# Patient Record
Sex: FEMALE | Race: WHITE | NOT HISPANIC OR LATINO | Employment: FULL TIME | URBAN - METROPOLITAN AREA
[De-identification: names, ages, dates, MRNs, and addresses within clinical notes are randomized per-mention and may not be internally consistent; named-entity substitution may affect disease eponyms.]

---

## 2017-05-04 ENCOUNTER — HOSPITAL ENCOUNTER (OUTPATIENT)
Dept: RADIOLOGY | Facility: MEDICAL CENTER | Age: 37
DRG: 621 | End: 2017-05-04
Attending: COLON & RECTAL SURGERY
Payer: COMMERCIAL

## 2017-05-04 DIAGNOSIS — Z01.810 PRE-OPERATIVE CARDIOVASCULAR EXAMINATION: ICD-10-CM

## 2017-05-04 DIAGNOSIS — Z01.811 PRE-OPERATIVE RESPIRATORY EXAMINATION: ICD-10-CM

## 2017-05-04 DIAGNOSIS — Z01.812 PRE-OPERATIVE LABORATORY EXAMINATION: ICD-10-CM

## 2017-05-04 LAB
25(OH)D3 SERPL-MCNC: 15 NG/ML (ref 30–100)
ALBUMIN SERPL BCP-MCNC: 4.5 G/DL (ref 3.2–4.9)
ALBUMIN/GLOB SERPL: 1.3 G/DL
ALP SERPL-CCNC: 83 U/L (ref 30–99)
ALT SERPL-CCNC: 22 U/L (ref 2–50)
ANION GAP SERPL CALC-SCNC: 11 MMOL/L (ref 0–11.9)
APTT PPP: 27 SEC (ref 24.7–36)
AST SERPL-CCNC: 22 U/L (ref 12–45)
BILIRUB SERPL-MCNC: 1.7 MG/DL (ref 0.1–1.5)
BUN SERPL-MCNC: 14 MG/DL (ref 8–22)
CALCIUM SERPL-MCNC: 10.2 MG/DL (ref 8.5–10.5)
CHLORIDE SERPL-SCNC: 102 MMOL/L (ref 96–112)
CHOLEST SERPL-MCNC: 203 MG/DL (ref 100–199)
CO2 SERPL-SCNC: 23 MMOL/L (ref 20–33)
CREAT SERPL-MCNC: 0.69 MG/DL (ref 0.5–1.4)
EKG IMPRESSION: NORMAL
ERYTHROCYTE [DISTWIDTH] IN BLOOD BY AUTOMATED COUNT: 42.2 FL (ref 35.9–50)
FERRITIN SERPL-MCNC: 49.6 NG/ML (ref 10–291)
FOLATE SERPL-MCNC: >23.3 NG/ML
GFR SERPL CREATININE-BSD FRML MDRD: >60 ML/MIN/1.73 M 2
GLOBULIN SER CALC-MCNC: 3.6 G/DL (ref 1.9–3.5)
GLUCOSE SERPL-MCNC: 69 MG/DL (ref 65–99)
HCT VFR BLD AUTO: 42.1 % (ref 37–47)
HDLC SERPL-MCNC: 51 MG/DL
HGB BLD-MCNC: 13.9 G/DL (ref 12–16)
INR PPP: 0.93 (ref 0.87–1.13)
IRON SATN MFR SERPL: 16 % (ref 15–55)
IRON SERPL-MCNC: 76 UG/DL (ref 40–170)
LDLC SERPL CALC-MCNC: 130 MG/DL
MCH RBC QN AUTO: 28.2 PG (ref 27–33)
MCHC RBC AUTO-ENTMCNC: 33 G/DL (ref 33.6–35)
MCV RBC AUTO: 85.4 FL (ref 81.4–97.8)
PLATELET # BLD AUTO: 408 K/UL (ref 164–446)
PMV BLD AUTO: 10.8 FL (ref 9–12.9)
POTASSIUM SERPL-SCNC: 3.9 MMOL/L (ref 3.6–5.5)
PREALB SERPL-MCNC: 15 MG/DL (ref 18–38)
PROT SERPL-MCNC: 8.1 G/DL (ref 6–8.2)
PROTHROMBIN TIME: 12.8 SEC (ref 12–14.6)
RBC # BLD AUTO: 4.93 M/UL (ref 4.2–5.4)
SODIUM SERPL-SCNC: 136 MMOL/L (ref 135–145)
TIBC SERPL-MCNC: 476 UG/DL (ref 250–450)
TRANSFERRIN SERPL-MCNC: 336 MG/DL (ref 200–370)
TRIGL SERPL-MCNC: 108 MG/DL (ref 0–149)
VIT B12 SERPL-MCNC: 452 PG/ML (ref 211–911)
WBC # BLD AUTO: 7.4 K/UL (ref 4.8–10.8)

## 2017-05-04 PROCEDURE — 83540 ASSAY OF IRON: CPT

## 2017-05-04 PROCEDURE — 84134 ASSAY OF PREALBUMIN: CPT

## 2017-05-04 PROCEDURE — 36415 COLL VENOUS BLD VENIPUNCTURE: CPT

## 2017-05-04 PROCEDURE — 71010 DX-CHEST-LIMITED (1 VIEW): CPT

## 2017-05-04 PROCEDURE — 82607 VITAMIN B-12: CPT

## 2017-05-04 PROCEDURE — 80053 COMPREHEN METABOLIC PANEL: CPT

## 2017-05-04 PROCEDURE — 82306 VITAMIN D 25 HYDROXY: CPT

## 2017-05-04 PROCEDURE — 82746 ASSAY OF FOLIC ACID SERUM: CPT

## 2017-05-04 PROCEDURE — 83550 IRON BINDING TEST: CPT

## 2017-05-04 PROCEDURE — 80061 LIPID PANEL: CPT

## 2017-05-04 PROCEDURE — 85730 THROMBOPLASTIN TIME PARTIAL: CPT

## 2017-05-04 PROCEDURE — 82728 ASSAY OF FERRITIN: CPT

## 2017-05-04 PROCEDURE — 84425 ASSAY OF VITAMIN B-1: CPT

## 2017-05-04 PROCEDURE — 93005 ELECTROCARDIOGRAM TRACING: CPT

## 2017-05-04 PROCEDURE — 93010 ELECTROCARDIOGRAM REPORT: CPT | Performed by: INTERNAL MEDICINE

## 2017-05-04 PROCEDURE — 85610 PROTHROMBIN TIME: CPT

## 2017-05-04 PROCEDURE — 84466 ASSAY OF TRANSFERRIN: CPT

## 2017-05-04 PROCEDURE — 85027 COMPLETE CBC AUTOMATED: CPT

## 2017-05-04 NOTE — OR NURSING
Pre admit apt: Pt. Instructed to continue regularly prescribed medications through day before surgery.  Instructed to take the following medications, the day of surgery, with a sip of water per anesthesia protocol:nexium

## 2017-05-04 NOTE — OR NURSING
Reviewed patient's medical history with Dr. Caal, based upon information available, Dr. Caal indicates that the patient is a candidate to have the procedure on 5/12/17

## 2017-05-06 LAB — VIT B1 BLD-MCNC: 96 NMOL/L (ref 70–180)

## 2017-05-12 ENCOUNTER — HOSPITAL ENCOUNTER (INPATIENT)
Facility: MEDICAL CENTER | Age: 37
LOS: 2 days | DRG: 621 | End: 2017-05-14
Attending: COLON & RECTAL SURGERY | Admitting: COLON & RECTAL SURGERY
Payer: COMMERCIAL

## 2017-05-12 PROBLEM — E66.01 MORBID OBESITY (HCC): Status: ACTIVE | Noted: 2017-05-12

## 2017-05-12 LAB
B-HCG FREE SERPL-ACNC: <5 G/DL
IHCGL IHCGL: NEGATIVE G/DL

## 2017-05-12 PROCEDURE — 160048 HCHG OR STATISTICAL LEVEL 1-5: Performed by: COLON & RECTAL SURGERY

## 2017-05-12 PROCEDURE — 160029 HCHG SURGERY MINUTES - 1ST 30 MINS LEVEL 4: Performed by: COLON & RECTAL SURGERY

## 2017-05-12 PROCEDURE — 700102 HCHG RX REV CODE 250 W/ 637 OVERRIDE(OP): Performed by: PHYSICIAN ASSISTANT

## 2017-05-12 PROCEDURE — 94760 N-INVAS EAR/PLS OXIMETRY 1: CPT

## 2017-05-12 PROCEDURE — 500800 HCHG LAPAROSCOPIC J/L HOOK: Performed by: COLON & RECTAL SURGERY

## 2017-05-12 PROCEDURE — 700101 HCHG RX REV CODE 250

## 2017-05-12 PROCEDURE — 770006 HCHG ROOM/CARE - MED/SURG/GYN SEMI*

## 2017-05-12 PROCEDURE — 501576 HCHG TROCAR, SMTH CAN&SEAL12: Performed by: COLON & RECTAL SURGERY

## 2017-05-12 PROCEDURE — A6402 STERILE GAUZE <= 16 SQ IN: HCPCS | Performed by: COLON & RECTAL SURGERY

## 2017-05-12 PROCEDURE — 500521 HCHG ENDOSTITCH LOAD UNIT: Performed by: COLON & RECTAL SURGERY

## 2017-05-12 PROCEDURE — 500522 HCHG ENDOSTITCH SUTURING DEVICE: Performed by: COLON & RECTAL SURGERY

## 2017-05-12 PROCEDURE — 84702 CHORIONIC GONADOTROPIN TEST: CPT

## 2017-05-12 PROCEDURE — 0BQS4ZZ REPAIR LEFT DIAPHRAGM, PERCUTANEOUS ENDOSCOPIC APPROACH: ICD-10-PCS | Performed by: COLON & RECTAL SURGERY

## 2017-05-12 PROCEDURE — 501583 HCHG TROCAR, THRD CAN&SEAL 5X100: Performed by: COLON & RECTAL SURGERY

## 2017-05-12 PROCEDURE — 0BQR4ZZ REPAIR RIGHT DIAPHRAGM, PERCUTANEOUS ENDOSCOPIC APPROACH: ICD-10-PCS | Performed by: COLON & RECTAL SURGERY

## 2017-05-12 PROCEDURE — 501570 HCHG TROCAR, SEPARATOR: Performed by: COLON & RECTAL SURGERY

## 2017-05-12 PROCEDURE — 700111 HCHG RX REV CODE 636 W/ 250 OVERRIDE (IP)

## 2017-05-12 PROCEDURE — 160035 HCHG PACU - 1ST 60 MINS PHASE I: Performed by: COLON & RECTAL SURGERY

## 2017-05-12 PROCEDURE — 160036 HCHG PACU - EA ADDL 30 MINS PHASE I: Performed by: COLON & RECTAL SURGERY

## 2017-05-12 PROCEDURE — 501571 HCHG TROCAR, SEPARATOR 12X100: Performed by: COLON & RECTAL SURGERY

## 2017-05-12 PROCEDURE — 502240 HCHG MISC OR SUPPLY RC 0272: Performed by: COLON & RECTAL SURGERY

## 2017-05-12 PROCEDURE — 502000 HCHG MISC OR IMPLANTS RC 0278: Performed by: COLON & RECTAL SURGERY

## 2017-05-12 PROCEDURE — 160002 HCHG RECOVERY MINUTES (STAT): Performed by: COLON & RECTAL SURGERY

## 2017-05-12 PROCEDURE — 160009 HCHG ANES TIME/MIN: Performed by: COLON & RECTAL SURGERY

## 2017-05-12 PROCEDURE — 700105 HCHG RX REV CODE 258: Performed by: PHYSICIAN ASSISTANT

## 2017-05-12 PROCEDURE — 502652 HCHG STAPLES, ETHICON ECR60: Performed by: COLON & RECTAL SURGERY

## 2017-05-12 PROCEDURE — 0D164ZA BYPASS STOMACH TO JEJUNUM, PERCUTANEOUS ENDOSCOPIC APPROACH: ICD-10-PCS | Performed by: COLON & RECTAL SURGERY

## 2017-05-12 PROCEDURE — 700111 HCHG RX REV CODE 636 W/ 250 OVERRIDE (IP): Performed by: PHYSICIAN ASSISTANT

## 2017-05-12 PROCEDURE — 501838 HCHG SUTURE GENERAL: Performed by: COLON & RECTAL SURGERY

## 2017-05-12 PROCEDURE — A9270 NON-COVERED ITEM OR SERVICE: HCPCS | Performed by: PHYSICIAN ASSISTANT

## 2017-05-12 PROCEDURE — 160041 HCHG SURGERY MINUTES - EA ADDL 1 MIN LEVEL 4: Performed by: COLON & RECTAL SURGERY

## 2017-05-12 PROCEDURE — 502571 HCHG PACK, LAP CHOLE: Performed by: COLON & RECTAL SURGERY

## 2017-05-12 DEVICE — TISSEEL 4ML ----MUST ORDER A MIN OF 6EA----: Type: IMPLANTABLE DEVICE | Status: FUNCTIONAL

## 2017-05-12 RX ORDER — HALOPERIDOL 5 MG/ML
1 INJECTION INTRAMUSCULAR EVERY 6 HOURS PRN
Status: DISCONTINUED | OUTPATIENT
Start: 2017-05-12 | End: 2017-05-14 | Stop reason: HOSPADM

## 2017-05-12 RX ORDER — SODIUM CHLORIDE, SODIUM LACTATE, POTASSIUM CHLORIDE, CALCIUM CHLORIDE 600; 310; 30; 20 MG/100ML; MG/100ML; MG/100ML; MG/100ML
1000 INJECTION, SOLUTION INTRAVENOUS
Status: DISCONTINUED | OUTPATIENT
Start: 2017-05-12 | End: 2017-05-12

## 2017-05-12 RX ORDER — ONDANSETRON 2 MG/ML
4 INJECTION INTRAMUSCULAR; INTRAVENOUS EVERY 4 HOURS PRN
Status: DISCONTINUED | OUTPATIENT
Start: 2017-05-12 | End: 2017-05-14 | Stop reason: HOSPADM

## 2017-05-12 RX ORDER — DIPHENHYDRAMINE HYDROCHLORIDE 50 MG/ML
25 INJECTION INTRAMUSCULAR; INTRAVENOUS EVERY 6 HOURS PRN
Status: DISCONTINUED | OUTPATIENT
Start: 2017-05-12 | End: 2017-05-12

## 2017-05-12 RX ORDER — SCOLOPAMINE TRANSDERMAL SYSTEM 1 MG/1
1 PATCH, EXTENDED RELEASE TRANSDERMAL
Status: DISCONTINUED | OUTPATIENT
Start: 2017-05-12 | End: 2017-05-14 | Stop reason: HOSPADM

## 2017-05-12 RX ORDER — DIPHENHYDRAMINE HYDROCHLORIDE 50 MG/ML
12.5 INJECTION INTRAMUSCULAR; INTRAVENOUS EVERY 6 HOURS PRN
Status: DISCONTINUED | OUTPATIENT
Start: 2017-05-12 | End: 2017-05-14 | Stop reason: HOSPADM

## 2017-05-12 RX ORDER — ACETAMINOPHEN 650 MG/1
650 SUPPOSITORY RECTAL EVERY 6 HOURS PRN
Status: DISCONTINUED | OUTPATIENT
Start: 2017-05-12 | End: 2017-05-14 | Stop reason: HOSPADM

## 2017-05-12 RX ORDER — HYDROXYZINE HYDROCHLORIDE 25 MG/1
25 TABLET, FILM COATED ORAL EVERY 6 HOURS PRN
Status: DISCONTINUED | OUTPATIENT
Start: 2017-05-12 | End: 2017-05-14 | Stop reason: HOSPADM

## 2017-05-12 RX ORDER — CETIRIZINE HYDROCHLORIDE 10 MG/1
10 TABLET ORAL DAILY
COMMUNITY
End: 2017-07-09

## 2017-05-12 RX ORDER — LORAZEPAM 1 MG/1
0.5 TABLET ORAL EVERY 4 HOURS PRN
COMMUNITY
End: 2017-07-09

## 2017-05-12 RX ORDER — HALOPERIDOL 5 MG/ML
INJECTION INTRAMUSCULAR
Status: COMPLETED
Start: 2017-05-12 | End: 2017-05-12

## 2017-05-12 RX ORDER — DIPHENHYDRAMINE HYDROCHLORIDE 50 MG/ML
25 INJECTION INTRAMUSCULAR; INTRAVENOUS EVERY 6 HOURS PRN
Status: DISCONTINUED | OUTPATIENT
Start: 2017-05-12 | End: 2017-05-14 | Stop reason: HOSPADM

## 2017-05-12 RX ORDER — DIPHENHYDRAMINE HCL 25 MG
25 TABLET ORAL EVERY 6 HOURS PRN
Status: DISCONTINUED | OUTPATIENT
Start: 2017-05-12 | End: 2017-05-12

## 2017-05-12 RX ORDER — CALCIUM CARBONATE 500 MG/1
500 TABLET, CHEWABLE ORAL
Status: DISCONTINUED | OUTPATIENT
Start: 2017-05-12 | End: 2017-05-14 | Stop reason: HOSPADM

## 2017-05-12 RX ORDER — ENALAPRILAT 1.25 MG/ML
2.5 INJECTION INTRAVENOUS EVERY 6 HOURS PRN
Status: DISCONTINUED | OUTPATIENT
Start: 2017-05-12 | End: 2017-05-14 | Stop reason: HOSPADM

## 2017-05-12 RX ORDER — MEPERIDINE HYDROCHLORIDE 25 MG/ML
INJECTION INTRAMUSCULAR; INTRAVENOUS; SUBCUTANEOUS
Status: COMPLETED
Start: 2017-05-12 | End: 2017-05-12

## 2017-05-12 RX ORDER — HYDROMORPHONE HYDROCHLORIDE 2 MG/1
2 TABLET ORAL
Status: DISCONTINUED | OUTPATIENT
Start: 2017-05-12 | End: 2017-05-12

## 2017-05-12 RX ORDER — DIPHENHYDRAMINE HCL 25 MG
25 TABLET ORAL EVERY 6 HOURS PRN
Status: DISCONTINUED | OUTPATIENT
Start: 2017-05-12 | End: 2017-05-14 | Stop reason: HOSPADM

## 2017-05-12 RX ORDER — ACETAMINOPHEN 160 MG/5ML
1000 SUSPENSION ORAL EVERY 6 HOURS
Status: DISCONTINUED | OUTPATIENT
Start: 2017-05-12 | End: 2017-05-12

## 2017-05-12 RX ORDER — LORAZEPAM 0.5 MG/1
0.5 TABLET ORAL EVERY 4 HOURS PRN
Status: DISCONTINUED | OUTPATIENT
Start: 2017-05-12 | End: 2017-05-14 | Stop reason: HOSPADM

## 2017-05-12 RX ADMIN — Medication: at 15:16

## 2017-05-12 RX ADMIN — ONDANSETRON 4 MG: 2 INJECTION INTRAMUSCULAR; INTRAVENOUS at 12:23

## 2017-05-12 RX ADMIN — MEPERIDINE HYDROCHLORIDE: 25 INJECTION INTRAMUSCULAR; INTRAVENOUS; SUBCUTANEOUS at 11:15

## 2017-05-12 RX ADMIN — ENOXAPARIN SODIUM 40 MG: 100 INJECTION SUBCUTANEOUS at 23:18

## 2017-05-12 RX ADMIN — POTASSIUM CHLORIDE: 149 INJECTION, SOLUTION, CONCENTRATE INTRAVENOUS at 12:28

## 2017-05-12 RX ADMIN — SCOPALAMINE 1 PATCH: 1 PATCH, EXTENDED RELEASE TRANSDERMAL at 12:53

## 2017-05-12 RX ADMIN — SODIUM CHLORIDE, SODIUM LACTATE, POTASSIUM CHLORIDE, CALCIUM CHLORIDE 1000 ML: 600; 310; 30; 20 INJECTION, SOLUTION INTRAVENOUS at 08:36

## 2017-05-12 RX ADMIN — LORAZEPAM 0.5 MG: 0.5 TABLET ORAL at 14:27

## 2017-05-12 RX ADMIN — HALOPERIDOL LACTATE 1 MG: 5 INJECTION, SOLUTION INTRAMUSCULAR at 18:48

## 2017-05-12 RX ADMIN — POTASSIUM CHLORIDE: 149 INJECTION, SOLUTION, CONCENTRATE INTRAVENOUS at 19:24

## 2017-05-12 RX ADMIN — HALOPERIDOL LACTATE 1 MG: 5 INJECTION, SOLUTION INTRAMUSCULAR at 10:53

## 2017-05-12 RX ADMIN — ONDANSETRON 4 MG: 2 INJECTION INTRAMUSCULAR; INTRAVENOUS at 16:42

## 2017-05-12 RX ADMIN — ONDANSETRON 4 MG: 2 INJECTION INTRAMUSCULAR; INTRAVENOUS at 21:22

## 2017-05-12 RX ADMIN — HALOPERIDOL LACTATE 1 MG: 5 INJECTION, SOLUTION INTRAMUSCULAR at 11:06

## 2017-05-12 ASSESSMENT — PAIN SCALES - GENERAL
PAINLEVEL_OUTOF10: 0
PAINLEVEL_OUTOF10: 0
PAINLEVEL_OUTOF10: 7
PAINLEVEL_OUTOF10: ASSUMED PAIN PRESENT
PAINLEVEL_OUTOF10: ASSUMED PAIN PRESENT

## 2017-05-12 ASSESSMENT — LIFESTYLE VARIABLES
ALCOHOL_USE: NO
EVER_SMOKED: NEVER

## 2017-05-12 NOTE — IP AVS SNAPSHOT
" <p align=\"LEFT\"><IMG SRC=\"//[email or web address]$/[email or web address]\" alt=\"Image\" WIDTH=\"50%\" HEIGHT=\"200\" BORDER=\"\"></p>                   Name:Farhana Freeman  Medical Record Number:[id number]  CSN: [id number]    YOB: 1980   Age: 37 y.o.  Sex: female  HT:1.575 m (5' 2\") WT: 103 kg (227 lb 1.2 oz)          Admit Date: 5/12/2017     Discharge Date:   Today's Date: 5/14/2017  Attending Doctor:  Shelton Jackson M.D.                  Allergies:  Oxycodone hcl; Morphine; and Sulfa drugs          Follow-up Information     1. Follow up with Shelton Jackson M.D..    Specialties:  Surgery, Radiology    Why:  as per his instructions    Contact information    [address]  [facility] [id number] [phone]           Medication List      Take these Medications        Instructions    albuterol 2.5mg/0.5ml Nebu   Commonly known as:  PROVENTIL    2.5 mg by Nebulization route every four hours as needed for Shortness of Breath.   Dose:  2.5 mg       cetirizine 10 MG Tabs   Commonly known as:  ZYRTEC    Take 10 mg by mouth every day.   Dose:  10 mg       CHILDRENS MULTIVITAMINS PO    Take  by mouth every day at 6 PM.       lorazepam 1 MG Tabs   Commonly known as:  ATIVAN    Take 0.5 mg by mouth every four hours as needed for Anxiety.   Dose:  0.5 mg       METHYL B-12 PO    Take  by mouth every day at 6 PM.       NEXIUM PO    Take 40 mg by mouth.   Dose:  40 mg       NON SPECIFIED    imetabolic multivitamin daily         "

## 2017-05-12 NOTE — OR NURSING
Name, birthday, allergies, NPO status, last void, medication rec, surgical site and procedure verified with patient.  Pt belongings collected and secured in locker.  Pt verbalizes understanding of pain scale, expected plan of care and course of stay.  IV established. Sequentials placed.  Family at bedside.

## 2017-05-12 NOTE — IP AVS SNAPSHOT
" Home Care Instructions                                                                                                                  Name:Farhana Freeman  Medical Record Number:[id number]  CSN: [id number]    YOB: 1980   Age: 37 y.o.  Sex: female  HT:1.575 m (5' 2\") WT: 103 kg (227 lb 1.2 oz)          Admit Date: 5/12/2017     Discharge Date:   Today's Date: 5/14/2017  Attending Doctor:  Shelton Jackson M.D.                  Allergies:  Oxycodone hcl; Morphine; and Sulfa drugs            Discharge Instructions       Discharge Instructions    Discharged to home by car with relative. Discharged via walking, hospital escort: Yes.  Special equipment needed: Not Applicable    Be sure to schedule a follow-up appointment with your primary care doctor or any specialists as instructed.     Discharge Plan: Home  Influenza Vaccine Indication: Patient Refuses    I understand that a diet low in cholesterol, fat, and sodium is recommended for good health. Unless I have been given specific instructions below for another diet, I accept this instruction as my diet prescription.   Other diet: as per Dr. Jackson's instructions    Special Instructions: None    · Is patient discharged on Warfarin / Coumadin?   No     · Is patient Post Blood Transfusion?  No    Depression / Suicide Risk    As you are discharged from this [facility] facility, it is important to learn how to keep safe from harming yourself.    Recognize the warning signs:  · Abrupt changes in personality, positive or negative- including increase in energy   · Giving away possessions  · Change in eating patterns- significant weight changes-  positive or negative  · Change in sleeping patterns- unable to sleep or sleeping all the time   · Unwillingness or inability to communicate  · Depression  · Unusual sadness, discouragement and loneliness  · Talk of wanting to die  · Neglect of personal appearance   · Rebelliousness- reckless behavior  · Withdrawal from " people/activities they love  · Confusion- inability to concentrate     If you or a loved one observes any of these behaviors or has concerns about self-harm, here's what you can do:  · Talk about it- your feelings and reasons for harming yourself  · Remove any means that you might use to hurt yourself (examples: pills, rope, extension cords, firearm)  · Get professional help from the community (Mental Health, Substance Abuse, psychological counseling)  · Do not be alone:Call your Safe Contact- someone whom you trust who will be there for you.  · Call your local CRISIS HOTLINE 532-9040 or 364-257-2393  · Call your local Children's Mobile Crisis Response Team [place] (929) 886-5521 or www.GoLocal24  · Call the toll free National Suicide Prevention Hotlines   · National Suicide Prevention Lifeline 500-421-KHKM (1305)  · COGE[facility] Line Network 800-SUICIDE (876-1678)        Follow-up Information     1. Follow up with Shelton Jackson M.D..    Specialties:  Surgery, Radiology    Why:  as per his instructions    Contact information    [address] [facility] [address]  [place] [address]  [phone]           Discharge Medication Instructions:    Below are the medications your physician expects you to take upon discharge:    Review all your home medications and newly ordered medications with your doctor and/or pharmacist. Follow medication instructions as directed by your doctor and/or pharmacist.    Please keep your medication list with you and share with your physician.               Medication List      CONTINUE taking these medications        Instructions    Morning Afternoon Evening Bedtime    albuterol 2.5mg/0.5ml Nebu   Commonly known as:  PROVENTIL        2.5 mg by Nebulization route every four hours as needed for Shortness of Breath.   Dose:  2.5 mg                        cetirizine 10 MG Tabs   Commonly known as:  ZYRTEC        Take 10 mg by mouth every day.   Dose:  10 mg                        CHILDRENS  MULTIVITAMINS PO        Take  by mouth every day at 6 PM.                        lorazepam 1 MG Tabs   Last time this was given:  0.5 mg on 5/13/2017  8:15 AM   Commonly known as:  ATIVAN        Take 0.5 mg by mouth every four hours as needed for Anxiety.   Dose:  0.5 mg                        METHYL B-12 PO        Take  by mouth every day at 6 PM.                        NEXIUM PO        Take 40 mg by mouth.   Dose:  40 mg                        NON SPECIFIED        imetabolic multivitamin daily                                Instructions           Diet / Nutrition:    Follow any diet instructions given to you by your doctor or the dietician, including how much salt (sodium) you are allowed each day.    If you are overweight, talk to your doctor about a weight reduction plan.    Activity:    Remain physically active following your doctor's instructions about exercise and activity.    Rest often.     Any time you become even a little tired or short of breath, SIT DOWN and rest.    Worsening Symptoms:    Report any of the following signs and symptoms to the doctor's office immediately:    *Pain of jaw, arm, or neck  *Chest pain not relieved by medication                               *Dizziness or loss of consciousness  *Difficulty breathing even when at rest   *More tired than usual                                       *Bleeding drainage or swelling of surgical site  *Swelling of feet, ankles, legs or stomach                 *Fever (>100ºF)  *Pink or blood tinged sputum  *Weight gain (3lbs/day or 5lbs /week)           *Shock from internal defibrillator (if applicable)  *Palpitations or irregular heartbeats                *Cool and/or numb extremities    Stroke Awareness    Common Risk Factors for Stroke include:    Age  Atrial Fibrillation  Carotid Artery Stenosis  Diabetes Mellitus  Excessive alcohol consumption  High blood pressure  Overweight   Physical inactivity  Smoking    Warning signs and symptoms of a  stroke include:    *Sudden numbness or weakness of the face, arm or leg (especially on one side of the body).  *Sudden confusion, trouble speaking or understanding.  *Sudden trouble seeing in one or both eyes.  *Sudden trouble walking, dizziness, loss of balance or coordination.Sudden severe headache with no known cause.    It is very important to get treatment quickly when a stroke occurs. If you experience any of the above warning signs, call 911 immediately.                   Disclaimer         Quit Smoking / Tobacco Use:    I understand the use of any tobacco products increases my chance of suffering from future heart disease or stroke and could cause other illnesses which may shorten my life. Quitting the use of tobacco products is the single most important thing I can do to improve my health. For further information on smoking / tobacco cessation call a Toll Free Quit Line at 1-677.833.5462 (*National Cancer [place]) or 1-328.785.6750 (American Lung Association) or you can access the web based program at www.lungLiquidPiston.org.    [place] Tobacco Users Help Line:  (584) 408-7941       Toll Free: 1-731.960.6412  Quit Tobacco Program [facility] (632)181-8145    Crisis Hotline:    [place] Crisis Hotline:  1-626-UTHRDLG or 1-951.305.2928    [place] Crisis Hotline:    1-411.716.5298 or 598-322-5517    Discharge Survey:   Thank you for choosing [facility]. We hope we did everything we could to make your hospital stay a pleasant one. You may be receiving a phone survey and we would appreciate your time and participation in answering the questions. Your input is very valuable to us in our efforts to improve our service to our patients and their families.        My signature on this form indicates that:    1. I have reviewed and understand the above information.  2. My questions regarding this information have been answered to my satisfaction.  3. I have formulated a plan with my discharge nurse to  obtain my prescribed medications for home.                  Disclaimer         __________________________________                     __________       ________                       Patient Signature                                                 Date                    Time

## 2017-05-12 NOTE — OP REPORT
NAME:  Farhana Freeman  MRN:  [id number]  :  1980      DATE OF OPERATION: 2017    PREOPERATIVE DIAGNOSIS: Morbid Obesity with medical sequelae    POSTOPERATIVE DIAGNOSIS: Morbid Obesity with medical sequelae; Hiatal hernia    OPERATION PERFORMED: 1.  Laparoscopic Danie-en-Y Gastric Bypass 2.   Methylene blue insufflation leak test  3. Hiatal hernia repair    SURGEON: Shelton Jackson MD    ASSISTANT:  Leydi Gonsales PA-C    ANESTHESIOLOGIST:  Tate Spencer MD. , MD    ANESTHESIA: General endotracheal anesthesia.     SPECIMEN: None    ESTIMATED BLOOD LOSS: <10cc.     INDICATIONS: The patient is a 37 y.o. female with a diagnosis of morbid obesity with medical sequelae. She is taken to the operating room today for laparoscopic Danie-en-Y Gastric Bypass.     PROCEDURE: Following informed consent, the patient was properly identified, taken to the operating room, and placed in the supine position where general endotracheal anesthesia was administered. Intravenous antibiotics were administered by the anesthesiologist in the correct time interval. Sequential compression devices were employed. The abdomen was prepped and draped into a sterile field.     A bladeless optical entry trocar was carefully inserted into the abdomen and a pneumoperitoneum was established in the usual fashion.  A bladeless 5 mm separator trocar was introduced. A 5 mm lens/camera was passed into the peritoneal cavity.  Three additional separator trocars were placed under direct vision.  A 5 mm Ran-type liver retractor was placed into position.  This was used to elevate the left sided segment of the liver.  It was secured to the patients right side with a robot arm.  Careful inspection revealed no untoward events with placement of the Veress needle via the first trocar. Adhesiolysis was easily performed to free a free adhesions related to the prior gastric procedure.    Careful examination of the abdomen and pelvis was performed.  An  antecolic antegastric Danie-en-Y gastric procedure was then performed.  The greater omentum was elevated.  The ligament of treitz was identified.  Approximately 30 cm downstream from the ligament of Treitz, the small bowel was divided with the endo mechanical stapler .  A second firing of the endomechanical stapler was used to divide a portion of the mesentery to give mobility on danie limb.  A 100 cm danie limb was carefully measured downstream.  At this position, the small bowel was approximated to the proximal stapled jejunal end with multiple interrupted endo stitches.  The enterotomes were made.  The jejunojejunal anastomosis was created with two firings of the endo mechanical stapler, one in each direction.  The anastomosis was inspected and found to be nicely patent and excellent blood supply and hemostasis.  The enterotomy defect was closed with the endomechanical stapler and reinforced with imbricating seromuscular endostitches with care taken to avoid liminal narrowing.  The mesentery defect was then closed with running and interrupted suture.    The lesser curvature of the stomach was then dissected just adjacent to the stomach wall with care taken to avoid the vagus nerve.  The first firing of the endo mechanical stapler on the stomach was then performed using a 3.5 mm staple depth.  A sight was chosen on the stomach so as to create a very small  Capacity gastric pouch measuring approximately 30 ml total volume.  A 34-Hebrew blunt-tipped bougie was passed down into the gastric pouch.  Three additional endomechanical stapler firings were used to complete the transection of the stomach.  Careful inspection of the staple line demonstrated excellent hemostasis.    The danie limb was brought up and comfortably approximated to the gastric pouch with multiple interrupted endostitches.  Enterotomies were made, the gastrojejunal anastomosis was then created with the endomechanical stapler using a 45 mm cartridge and  3.5 mm staple depth.  The bougie was passed down across the anastomosis and well down into the didier limb.  The gastrotomy/enterotomy defect was then closed with the endomechanical stapler and reinforced with imbricating seromuscular endostitches.  After completion of the healthy, intact, tension free and well-perfused anastomosis, attention was turned to the hiatus.    Careful inspection revealed a midline hiatal hernia. The midline diaphramatic Hiatal Hernia was then repaired with an O-Ethibond Endostitch using the anterior crural technique. The bougie was then removed.      A methylene blue insufflation leak test was performed.  An orogastric tube was inserted just beyond the anastomosis.  Cricoid pressure was held.  The didier limb was occluded.  The gastric pouch and proximal didier limb were then tautly distended with the blue dye.  A careful examination of the anastomosis and gastric pouch showed no extravasation.  After successful test, the dye was aspirated and the orogastric tube was removed.    The ports were removed under direct vision and the pneumoperitoneum was allowed to escape. The port sites were then irrigated well.  The port site skin incisions were closed with interrupted 4-0 Vicryl subcuticular sutures.  Steri-Strips and Benzoin were applied beneath sterile Band-Aids.     The patient tolerated the procedure well and there were no apparent complications. All sponge, needle, and instrument counts were correct on 2 separate occasions. She was awakened, extubated, and transferred to the recovery room in satisfactory condition.       ____________________________________   Shelton Jackson MD  DD: 5/12/2017  12:09 PM    CC:  [facility];

## 2017-05-12 NOTE — IP AVS SNAPSHOT
TestObject Access Code: [email or web address]  Expires: 6/3/2017 11:28 AM    TestObject  A secure, online tool to manage your health information     [facility] [employer]’s TestObject® is a secure, online tool that connects you to your personalized health information from the privacy of your home -- day or night - making it very easy for you to manage your healthcare. Once the activation process is completed, you can even access your medical information using the TestObject aditya, which is available for free in the Apple Aditya store or Google Play store.     TestObject provides the following levels of access (as shown below):   My Chart Features   [facility] [place] [facility] Primary Care Doctor [facility]  Specialists [facility]  Urgent  Care Non-[facility]  Primary Care  Doctor   Email your healthcare team securely and privately 24/7 X X X X   Manage appointments: schedule your next appointment; view details of past/upcoming appointments X      Request prescription refills. X      View recent personal medical records, including lab and immunizations X X X X   View health record, including health history, allergies, medications X X X X   Read reports about your outpatient visits, procedures, consult and ER notes X X X X   See your discharge summary, which is a recap of your hospital and/or ER visit that includes your diagnosis, lab results, and care plan. X X       How to register for TestObject:  1. Go to  [email or web address].  2. Click on the Sign Up Now box, which takes you to the New Member Sign Up page. You will need to provide the following information:  a. Enter your TestObject Access Code exactly as it appears at the top of this page. (You will not need to use this code after you’ve completed the sign-up process. If you do not sign up before the expiration date, you must request a new code.)   b. Enter your date of birth.   c. Enter your home email address.   d. Click Submit, and follow the next screen’s instructions.  3. Create a TestObject ID. This will be your TestObject  login ID and cannot be changed, so think of one that is secure and easy to remember.  4. Create a RedT password. You can change your password at any time.  5. Enter your Password Reset Question and Answer. This can be used at a later time if you forget your password.   6. Enter your e-mail address. This allows you to receive e-mail notifications when new information is available in RedT.  7. Click Sign Up. You can now view your health information.    For assistance activating your RedT account, call [phone]

## 2017-05-12 NOTE — OR NURSING
1048 Drowsy to PACU. LUngs clear with good spontaneous respiratory effort. Abdomen round and and soft. Small amount of oozing observed from incisional sites. Pt reoriented to surroundings, WEEKS's. 1052 C/o Nausea, RX given. 1105 Pt remains nauseated, cool cloth placed on forehead. 1115 Nausea persists, additional medication given. 1135 HR mid 40's at times, blood pressure 114/58 Skin, warm, pink and dry. Discussed with Dr Mcduffie with no intervention at this time. 1140 Pain tolerable and improved slightly. Pt meets criteria for discharge.  1152 Pt transported to floor with RN and CNA.

## 2017-05-12 NOTE — IP AVS SNAPSHOT
5/14/2017    Farhana Freeman  [address] [facility] [address]    Dear Farhana:    On license of [facility] wants to ensure your discharge home is safe and you or your loved ones have had all of your questions answered regarding your care after you leave the hospital.    Below is a list of resources and contact information should you have any questions regarding your hospital stay, follow-up instructions, or active medical symptoms.    Questions or Concerns Regarding… Contact   Medical Questions Related to Your Discharge  (7 days a week, 8am-5pm) Contact a Nurse Care Coordinator   [phone]   Medical Questions Not Related to Your Discharge  (24 hours a day / 7 days a week)  Contact the Nurse Health Line   [phone]    Medications or Discharge Instructions Refer to your discharge packet   or contact your [facility] Primary Care Provider   [phone]   Follow-up Appointment(s) Schedule your appointment via [employer]   or contact Scheduling [phone]   Billing Review your statement via [employer]  or contact Billing [phone]   Medical Records Review your records via [employer]   or contact Medical Records [phone]     You may receive a telephone call within two days of discharge. This call is to make certain you understand your discharge instructions and have the opportunity to have any questions answered. You can also easily access your medical information, test results and upcoming appointments via the Energy[employer]ck free online health management tool. You can learn more and sign up at [employer]/EnergyDeck. For assistance setting up your [employer] account, please call [phone].    Once again, we want to ensure your discharge home is safe and that you have a clear understanding of any next steps in your care. If you have any questions or concerns, please do not hesitate to contact us, we are here for you. Thank you for choosing [facility] for your healthcare needs.    Sincerely,    Your [facility] Healthcare Team

## 2017-05-13 LAB
ALBUMIN SERPL BCP-MCNC: 3.6 G/DL (ref 3.2–4.9)
ALBUMIN/GLOB SERPL: 1.4 G/DL
ALP SERPL-CCNC: 64 U/L (ref 30–99)
ALT SERPL-CCNC: 15 U/L (ref 2–50)
ANION GAP SERPL CALC-SCNC: 9 MMOL/L (ref 0–11.9)
AST SERPL-CCNC: 20 U/L (ref 12–45)
BILIRUB SERPL-MCNC: 1.1 MG/DL (ref 0.1–1.5)
BUN SERPL-MCNC: 6 MG/DL (ref 8–22)
CALCIUM SERPL-MCNC: 9 MG/DL (ref 8.4–10.2)
CHLORIDE SERPL-SCNC: 104 MMOL/L (ref 96–112)
CO2 SERPL-SCNC: 22 MMOL/L (ref 20–33)
CREAT SERPL-MCNC: 0.73 MG/DL (ref 0.5–1.4)
ERYTHROCYTE [DISTWIDTH] IN BLOOD BY AUTOMATED COUNT: 42.5 FL (ref 35.9–50)
GFR SERPL CREATININE-BSD FRML MDRD: >60 ML/MIN/1.73 M 2
GLOBULIN SER CALC-MCNC: 2.6 G/DL (ref 1.9–3.5)
GLUCOSE SERPL-MCNC: 107 MG/DL (ref 65–99)
HCT VFR BLD AUTO: 36.9 % (ref 37–47)
HGB BLD-MCNC: 12.4 G/DL (ref 12–16)
MCH RBC QN AUTO: 28.3 PG (ref 27–33)
MCHC RBC AUTO-ENTMCNC: 33.6 G/DL (ref 33.6–35)
MCV RBC AUTO: 84.2 FL (ref 81.4–97.8)
PLATELET # BLD AUTO: 389 K/UL (ref 164–446)
PMV BLD AUTO: 10.4 FL (ref 9–12.9)
POTASSIUM SERPL-SCNC: 4.3 MMOL/L (ref 3.6–5.5)
PROT SERPL-MCNC: 6.2 G/DL (ref 6–8.2)
RBC # BLD AUTO: 4.38 M/UL (ref 4.2–5.4)
SODIUM SERPL-SCNC: 135 MMOL/L (ref 135–145)
WBC # BLD AUTO: 15.5 K/UL (ref 4.8–10.8)

## 2017-05-13 PROCEDURE — 770006 HCHG ROOM/CARE - MED/SURG/GYN SEMI*

## 2017-05-13 PROCEDURE — 80053 COMPREHEN METABOLIC PANEL: CPT

## 2017-05-13 PROCEDURE — 36415 COLL VENOUS BLD VENIPUNCTURE: CPT

## 2017-05-13 PROCEDURE — 700105 HCHG RX REV CODE 258: Performed by: PHYSICIAN ASSISTANT

## 2017-05-13 PROCEDURE — A9270 NON-COVERED ITEM OR SERVICE: HCPCS | Performed by: COLON & RECTAL SURGERY

## 2017-05-13 PROCEDURE — 700102 HCHG RX REV CODE 250 W/ 637 OVERRIDE(OP): Performed by: PHYSICIAN ASSISTANT

## 2017-05-13 PROCEDURE — 700111 HCHG RX REV CODE 636 W/ 250 OVERRIDE (IP): Performed by: PHYSICIAN ASSISTANT

## 2017-05-13 PROCEDURE — 85027 COMPLETE CBC AUTOMATED: CPT

## 2017-05-13 PROCEDURE — 700102 HCHG RX REV CODE 250 W/ 637 OVERRIDE(OP): Performed by: COLON & RECTAL SURGERY

## 2017-05-13 PROCEDURE — A9270 NON-COVERED ITEM OR SERVICE: HCPCS | Performed by: PHYSICIAN ASSISTANT

## 2017-05-13 RX ADMIN — ENOXAPARIN SODIUM 40 MG: 100 INJECTION SUBCUTANEOUS at 08:15

## 2017-05-13 RX ADMIN — ONDANSETRON 4 MG: 2 INJECTION INTRAMUSCULAR; INTRAVENOUS at 01:36

## 2017-05-13 RX ADMIN — ENOXAPARIN SODIUM 40 MG: 100 INJECTION SUBCUTANEOUS at 20:32

## 2017-05-13 RX ADMIN — DIPHENHYDRAMINE HCL 25 MG: 25 TABLET ORAL at 09:49

## 2017-05-13 RX ADMIN — POTASSIUM CHLORIDE: 149 INJECTION, SOLUTION, CONCENTRATE INTRAVENOUS at 16:48

## 2017-05-13 RX ADMIN — POTASSIUM CHLORIDE: 149 INJECTION, SOLUTION, CONCENTRATE INTRAVENOUS at 01:39

## 2017-05-13 RX ADMIN — LORAZEPAM 0.5 MG: 0.5 TABLET ORAL at 08:15

## 2017-05-13 RX ADMIN — ONDANSETRON 4 MG: 2 INJECTION INTRAMUSCULAR; INTRAVENOUS at 10:54

## 2017-05-13 RX ADMIN — HALOPERIDOL LACTATE 1 MG: 5 INJECTION, SOLUTION INTRAMUSCULAR at 08:23

## 2017-05-13 RX ADMIN — ONDANSETRON 4 MG: 2 INJECTION INTRAMUSCULAR; INTRAVENOUS at 16:48

## 2017-05-13 RX ADMIN — ONDANSETRON 4 MG: 2 INJECTION INTRAMUSCULAR; INTRAVENOUS at 05:45

## 2017-05-13 ASSESSMENT — ENCOUNTER SYMPTOMS
ABDOMINAL PAIN: 1
VOMITING: 1
COUGH: 0
NAUSEA: 1
SHORTNESS OF BREATH: 0
CHILLS: 0
FEVER: 0
DIARRHEA: 0
HEARTBURN: 0

## 2017-05-13 ASSESSMENT — PAIN SCALES - GENERAL
PAINLEVEL_OUTOF10: 0
PAINLEVEL_OUTOF10: 0
PAINLEVEL_OUTOF10: 5

## 2017-05-13 NOTE — CARE PLAN
Problem: Pain Management  Goal: Pain level will decrease to patient’s comfort goal  Outcome: PROGRESSING AS EXPECTED  Intervention: Follow pain managment plan developed in collaboration with patient and Interdisciplinary Team  Pain assessment q4h or q2h after medication intervention.  Encourage patient to report pain, verbalize understanding. Medicate PRN      Problem: Venous Thromboembolism (VTW)/Deep Vein Thrombosis (DVT) Prevention:  Goal: Patient will participate in Venous Thrombosis (VTE)/Deep Vein Thrombosis (DVT)Prevention Measures  Outcome: PROGRESSING AS EXPECTED  Intervention: Encourage patient to perform ankle flex, foot rotation, and knee flex exercises in addition to other prophylatic measures every hour while awake  SCDs in place.  Encourage to perform flexion of the feet while in bed and awake, verbalize understanding.  Encourage ambulation TID.

## 2017-05-13 NOTE — PROGRESS NOTES
Requesting ativan for anxiety,took pill with sip of water,shoprtly after vomited up water.pill not vomited up.    1642 nauseated-medicated with zofran,pca dilaudid set-up earlier.has used a few times so far with good results.voided 250 cc

## 2017-05-13 NOTE — DIETARY
BMI - Pt with BMI >40 (41.52).   Pt s/p Gastric Bypass, anticipate follow-up with MD/RD post discharge.

## 2017-05-13 NOTE — PROGRESS NOTES
Received from pacu via bed.drowsy but easily awakens.  Iv infusing.pain 0/10   Lap stab sites x 5 with small amt bloody drainage.  Nauseated zofran given.  1253-pt nauseated vomited small amt bloody mucous-scopalamine patch applied.

## 2017-05-13 NOTE — DISCHARGE PLANNING
Medical Social Work    Referral: Pt discussed at IDT rounds this AM.    Intervention: Per lucas, pt lives with spouse.  No SS needs identified nor any requests for BRIAN Napier during rounds.      Plan: SW available for any assistance with d.c planning.    Care Transition Team Assessment    Information Source  Orientation : Oriented x 4  Information Given By: Patient    Readmission Evaluation  Is this a readmission?: No    Elopement Risk  Legal Hold: No  Elopement Risk: Not at Risk for Elopement    Interdisciplinary Discharge Planning  Does Admitting Nurse Feel This Could be a Complex Discharge?: No  Able to Return to Previous ADL's: Yes  Mobility Issues: No    Discharge Preparedness  What is your plan after discharge?: Home with help  What are your discharge supports?: Spouse         Finances  Prescription Coverage: Yes    Vision / Hearing Impairment  Right Eye Vision: Wears Glasses  Left Eye Vision: Wears Glasses    Values / Beliefs / Concerns  Spiritual Requests During Hospitalization: No    Advance Directive  Advance Directive?: None    Domestic Abuse  Have you ever been the victim of abuse or violence?: No  Physical Abuse or Sexual Abuse: No  Verbal Abuse or Emotional Abuse: No              Anticipated Discharge Information  Anticipated discharge disposition: Home

## 2017-05-13 NOTE — PROGRESS NOTES
Surgical Progress Note    Author: Leydi Gonsales Date & Time created: 2017   10:25 AM     Interval Events:  Pt doing okay POD #1 s/p gastric bypass. Admits to nausea and vomiting. Pt voiding, ambulating. Some abdominal pain. Pt is laying in bed. Alert and oriented. NAD. Breathing unlabored. Abdomen soft, somewhat tender, mildly distended. Incisions clean, dry, intact.   Review of Systems   Constitutional: Negative for fever and chills.   Respiratory: Negative for cough and shortness of breath.    Gastrointestinal: Positive for nausea, vomiting and abdominal pain. Negative for heartburn and diarrhea.   Skin: Negative for itching and rash.     Hemodynamics:  Temp (24hrs), Av.8 °C (98.2 °F), Min:36.5 °C (97.7 °F), Max:37 °C (98.6 °F)  Temperature: 36.6 °C (97.9 °F)  Pulse  Av.4  Min: 47  Max: 92Heart Rate (Monitored): (!) 48  Blood Pressure: 136/65 mmHg, NIBP: 114/58 mmHg     Respiratory:    Respiration: 20, Pulse Oximetry: 99 %, O2 Daily Delivery Respiratory : Room Air with O2 Available        RUL Breath Sounds: Clear, RML Breath Sounds: Clear, RLL Breath Sounds: Clear, RL Breath Sounds: Clear, LLL Breath Sounds: Clear  Neuro:  GCS       Fluids:    Intake/Output Summary (Last 24 hours) at 17 1025  Last data filed at 17 0700   Gross per 24 hour   Intake   1642 ml   Output   2100 ml   Net   -458 ml        Current Diet Order   Procedures   • Diet Order     Physical Exam   Constitutional: She is oriented to person, place, and time. She appears well-developed and well-nourished. No distress.   Cardiovascular: Normal rate.    Pulmonary/Chest: Effort normal. No respiratory distress.   Abdominal: Soft. She exhibits distension. She exhibits no mass. There is tenderness. There is no rebound and no guarding.   Neurological: She is alert and oriented to person, place, and time.   Skin: Skin is warm and dry. No rash noted. She is not diaphoretic. No erythema.   Psychiatric: She has a normal mood and affect.  Her behavior is normal.   Vitals reviewed.    Labs:  Recent Results (from the past 24 hour(s))   CBC without Differential (blood)    Collection Time: 05/13/17  5:13 AM   Result Value Ref Range    WBC 15.5 (H) 4.8 - 10.8 K/uL    RBC 4.38 4.20 - 5.40 M/uL    Hemoglobin 12.4 12.0 - 16.0 g/dL    Hematocrit 36.9 (L) 37.0 - 47.0 %    MCV 84.2 81.4 - 97.8 fL    MCH 28.3 27.0 - 33.0 pg    MCHC 33.6 33.6 - 35.0 g/dL    RDW 42.5 35.9 - 50.0 fL    Platelet Count 389 164 - 446 K/uL    MPV 10.4 9.0 - 12.9 fL   Comp Metabolic Panel (CMP)    Collection Time: 05/13/17  5:13 AM   Result Value Ref Range    Sodium 135 135 - 145 mmol/L    Potassium 4.3 3.6 - 5.5 mmol/L    Chloride 104 96 - 112 mmol/L    Co2 22 20 - 33 mmol/L    Anion Gap 9.0 0.0 - 11.9    Glucose 107 (H) 65 - 99 mg/dL    Bun 6 (L) 8 - 22 mg/dL    Creatinine 0.73 0.50 - 1.40 mg/dL    Calcium 9.0 8.4 - 10.2 mg/dL    AST(SGOT) 20 12 - 45 U/L    ALT(SGPT) 15 2 - 50 U/L    Alkaline Phosphatase 64 30 - 99 U/L    Total Bilirubin 1.1 0.1 - 1.5 mg/dL    Albumin 3.6 3.2 - 4.9 g/dL    Total Protein 6.2 6.0 - 8.2 g/dL    Globulin 2.6 1.9 - 3.5 g/dL    A-G Ratio 1.4 g/dL   ESTIMATED GFR    Collection Time: 05/13/17  5:13 AM   Result Value Ref Range    GFR If African American >60 >60 mL/min/1.73 m 2    GFR If Non African American >60 >60 mL/min/1.73 m 2     Medical Decision Making, by Problem:  Active Hospital Problems    Diagnosis   • Morbid obesity (CMS-HCC) [E66.01]     Plan:  Pt doing okay POD #1 s/p gastric bypass. Admits to nausea and vomiting. Pt voiding, ambulating. Some abdominal pain. Pt is laying in bed. Alert and oriented. NAD. Breathing unlabored. Abdomen soft, somewhat tender, mildly distended. Incisions clean, dry, intact. Labs reviewed. VS reviewed. Plan to stay one more night. Will continue to monitor.     Quality Measures:  Labs reviewed and Medications reviewed  Boston catheter: No Boston      DVT Prophylaxis: Enoxaparin (Lovenox)  DVT prophylaxis - mechanical:  SCDs  Ulcer prophylaxis: Yes          Discussed patient condition with RN, Patient and Dr. Jackson.

## 2017-05-13 NOTE — CARE PLAN
Problem: Safety  Goal: Will remain free from injury  Outcome: PROGRESSING AS EXPECTED  Pt will remain injury free  Call lght in reach  Hourly rounding    Problem: Fluid Volume:  Goal: Will maintain balanced intake and output  Outcome: PROGRESSING AS EXPECTED  Monitor i&o as ordered  Iv fluids as ordered

## 2017-05-13 NOTE — PROGRESS NOTES
Received report from night RN.  Assumed pt care.  Pt alert and oriented x 4.  Pt c/o nausea, will medicate per MAR.  C/O mild pain 3/10, tolerable by patient.  Will continue with care.

## 2017-05-14 VITALS
BODY MASS INDEX: 41.79 KG/M2 | HEIGHT: 62 IN | RESPIRATION RATE: 18 BRPM | SYSTOLIC BLOOD PRESSURE: 127 MMHG | WEIGHT: 227.07 LBS | DIASTOLIC BLOOD PRESSURE: 73 MMHG | TEMPERATURE: 97.3 F | HEART RATE: 67 BPM | OXYGEN SATURATION: 97 %

## 2017-05-14 LAB
ALBUMIN SERPL BCP-MCNC: 3.6 G/DL (ref 3.2–4.9)
ALBUMIN/GLOB SERPL: 1.3 G/DL
ALP SERPL-CCNC: 63 U/L (ref 30–99)
ALT SERPL-CCNC: 15 U/L (ref 2–50)
ANION GAP SERPL CALC-SCNC: 11 MMOL/L (ref 0–11.9)
AST SERPL-CCNC: 17 U/L (ref 12–45)
BILIRUB SERPL-MCNC: 1.4 MG/DL (ref 0.1–1.5)
BUN SERPL-MCNC: 6 MG/DL (ref 8–22)
CALCIUM SERPL-MCNC: 9.1 MG/DL (ref 8.4–10.2)
CHLORIDE SERPL-SCNC: 101 MMOL/L (ref 96–112)
CO2 SERPL-SCNC: 27 MMOL/L (ref 20–33)
CREAT SERPL-MCNC: 0.7 MG/DL (ref 0.5–1.4)
GFR SERPL CREATININE-BSD FRML MDRD: >60 ML/MIN/1.73 M 2
GLOBULIN SER CALC-MCNC: 2.7 G/DL (ref 1.9–3.5)
GLUCOSE SERPL-MCNC: 87 MG/DL (ref 65–99)
POTASSIUM SERPL-SCNC: 3.9 MMOL/L (ref 3.6–5.5)
PROT SERPL-MCNC: 6.3 G/DL (ref 6–8.2)
SODIUM SERPL-SCNC: 139 MMOL/L (ref 135–145)

## 2017-05-14 PROCEDURE — 36415 COLL VENOUS BLD VENIPUNCTURE: CPT

## 2017-05-14 PROCEDURE — 700111 HCHG RX REV CODE 636 W/ 250 OVERRIDE (IP): Performed by: PHYSICIAN ASSISTANT

## 2017-05-14 PROCEDURE — 700105 HCHG RX REV CODE 258: Performed by: PHYSICIAN ASSISTANT

## 2017-05-14 PROCEDURE — 80053 COMPREHEN METABOLIC PANEL: CPT

## 2017-05-14 PROCEDURE — A9270 NON-COVERED ITEM OR SERVICE: HCPCS | Performed by: PHYSICIAN ASSISTANT

## 2017-05-14 PROCEDURE — 700102 HCHG RX REV CODE 250 W/ 637 OVERRIDE(OP): Performed by: PHYSICIAN ASSISTANT

## 2017-05-14 RX ADMIN — POTASSIUM CHLORIDE: 149 INJECTION, SOLUTION, CONCENTRATE INTRAVENOUS at 01:28

## 2017-05-14 RX ADMIN — ENOXAPARIN SODIUM 40 MG: 100 INJECTION SUBCUTANEOUS at 08:29

## 2017-05-14 RX ADMIN — HYDROCODONE BITARTRATE AND ACETAMINOPHEN 15 ML: 7.5; 325 SOLUTION ORAL at 06:44

## 2017-05-14 ASSESSMENT — ENCOUNTER SYMPTOMS
SHORTNESS OF BREATH: 0
DIZZINESS: 0
COUGH: 0
FEVER: 0
VOMITING: 0
NECK PAIN: 0
CONSTITUTIONAL NEGATIVE: 1
PALPITATIONS: 0
BACK PAIN: 0
CHILLS: 0
NAUSEA: 0
ABDOMINAL PAIN: 0
HEADACHES: 0

## 2017-05-14 NOTE — CARE PLAN
Problem: Pain Management  Goal: Pain level will decrease to patient’s comfort goal  Outcome: PROGRESSING AS EXPECTED  Intervention: Follow pain managment plan developed in collaboration with patient and Interdisciplinary Team  Pain assessment q4h or q2h after medication intervention.  Encourage patient to report pain, verbalize understanding. Medicate PRN      Problem: Infection  Goal: Will remain free from infection  Outcome: PROGRESSING AS EXPECTED  Intervention: Implement standard precautions and perform hand washing before and after patient contact  Hand washing every encounter. IV ports scrubbed with alcohol when hanging medicine. Patient watch for s/s of infection. Patient taucht to report s/s of infection, verbalizes understanding.

## 2017-05-14 NOTE — DISCHARGE INSTRUCTIONS
Discharge Instructions    Discharged to home by car with relative. Discharged via walking, hospital escort: Yes.  Special equipment needed: Not Applicable    Be sure to schedule a follow-up appointment with your primary care doctor or any specialists as instructed.     Discharge Plan: Home  Influenza Vaccine Indication: Patient Refuses    I understand that a diet low in cholesterol, fat, and sodium is recommended for good health. Unless I have been given specific instructions below for another diet, I accept this instruction as my diet prescription.   Other diet: as per Dr. Jackson's instructions    Special Instructions: None    · Is patient discharged on Warfarin / Coumadin?   No     · Is patient Post Blood Transfusion?  No    Depression / Suicide Risk    As you are discharged from this [facility] facility, it is important to learn how to keep safe from harming yourself.    Recognize the warning signs:  · Abrupt changes in personality, positive or negative- including increase in energy   · Giving away possessions  · Change in eating patterns- significant weight changes-  positive or negative  · Change in sleeping patterns- unable to sleep or sleeping all the time   · Unwillingness or inability to communicate  · Depression  · Unusual sadness, discouragement and loneliness  · Talk of wanting to die  · Neglect of personal appearance   · Rebelliousness- reckless behavior  · Withdrawal from people/activities they love  · Confusion- inability to concentrate     If you or a loved one observes any of these behaviors or has concerns about self-harm, here's what you can do:  · Talk about it- your feelings and reasons for harming yourself  · Remove any means that you might use to hurt yourself (examples: pills, rope, extension cords, firearm)  · Get professional help from the community (Mental Health, Substance Abuse, psychological counseling)  · Do not be alone:Call your Safe Contact- someone whom you trust who will be  there for you.  · Call your local CRISIS HOTLINE 502-6012 or 543-789-9390  · Call your local Children's Mobile Crisis Response Team [place] (824) 027-3677 or www.[employer]  · Call the toll free National Suicide Prevention Hotlines   · National Suicide Prevention Lifeline 460-904-HHEO (1985)  · National [employer] Line Network 800-SUICIDE (229-0978)

## 2017-05-14 NOTE — PROGRESS NOTES
Surgical Progress Note    Author: Kade Jamil Date & Time created: 2017   6:12 AM     Interval Events:  Pt is POD 2 LRYGB.  She is OOB walking in room and halls.  She is in good spirits and excited about going home this morning.  She denies pain.  She denies N/V.  She voiding well.   Review of Systems   Constitutional: Negative.  Negative for fever and chills.   HENT: Negative.    Respiratory: Negative for cough and shortness of breath.    Cardiovascular: Negative for chest pain and palpitations.   Gastrointestinal: Negative for nausea, vomiting and abdominal pain.   Musculoskeletal: Negative for back pain and neck pain.   Skin: Negative for rash.   Neurological: Negative for dizziness and headaches.     Hemodynamics:  Temp (24hrs), Av.8 °C (98.3 °F), Min:36.5 °C (97.7 °F), Max:37.4 °C (99.4 °F)  Temperature: 36.7 °C (98.1 °F)  Pulse  Av.3  Min: 47  Max: 92  Blood Pressure: 116/65 mmHg     Respiratory:    Respiration: 18, Pulse Oximetry: 96 %        RUL Breath Sounds: Clear, RML Breath Sounds: Clear, RLL Breath Sounds: Clear, RL Breath Sounds: Clear, LLL Breath Sounds: Clear  Neuro:  GCS       Fluids:    Intake/Output Summary (Last 24 hours) at 17 0612  Last data filed at 17 2000   Gross per 24 hour   Intake   1625 ml   Output   1700 ml   Net    -75 ml        Current Diet Order   Procedures   • Diet Order     Physical Exam   Constitutional: She is oriented to person, place, and time. She appears well-developed and well-nourished. No distress.   HENT:   Head: Normocephalic and atraumatic.   Eyes: Conjunctivae and EOM are normal. Pupils are equal, round, and reactive to light.   Neck: Normal range of motion. Neck supple.   Pulmonary/Chest: No respiratory distress.   Abdominal: Soft. She exhibits no distension. There is no tenderness. There is no rebound and no guarding.   Wounds are Clean Dry and Intact   Musculoskeletal: Normal range of motion.   Neurological: She is alert and oriented  to person, place, and time.   Skin: Skin is warm and dry.   Psychiatric: She has a normal mood and affect.   Nursing note and vitals reviewed.    Labs:  Recent Results (from the past 24 hour(s))   COMP METABOLIC PANEL    Collection Time: 05/14/17  4:53 AM   Result Value Ref Range    Sodium 139 135 - 145 mmol/L    Potassium 3.9 3.6 - 5.5 mmol/L    Chloride 101 96 - 112 mmol/L    Co2 27 20 - 33 mmol/L    Anion Gap 11.0 0.0 - 11.9    Glucose 87 65 - 99 mg/dL    Bun 6 (L) 8 - 22 mg/dL    Creatinine 0.70 0.50 - 1.40 mg/dL    Calcium 9.1 8.4 - 10.2 mg/dL    AST(SGOT) 17 12 - 45 U/L    ALT(SGPT) 15 2 - 50 U/L    Alkaline Phosphatase 63 30 - 99 U/L    Total Bilirubin 1.4 0.1 - 1.5 mg/dL    Albumin 3.6 3.2 - 4.9 g/dL    Total Protein 6.3 6.0 - 8.2 g/dL    Globulin 2.7 1.9 - 3.5 g/dL    A-G Ratio 1.3 g/dL   ESTIMATED GFR    Collection Time: 05/14/17  4:53 AM   Result Value Ref Range    GFR If African American >60 >60 mL/min/1.73 m 2    GFR If Non African American >60 >60 mL/min/1.73 m 2     Medical Decision Making, by Problem:  Active Hospital Problems    Diagnosis   • Morbid obesity (CMS-HCC) [E66.01]     Plan:  Counseled patient on diet, actively level, and progress this far.  Will wean off IV medication and IV fluids and transition to PO.  OK for home later today if comfortable and tolerating PO well.      Quality Measures:  Labs reviewed and Medications reviewed  Boston catheter: No Boston      DVT Prophylaxis: Enoxaparin (Lovenox)  DVT prophylaxis - mechanical: SCDs  Ulcer prophylaxis: Yes          Discussed patient condition with Dr Jackson

## 2017-07-09 ENCOUNTER — HOSPITAL ENCOUNTER (EMERGENCY)
Facility: MEDICAL CENTER | Age: 37
End: 2017-07-09
Attending: EMERGENCY MEDICINE
Payer: COMMERCIAL

## 2017-07-09 ENCOUNTER — APPOINTMENT (OUTPATIENT)
Dept: RADIOLOGY | Facility: MEDICAL CENTER | Age: 37
End: 2017-07-09
Attending: EMERGENCY MEDICINE
Payer: COMMERCIAL

## 2017-07-09 VITALS
BODY MASS INDEX: 37.73 KG/M2 | OXYGEN SATURATION: 98 % | WEIGHT: 205.03 LBS | TEMPERATURE: 98 F | SYSTOLIC BLOOD PRESSURE: 112 MMHG | RESPIRATION RATE: 18 BRPM | DIASTOLIC BLOOD PRESSURE: 68 MMHG | HEIGHT: 62 IN | HEART RATE: 58 BPM

## 2017-07-09 DIAGNOSIS — R10.13 EPIGASTRIC PAIN: ICD-10-CM

## 2017-07-09 LAB
ALBUMIN SERPL BCP-MCNC: 4 G/DL (ref 3.2–4.9)
ALBUMIN/GLOB SERPL: 1.3 G/DL
ALP SERPL-CCNC: 96 U/L (ref 30–99)
ALT SERPL-CCNC: 21 U/L (ref 2–50)
ANION GAP SERPL CALC-SCNC: 8 MMOL/L (ref 0–11.9)
AST SERPL-CCNC: 18 U/L (ref 12–45)
BASOPHILS # BLD AUTO: 0.8 % (ref 0–1.8)
BASOPHILS # BLD: 0.06 K/UL (ref 0–0.12)
BILIRUB SERPL-MCNC: 1.4 MG/DL (ref 0.1–1.5)
BUN SERPL-MCNC: 6 MG/DL (ref 8–22)
CALCIUM SERPL-MCNC: 9.3 MG/DL (ref 8.4–10.2)
CHLORIDE SERPL-SCNC: 107 MMOL/L (ref 96–112)
CO2 SERPL-SCNC: 24 MMOL/L (ref 20–33)
CREAT SERPL-MCNC: 0.65 MG/DL (ref 0.5–1.4)
EOSINOPHIL # BLD AUTO: 0.16 K/UL (ref 0–0.51)
EOSINOPHIL NFR BLD: 2.1 % (ref 0–6.9)
ERYTHROCYTE [DISTWIDTH] IN BLOOD BY AUTOMATED COUNT: 45.1 FL (ref 35.9–50)
GFR SERPL CREATININE-BSD FRML MDRD: >60 ML/MIN/1.73 M 2
GLOBULIN SER CALC-MCNC: 3 G/DL (ref 1.9–3.5)
GLUCOSE SERPL-MCNC: 90 MG/DL (ref 65–99)
HCT VFR BLD AUTO: 39.9 % (ref 37–47)
HGB BLD-MCNC: 13.3 G/DL (ref 12–16)
IMM GRANULOCYTES # BLD AUTO: 0.02 K/UL (ref 0–0.11)
IMM GRANULOCYTES NFR BLD AUTO: 0.3 % (ref 0–0.9)
LIPASE SERPL-CCNC: 12 U/L (ref 7–58)
LYMPHOCYTES # BLD AUTO: 2.08 K/UL (ref 1–4.8)
LYMPHOCYTES NFR BLD: 26.9 % (ref 22–41)
MCH RBC QN AUTO: 28.5 PG (ref 27–33)
MCHC RBC AUTO-ENTMCNC: 33.3 G/DL (ref 33.6–35)
MCV RBC AUTO: 85.6 FL (ref 81.4–97.8)
MONOCYTES # BLD AUTO: 0.44 K/UL (ref 0–0.85)
MONOCYTES NFR BLD AUTO: 5.7 % (ref 0–13.4)
NEUTROPHILS # BLD AUTO: 4.97 K/UL (ref 2–7.15)
NEUTROPHILS NFR BLD: 64.2 % (ref 44–72)
NRBC # BLD AUTO: 0 K/UL
NRBC BLD AUTO-RTO: 0 /100 WBC
PLATELET # BLD AUTO: 368 K/UL (ref 164–446)
PMV BLD AUTO: 10.7 FL (ref 9–12.9)
POTASSIUM SERPL-SCNC: 3.6 MMOL/L (ref 3.6–5.5)
PROT SERPL-MCNC: 7 G/DL (ref 6–8.2)
RBC # BLD AUTO: 4.66 M/UL (ref 4.2–5.4)
SODIUM SERPL-SCNC: 139 MMOL/L (ref 135–145)
WBC # BLD AUTO: 7.7 K/UL (ref 4.8–10.8)

## 2017-07-09 PROCEDURE — 99284 EMERGENCY DEPT VISIT MOD MDM: CPT

## 2017-07-09 PROCEDURE — 36415 COLL VENOUS BLD VENIPUNCTURE: CPT

## 2017-07-09 PROCEDURE — 83690 ASSAY OF LIPASE: CPT

## 2017-07-09 PROCEDURE — 80053 COMPREHEN METABOLIC PANEL: CPT

## 2017-07-09 PROCEDURE — 74022 RADEX COMPL AQT ABD SERIES: CPT

## 2017-07-09 PROCEDURE — 85025 COMPLETE CBC W/AUTO DIFF WBC: CPT

## 2017-07-09 PROCEDURE — 700105 HCHG RX REV CODE 258: Performed by: EMERGENCY MEDICINE

## 2017-07-09 PROCEDURE — 700102 HCHG RX REV CODE 250 W/ 637 OVERRIDE(OP): Performed by: EMERGENCY MEDICINE

## 2017-07-09 PROCEDURE — A9270 NON-COVERED ITEM OR SERVICE: HCPCS | Performed by: EMERGENCY MEDICINE

## 2017-07-09 RX ORDER — OMEPRAZOLE 40 MG/1
40 CAPSULE, DELAYED RELEASE ORAL DAILY
Qty: 30 CAP | Refills: 0 | Status: SHIPPED | OUTPATIENT
Start: 2017-07-09 | End: 2017-08-28

## 2017-07-09 RX ORDER — POLYETHYLENE GLYCOL 3350 17 G/17G
17 POWDER, FOR SOLUTION ORAL DAILY
Status: SHIPPED | COMMUNITY
End: 2017-08-28

## 2017-07-09 RX ORDER — SODIUM CHLORIDE 9 MG/ML
1000 INJECTION, SOLUTION INTRAVENOUS ONCE
Status: COMPLETED | OUTPATIENT
Start: 2017-07-09 | End: 2017-07-09

## 2017-07-09 RX ORDER — PROMETHAZINE HYDROCHLORIDE 25 MG/1
25 TABLET ORAL ONCE
Status: COMPLETED | OUTPATIENT
Start: 2017-07-09 | End: 2017-07-09

## 2017-07-09 RX ORDER — DICYCLOMINE HCL 20 MG
20 TABLET ORAL EVERY 6 HOURS
Qty: 120 TAB | Refills: 0 | Status: SHIPPED | OUTPATIENT
Start: 2017-07-09 | End: 2017-08-06

## 2017-07-09 RX ORDER — M-VIT,TX,IRON,MINS/CALC/FOLIC 27MG-0.4MG
1 TABLET ORAL DAILY
Status: SHIPPED | COMMUNITY
End: 2020-03-27

## 2017-07-09 RX ADMIN — PROMETHAZINE HYDROCHLORIDE 25 MG: 25 TABLET ORAL at 12:41

## 2017-07-09 RX ADMIN — SODIUM CHLORIDE 1000 ML: 9 INJECTION, SOLUTION INTRAVENOUS at 12:40

## 2017-07-09 ASSESSMENT — PAIN SCALES - GENERAL
PAINLEVEL_OUTOF10: 5
PAINLEVEL_OUTOF10: 6

## 2017-07-09 NOTE — ED PROVIDER NOTES
"ED Provider Note    CHIEF COMPLAINT  Chief Complaint   Patient presents with   • Abdominal Pain   • Nausea       HPI  Farhana Freeman is a 37 y.o. female who presents to the emergency department with abdominal discomfort. The patient has been having the abdominal pain over the last several weeks. She had gastric bypass surgery on May 12. The patient does have associated nausea. She states the pain is in epigastric region and described as sharp. She states that she is out of her pain medication at this time. She has chronic loose stools but no change in bowel or bladder function. She has not had any associated fevers.    REVIEW OF SYSTEMS  See HPI for further details. All other systems are negative.     PAST MEDICAL HISTORY  Past Medical History   Diagnosis Date   • Migraine    • Heart burn    • Anesthesia      severe n/v with morphine   • ASTHMA 5/17     Inhalers as needed, last use 3/17       SOCIAL HISTORY  Social History     Social History   • Marital Status:      Spouse Name: N/A   • Number of Children: N/A   • Years of Education: N/A     Social History Main Topics   • Smoking status: Never Smoker    • Smokeless tobacco: Never Used   • Alcohol Use: No   • Drug Use: No   • Sexual Activity: Not Asked     Other Topics Concern   • None     Social History Narrative           PHYSICAL EXAM  VITAL SIGNS: /81 mmHg  Pulse 66  Temp(Src) 36.7 °C (98 °F)  Resp 20  Ht 1.575 m (5' 2\")  Wt 93 kg (205 lb 0.4 oz)  BMI 37.49 kg/m2  SpO2 99%  LMP 06/25/2017  Constitutional: Well developed, Well nourished, No acute distress, Non-toxic appearance.   HENT: Normocephalic, Atraumatic, tympanic membranes are intact and nonerythematous bilaterally, Oropharynx moist without exudates or erythema, Nose normal.   Eyes: PERRLA, EOMI, Conjunctiva normal.  Neck: Supple without meningismus  Lymphatic: No lymphadenopathy noted.   Cardiovascular: Normal heart rate, Normal rhythm, No murmurs, No rubs, No gallops.   Thorax " & Lungs: Normal breath sounds, No respiratory distress, No wheezing, No chest tenderness.   Abdomen: Epigastric discomfort to deep palpation, no rebound, no guarding, normal bowel sounds.   Skin: Warm, Dry, No erythema, No rash.   Back: No tenderness, No CVA tenderness.   Extremities: Atraumatic with symmetric distal pulses, No edema, No tenderness, No cyanosis, No clubbing.   Neurologic: Alert & oriented x 3, cranial nerves II through XII are intact, Normal motor function, Normal sensory function, No focal deficits noted.   Psychiatric: Affect normal, Judgment normal, Mood normal.     RADIOLOGY/PROCEDURES  DX-ABDOMEN COMPLETE WITH AP OR PA CXR   Final Result         1. No specific finding to suggest small bowel obstruction.            COURSE & MEDICAL DECISION MAKING  Pertinent Labs & Imaging studies reviewed. (See chart for details)  This a 37-year-old female who presents with abdominal discomfort. At this time I don't have a clear source. His abdomen is nonsurgical on the x-ray does not show any evidence of an obstructive process. The blood work does not support pancreatitis or cholecystitis. This could be from her narcotics causing some narcotic gut syndrome. Therefore we'll try the patient on Bentyl as well as Prilosec for possible gastritis. If this is not effective she'll follow-up with her surgeon or return to emergency department if she is acutely worse.    FINAL IMPRESSION  1. Abdominal pain  Disposition  The patient will be discharged in stable condition    Electronically signed by: Bob Banuelos, 7/9/2017 12:27 PM

## 2017-07-09 NOTE — ED AVS SNAPSHOT
H-art (WPP) Access Code: [email or web address]  Expires: 7/29/2017  4:10 AM    H-art (WPP)  A secure, online tool to manage your health information     [employer]’s H-art (WPP)® is a secure, online tool that connects you to your personalized health information from the privacy of your home -- day or night - making it very easy for you to manage your healthcare. Once the activation process is completed, you can even access your medical information using the H-art (WPP) aditya, which is available for free in the Apple Aditya store or Google Play store.     H-art (WPP) provides the following levels of access (as shown below):   My Chart Features   [facility] Primary Care Doctor [facility]  Specialists [facility]  Urgent  Care Non-[facility]  Primary Care  Doctor   Email your healthcare team securely and privately 24/7 X X X X   Manage appointments: schedule your next appointment; view details of past/upcoming appointments X      Request prescription refills. X      View recent personal medical records, including lab and immunizations X X X X   View health record, including health history, allergies, medications X X X X   Read reports about your outpatient visits, procedures, consult and ER notes X X X X   See your discharge summary, which is a recap of your hospital and/or ER visit that includes your diagnosis, lab results, and care plan. X X       How to register for H-art (WPP):  1. Go to  [email or web address].  2. Click on the Sign Up Now box, which takes you to the New Member Sign Up page. You will need to provide the following information:  a. Enter your H-art (WPP) Access Code exactly as it appears at the top of this page. (You will not need to use this code after you’ve completed the sign-up process. If you do not sign up before the expiration date, you must request a new code.)   b. Enter your date of birth.   c. Enter your home email address.   d. Click Submit, and follow the next screen’s instructions.  3. Create a H-art (WPP) ID. This will be your H-art (WPP)  login ID and cannot be changed, so think of one that is secure and easy to remember.  4. Create a [employer] password. You can change your password at any time.  5. Enter your Password Reset Question and Answer. This can be used at a later time if you forget your password.   6. Enter your e-mail address. This allows you to receive e-mail notifications when new information is available in [employer].  7. Click Sign Up. You can now view your health information.    For assistance activating your [employer] account, call [phone]

## 2017-07-09 NOTE — ED AVS SNAPSHOT
Home Care Instructions                                                                                                                Farhana Freeman   MRN: [id number]    Department:  [facility], Emergency Dept   Date of Visit:  7/9/2017            [facility], Emergency Dept    [address]    [place] [address]    Phone:  [phone]      You were seen by     Bob Banuelos M.D.      Your Diagnosis Was     Epigastric pain     R10.13       These are the medications you received during your hospitalization from 07/09/2017 1133 to 07/09/2017 1419     Date/Time Order Dose Route Action    07/09/2017 1240 NS infusion 1,000 mL 1,000 mL Intravenous New Bag    07/09/2017 1241 promethazine (PHENERGAN) tablet 25 mg 25 mg Oral Given      Follow-up Information     1. Follow up with [facility], Emergency Dept.    Specialty:  Emergency Medicine    Why:  If symptoms worsen    Contact information    [address] [id number] [phone]      Medication Information     Review all of your home medications and newly ordered medications with your primary doctor and/or pharmacist as soon as possible. Follow medication instructions as directed by your doctor and/or pharmacist.     Please keep your complete medication list with you and share with your physician. Update the information when medications are discontinued, doses are changed, or new medications (including over-the-counter products) are added; and carry medication information at all times in the event of emergency situations.               Medication List      START taking these medications        Instructions    Morning Afternoon Evening Bedtime    dicyclomine 20 MG Tabs   Commonly known as:  BENTYL        Take 1 Tab by mouth every 6 hours.   Dose:  20 mg                        omeprazole 40 MG delayed-release capsule   Commonly known as:  PRILOSEC        Take 1 Cap  by mouth every day.   Dose:  40 mg                          ASK your doctor about these medications        Instructions    Morning Afternoon Evening Bedtime    LORTAB 7.5-500 MG/15ML Soln   Generic drug:  hydrocodone-acetaminophen 2.5-167 mg/5 mL solution        Take 5 mL by mouth 4 times a day as needed for Moderate Pain.   Dose:  5 mL                        polyethylene glycol/lytes Pack   Commonly known as:  MIRALAX        Take 17 g by mouth every day.   Dose:  17 g                        therapeutic multivitamin-minerals Tabs        Take 1 Tab by mouth every day.   Dose:  1 Tab                             Where to Get Your Medications      These medications were sent to [id number] - [place], [place] - [address] [place] [address]     Phone:  [phone]    - omeprazole 40 MG delayed-release capsule      You can get these medications from any pharmacy     Bring a paper prescription for each of these medications    - dicyclomine 20 MG Tabs            Procedures and tests performed during your visit     CBC WITH DIFFERENTIAL    COMP METABOLIC PANEL    DX-ABDOMEN COMPLETE WITH AP OR PA CXR    ESTIMATED GFR    IV Saline Lock    LIPASE        Discharge Instructions       Abdominal Pain (Nonspecific)  Your exam might not show the exact reason you have abdominal pain. Since there are many different causes of abdominal pain, another checkup and more tests may be needed. It is very important to follow up for lasting (persistent) or worsening symptoms. A possible cause of abdominal pain in any person who still has his or her appendix is acute appendicitis. Appendicitis is often hard to diagnose. Normal blood tests, urine tests, ultrasound, and CT scans do not completely rule out early appendicitis or other causes of abdominal pain. Sometimes, only the changes that happen over time will allow appendicitis and other causes of abdominal pain to be determined. Other  potential problems that may require surgery may also take time to become more apparent. Because of this, it is important that you follow all of the instructions below.  HOME CARE INSTRUCTIONS   · Rest as much as possible.   · Do not eat solid food until your pain is gone.   · While adults or children have pain: A diet of water, weak decaffeinated tea, broth or bouillon, gelatin, oral rehydration solutions (ORS), frozen ice pops, or ice chips may be helpful.   · When pain is gone in adults or children: Start a light diet (dry toast, crackers, applesauce, or white rice). Increase the diet slowly as long as it does not bother you. Eat no dairy products (including cheese and eggs) and no spicy, fatty, fried, or high-fiber foods.   · Use no alcohol, caffeine, or cigarettes.   · Take your regular medicines unless your caregiver told you not to.   · Take any prescribed medicine as directed.   · Only take over-the-counter or prescription medicines for pain, discomfort, or fever as directed by your caregiver. Do not give aspirin to children.   If your caregiver has given you a follow-up appointment, it is very important to keep that appointment. Not keeping the appointment could result in a permanent injury and/or lasting (chronic) pain and/or disability. If there is any problem keeping the appointment, you must call to reschedule.   SEEK IMMEDIATE MEDICAL CARE IF:   · Your pain is not gone in 24 hours.   · Your pain becomes worse, changes location, or feels different.   · You or your child has an oral temperature above 102° F (38.9° C), not controlled by medicine.   · Your baby is older than 3 months with a rectal temperature of 102° F (38.9° C) or higher.   · Your baby is 3 months old or younger with a rectal temperature of 100.4° F (38° C) or higher.   · You have shaking chills.   · You keep throwing up (vomiting) or cannot drink liquids.   · There is blood in your vomit or you see blood in your bowel movements.   · Your  bowel movements become dark or black.   · You have frequent bowel movements.   · Your bowel movements stop (become blocked) or you cannot pass gas.   · You have bloody, frequent, or painful urination.   · You have yellow discoloration in the skin or whites of the eyes.   · Your stomach becomes bloated or bigger.   · You have dizziness or fainting.   · You have chest or back pain.   MAKE SURE YOU:   · Understand these instructions.   · Will watch your condition.   · Will get help right away if you are not doing well or get worse.   Document Released: 12/18/2006 Document Revised: 03/11/2013 Document Reviewed: 11/15/2010  ExitCare® Patient Information ©2013 [employer].          Patient Information     Patient Information    Following emergency treatment: all patient requiring follow-up care must return either to a private physician or a clinic if your condition worsens before you are able to obtain further medical attention, please return to the emergency room.     Billing Information    At [facility], we work to make the billing process streamlined for our patients.  Our Representatives are here to answer any questions you may have regarding your hospital bill.  If you have insurance coverage and have supplied your insurance information to us, we will submit a claim to your insurer on your behalf.  Should you have any questions regarding your bill, we can be reached online or by phone as follows:  Online: You are able pay your bills online or live chat with our representatives about any billing questions you may have. We are here to help Monday - Friday from 8:00am to 7:30pm and 9:00am - 12:00pm on Saturdays.  Please visit [email or web address]  for more information.   Phone:  [phone] or [phone]    Please note that your emergency physician, surgeon, pathologist, radiologist, anesthesiologist, and other specialists are not employed by [facility] and will therefore bill  separately for their services.  Please contact them directly for any questions concerning their bills at the numbers below:     Emergency Physician Services:  [phone]  [facility]:  [phone]  [facility]:  [phone]  [facility]:  [phone]    1. Your final bill may vary from the amount quoted upon discharge if all procedures are not complete at that time, or if your doctor has additional procedures of which we are not aware. You will receive an additional bill if you return to the Emergency Department at [facility] for suture removal regardless of the facility of which the sutures were placed.     2. Please arrange for settlement of this account at the emergency registration.    3. All self-pay accounts are due in full at the time of treatment.  If you are unable to meet this obligation then payment is expected within 4-5 days.     4. If you have had radiology studies (CT, X-ray, Ultrasound, MRI), you have received a preliminary result during your emergency department visit. Please contact the radiology department [phone] to receive a copy of your final result. Please discuss the Final result with your primary physician or with the follow up physician provided.     Crisis Hotline:  [place] Crisis Hotline:  4-210-NRSPHNP or 1-755.110.7935  [place] Crisis Hotline:    1-402.726.4432 or 938-394-0351         ED Discharge Follow Up Questions    1. In order to provide you with very good care, we would like to follow up with a phone call in the next few days.  May we have your permission to contact you?     YES /  NO    2. What is the best phone number to call you? (       )_____-__________    3. What is the best time to call you?      Morning  /  Afternoon  /  Evening                   Patient Signature:  ____________________________________________________________    Date:  ____________________________________________________________

## 2017-07-09 NOTE — ED AVS SNAPSHOT
7/9/2017    Farhana Freeman  [address] [facility] [address]    Dear Farhana:    [facility] wants to ensure your discharge home is safe and you or your loved ones have had all of your questions answered regarding your care after you leave the hospital.    Below is a list of resources and contact information should you have any questions regarding your hospital stay, follow-up instructions, or active medical symptoms.    Questions or Concerns Regarding… Contact   Medical Questions Related to Your Discharge  (7 days a week, 8am-5pm) Contact a Nurse Care Coordinator   [phone]   Medical Questions Not Related to Your Discharge  (24 hours a day / 7 days a week)  Contact the Nurse Health Line   [phone]    Medications or Discharge Instructions Refer to your discharge packet   or contact your [facility] Primary Care Provider   [phone]   Follow-up Appointment(s) Schedule your appointment via Ess[employer] Viewing   or contact Scheduling [phone]   Billing Review your statement via [employer]  or contact Billing [phone]   Medical Records Review your records via [employer]ing   or contact Medical Records [phone]     You may receive a telephone call within two days of discharge. This call is to make certain you understand your discharge instructions and have the opportunity to have any questions answered. You can also easily access your medical information, test results and upcoming appointments via the Essential Viewing free online health management tool. You can learn more and sign up at [employer]/Essential Viewing. For assistance setting up your Essential Viewing account, please call [phone].    Once again, we want to ensure your discharge home is safe and that you have a clear understanding of any next steps in your care. If you have any questions or concerns, please do not hesitate to contact us, we are here for you. Thank you for choosing [facility] for your healthcare needs.    Sincerely,    Your [facility] Healthcare Team

## 2017-07-09 NOTE — ED NOTES
Reports a history of gastric sleeve and bypass.  Pt C/O abdominal pain with nausea for the past 2 weeks.

## 2017-07-09 NOTE — DISCHARGE INSTRUCTIONS
Abdominal Pain (Nonspecific)  Your exam might not show the exact reason you have abdominal pain. Since there are many different causes of abdominal pain, another checkup and more tests may be needed. It is very important to follow up for lasting (persistent) or worsening symptoms. A possible cause of abdominal pain in any person who still has his or her appendix is acute appendicitis. Appendicitis is often hard to diagnose. Normal blood tests, urine tests, ultrasound, and CT scans do not completely rule out early appendicitis or other causes of abdominal pain. Sometimes, only the changes that happen over time will allow appendicitis and other causes of abdominal pain to be determined. Other potential problems that may require surgery may also take time to become more apparent. Because of this, it is important that you follow all of the instructions below.  HOME CARE INSTRUCTIONS   · Rest as much as possible.   · Do not eat solid food until your pain is gone.   · While adults or children have pain: A diet of water, weak decaffeinated tea, broth or bouillon, gelatin, oral rehydration solutions (ORS), frozen ice pops, or ice chips may be helpful.   · When pain is gone in adults or children: Start a light diet (dry toast, crackers, applesauce, or white rice). Increase the diet slowly as long as it does not bother you. Eat no dairy products (including cheese and eggs) and no spicy, fatty, fried, or high-fiber foods.   · Use no alcohol, caffeine, or cigarettes.   · Take your regular medicines unless your caregiver told you not to.   · Take any prescribed medicine as directed.   · Only take over-the-counter or prescription medicines for pain, discomfort, or fever as directed by your caregiver. Do not give aspirin to children.   If your caregiver has given you a follow-up appointment, it is very important to keep that appointment. Not keeping the appointment could result in a permanent injury and/or lasting (chronic) pain  and/or disability. If there is any problem keeping the appointment, you must call to reschedule.   SEEK IMMEDIATE MEDICAL CARE IF:   · Your pain is not gone in 24 hours.   · Your pain becomes worse, changes location, or feels different.   · You or your child has an oral temperature above 102° F (38.9° C), not controlled by medicine.   · Your baby is older than 3 months with a rectal temperature of 102° F (38.9° C) or higher.   · Your baby is 3 months old or younger with a rectal temperature of 100.4° F (38° C) or higher.   · You have shaking chills.   · You keep throwing up (vomiting) or cannot drink liquids.   · There is blood in your vomit or you see blood in your bowel movements.   · Your bowel movements become dark or black.   · You have frequent bowel movements.   · Your bowel movements stop (become blocked) or you cannot pass gas.   · You have bloody, frequent, or painful urination.   · You have yellow discoloration in the skin or whites of the eyes.   · Your stomach becomes bloated or bigger.   · You have dizziness or fainting.   · You have chest or back pain.   MAKE SURE YOU:   · Understand these instructions.   · Will watch your condition.   · Will get help right away if you are not doing well or get worse.   Document Released: 12/18/2006 Document Revised: 03/11/2013 Document Reviewed: 11/15/2010  ExitCare® Patient Information ©2013 [employer].

## 2017-07-09 NOTE — ED NOTES
Discharge instructions provided.  Pt verbalized the understanding of discharge instructions to follow up with PCP and to return to ER if condition worsens.  Pt ambulated out of ER without difficulty.   Educated on 2 new prescriptions.

## 2017-08-04 ENCOUNTER — HOSPITAL ENCOUNTER (OUTPATIENT)
Dept: LAB | Facility: MEDICAL CENTER | Age: 37
End: 2017-08-04
Attending: COLON & RECTAL SURGERY
Payer: COMMERCIAL

## 2017-08-04 LAB
25(OH)D3 SERPL-MCNC: 25 NG/ML (ref 30–100)
ALBUMIN SERPL BCP-MCNC: 3.9 G/DL (ref 3.2–4.9)
ALBUMIN/GLOB SERPL: 1.4 G/DL
ALP SERPL-CCNC: 99 U/L (ref 30–99)
ALT SERPL-CCNC: 15 U/L (ref 2–50)
ANION GAP SERPL CALC-SCNC: 7 MMOL/L (ref 0–11.9)
AST SERPL-CCNC: 17 U/L (ref 12–45)
BASOPHILS # BLD AUTO: 1.2 % (ref 0–1.8)
BASOPHILS # BLD: 0.08 K/UL (ref 0–0.12)
BILIRUB SERPL-MCNC: 0.8 MG/DL (ref 0.1–1.5)
BUN SERPL-MCNC: 9 MG/DL (ref 8–22)
CALCIUM SERPL-MCNC: 9.3 MG/DL (ref 8.5–10.5)
CHLORIDE SERPL-SCNC: 109 MMOL/L (ref 96–112)
CHOLEST SERPL-MCNC: 153 MG/DL (ref 100–199)
CO2 SERPL-SCNC: 24 MMOL/L (ref 20–33)
CREAT SERPL-MCNC: 0.63 MG/DL (ref 0.5–1.4)
EOSINOPHIL # BLD AUTO: 0.25 K/UL (ref 0–0.51)
EOSINOPHIL NFR BLD: 3.9 % (ref 0–6.9)
ERYTHROCYTE [DISTWIDTH] IN BLOOD BY AUTOMATED COUNT: 46 FL (ref 35.9–50)
FOLATE SERPL-MCNC: 8.8 NG/ML
GFR SERPL CREATININE-BSD FRML MDRD: >60 ML/MIN/1.73 M 2
GLOBULIN SER CALC-MCNC: 2.8 G/DL (ref 1.9–3.5)
GLUCOSE SERPL-MCNC: 81 MG/DL (ref 65–99)
HCT VFR BLD AUTO: 40.7 % (ref 37–47)
HDLC SERPL-MCNC: 43 MG/DL
HGB BLD-MCNC: 12.9 G/DL (ref 12–16)
IMM GRANULOCYTES # BLD AUTO: 0.02 K/UL (ref 0–0.11)
IMM GRANULOCYTES NFR BLD AUTO: 0.3 % (ref 0–0.9)
IRON SATN MFR SERPL: 16 % (ref 15–55)
IRON SERPL-MCNC: 62 UG/DL (ref 40–170)
LDLC SERPL CALC-MCNC: 96 MG/DL
LYMPHOCYTES # BLD AUTO: 2.13 K/UL (ref 1–4.8)
LYMPHOCYTES NFR BLD: 33 % (ref 22–41)
MCH RBC QN AUTO: 27.4 PG (ref 27–33)
MCHC RBC AUTO-ENTMCNC: 31.7 G/DL (ref 33.6–35)
MCV RBC AUTO: 86.6 FL (ref 81.4–97.8)
MONOCYTES # BLD AUTO: 0.47 K/UL (ref 0–0.85)
MONOCYTES NFR BLD AUTO: 7.3 % (ref 0–13.4)
NEUTROPHILS # BLD AUTO: 3.51 K/UL (ref 2–7.15)
NEUTROPHILS NFR BLD: 54.3 % (ref 44–72)
NRBC # BLD AUTO: 0 K/UL
NRBC BLD AUTO-RTO: 0 /100 WBC
PLATELET # BLD AUTO: 348 K/UL (ref 164–446)
PMV BLD AUTO: 11.4 FL (ref 9–12.9)
POTASSIUM SERPL-SCNC: 3.9 MMOL/L (ref 3.6–5.5)
PREALB SERPL-MCNC: 11 MG/DL (ref 18–38)
PROT SERPL-MCNC: 6.7 G/DL (ref 6–8.2)
PTH-INTACT SERPL-MCNC: 47.3 PG/ML (ref 14–72)
RBC # BLD AUTO: 4.7 M/UL (ref 4.2–5.4)
SODIUM SERPL-SCNC: 140 MMOL/L (ref 135–145)
TIBC SERPL-MCNC: 392 UG/DL (ref 250–450)
TRANSFERRIN SERPL-MCNC: 279 MG/DL (ref 200–370)
TRIGL SERPL-MCNC: 68 MG/DL (ref 0–149)
VIT B12 SERPL-MCNC: 306 PG/ML (ref 211–911)
WBC # BLD AUTO: 6.5 K/UL (ref 4.8–10.8)

## 2017-08-04 PROCEDURE — 80053 COMPREHEN METABOLIC PANEL: CPT

## 2017-08-04 PROCEDURE — 83970 ASSAY OF PARATHORMONE: CPT

## 2017-08-04 PROCEDURE — 83550 IRON BINDING TEST: CPT

## 2017-08-04 PROCEDURE — 85025 COMPLETE CBC W/AUTO DIFF WBC: CPT

## 2017-08-04 PROCEDURE — 83540 ASSAY OF IRON: CPT

## 2017-08-04 PROCEDURE — 36415 COLL VENOUS BLD VENIPUNCTURE: CPT

## 2017-08-04 PROCEDURE — 84134 ASSAY OF PREALBUMIN: CPT

## 2017-08-04 PROCEDURE — 84466 ASSAY OF TRANSFERRIN: CPT

## 2017-08-04 PROCEDURE — 82607 VITAMIN B-12: CPT

## 2017-08-04 PROCEDURE — 82306 VITAMIN D 25 HYDROXY: CPT

## 2017-08-04 PROCEDURE — 84425 ASSAY OF VITAMIN B-1: CPT

## 2017-08-04 PROCEDURE — 82746 ASSAY OF FOLIC ACID SERUM: CPT

## 2017-08-04 PROCEDURE — 80061 LIPID PANEL: CPT

## 2017-08-06 ENCOUNTER — APPOINTMENT (OUTPATIENT)
Dept: RADIOLOGY | Facility: MEDICAL CENTER | Age: 37
End: 2017-08-06
Attending: EMERGENCY MEDICINE
Payer: COMMERCIAL

## 2017-08-06 ENCOUNTER — HOSPITAL ENCOUNTER (EMERGENCY)
Facility: MEDICAL CENTER | Age: 37
End: 2017-08-06
Attending: EMERGENCY MEDICINE
Payer: COMMERCIAL

## 2017-08-06 VITALS
HEART RATE: 74 BPM | DIASTOLIC BLOOD PRESSURE: 66 MMHG | OXYGEN SATURATION: 99 % | RESPIRATION RATE: 18 BRPM | TEMPERATURE: 98 F | HEIGHT: 62 IN | WEIGHT: 198.41 LBS | BODY MASS INDEX: 36.51 KG/M2 | SYSTOLIC BLOOD PRESSURE: 105 MMHG

## 2017-08-06 DIAGNOSIS — K80.20 CALCULUS OF GALLBLADDER WITHOUT CHOLECYSTITIS WITHOUT OBSTRUCTION: ICD-10-CM

## 2017-08-06 DIAGNOSIS — R10.84 GENERALIZED ABDOMINAL PAIN: ICD-10-CM

## 2017-08-06 LAB
ALBUMIN SERPL BCP-MCNC: 4 G/DL (ref 3.2–4.9)
ALBUMIN/GLOB SERPL: 1.4 G/DL
ALP SERPL-CCNC: 94 U/L (ref 30–99)
ALT SERPL-CCNC: 13 U/L (ref 2–50)
ANION GAP SERPL CALC-SCNC: 7 MMOL/L (ref 0–11.9)
AST SERPL-CCNC: 14 U/L (ref 12–45)
BASOPHILS # BLD AUTO: 0.7 % (ref 0–1.8)
BASOPHILS # BLD: 0.05 K/UL (ref 0–0.12)
BILIRUB SERPL-MCNC: 0.9 MG/DL (ref 0.1–1.5)
BUN SERPL-MCNC: 9 MG/DL (ref 8–22)
CALCIUM SERPL-MCNC: 9.2 MG/DL (ref 8.4–10.2)
CHLORIDE SERPL-SCNC: 106 MMOL/L (ref 96–112)
CO2 SERPL-SCNC: 24 MMOL/L (ref 20–33)
CREAT SERPL-MCNC: 0.71 MG/DL (ref 0.5–1.4)
EOSINOPHIL # BLD AUTO: 0.2 K/UL (ref 0–0.51)
EOSINOPHIL NFR BLD: 2.9 % (ref 0–6.9)
ERYTHROCYTE [DISTWIDTH] IN BLOOD BY AUTOMATED COUNT: 44.1 FL (ref 35.9–50)
GFR SERPL CREATININE-BSD FRML MDRD: >60 ML/MIN/1.73 M 2
GLOBULIN SER CALC-MCNC: 2.8 G/DL (ref 1.9–3.5)
GLUCOSE SERPL-MCNC: 85 MG/DL (ref 65–99)
HCG UR QL: NEGATIVE
HCT VFR BLD AUTO: 39.5 % (ref 37–47)
HGB BLD-MCNC: 13 G/DL (ref 12–16)
IMM GRANULOCYTES # BLD AUTO: 0.01 K/UL (ref 0–0.11)
IMM GRANULOCYTES NFR BLD AUTO: 0.1 % (ref 0–0.9)
LIPASE SERPL-CCNC: 15 U/L (ref 7–58)
LYMPHOCYTES # BLD AUTO: 2.01 K/UL (ref 1–4.8)
LYMPHOCYTES NFR BLD: 29.3 % (ref 22–41)
MCH RBC QN AUTO: 28.1 PG (ref 27–33)
MCHC RBC AUTO-ENTMCNC: 32.9 G/DL (ref 33.6–35)
MCV RBC AUTO: 85.3 FL (ref 81.4–97.8)
MONOCYTES # BLD AUTO: 0.58 K/UL (ref 0–0.85)
MONOCYTES NFR BLD AUTO: 8.5 % (ref 0–13.4)
NEUTROPHILS # BLD AUTO: 4 K/UL (ref 2–7.15)
NEUTROPHILS NFR BLD: 58.5 % (ref 44–72)
NRBC # BLD AUTO: 0 K/UL
NRBC BLD AUTO-RTO: 0 /100 WBC
PLATELET # BLD AUTO: 346 K/UL (ref 164–446)
PMV BLD AUTO: 10.4 FL (ref 9–12.9)
POTASSIUM SERPL-SCNC: 3.8 MMOL/L (ref 3.6–5.5)
PROT SERPL-MCNC: 6.8 G/DL (ref 6–8.2)
RBC # BLD AUTO: 4.63 M/UL (ref 4.2–5.4)
SODIUM SERPL-SCNC: 137 MMOL/L (ref 135–145)
VIT B1 BLD-MCNC: 92 NMOL/L (ref 70–180)
WBC # BLD AUTO: 6.9 K/UL (ref 4.8–10.8)

## 2017-08-06 PROCEDURE — 80053 COMPREHEN METABOLIC PANEL: CPT

## 2017-08-06 PROCEDURE — 76705 ECHO EXAM OF ABDOMEN: CPT

## 2017-08-06 PROCEDURE — 81025 URINE PREGNANCY TEST: CPT

## 2017-08-06 PROCEDURE — 700105 HCHG RX REV CODE 258: Performed by: EMERGENCY MEDICINE

## 2017-08-06 PROCEDURE — 83690 ASSAY OF LIPASE: CPT

## 2017-08-06 PROCEDURE — 99284 EMERGENCY DEPT VISIT MOD MDM: CPT

## 2017-08-06 PROCEDURE — 36415 COLL VENOUS BLD VENIPUNCTURE: CPT

## 2017-08-06 PROCEDURE — 85025 COMPLETE CBC W/AUTO DIFF WBC: CPT

## 2017-08-06 RX ORDER — SODIUM CHLORIDE 9 MG/ML
INJECTION, SOLUTION INTRAVENOUS CONTINUOUS
Status: DISCONTINUED | OUTPATIENT
Start: 2017-08-06 | End: 2017-08-07 | Stop reason: HOSPADM

## 2017-08-06 RX ADMIN — SODIUM CHLORIDE 1000 ML: 9 INJECTION, SOLUTION INTRAVENOUS at 20:25

## 2017-08-06 ASSESSMENT — PAIN SCALES - GENERAL: PAINLEVEL_OUTOF10: 5

## 2017-08-06 NOTE — ED AVS SNAPSHOT
Minilogs Access Code: [email or web address]  Expires: 8/27/2017  4:10 AM    Minilogs  A secure, online tool to manage your health information     [employer]’s Minilogs® is a secure, online tool that connects you to your personalized health information from the privacy of your home -- day or night - making it very easy for you to manage your healthcare. Once the activation process is completed, you can even access your medical information using the Minilogs aditya, which is available for free in the Apple Aditya store or Google Play store.     Minilogs provides the following levels of access (as shown below):   My Chart Features   [facility] Primary Care Doctor [facility]  Specialists [facility]  Urgent  Care Non-[facility]  Primary Care  Doctor   Email your healthcare team securely and privately 24/7 X X X X   Manage appointments: schedule your next appointment; view details of past/upcoming appointments X      Request prescription refills. X      View recent personal medical records, including lab and immunizations X X X X   View health record, including health history, allergies, medications X X X X   Read reports about your outpatient visits, procedures, consult and ER notes X X X X   See your discharge summary, which is a recap of your hospital and/or ER visit that includes your diagnosis, lab results, and care plan. X X       How to register for Minilogs:  1. Go to  [email or web address].  2. Click on the Sign Up Now box, which takes you to the New Member Sign Up page. You will need to provide the following information:  a. Enter your Minilogs Access Code exactly as it appears at the top of this page. (You will not need to use this code after you’ve completed the sign-up process. If you do not sign up before the expiration date, you must request a new code.)   b. Enter your date of birth.   c. Enter your home email address.   d. Click Submit, and follow the next screen’s instructions.  3. Create a Minilogs ID. This will be your Minilogs  login ID and cannot be changed, so think of one that is secure and easy to remember.  4. Create a [employer] password. You can change your password at any time.  5. Enter your Password Reset Question and Answer. This can be used at a later time if you forget your password.   6. Enter your e-mail address. This allows you to receive e-mail notifications when new information is available in [employer].  7. Click Sign Up. You can now view your health information.    For assistance activating your [employer] account, call [phone]

## 2017-08-06 NOTE — ED AVS SNAPSHOT
Home Care Instructions                                                                                                                Farhana Freeman   MRN: [id number]    Department:  [facility], Emergency Dept   Date of Visit:  8/6/2017            [facility], Emergency Dept    [address]    [place] [address]    Phone:  [phone]      You were seen by     Be Trujillo M.D.      Your Diagnosis Was     Generalized abdominal pain     R10.84       These are the medications you received during your hospitalization from 08/06/2017 1856 to 08/06/2017 2237     Date/Time Order Dose Route Action    08/06/2017 2025 NS infusion 1,000 mL Intravenous New Bag      Follow-up Information     1. Follow up with Shelton Jackson M.D.. Schedule an appointment as soon as possible for a visit in 3 days.    Specialties:  Surgery, Radiology    Why:  asap    Contact information    [address] [facility] [address]  [place] [id number] [phone]        Medication Information     Review all of your home medications and newly ordered medications with your primary doctor and/or pharmacist as soon as possible. Follow medication instructions as directed by your doctor and/or pharmacist.     Please keep your complete medication list with you and share with your physician. Update the information when medications are discontinued, doses are changed, or new medications (including over-the-counter products) are added; and carry medication information at all times in the event of emergency situations.               Medication List      ASK your doctor about these medications        Instructions    Morning Afternoon Evening Bedtime    omeprazole 40 MG delayed-release capsule   Commonly known as:  PRILOSEC        Take 1 Cap by mouth every day.   Dose:  40 mg                        polyethylene glycol/lytes Pack   Commonly known as:  MIRALAX        Take 17 g by mouth every day.   Dose:  17 g                        therapeutic multivitamin-minerals Tabs        Take 1 Tab by mouth every day.   Dose:  1 Tab                                Procedures and tests performed during your visit     CBC WITH DIFFERENTIAL    COMP METABOLIC PANEL    ESTIMATED GFR    IV Saline Lock    LIPASE    POC URINE PREGNANCY    US-GALLBLADDER        Discharge Instructions       Abdominal Pain (Nonspecific)  Your exam might not show the exact reason you have abdominal pain. Since there are many different causes of abdominal pain, another checkup and more tests may be needed. It is very important to follow up for lasting (persistent) or worsening symptoms. A possible cause of abdominal pain in any person who still has his or her appendix is acute appendicitis. Appendicitis is often hard to diagnose. Normal blood tests, urine tests, ultrasound, and CT scans do not completely rule out early appendicitis or other causes of abdominal pain. Sometimes, only the changes that happen over time will allow appendicitis and other causes of abdominal pain to be determined. Other potential problems that may require surgery may also take time to become more apparent. Because of this, it is important that you follow all of the instructions below.  HOME CARE INSTRUCTIONS   · Rest as much as possible.   · Do not eat solid food until your pain is gone.   · While adults or children have pain: A diet of water, weak decaffeinated tea, broth or bouillon, gelatin, oral rehydration solutions (ORS), frozen ice pops, or ice chips may be helpful.   · When pain is gone in adults or children: Start a light diet (dry toast, crackers, applesauce, or white rice). Increase the diet slowly as long as it does not bother you. Eat no dairy products (including cheese and eggs) and no spicy, fatty, fried, or high-fiber foods.   · Use no alcohol, caffeine, or cigarettes.   · Take your regular medicines unless your caregiver told you not to.   · Take any prescribed medicine as  directed.   · Only take over-the-counter or prescription medicines for pain, discomfort, or fever as directed by your caregiver. Do not give aspirin to children.   If your caregiver has given you a follow-up appointment, it is very important to keep that appointment. Not keeping the appointment could result in a permanent injury and/or lasting (chronic) pain and/or disability. If there is any problem keeping the appointment, you must call to reschedule.   SEEK IMMEDIATE MEDICAL CARE IF:   · Your pain is not gone in 24 hours.   · Your pain becomes worse, changes location, or feels different.   · You or your child has an oral temperature above 102° F (38.9° C), not controlled by medicine.   · Your baby is older than 3 months with a rectal temperature of 102° F (38.9° C) or higher.   · Your baby is 3 months old or younger with a rectal temperature of 100.4° F (38° C) or higher.   · You have shaking chills.   · You keep throwing up (vomiting) or cannot drink liquids.   · There is blood in your vomit or you see blood in your bowel movements.   · Your bowel movements become dark or black.   · You have frequent bowel movements.   · Your bowel movements stop (become blocked) or you cannot pass gas.   · You have bloody, frequent, or painful urination.   · You have yellow discoloration in the skin or whites of the eyes.   · Your stomach becomes bloated or bigger.   · You have dizziness or fainting.   · You have chest or back pain.   MAKE SURE YOU:   · Understand these instructions.   · Will watch your condition.   · Will get help right away if you are not doing well or get worse.   Document Released: 12/18/2006 Document Revised: 03/11/2013 Document Reviewed: 11/15/2010  ExitCare® Patient Information ©2013 [email or web address].  Biliary Colic  Biliary colic is a pain in the upper abdomen. The pain:  · Is usually felt on the right side of the abdomen, but it may also be felt in the center of the abdomen, just below the breastbone  (sternum).  · May spread back toward the right shoulder blade.  · May be steady or irregular.  · May be accompanied by nausea and vomiting.  Most of the time, the pain goes away in 1-5 hours. After the most intense pain passes, the abdomen may continue to ache mildly for about 24 hours.  Biliary colic is caused by a blockage in the bile duct. The bile duct is a pathway that carries bile--a liquid that helps to digest fats--from the gallbladder to the small intestine. Biliary colic usually occurs after eating, when the digestive system demands bile. The pain develops when muscle cells contract forcefully to try to move the blockage so that bile can get by.  HOME CARE INSTRUCTIONS  · Take medicines only as directed by your health care provider.  · Drink enough fluid to keep your urine clear or pale yellow.  · Avoid fatty, greasy, and fried foods. These kinds of foods increase your body's demand for bile.  · Avoid any foods that make your pain worse.  · Avoid overeating.  · Avoid having a large meal after fasting.  SEEK MEDICAL CARE IF:  · You develop a fever.  · Your pain gets worse.  · You vomit.  · You develop nausea that prevents you from eating and drinking.  SEEK IMMEDIATE MEDICAL CARE IF:  · You suddenly develop a fever and shaking chills.  · You develop a yellowish discoloration (jaundice) of:  ¨ Skin.  ¨ Whites of the eyes.  ¨ Mucous membranes.  · You have continuous or severe pain that is not relieved with medicines.  · You have nausea and vomiting that is not relieved with medicines.  · You develop dizziness or you faint.     This information is not intended to replace advice given to you by your health care provider. Make sure you discuss any questions you have with your health care provider.     Document Released: 05/21/2007 Document Revised: 05/03/2016 Document Reviewed: 09/29/2015  "[employer] Interactive Patient Education ©2016 "[employer] Inc.            Patient Information     Patient  Information    Following emergency treatment: all patient requiring follow-up care must return either to a private physician or a clinic if your condition worsens before you are able to obtain further medical attention, please return to the emergency room.     Billing Information    At [facility], we work to make the billing process streamlined for our patients.  Our Representatives are here to answer any questions you may have regarding your hospital bill.  If you have insurance coverage and have supplied your insurance information to us, we will submit a claim to your insurer on your behalf.  Should you have any questions regarding your bill, we can be reached online or by phone as follows:  Online: You are able pay your bills online or live chat with our representatives about any billing questions you may have. We are here to help Monday - Friday from 8:00am to 7:30pm and 9:00am - 12:00pm on Saturdays.  Please visit [email or web address]  for more information.   Phone:  [phone] or [phone]    Please note that your emergency physician, surgeon, pathologist, radiologist, anesthesiologist, and other specialists are not employed by [facility] and will therefore bill separately for their services.  Please contact them directly for any questions concerning their bills at the numbers below:     Emergency Physician Services:  [phone]  [facility]:  [phone]  [facility]:  [phone]  [facility]:  [phone]    1. Your final bill may vary from the amount quoted upon discharge if all procedures are not complete at that time, or if your doctor has additional procedures of which we are not aware. You will receive an additional bill if you return to the Emergency Department at [facility] for suture removal regardless of the facility of which the sutures were placed.     2. Please arrange for settlement of this account  at the emergency registration.    3. All self-pay accounts are due in full at the time of treatment.  If you are unable to meet this obligation then payment is expected within 4-5 days.     4. If you have had radiology studies (CT, X-ray, Ultrasound, MRI), you have received a preliminary result during your emergency department visit. Please contact the radiology department [phone] to receive a copy of your final result. Please discuss the Final result with your primary physician or with the follow up physician provided.     Crisis Hotline:  [place] Crisis Hotline:  1-083-VVSJNPU or 1-588.337.5690  [place] Crisis Hotline:    1-285.502.6978 or 156-083-4259         ED Discharge Follow Up Questions    1. In order to provide you with very good care, we would like to follow up with a phone call in the next few days.  May we have your permission to contact you?     YES /  NO    2. What is the best phone number to call you? (       )_____-__________    3. What is the best time to call you?      Morning  /  Afternoon  /  Evening                   Patient Signature:  ____________________________________________________________    Date:  ____________________________________________________________

## 2017-08-06 NOTE — ED AVS SNAPSHOT
8/6/2017    Farhana Freeman  [address] [facility] [address]    Dear Farhana:    Critical access hospital wants to ensure your discharge home is safe and you or your loved ones have had all of your questions answered regarding your care after you leave the hospital.    Below is a list of resources and contact information should you have any questions regarding your hospital stay, follow-up instructions, or active medical symptoms.    Questions or Concerns Regarding… Contact   Medical Questions Related to Your Discharge  (7 days a week, 8am-5pm) Contact a Nurse Care Coordinator   [phone]   Medical Questions Not Related to Your Discharge  (24 hours a day / 7 days a week)  Contact the Nurse Health Line   [phone]    Medications or Discharge Instructions Refer to your discharge packet   or contact your [facility] Primary Care Provider   [phone]   Follow-up Appointment(s) Schedule your appointment via [employer]   or contact Scheduling [phone]   Billing Review your statement via [employer]  or contact Billing [phone]   Medical Records Review your records via [employer]   or contact Medical Records [phone]     You may receive a telephone call within two days of discharge. This call is to make certain you understand your discharge instructions and have the opportunity to have any questions answered. You can also easily access your medical information, test results and upcoming appointments via the [employer] free online health management tool. You can learn more and sign up at [email or web address]. For assistance setting up your [employer] account, please call [phone].    Once again, we want to ensure your discharge home is safe and that you have a clear understanding of any next steps in your care. If you have any questions or concerns, please do not hesitate to contact us, we are here for you. Thank you for choosing [facility] for your healthcare needs.    Sincerely,    Your [facility] Healthcare Team

## 2017-08-07 NOTE — ED NOTES
Discharge instructions provided.  Pt educated on proper diet to avoid a gallbladder attack. Pt verbalized the understanding of discharge instructions to follow up with PCP and to return to ER if condition worsens.  Pt ambulated out of ER without difficulty.

## 2017-08-07 NOTE — DISCHARGE INSTRUCTIONS
Abdominal Pain (Nonspecific)  Your exam might not show the exact reason you have abdominal pain. Since there are many different causes of abdominal pain, another checkup and more tests may be needed. It is very important to follow up for lasting (persistent) or worsening symptoms. A possible cause of abdominal pain in any person who still has his or her appendix is acute appendicitis. Appendicitis is often hard to diagnose. Normal blood tests, urine tests, ultrasound, and CT scans do not completely rule out early appendicitis or other causes of abdominal pain. Sometimes, only the changes that happen over time will allow appendicitis and other causes of abdominal pain to be determined. Other potential problems that may require surgery may also take time to become more apparent. Because of this, it is important that you follow all of the instructions below.  HOME CARE INSTRUCTIONS   · Rest as much as possible.   · Do not eat solid food until your pain is gone.   · While adults or children have pain: A diet of water, weak decaffeinated tea, broth or bouillon, gelatin, oral rehydration solutions (ORS), frozen ice pops, or ice chips may be helpful.   · When pain is gone in adults or children: Start a light diet (dry toast, crackers, applesauce, or white rice). Increase the diet slowly as long as it does not bother you. Eat no dairy products (including cheese and eggs) and no spicy, fatty, fried, or high-fiber foods.   · Use no alcohol, caffeine, or cigarettes.   · Take your regular medicines unless your caregiver told you not to.   · Take any prescribed medicine as directed.   · Only take over-the-counter or prescription medicines for pain, discomfort, or fever as directed by your caregiver. Do not give aspirin to children.   If your caregiver has given you a follow-up appointment, it is very important to keep that appointment. Not keeping the appointment could result in a permanent injury and/or lasting (chronic) pain  and/or disability. If there is any problem keeping the appointment, you must call to reschedule.   SEEK IMMEDIATE MEDICAL CARE IF:   · Your pain is not gone in 24 hours.   · Your pain becomes worse, changes location, or feels different.   · You or your child has an oral temperature above 102° F (38.9° C), not controlled by medicine.   · Your baby is older than 3 months with a rectal temperature of 102° F (38.9° C) or higher.   · Your baby is 3 months old or younger with a rectal temperature of 100.4° F (38° C) or higher.   · You have shaking chills.   · You keep throwing up (vomiting) or cannot drink liquids.   · There is blood in your vomit or you see blood in your bowel movements.   · Your bowel movements become dark or black.   · You have frequent bowel movements.   · Your bowel movements stop (become blocked) or you cannot pass gas.   · You have bloody, frequent, or painful urination.   · You have yellow discoloration in the skin or whites of the eyes.   · Your stomach becomes bloated or bigger.   · You have dizziness or fainting.   · You have chest or back pain.   MAKE SURE YOU:   · Understand these instructions.   · Will watch your condition.   · Will get help right away if you are not doing well or get worse.   Document Released: 12/18/2006 Document Revised: 03/11/2013 Document Reviewed: 11/15/2010  ExitCare® Patient Information ©2013 [employer].  Biliary Colic  Biliary colic is a pain in the upper abdomen. The pain:  · Is usually felt on the right side of the abdomen, but it may also be felt in the center of the abdomen, just below the breastbone (sternum).  · May spread back toward the right shoulder blade.  · May be steady or irregular.  · May be accompanied by nausea and vomiting.  Most of the time, the pain goes away in 1-5 hours. After the most intense pain passes, the abdomen may continue to ache mildly for about 24 hours.  Biliary colic is caused by a blockage in the bile duct. The bile duct is a  pathway that carries bile--a liquid that helps to digest fats--from the gallbladder to the small intestine. Biliary colic usually occurs after eating, when the digestive system demands bile. The pain develops when muscle cells contract forcefully to try to move the blockage so that bile can get by.  HOME CARE INSTRUCTIONS  · Take medicines only as directed by your health care provider.  · Drink enough fluid to keep your urine clear or pale yellow.  · Avoid fatty, greasy, and fried foods. These kinds of foods increase your body's demand for bile.  · Avoid any foods that make your pain worse.  · Avoid overeating.  · Avoid having a large meal after fasting.  SEEK MEDICAL CARE IF:  · You develop a fever.  · Your pain gets worse.  · You vomit.  · You develop nausea that prevents you from eating and drinking.  SEEK IMMEDIATE MEDICAL CARE IF:  · You suddenly develop a fever and shaking chills.  · You develop a yellowish discoloration (jaundice) of:  ¨ Skin.  ¨ Whites of the eyes.  ¨ Mucous membranes.  · You have continuous or severe pain that is not relieved with medicines.  · You have nausea and vomiting that is not relieved with medicines.  · You develop dizziness or you faint.     This information is not intended to replace advice given to you by your health care provider. Make sure you discuss any questions you have with your health care provider.     Document Released: 05/21/2007 Document Revised: 05/03/2016 Document Reviewed: 09/29/2015  [employer] Interactive Patient Education ©2016 [employer]

## 2017-08-07 NOTE — ED NOTES
States a history of gastric bypass May 12 th 2017.  Pt has been experiencing abdominal cramping for the past 7 days.

## 2017-08-07 NOTE — ED PROVIDER NOTES
"ED Provider Note    CHIEF COMPLAINT  Chief Complaint   Patient presents with   • Abdominal Cramping       HPI  Farhana Freeman is a 37 y.o. female who presents Company of abdominal pain. The abdominal pain is located in the epigastrium. It comes on after eating. She states she's had it multiple times. She was here within last month with a similar episode. Patient states the pain typically comes on after food. No nausea or vomiting. No diarrhea. She is status post gastric bypass in May of this year. The pain is sharp and stabbing. 8/10 in severity.    REVIEW OF SYSTEMS  See HPI for further details.  No nausea or vomiting. No diarrhea. No cough or cold symptoms. No fever. All other systems are negative.    PAST MEDICAL HISTORY  Past Medical History   Diagnosis Date   • Migraine    • Heart burn    • Anesthesia      severe n/v with morphine   • ASTHMA 5/17     Inhalers as needed, last use 3/17       FAMILY HISTORY  History reviewed. No pertinent family history.    SOCIAL HISTORY  Social History     Social History   • Marital Status:      Spouse Name: N/A   • Number of Children: N/A   • Years of Education: N/A     Social History Main Topics   • Smoking status: Never Smoker    • Smokeless tobacco: Never Used   • Alcohol Use: No   • Drug Use: No   • Sexual Activity: Not Asked     Other Topics Concern   • None     Social History Narrative       SURGICAL HISTORY  Past Surgical History   Procedure Laterality Date   • Other abdominal surgery  10/12/15     vertical gastric sleeve   • Gastric bypass laparoscopic  5/12/2017     Procedure: GASTRIC BYPASS LAPAROSCOPIC - ISIAH EN Y AND HIATAL HERNIA REPAIR;  Surgeon: Shelton Jackson M.D.;  Location: SURGERY [facility];  Service:        CURRENT MEDICATIONS  @ He is Herbert@    ALLERGIES  Allergies   Allergen Reactions   • Oxycodone Hcl Vomiting   • Morphine Nausea     \"severe nausea vomiting\"   • Sulfa Drugs      \"Causes blindness\"       PHYSICAL EXAM  VITAL SIGNS: BP " "105/66 mmHg  Pulse 74  Temp(Src) 36.7 °C (98 °F)  Resp 18  Ht 1.575 m (5' 2\")  Wt 90 kg (198 lb 6.6 oz)  BMI 36.28 kg/m2  SpO2 99%  LMP 06/25/2017  Constitutional: Well developed, Well nourished, No acute distress, Non-toxic appearance.   HENT: Normocephalic, Atraumatic, Bilateral external ears normal, Oropharynx moist, No oral exudates, Nose normal.   Eyes: PATRICE, EOMI, Conjunctiva normal, No discharge.   Neck: Normal range of motion, No tenderness, Supple, No stridor. No nuchal rigidity  Lymphatic: No lymphadenopathy noted.   Cardiovascular: Normal heart rate, Normal rhythm, No murmurs, No rubs, No gallops.   Thorax & Lungs: Normal breath sounds, No respiratory distress, No wheezing, No chest tenderness.  Abdomen: Normal bowel sounds soft. Tender epigastrium.  Musculoskeletal: No CVA tenderness  Skin: Warm, Dry, No erythema, No rash.   Back: No tenderness, No CVA tenderness.   Extremities: No edema, No tenderness, No cyanosis, No clubbing. Dorsalis pedis pulses 2+ equal bilaterally. Radial pulses 2+ equal bilaterally    RADIOLOGY/PROCEDURES  US-GALLBLADDER   Final Result      Cholelithiasis, with borderline ectasia of the common bile duct but no obvious choledocholithiasis.               Results for orders placed or performed during the hospital encounter of 08/06/17   CBC WITH DIFFERENTIAL   Result Value Ref Range    WBC 6.9 4.8 - 10.8 K/uL    RBC 4.63 4.20 - 5.40 M/uL    Hemoglobin 13.0 12.0 - 16.0 g/dL    Hematocrit 39.5 37.0 - 47.0 %    MCV 85.3 81.4 - 97.8 fL    MCH 28.1 27.0 - 33.0 pg    MCHC 32.9 (L) 33.6 - 35.0 g/dL    RDW 44.1 35.9 - 50.0 fL    Platelet Count 346 164 - 446 K/uL    MPV 10.4 9.0 - 12.9 fL    Neutrophils-Polys 58.50 44.00 - 72.00 %    Lymphocytes 29.30 22.00 - 41.00 %    Monocytes 8.50 0.00 - 13.40 %    Eosinophils 2.90 0.00 - 6.90 %    Basophils 0.70 0.00 - 1.80 %    Immature Granulocytes 0.10 0.00 - 0.90 %    Nucleated RBC 0.00 /100 WBC    Neutrophils (Absolute) 4.00 2.00 - 7.15 " K/uL    Lymphs (Absolute) 2.01 1.00 - 4.80 K/uL    Monos (Absolute) 0.58 0.00 - 0.85 K/uL    Eos (Absolute) 0.20 0.00 - 0.51 K/uL    Baso (Absolute) 0.05 0.00 - 0.12 K/uL    Immature Granulocytes (abs) 0.01 0.00 - 0.11 K/uL    NRBC (Absolute) 0.00 K/uL   COMP METABOLIC PANEL   Result Value Ref Range    Sodium 137 135 - 145 mmol/L    Potassium 3.8 3.6 - 5.5 mmol/L    Chloride 106 96 - 112 mmol/L    Co2 24 20 - 33 mmol/L    Anion Gap 7.0 0.0 - 11.9    Glucose 85 65 - 99 mg/dL    Bun 9 8 - 22 mg/dL    Creatinine 0.71 0.50 - 1.40 mg/dL    Calcium 9.2 8.4 - 10.2 mg/dL    AST(SGOT) 14 12 - 45 U/L    ALT(SGPT) 13 2 - 50 U/L    Alkaline Phosphatase 94 30 - 99 U/L    Total Bilirubin 0.9 0.1 - 1.5 mg/dL    Albumin 4.0 3.2 - 4.9 g/dL    Total Protein 6.8 6.0 - 8.2 g/dL    Globulin 2.8 1.9 - 3.5 g/dL    A-G Ratio 1.4 g/dL   LIPASE   Result Value Ref Range    Lipase 15 7 - 58 U/L   ESTIMATED GFR   Result Value Ref Range    GFR If African American >60 >60 mL/min/1.73 m 2    GFR If Non African American >60 >60 mL/min/1.73 m 2   POC URINE PREGNANCY   Result Value Ref Range    POC Urine Pregnancy Test Negative          COURSE & MEDICAL DECISION MAKING  Pertinent Labs & Imaging studies reviewed. (See chart for details)  Patient has normal blood work but caught a tender epigastrium right upper quadrant. Ultrasound was notable for multiple gallstones. Given her postprandial pain she certainly may have symptomatic cholelithiasis. She'll be referred back to her surgeon Dr. STOREY. She knows to return immediately for worse pain vomiting or fever. She is placed on a nonfat diet.    FINAL IMPRESSION  1. Abdominal pain  2. Symptomatic cholelithiasis  3.    Please note that this dictation was created using voice recognition software. I have worked with consultants from the vendor as well as technical experts from [facility] to optimize the interface. I have made every reasonable attempt to correct obvious errors, but I expect that there are  errors of grammar and possibly content that I did not discover before finalizing the note.    Electronically signed by: Be Trujillo, 8/6/2017 10:33 PM

## 2017-08-09 ENCOUNTER — HOSPITAL ENCOUNTER (OUTPATIENT)
Facility: MEDICAL CENTER | Age: 37
End: 2017-08-09
Attending: COLON & RECTAL SURGERY | Admitting: COLON & RECTAL SURGERY
Payer: COMMERCIAL

## 2017-08-09 VITALS
HEIGHT: 62 IN | HEART RATE: 66 BPM | BODY MASS INDEX: 35.86 KG/M2 | SYSTOLIC BLOOD PRESSURE: 104 MMHG | RESPIRATION RATE: 15 BRPM | OXYGEN SATURATION: 96 % | WEIGHT: 194.89 LBS | TEMPERATURE: 97.5 F | DIASTOLIC BLOOD PRESSURE: 67 MMHG

## 2017-08-09 PROBLEM — K80.20 CHOLELITHIASES: Status: ACTIVE | Noted: 2017-08-09

## 2017-08-09 LAB
B-HCG FREE SERPL-ACNC: <5 MIU/ML
IHCGL IHCGL: NEGATIVE MIU/ML

## 2017-08-09 PROCEDURE — 700102 HCHG RX REV CODE 250 W/ 637 OVERRIDE(OP)

## 2017-08-09 PROCEDURE — 88304 TISSUE EXAM BY PATHOLOGIST: CPT

## 2017-08-09 PROCEDURE — 160028 HCHG SURGERY MINUTES - 1ST 30 MINS LEVEL 3: Performed by: COLON & RECTAL SURGERY

## 2017-08-09 PROCEDURE — 501571 HCHG TROCAR, SEPARATOR 12X100: Performed by: COLON & RECTAL SURGERY

## 2017-08-09 PROCEDURE — 501570 HCHG TROCAR, SEPARATOR: Performed by: COLON & RECTAL SURGERY

## 2017-08-09 PROCEDURE — 700101 HCHG RX REV CODE 250

## 2017-08-09 PROCEDURE — 160009 HCHG ANES TIME/MIN: Performed by: COLON & RECTAL SURGERY

## 2017-08-09 PROCEDURE — 501838 HCHG SUTURE GENERAL: Performed by: COLON & RECTAL SURGERY

## 2017-08-09 PROCEDURE — 501497 HCHG SURGICLIP: Performed by: COLON & RECTAL SURGERY

## 2017-08-09 PROCEDURE — 160036 HCHG PACU - EA ADDL 30 MINS PHASE I: Performed by: COLON & RECTAL SURGERY

## 2017-08-09 PROCEDURE — 501583 HCHG TROCAR, THRD CAN&SEAL 5X100: Performed by: COLON & RECTAL SURGERY

## 2017-08-09 PROCEDURE — 160035 HCHG PACU - 1ST 60 MINS PHASE I: Performed by: COLON & RECTAL SURGERY

## 2017-08-09 PROCEDURE — 84702 CHORIONIC GONADOTROPIN TEST: CPT

## 2017-08-09 PROCEDURE — A9270 NON-COVERED ITEM OR SERVICE: HCPCS

## 2017-08-09 PROCEDURE — 700111 HCHG RX REV CODE 636 W/ 250 OVERRIDE (IP)

## 2017-08-09 PROCEDURE — 501338 HCHG SHEARS, ENDO: Performed by: COLON & RECTAL SURGERY

## 2017-08-09 PROCEDURE — 502571 HCHG PACK, LAP CHOLE: Performed by: COLON & RECTAL SURGERY

## 2017-08-09 PROCEDURE — 501399 HCHG SPECIMAN BAG, ENDO CATC: Performed by: COLON & RECTAL SURGERY

## 2017-08-09 PROCEDURE — 160046 HCHG PACU - 1ST 60 MINS PHASE II: Performed by: COLON & RECTAL SURGERY

## 2017-08-09 PROCEDURE — 160002 HCHG RECOVERY MINUTES (STAT): Performed by: COLON & RECTAL SURGERY

## 2017-08-09 PROCEDURE — 160048 HCHG OR STATISTICAL LEVEL 1-5: Performed by: COLON & RECTAL SURGERY

## 2017-08-09 PROCEDURE — 160025 RECOVERY II MINUTES (STATS): Performed by: COLON & RECTAL SURGERY

## 2017-08-09 RX ORDER — ONDANSETRON 8 MG/1
8 TABLET, ORALLY DISINTEGRATING ORAL EVERY 8 HOURS PRN
Qty: 15 TAB | Refills: 0 | Status: SHIPPED | OUTPATIENT
Start: 2017-08-09 | End: 2019-11-02

## 2017-08-09 RX ORDER — LIDOCAINE HYDROCHLORIDE 10 MG/ML
0.5 INJECTION, SOLUTION INFILTRATION; PERINEURAL
Status: COMPLETED | OUTPATIENT
Start: 2017-08-09 | End: 2017-08-09

## 2017-08-09 RX ORDER — LIDOCAINE AND PRILOCAINE 25; 25 MG/G; MG/G
1 CREAM TOPICAL
Status: COMPLETED | OUTPATIENT
Start: 2017-08-09 | End: 2017-08-09

## 2017-08-09 RX ORDER — SODIUM CHLORIDE, SODIUM LACTATE, POTASSIUM CHLORIDE, CALCIUM CHLORIDE 600; 310; 30; 20 MG/100ML; MG/100ML; MG/100ML; MG/100ML
INJECTION, SOLUTION INTRAVENOUS CONTINUOUS
Status: DISCONTINUED | OUTPATIENT
Start: 2017-08-09 | End: 2017-08-09 | Stop reason: HOSPADM

## 2017-08-09 RX ORDER — HYDROMORPHONE HYDROCHLORIDE 2 MG/ML
INJECTION, SOLUTION INTRAMUSCULAR; INTRAVENOUS; SUBCUTANEOUS
Status: COMPLETED
Start: 2017-08-09 | End: 2017-08-09

## 2017-08-09 RX ORDER — ACETAMINOPHEN 160 MG/5ML
500 SUSPENSION ORAL 2 TIMES DAILY PRN
COMMUNITY
End: 2017-08-28

## 2017-08-09 RX ORDER — LIDOCAINE HYDROCHLORIDE 10 MG/ML
INJECTION, SOLUTION INFILTRATION; PERINEURAL
Status: COMPLETED
Start: 2017-08-09 | End: 2017-08-09

## 2017-08-09 RX ORDER — BUPIVACAINE HYDROCHLORIDE AND EPINEPHRINE 5; 5 MG/ML; UG/ML
INJECTION, SOLUTION EPIDURAL; INTRACAUDAL; PERINEURAL
Status: DISCONTINUED | OUTPATIENT
Start: 2017-08-09 | End: 2017-08-09 | Stop reason: HOSPADM

## 2017-08-09 RX ORDER — SCOLOPAMINE TRANSDERMAL SYSTEM 1 MG/1
PATCH, EXTENDED RELEASE TRANSDERMAL
Status: DISCONTINUED
Start: 2017-08-09 | End: 2017-08-09 | Stop reason: HOSPADM

## 2017-08-09 RX ORDER — SCOLOPAMINE TRANSDERMAL SYSTEM 1 MG/1
1 PATCH, EXTENDED RELEASE TRANSDERMAL
Status: DISCONTINUED | OUTPATIENT
Start: 2017-08-09 | End: 2017-08-09 | Stop reason: HOSPADM

## 2017-08-09 RX ADMIN — FENTANYL CITRATE 50 MCG: 50 INJECTION, SOLUTION INTRAMUSCULAR; INTRAVENOUS at 07:40

## 2017-08-09 RX ADMIN — HYDROMORPHONE HYDROCHLORIDE 0.5 MG: 2 INJECTION INTRAMUSCULAR; INTRAVENOUS; SUBCUTANEOUS at 07:50

## 2017-08-09 RX ADMIN — HYDROMORPHONE HYDROCHLORIDE 0.5 MG: 2 INJECTION INTRAMUSCULAR; INTRAVENOUS; SUBCUTANEOUS at 07:59

## 2017-08-09 RX ADMIN — SCOPALAMINE 1 PATCH: 1 PATCH, EXTENDED RELEASE TRANSDERMAL at 06:50

## 2017-08-09 RX ADMIN — SCOLOPAMINE TRANSDERMAL SYSTEM 1 PATCH: 1 PATCH, EXTENDED RELEASE TRANSDERMAL at 06:50

## 2017-08-09 RX ADMIN — FENTANYL CITRATE 50 MCG: 50 INJECTION, SOLUTION INTRAMUSCULAR; INTRAVENOUS at 07:35

## 2017-08-09 RX ADMIN — LIDOCAINE HYDROCHLORIDE 0.5 ML: 10 INJECTION, SOLUTION INFILTRATION; PERINEURAL at 06:45

## 2017-08-09 RX ADMIN — SODIUM CHLORIDE, SODIUM LACTATE, POTASSIUM CHLORIDE, CALCIUM CHLORIDE: 600; 310; 30; 20 INJECTION, SOLUTION INTRAVENOUS at 06:45

## 2017-08-09 ASSESSMENT — PAIN SCALES - GENERAL
PAINLEVEL_OUTOF10: 3
PAINLEVEL_OUTOF10: 0
PAINLEVEL_OUTOF10: 0
PAINLEVEL_OUTOF10: 7
PAINLEVEL_OUTOF10: 2
PAINLEVEL_OUTOF10: 5

## 2017-08-09 NOTE — DISCHARGE INSTRUCTIONS
ACTIVITY: Rest and take it easy for the first 24 hours.  A responsible adult is recommended to remain with you during that time.  It is normal to feel sleepy.  We encourage you to not do anything that requires balance, judgment or coordination.    MILD FLU-LIKE SYMPTOMS ARE NORMAL. YOU MAY EXPERIENCE GENERALIZED MUSCLE ACHES, THROAT IRRITATION, HEADACHE AND/OR SOME NAUSEA.    FOR 24 HOURS DO NOT:  Drive, operate machinery or run household appliances.  Drink beer or alcoholic beverages.   Make important decisions or sign legal documents.    SPECIAL INSTRUCTIONS:   Discharge instructions:     1. DIET: Upon discharge from the hospital you may resume your normal preoperative diet. Depending on how you are feeling and whether you have nausea or not, you may wish to stay with a bland diet for the first few days. However, you can advance this as quickly as you feel ready.     2. ACTIVITIES: After discharge from the hospital, you may resume full routine activities. However, there should be no heavy lifting (greater than 15 pounds) and no strenuous activities until after your follow-up visit. Otherwise, routine activities of daily living are acceptable.     3. DRIVING: You may drive whenever you are off pain medications and are able to perform the activities needed to drive, i.e. turning, bending, twisting, etc.     4. BATHING: You may get the wound wet 48 hours after surgery. You may shower, but do not submerge in a bath for at least a week. Dressings may come off after 48 hours. Please leave steri-strips in place, they will fall off over 5-7 days.     5. BOWEL FUNCTION: Constipation is common after an operation, especially with pain medications. The combination of pain medication and decreased activity level can cause constipation in otherwise normal patients. If you feel this is occurring, take a laxative (Miralax, Milk of Magnesia, Ex-Lax, Senokot, etc.) until the problem has resolved.     6. PAIN MEDICATION: You will  be given a prescription for pain medication at discharge. Please take these as directed. It is important to remember not to take medications on an empty stomach as this may cause nausea.     7.CALL IF YOU HAVE: (1) Fevers to more than 101.0 F, (2) Unusual chest or leg pain, (3) Drainage or fluid from incision that may be foul smelling, increased tenderness or soreness at the wound or the wound edges are no longer together, redness or swelling at the incision site. Please do not hesitate to call with any other questions.     8. APPOINTMENT: Contact our office at [phone] for a follow-up appointment in 1-2 weeks following your procedure.     If you have any additional questions, please do not hesitate to call the office and speak to either myself or the physician on call.     Office address:   [facility].   [address] [place] [id number]    You should CALL YOUR PHYSICIAN if you develop:  Fever greater than 101 degrees F.  Pain not relieved by medication, or persistent nausea or vomiting.  Excessive bleeding (blood soaking through dressing) or unexpected drainage from the wound.  Extreme redness or swelling around the incision site, drainage of pus or foul smelling drainage.  Inability to urinate or empty your bladder within 8 hours.  Problems with breathing or chest pain.    You should call [phone] if you develop problems with breathing or chest pain.  If you are unable to contact your doctor or surgical center, you should go to the nearest emergency room or urgent care center.  Physician's telephone #: [phone]    If any questions arise, call your doctor.  If your doctor is not available, please feel free to call the Surgical Center at [phone].  The Center is open Monday through Friday from 7AM to 7PM.  You can also call the [facility] HOTLINE open 24 hours/day, 7 days/week and speak to a nurse at [phone], or toll free at [phone].    A registered nurse may call you  a few days after your surgery to see how you are doing after your procedure.    MEDICATIONS: Resume taking daily medication.  Take prescribed pain medication with food.  If no medication is prescribed, you may take non-aspirin pain medication if needed.  PAIN MEDICATION CAN BE VERY CONSTIPATING.  Take a stool softener or laxative such as senokot, pericolace, or milk of magnesia if needed.    Prescription given for Zofran.    If your physician has prescribed pain medication that includes Acetaminophen (Tylenol), do not take additional Acetaminophen (Tylenol) while taking the prescribed medication.    Depression / Suicide Risk    As you are discharged from this Critical access hospital facility, it is important to learn how to keep safe from harming yourself.    Recognize the warning signs:  · Abrupt changes in personality, positive or negative- including increase in energy   · Giving away possessions  · Change in eating patterns- significant weight changes-  positive or negative  · Change in sleeping patterns- unable to sleep or sleeping all the time   · Unwillingness or inability to communicate  · Depression  · Unusual sadness, discouragement and loneliness  · Talk of wanting to die  · Neglect of personal appearance   · Rebelliousness- reckless behavior  · Withdrawal from people/activities they love  · Confusion- inability to concentrate     If you or a loved one observes any of these behaviors or has concerns about self-harm, here's what you can do:  · Talk about it- your feelings and reasons for harming yourself  · Remove any means that you might use to hurt yourself (examples: pills, rope, extension cords, firearm)  · Get professional help from the community (Mental Health, Substance Abuse, psychological counseling)  · Do not be alone:Call your Safe Contact- someone whom you trust who will be there for you.  · Call your local CRISIS HOTLINE 981-4646 or 045-071-7267  · Call your local Children's Mobile Crisis Response Team  [facility] (725) 950-5945 or www.[employer].Dotflux  · Call the toll free National Suicide Prevention Hotlines   · National Suicide Prevention Lifeline 270-687-FWTS (3638)  · National Hope Line Network 800-SUICIDE (178-1428)

## 2017-08-09 NOTE — IP AVS SNAPSHOT
" Home Care Instructions                                                                                                                Name:Farhana Freeman  Medical Record Number:[id number]  CSN: [id number]    YOB: 1980   Age: 37 y.o.  Sex: female  HT:1.575 m (5' 2.01\") WT: 88.4 kg (194 lb 14.2 oz)          Admit Date: 8/9/2017     Discharge Date:   Today's Date: 8/9/2017  Attending Doctor:  S[facility]ton Jackson M.D.                  Allergies:  Oxycodone hcl; Morphine; and Sulfa drugs                Discharge Instructions         ACTIVITY: Rest and take it easy for the first 24 hours.  A responsible adult is recommended to remain with you during that time.  It is normal to feel sleepy.  We encourage you to not do anything that requires balance, judgment or coordination.    MILD FLU-LIKE SYMPTOMS ARE NORMAL. YOU MAY EXPERIENCE GENERALIZED MUSCLE ACHES, THROAT IRRITATION, HEADACHE AND/OR SOME NAUSEA.    FOR 24 HOURS DO NOT:  Drive, operate machinery or run household appliances.  Drink beer or alcoholic beverages.   Make important decisions or sign legal documents.    SPECIAL INSTRUCTIONS:   Discharge instructions:     1. DIET: Upon discharge from the hospital you may resume your normal preoperative diet. Depending on how you are feeling and whether you have nausea or not, you may wish to stay with a bland diet for the first few days. However, you can advance this as quickly as you feel ready.     2. ACTIVITIES: After discharge from the hospital, you may resume full routine activities. However, there should be no heavy lifting (greater than 15 pounds) and no strenuous activities until after your follow-up visit. Otherwise, routine activities of daily living are acceptable.     3. DRIVING: You may drive whenever you are off pain medications and are able to perform the activities needed to drive, i.e. turning, bending, twisting, etc.     4. BATHING: You may get the wound wet 48 hours after surgery. You may " shower, but do not submerge in a bath for at least a week. Dressings may come off after 48 hours. Please leave steri-strips in place, they will fall off over 5-7 days.     5. BOWEL FUNCTION: Constipation is common after an operation, especially with pain medications. The combination of pain medication and decreased activity level can cause constipation in otherwise normal patients. If you feel this is occurring, take a laxative (Miralax, Milk of Magnesia, Ex-Lax, Senokot, etc.) until the problem has resolved.     6. PAIN MEDICATION: You will be given a prescription for pain medication at discharge. Please take these as directed. It is important to remember not to take medications on an empty stomach as this may cause nausea.     7.CALL IF YOU HAVE: (1) Fevers to more than 101.0 F, (2) Unusual chest or leg pain, (3) Drainage or fluid from incision that may be foul smelling, increased tenderness or soreness at the wound or the wound edges are no longer together, redness or swelling at the incision site. Please do not hesitate to call with any other questions.     8. APPOINTMENT: Contact our office at [phone] for a follow-up appointment in 1-2 weeks following your procedure.     If you have any additional questions, please do not hesitate to call the office and speak to either myself or the physician on call.     Office address:   [facility].   [address] [place] [address]    You should CALL YOUR PHYSICIAN if you develop:  Fever greater than 101 degrees F.  Pain not relieved by medication, or persistent nausea or vomiting.  Excessive bleeding (blood soaking through dressing) or unexpected drainage from the wound.  Extreme redness or swelling around the incision site, drainage of pus or foul smelling drainage.  Inability to urinate or empty your bladder within 8 hours.  Problems with breathing or chest pain.    You should call [phone] if you develop problems with breathing or chest  pain.  If you are unable to contact your doctor or surgical center, you should go to the nearest emergency room or urgent care center.  Physician's telephone #: [phone]    If any questions arise, call your doctor.  If your doctor is not available, please feel free to call the Surgical Center at [phone].  The Center is open Monday through Friday from 7AM to 7PM.  You can also call the HEALTH HOTLINE open 24 hours/day, 7 days/week and speak to a nurse at [phone], or toll free at [phone].    A registered nurse may call you a few days after your surgery to see how you are doing after your procedure.    MEDICATIONS: Resume taking daily medication.  Take prescribed pain medication with food.  If no medication is prescribed, you may take non-aspirin pain medication if needed.  PAIN MEDICATION CAN BE VERY CONSTIPATING.  Take a stool softener or laxative such as senokot, pericolace, or milk of magnesia if needed.    Prescription given for Zofran.    If your physician has prescribed pain medication that includes Acetaminophen (Tylenol), do not take additional Acetaminophen (Tylenol) while taking the prescribed medication.    Depression / Suicide Risk    As you are discharged from this [facility] facility, it is important to learn how to keep safe from harming yourself.    Recognize the warning signs:  · Abrupt changes in personality, positive or negative- including increase in energy   · Giving away possessions  · Change in eating patterns- significant weight changes-  positive or negative  · Change in sleeping patterns- unable to sleep or sleeping all the time   · Unwillingness or inability to communicate  · Depression  · Unusual sadness, discouragement and loneliness  · Talk of wanting to die  · Neglect of personal appearance   · Rebelliousness- reckless behavior  · Withdrawal from people/activities they love  · Confusion- inability to concentrate     If you or a loved one observes any of these  behaviors or has concerns about self-harm, here's what you can do:  · Talk about it- your feelings and reasons for harming yourself  · Remove any means that you might use to hurt yourself (examples: pills, rope, extension cords, firearm)  · Get professional help from the community (Mental Health, Substance Abuse, psychological counseling)  · Do not be alone:Call your Safe Contact- someone whom you trust who will be there for you.  · Call your local CRISIS HOTLINE 277-1040 or 045-528-8280  · Call your local Children's Mobile Crisis Response Team [place] (132) 833-7565 or www.Qiniu  · Call the toll free National Suicide Prevention Hotlines   · National Suicide Prevention Lifeline 784-396-NOTS (6038)  · [employer] Line Network 800-SUICIDE (217-7645)       Medication List      START taking these medications        Instructions    Morning Afternoon Evening Bedtime    ondansetron 8 MG Tbdp   Commonly known as:  ZOFRAN ODT        Take 1 Tab by mouth every 8 hours as needed for Nausea.   Dose:  8 mg                          CONTINUE taking these medications        Instructions    Morning Afternoon Evening Bedtime    acetaminophen 160 MG/5ML Susp   Commonly known as:  TYLENOL        Take 500 mg by mouth 2 times a day as needed.   Dose:  500 mg                        omeprazole 40 MG delayed-release capsule   Commonly known as:  PRILOSEC        Take 1 Cap by mouth every day.   Dose:  40 mg                        polyethylene glycol/lytes Pack   Commonly known as:  MIRALAX        Take 17 g by mouth every day.   Dose:  17 g                        therapeutic multivitamin-minerals Tabs        Take 1 Tab by mouth every day.   Dose:  1 Tab                             Where to Get Your Medications      You can get these medications from any pharmacy     Bring a paper prescription for each of these medications    - ondansetron 8 MG Tbdp            Medication Information     Above and/or attached are the  medications your physician expects you to take upon discharge. Review all of your home medications and newly ordered medications with your doctor and/or pharmacist. Follow medication instructions as directed by your doctor and/or pharmacist. Please keep your medication list with you and share with your physician. Update the information when medications are discontinued, doses are changed, or new medications (including over-the-counter products) are added; and carry medication information at all times in the event of emergency situations.        Resources     Quit Smoking / Tobacco Use:    I understand the use of any tobacco products increases my chance of suffering from future heart disease or stroke and could cause other illnesses which may shorten my life. Quitting the use of tobacco products is the single most important thing I can do to improve my health. For further information on smoking / tobacco cessation call a Toll Free Quit Line at 1-810.883.3078 (*National Cancer [place]) or 1-966.966.7261 (American Lung Association) or you can access the web based program at www.lung"[employer].org.    [place] Tobacco Users Help Line:  (224) 414-6915       Toll Free: 1-921.139.7941  Quit Tobacco Program [facility] (364)584-0610    Crisis Hotline:    [place] Crisis Hotline:  9-107-FQWDMEP or 1-351.395.9783    [place] Crisis Hotline:    1-250.468.1798 or 704-322-0668    Discharge Survey:   Thank you for choosing [facility]. We hope we did everything we could to make your hospital stay a pleasant one. You may be receiving a survey and we would appreciate your time and participation in answering the questions. Your input is very valuable to us in our efforts to improve our service to our patients and their families.            Signatures     My signature on this form indicates that:    1. I acknowledge receipt and understanding of these Home Care Instruction.  2. My questions regarding this information  have been answered to my satisfaction.  3. I have formulated a plan with my discharge nurse to obtain my prescribed medications for home.    __________________________________      __________________________________                   Patient Signature                                 Guardian/Responsible Adult Signature      __________________________________                 __________       ________                       Nurse Signature                                               Date                 Time

## 2017-08-09 NOTE — OP REPORT
NAME:  Farhana Freeman  MRN:  [id number]  :  1980      DATE OF OPERATION: 2017    PREOPERATIVE DIAGNOSIS: Biliary colic    POSTOPERATIVE DIAGNOSIS: Cholecysteitis    OPERATION PERFORMED: Laparoscopic cholecystectomy    SURGEON: Shelton Jackson MD    ASSISTANT:  Florinda Martin PA-C    ANESTHESIOLOGIST:  KONG Slater MD    ANESTHESIA: General endotracheal anesthesia.     FINDINGS: The gallbladder showed signs of chronic cholecystitis.     SPECIMEN: Gallbladder.    ESTIMATED BLOOD LOSS: <20 cc.     INDICATIONS: The patient is a 37 y.o. female with a history of symptomatic biliary colic. She is taken to the operating room today for laparoscopic cholecystectomy.     PROCEDURE: Following informed consent, the patient was properly identified, taken to the operating room, and placed in the supine position where general endotracheal anesthesia was administered. Intravenous antibiotics were administered by the anesthesiologist in the correct time interval. Sequential compression devices were employed. The abdomen was prepped and draped into a sterile field.     A bladeless optical entry trocar was carefully inserted into the abdomen and a pneumoperitoneum was established in the usual fashion.  A 5 mm separator port was introduced. A 5 mm lens/camera was passed into the peritoneal cavity.  An 11 mm port was placed in the epigastric midline under direct vision. Two 5 mm right subcostal ports were placed under direct vision.     Careful examination of the gallbladder and liver were performed.  The gallbladder was then grasped and elevated upward toward the right shoulder.  Dissection was carried out in hepatocystic triangle definitively identifying the cystic duct and cystic   artery. These structures were multiply clipped proximally, once distally and   divided. The gallbladder was dissected free from the undersurface of the   liver using electrocautery and placed within an EndoCatch bag. It was delivered intact from the  abdominal cavity through the epigastric port site. The gallbladder fossa was irrigated. All excess irrigant was evacuated from the abdominal cavity. Hemostasis was satisfactory.     The ports were removed and the abdomen desufflated. The enlarged epigastric port site fascia was approximated with a 0 Vicryl suture. The port site skin incisions were closed with interrupted 4-0 Vicryl subcuticular sutures.  Steri-Strips and Benzoin were applied beneath sterile Band-Aids.     The patient tolerated the procedure well and there were no apparent complications. All sponge, needle, and instrument counts were correct on 2 separate occasions. She was awakened, extubated, and transferred to the recovery room in satisfactory condition.       ____________________________________   Shelton Jackson MD  DD: 8/9/2017  8:28 AM    CC:  [facility];

## 2017-08-09 NOTE — IP AVS SNAPSHOT
8/9/2017    Farhana Freeman  [address] [facility] [address]    Dear Farhana:    [facility] wants to ensure your discharge home is safe and you or your loved ones have had all of your questions answered regarding your care after you leave the hospital.    Below is a list of resources and contact information should you have any questions regarding your hospital stay, follow-up instructions, or active medical symptoms.    Questions or Concerns Regarding… Contact   Medical Questions Related to Your Discharge  (7 days a week, 8am-5pm) Contact a Nurse Care Coordinator   [phone]   Medical Questions Not Related to Your Discharge  (24 hours a day / 7 days a week)  Contact the Nurse Health Line   [phone]    Medications or Discharge Instructions Refer to your discharge packet   or contact your [facility] Primary Care Provider   [phone]   Follow-up Appointment(s) Schedule your appointment via [employer]   or contact Scheduling [phone]   Billing Review your statement via [employer]s  or contact Billing [phone]   Medical Records Review your records via Teads   or contact Medical Records [phone]     You may receive a telephone call within two days of discharge. This call is to make certain you understand your discharge instructions and have the opportunity to have any questions answered. You can also easily access your medical information, test results and upcoming appointments via the [employer]eads free online health management tool. You can learn more and sign up at [email or web address]. For assistance setting up your [employer]eads account, please call [phone].    Once again, we want to ensure your discharge home is safe and that you have a clear understanding of any next steps in your care. If you have any questions or concerns, please do not hesitate to contact us, we are here for you. Thank you for choosing [facility] for your healthcare needs.    Sincerely,    Your [facility] Healthcare Team

## 2017-08-28 ENCOUNTER — APPOINTMENT (OUTPATIENT)
Dept: RADIOLOGY | Facility: MEDICAL CENTER | Age: 37
End: 2017-08-28
Attending: EMERGENCY MEDICINE
Payer: COMMERCIAL

## 2017-08-28 ENCOUNTER — HOSPITAL ENCOUNTER (EMERGENCY)
Facility: MEDICAL CENTER | Age: 37
End: 2017-08-29
Attending: EMERGENCY MEDICINE
Payer: COMMERCIAL

## 2017-08-28 DIAGNOSIS — R10.13 DYSPEPSIA: ICD-10-CM

## 2017-08-28 LAB
ALBUMIN SERPL BCP-MCNC: 3.9 G/DL (ref 3.2–4.9)
ALBUMIN/GLOB SERPL: 1.3 G/DL
ALP SERPL-CCNC: 131 U/L (ref 30–99)
ALT SERPL-CCNC: 20 U/L (ref 2–50)
ANION GAP SERPL CALC-SCNC: 9 MMOL/L (ref 0–11.9)
APPEARANCE UR: CLEAR
AST SERPL-CCNC: 19 U/L (ref 12–45)
BASOPHILS # BLD AUTO: 0.7 % (ref 0–1.8)
BASOPHILS # BLD: 0.08 K/UL (ref 0–0.12)
BILIRUB SERPL-MCNC: 0.6 MG/DL (ref 0.1–1.5)
BUN SERPL-MCNC: 10 MG/DL (ref 8–22)
CALCIUM SERPL-MCNC: 9.1 MG/DL (ref 8.4–10.2)
CHLORIDE SERPL-SCNC: 103 MMOL/L (ref 96–112)
CO2 SERPL-SCNC: 23 MMOL/L (ref 20–33)
COLOR UR: YELLOW
CREAT SERPL-MCNC: 0.73 MG/DL (ref 0.5–1.4)
EOSINOPHIL # BLD AUTO: 0.22 K/UL (ref 0–0.51)
EOSINOPHIL NFR BLD: 1.8 % (ref 0–6.9)
ERYTHROCYTE [DISTWIDTH] IN BLOOD BY AUTOMATED COUNT: 43.5 FL (ref 35.9–50)
GFR SERPL CREATININE-BSD FRML MDRD: >60 ML/MIN/1.73 M 2
GLOBULIN SER CALC-MCNC: 2.9 G/DL (ref 1.9–3.5)
GLUCOSE SERPL-MCNC: 88 MG/DL (ref 65–99)
GLUCOSE UR STRIP.AUTO-MCNC: NEGATIVE MG/DL
HCT VFR BLD AUTO: 37.7 % (ref 37–47)
HGB BLD-MCNC: 12.5 G/DL (ref 12–16)
IMM GRANULOCYTES # BLD AUTO: 0.04 K/UL (ref 0–0.11)
IMM GRANULOCYTES NFR BLD AUTO: 0.3 % (ref 0–0.9)
KETONES UR STRIP.AUTO-MCNC: NEGATIVE MG/DL
LEUKOCYTE ESTERASE UR QL STRIP.AUTO: NEGATIVE
LIPASE SERPL-CCNC: 15 U/L (ref 7–58)
LYMPHOCYTES # BLD AUTO: 4.54 K/UL (ref 1–4.8)
LYMPHOCYTES NFR BLD: 37.1 % (ref 22–41)
MCH RBC QN AUTO: 27.8 PG (ref 27–33)
MCHC RBC AUTO-ENTMCNC: 33.2 G/DL (ref 33.6–35)
MCV RBC AUTO: 83.8 FL (ref 81.4–97.8)
MONOCYTES # BLD AUTO: 0.71 K/UL (ref 0–0.85)
MONOCYTES NFR BLD AUTO: 5.8 % (ref 0–13.4)
NEUTROPHILS # BLD AUTO: 6.64 K/UL (ref 2–7.15)
NEUTROPHILS NFR BLD: 54.3 % (ref 44–72)
NITRITE UR QL STRIP.AUTO: NEGATIVE
NRBC # BLD AUTO: 0 K/UL
NRBC BLD AUTO-RTO: 0 /100 WBC
PH UR STRIP.AUTO: 5.5 [PH]
PLATELET # BLD AUTO: 432 K/UL (ref 164–446)
PMV BLD AUTO: 10.2 FL (ref 9–12.9)
POTASSIUM SERPL-SCNC: 3.1 MMOL/L (ref 3.6–5.5)
PROT SERPL-MCNC: 6.8 G/DL (ref 6–8.2)
PROT UR QL STRIP: NEGATIVE MG/DL
RBC # BLD AUTO: 4.5 M/UL (ref 4.2–5.4)
RBC UR QL AUTO: NEGATIVE
SODIUM SERPL-SCNC: 135 MMOL/L (ref 135–145)
SP GR UR STRIP.AUTO: <=1.005
WBC # BLD AUTO: 12.2 K/UL (ref 4.8–10.8)

## 2017-08-28 PROCEDURE — 36415 COLL VENOUS BLD VENIPUNCTURE: CPT

## 2017-08-28 PROCEDURE — 81002 URINALYSIS NONAUTO W/O SCOPE: CPT

## 2017-08-28 PROCEDURE — 83690 ASSAY OF LIPASE: CPT

## 2017-08-28 PROCEDURE — 84703 CHORIONIC GONADOTROPIN ASSAY: CPT

## 2017-08-28 PROCEDURE — 700111 HCHG RX REV CODE 636 W/ 250 OVERRIDE (IP): Performed by: EMERGENCY MEDICINE

## 2017-08-28 PROCEDURE — 96374 THER/PROPH/DIAG INJ IV PUSH: CPT

## 2017-08-28 PROCEDURE — 80053 COMPREHEN METABOLIC PANEL: CPT

## 2017-08-28 PROCEDURE — 99284 EMERGENCY DEPT VISIT MOD MDM: CPT

## 2017-08-28 PROCEDURE — 85025 COMPLETE CBC W/AUTO DIFF WBC: CPT

## 2017-08-28 RX ADMIN — HYDROMORPHONE HYDROCHLORIDE 1 MG: 1 INJECTION, SOLUTION INTRAMUSCULAR; INTRAVENOUS; SUBCUTANEOUS at 22:14

## 2017-08-28 ASSESSMENT — PAIN SCALES - GENERAL
PAINLEVEL_OUTOF10: 4
PAINLEVEL_OUTOF10: 8

## 2017-08-28 ASSESSMENT — ENCOUNTER SYMPTOMS
CONSTITUTIONAL NEGATIVE: 1
EYES NEGATIVE: 1
ABDOMINAL PAIN: 1
RESPIRATORY NEGATIVE: 1

## 2017-08-29 VITALS
SYSTOLIC BLOOD PRESSURE: 121 MMHG | RESPIRATION RATE: 16 BRPM | TEMPERATURE: 98.7 F | WEIGHT: 196.65 LBS | HEIGHT: 62 IN | BODY MASS INDEX: 36.19 KG/M2 | HEART RATE: 68 BPM | DIASTOLIC BLOOD PRESSURE: 83 MMHG | OXYGEN SATURATION: 97 %

## 2017-08-29 LAB — HCG SERPL QL: NEGATIVE

## 2017-08-29 PROCEDURE — 99284 EMERGENCY DEPT VISIT MOD MDM: CPT

## 2017-08-29 PROCEDURE — 74177 CT ABD & PELVIS W/CONTRAST: CPT

## 2017-08-29 PROCEDURE — 700117 HCHG RX CONTRAST REV CODE 255: Performed by: EMERGENCY MEDICINE

## 2017-08-29 PROCEDURE — 96374 THER/PROPH/DIAG INJ IV PUSH: CPT

## 2017-08-29 RX ORDER — RANITIDINE 15 MG/ML
5 SOLUTION ORAL 2 TIMES DAILY
Qty: 892.2 ML | Refills: 0 | Status: SHIPPED | OUTPATIENT
Start: 2017-08-29 | End: 2017-09-28

## 2017-08-29 RX ORDER — RANITIDINE 150 MG/1
150 TABLET ORAL 2 TIMES DAILY
Qty: 60 TAB | Refills: 0 | Status: SHIPPED | OUTPATIENT
Start: 2017-08-29 | End: 2017-08-29

## 2017-08-29 RX ADMIN — IOHEXOL 100 ML: 350 INJECTION, SOLUTION INTRAVENOUS at 00:34

## 2017-08-29 NOTE — ED NOTES
Discharge instructions provided.  Pt educated that Rx was sent to his pharmacy. Pt verbalized the understanding of discharge instructions to follow up with PCP and to return to ER if condition worsens.  Pt ambulated out of ER without difficulty.

## 2017-08-29 NOTE — ED NOTES
Discharge information provided. Pt verbalized understanding of discharge instructions to follow up with Surgeon and to return to ER if condition worsens. Pt expressed the awareness of not driving or operating heavy machinery, has ride home with . Pt ambulated out of ER in a steady gait, no additional questions or concerns. Educated on new medications.

## 2017-08-29 NOTE — ED NOTES
Pt rounding-in no apparent distress, though states DR ordered dilaudid and a ct. Call light in reach,  at bedside, vs as charted. Med per request

## 2017-08-29 NOTE — ED NOTES
Per patient blood draw attempted in triage.  IV started x 1 attempt by RN. Family at bedside. Unrelenting abdominal pain since 8/9.

## 2017-08-29 NOTE — ED NOTES
"Chief Complaint   Patient presents with   • Abdominal Pain     LUQ, pt states she had lap adeline 8/9 and has had pain ever since that has not improved.    • Nausea     Denies vomiting     Pulse 72   Temp 37.1 °C (98.7 °F)   Resp 18   Ht 1.575 m (5' 2\")   Wt 89.2 kg (196 lb 10.4 oz)   LMP 08/09/2017   SpO2 98%   BMI 35.97 kg/m²       Pt has been taking loratab at home with no relief, last BM today.   "

## 2017-08-29 NOTE — DISCHARGE INSTRUCTIONS
Your labs and CT scan failed to reveal any major abnormalities. I suspect especially given the recent steroid use that this may be an inflammation of the stomach. Therefore I will start you on ranitidine. Please follow-up with Dr. perdue and with her primary care physician for ongoing evaluation and management    Abdominal Pain (Nonspecific)  Your exam might not show the exact reason you have abdominal pain. Since there are many different causes of abdominal pain, another checkup and more tests may be needed. It is very important to follow up for lasting (persistent) or worsening symptoms. A possible cause of abdominal pain in any person who still has his or her appendix is acute appendicitis. Appendicitis is often hard to diagnose. Normal blood tests, urine tests, ultrasound, and CT scans do not completely rule out early appendicitis or other causes of abdominal pain. Sometimes, only the changes that happen over time will allow appendicitis and other causes of abdominal pain to be determined. Other potential problems that may require surgery may also take time to become more apparent. Because of this, it is important that you follow all of the instructions below.  HOME CARE INSTRUCTIONS   · Rest as much as possible.   · Do not eat solid food until your pain is gone.   · While adults or children have pain: A diet of water, weak decaffeinated tea, broth or bouillon, gelatin, oral rehydration solutions (ORS), frozen ice pops, or ice chips may be helpful.   · When pain is gone in adults or children: Start a light diet (dry toast, crackers, applesauce, or white rice). Increase the diet slowly as long as it does not bother you. Eat no dairy products (including cheese and eggs) and no spicy, fatty, fried, or high-fiber foods.   · Use no alcohol, caffeine, or cigarettes.   · Take your regular medicines unless your caregiver told you not to.   · Take any prescribed medicine as directed.   · Only take over-the-counter or  prescription medicines for pain, discomfort, or fever as directed by your caregiver. Do not give aspirin to children.   If your caregiver has given you a follow-up appointment, it is very important to keep that appointment. Not keeping the appointment could result in a permanent injury and/or lasting (chronic) pain and/or disability. If there is any problem keeping the appointment, you must call to reschedule.   SEEK IMMEDIATE MEDICAL CARE IF:   · Your pain is not gone in 24 hours.   · Your pain becomes worse, changes location, or feels different.   · You or your child has an oral temperature above 102° F (38.9° C), not controlled by medicine.   · Your baby is older than 3 months with a rectal temperature of 102° F (38.9° C) or higher.   · Your baby is 3 months old or younger with a rectal temperature of 100.4° F (38° C) or higher.   · You have shaking chills.   · You keep throwing up (vomiting) or cannot drink liquids.   · There is blood in your vomit or you see blood in your bowel movements.   · Your bowel movements become dark or black.   · You have frequent bowel movements.   · Your bowel movements stop (become blocked) or you cannot pass gas.   · You have bloody, frequent, or painful urination.   · You have yellow discoloration in the skin or whites of the eyes.   · Your stomach becomes bloated or bigger.   · You have dizziness or fainting.   · You have chest or back pain.   MAKE SURE YOU:   · Understand these instructions.   · Will watch your condition.   · Will get help right away if you are not doing well or get worse.   Document Released: 12/18/2006 Document Revised: 03/11/2013 Document Reviewed: 11/15/2010  ExitCare® Patient Information ©2013 [employer].

## 2017-08-29 NOTE — ED PROVIDER NOTES
ED Provider Note    CHIEF COMPLAINT  Chief Complaint   Patient presents with   • Abdominal Pain     LUQ, pt states she had lap adeline 8/9 and has had pain ever since that has not improved.    • Nausea     Denies vomiting       HPI  Farhana Freeman is a 37 y.o. female who presents With abdominal pain. Patient with history of recent gastric bypass around 3 months ago, and a recent necrotic cholecystitis status post cholecystectomy 3 weeks prior. Patient reports that after surgery her abdominal pain which proceeded the procedure never resolved. She denies any fever or constitutional symptoms. She denies any change in bowel movements. No bright red blood per rectum or melena. Patient denies any vomiting. Mild nausea. Patient denies any associated chest pain or shortness of breath. Patient has no association with exertion. Pain comes and goes without any provoking or relieving factors.    REVIEW OF SYSTEMS  Review of Systems   Constitutional: Negative.    HENT: Negative.    Eyes: Negative.    Respiratory: Negative.    Gastrointestinal: Positive for abdominal pain.   Genitourinary: Negative.    Skin: Negative.        See HPI for further details. All other systems are negative.     PAST MEDICAL HISTORY   has a past medical history of Anesthesia; ASTHMA (5/17); Heart burn; and Migraine.    SOCIAL HISTORY  Social History     Social History Main Topics   • Smoking status: Never Smoker   • Smokeless tobacco: Never Used   • Alcohol use No   • Drug use: No   • Sexual activity: Not on file       SURGICAL HISTORY   has a past surgical history that includes other abdominal surgery (10/12/15); gastric bypass laparoscopic (5/12/2017); and adeline by laparoscopy (8/9/2017).    CURRENT MEDICATIONS  Home Medications     Reviewed by Julita Velásquez R.N. (Registered Nurse) on 08/28/17 at 1939  Med List Status: Not Addressed   Medication Last Dose Status   MethylPREDNISolone (MEDROL, DEZ, PO) 8/28/2017 Active   ondansetron (ZOFRAN  "ODT) 8 MG TABLET DISPERSIBLE  Active   therapeutic multivitamin-minerals (THERAGRAN-M) Tab 8/28/2017 Active   vitamin D (CHOLECALCIFEROL) 1000 UNIT Tab 8/28/2017 Active                ALLERGIES  Allergies   Allergen Reactions   • Oxycodone Hcl Vomiting   • Morphine Nausea     \"severe nausea vomiting\"   • Sulfa Drugs      \"Causes blindness\"       PHYSICAL EXAM  Physical Exam   Constitutional: She is oriented to person, place, and time. She appears well-developed and well-nourished.   HENT:   Head: Normocephalic and atraumatic.   Eyes: Conjunctivae are normal. Pupils are equal, round, and reactive to light.   Neck: Normal range of motion. Neck supple.   Cardiovascular: Normal rate and regular rhythm.    Pulmonary/Chest: Effort normal and breath sounds normal.   Abdominal: Soft. Bowel sounds are normal. She exhibits no distension. There is no tenderness. There is no rebound.   Neurological: She is alert and oriented to person, place, and time.   Skin: Skin is warm and dry.       Results for orders placed or performed during the hospital encounter of 08/28/17   CBC WITH DIFFERENTIAL   Result Value Ref Range    WBC 12.2 (H) 4.8 - 10.8 K/uL    RBC 4.50 4.20 - 5.40 M/uL    Hemoglobin 12.5 12.0 - 16.0 g/dL    Hematocrit 37.7 37.0 - 47.0 %    MCV 83.8 81.4 - 97.8 fL    MCH 27.8 27.0 - 33.0 pg    MCHC 33.2 (L) 33.6 - 35.0 g/dL    RDW 43.5 35.9 - 50.0 fL    Platelet Count 432 164 - 446 K/uL    MPV 10.2 9.0 - 12.9 fL    Neutrophils-Polys 54.30 44.00 - 72.00 %    Lymphocytes 37.10 22.00 - 41.00 %    Monocytes 5.80 0.00 - 13.40 %    Eosinophils 1.80 0.00 - 6.90 %    Basophils 0.70 0.00 - 1.80 %    Immature Granulocytes 0.30 0.00 - 0.90 %    Nucleated RBC 0.00 /100 WBC    Neutrophils (Absolute) 6.64 2.00 - 7.15 K/uL    Lymphs (Absolute) 4.54 1.00 - 4.80 K/uL    Monos (Absolute) 0.71 0.00 - 0.85 K/uL    Eos (Absolute) 0.22 0.00 - 0.51 K/uL    Baso (Absolute) 0.08 0.00 - 0.12 K/uL    Immature Granulocytes (abs) 0.04 0.00 - 0.11 K/uL "    NRBC (Absolute) 0.00 K/uL   COMP METABOLIC PANEL   Result Value Ref Range    Sodium 135 135 - 145 mmol/L    Potassium 3.1 (L) 3.6 - 5.5 mmol/L    Chloride 103 96 - 112 mmol/L    Co2 23 20 - 33 mmol/L    Anion Gap 9.0 0.0 - 11.9    Glucose 88 65 - 99 mg/dL    Bun 10 8 - 22 mg/dL    Creatinine 0.73 0.50 - 1.40 mg/dL    Calcium 9.1 8.4 - 10.2 mg/dL    AST(SGOT) 19 12 - 45 U/L    ALT(SGPT) 20 2 - 50 U/L    Alkaline Phosphatase 131 (H) 30 - 99 U/L    Total Bilirubin 0.6 0.1 - 1.5 mg/dL    Albumin 3.9 3.2 - 4.9 g/dL    Total Protein 6.8 6.0 - 8.2 g/dL    Globulin 2.9 1.9 - 3.5 g/dL    A-G Ratio 1.3 g/dL   LIPASE   Result Value Ref Range    Lipase 15 7 - 58 U/L   ESTIMATED GFR   Result Value Ref Range    GFR If African American >60 >60 mL/min/1.73 m 2    GFR If Non African American >60 >60 mL/min/1.73 m 2   POC UA   Result Value Ref Range    POC Color Yellow     POC Appearance Clear     POC Glucose Negative Negative mg/dL    POC Ketones Negative Negative mg/dL    POC Specific Gravity <=1.005 (A) 1.005 - 1.030    POC Blood Negative Negative    POC Urine PH 5.5 5.0 - 8.0    POC Protein Negative Negative mg/dL    POC Nitrites Negative Negative    POC Leukocyte Esterase Negative Negative           COURSE & MEDICAL DECISION MAKING  Pertinent Labs & Imaging studies reviewed. (See chart for details)  (Patient has been on exam. Would check CT for possible seroma versus surgical complication otherwise. Treating pain, patient reports that she vomits with morphine and is requesting Dilaudid.  Patient's labs are unremarkable. Patient's CT is unremarkable for any acute postsurgical problem or surgical pathology otherwise. Patient continues to have benign abdominal exam. We will treat for possible gastritis and have patient follow-up with her surgeon Dr. perdue and her primary care physician  The patient will not drink alcohol nor drive with prescribed medications. The patient will return for worsening symptoms and is stable at the  time of discharge. The patient verbalizes understanding and will comply.    FINAL IMPRESSION  1. dyspepsia           Electronically signed by: Be Duvall, 8/28/2017 9:50 PM

## 2017-09-23 ENCOUNTER — HOSPITAL ENCOUNTER (EMERGENCY)
Facility: MEDICAL CENTER | Age: 37
End: 2017-09-24
Attending: EMERGENCY MEDICINE

## 2017-09-23 DIAGNOSIS — E66.9 OBESITY, UNSPECIFIED OBESITY SEVERITY, UNSPECIFIED OBESITY TYPE: ICD-10-CM

## 2017-09-23 DIAGNOSIS — G89.29 CHRONIC ABDOMINAL PAIN: ICD-10-CM

## 2017-09-23 DIAGNOSIS — R10.9 CHRONIC ABDOMINAL PAIN: ICD-10-CM

## 2017-09-23 DIAGNOSIS — Z90.49 HISTORY OF CHOLECYSTECTOMY: ICD-10-CM

## 2017-09-23 DIAGNOSIS — Z98.84 HISTORY OF GASTRIC BYPASS: ICD-10-CM

## 2017-09-23 DIAGNOSIS — R10.12 LEFT UPPER QUADRANT PAIN: ICD-10-CM

## 2017-09-23 LAB
APPEARANCE UR: CLEAR
BACTERIA #/AREA URNS HPF: ABNORMAL /HPF
BASOPHILS # BLD AUTO: 0.8 % (ref 0–1.8)
BASOPHILS # BLD: 0.06 K/UL (ref 0–0.12)
BILIRUB UR QL STRIP.AUTO: NEGATIVE
COLOR UR: YELLOW
CULTURE IF INDICATED INDCX: YES UA CULTURE
EOSINOPHIL # BLD AUTO: 0.35 K/UL (ref 0–0.51)
EOSINOPHIL NFR BLD: 4.6 % (ref 0–6.9)
EPI CELLS #/AREA URNS HPF: ABNORMAL /HPF
ERYTHROCYTE [DISTWIDTH] IN BLOOD BY AUTOMATED COUNT: 43.9 FL (ref 35.9–50)
GLUCOSE UR STRIP.AUTO-MCNC: NEGATIVE MG/DL
HCT VFR BLD AUTO: 37.3 % (ref 37–47)
HGB BLD-MCNC: 12.2 G/DL (ref 12–16)
IMM GRANULOCYTES # BLD AUTO: 0.01 K/UL (ref 0–0.11)
IMM GRANULOCYTES NFR BLD AUTO: 0.1 % (ref 0–0.9)
KETONES UR STRIP.AUTO-MCNC: NEGATIVE MG/DL
LEUKOCYTE ESTERASE UR QL STRIP.AUTO: ABNORMAL
LYMPHOCYTES # BLD AUTO: 3.38 K/UL (ref 1–4.8)
LYMPHOCYTES NFR BLD: 44.6 % (ref 22–41)
MCH RBC QN AUTO: 27.9 PG (ref 27–33)
MCHC RBC AUTO-ENTMCNC: 32.7 G/DL (ref 33.6–35)
MCV RBC AUTO: 85.2 FL (ref 81.4–97.8)
MICRO URNS: ABNORMAL
MONOCYTES # BLD AUTO: 0.54 K/UL (ref 0–0.85)
MONOCYTES NFR BLD AUTO: 7.1 % (ref 0–13.4)
MUCOUS THREADS #/AREA URNS HPF: ABNORMAL /HPF
NEUTROPHILS # BLD AUTO: 3.24 K/UL (ref 2–7.15)
NEUTROPHILS NFR BLD: 42.8 % (ref 44–72)
NITRITE UR QL STRIP.AUTO: NEGATIVE
NRBC # BLD AUTO: 0 K/UL
NRBC BLD AUTO-RTO: 0 /100 WBC
PH UR STRIP.AUTO: 6 [PH]
PLATELET # BLD AUTO: 292 K/UL (ref 164–446)
PMV BLD AUTO: 10.6 FL (ref 9–12.9)
PROT UR QL STRIP: NEGATIVE MG/DL
RBC # BLD AUTO: 4.38 M/UL (ref 4.2–5.4)
RBC UR QL AUTO: NEGATIVE
SP GR UR STRIP.AUTO: 1.02
WBC # BLD AUTO: 7.6 K/UL (ref 4.8–10.8)
WBC #/AREA URNS HPF: ABNORMAL /HPF

## 2017-09-23 PROCEDURE — 36415 COLL VENOUS BLD VENIPUNCTURE: CPT

## 2017-09-23 PROCEDURE — 96375 TX/PRO/DX INJ NEW DRUG ADDON: CPT

## 2017-09-23 PROCEDURE — 83605 ASSAY OF LACTIC ACID: CPT

## 2017-09-23 PROCEDURE — 700111 HCHG RX REV CODE 636 W/ 250 OVERRIDE (IP): Performed by: EMERGENCY MEDICINE

## 2017-09-23 PROCEDURE — 81001 URINALYSIS AUTO W/SCOPE: CPT

## 2017-09-23 PROCEDURE — 83690 ASSAY OF LIPASE: CPT

## 2017-09-23 PROCEDURE — 700105 HCHG RX REV CODE 258: Performed by: EMERGENCY MEDICINE

## 2017-09-23 PROCEDURE — 87086 URINE CULTURE/COLONY COUNT: CPT

## 2017-09-23 PROCEDURE — 99285 EMERGENCY DEPT VISIT HI MDM: CPT

## 2017-09-23 PROCEDURE — 96374 THER/PROPH/DIAG INJ IV PUSH: CPT

## 2017-09-23 PROCEDURE — 85025 COMPLETE CBC W/AUTO DIFF WBC: CPT

## 2017-09-23 PROCEDURE — 84484 ASSAY OF TROPONIN QUANT: CPT

## 2017-09-23 PROCEDURE — 84703 CHORIONIC GONADOTROPIN ASSAY: CPT

## 2017-09-23 PROCEDURE — 80053 COMPREHEN METABOLIC PANEL: CPT

## 2017-09-23 RX ORDER — ONDANSETRON 2 MG/ML
4 INJECTION INTRAMUSCULAR; INTRAVENOUS ONCE
Status: COMPLETED | OUTPATIENT
Start: 2017-09-24 | End: 2017-09-23

## 2017-09-23 RX ORDER — SODIUM CHLORIDE 9 MG/ML
1000 INJECTION, SOLUTION INTRAVENOUS ONCE
Status: COMPLETED | OUTPATIENT
Start: 2017-09-24 | End: 2017-09-24

## 2017-09-23 RX ADMIN — SODIUM CHLORIDE 1000 ML: 9 INJECTION, SOLUTION INTRAVENOUS at 23:55

## 2017-09-23 RX ADMIN — ONDANSETRON 4 MG: 2 INJECTION INTRAMUSCULAR; INTRAVENOUS at 23:55

## 2017-09-23 RX ADMIN — FENTANYL CITRATE 100 MCG: 50 INJECTION, SOLUTION INTRAMUSCULAR; INTRAVENOUS at 23:55

## 2017-09-24 ENCOUNTER — HOSPITAL ENCOUNTER (OUTPATIENT)
Dept: RADIOLOGY | Facility: MEDICAL CENTER | Age: 37
End: 2017-09-24
Attending: EMERGENCY MEDICINE

## 2017-09-24 VITALS
RESPIRATION RATE: 19 BRPM | HEIGHT: 61 IN | WEIGHT: 194.67 LBS | HEART RATE: 47 BPM | TEMPERATURE: 99.2 F | OXYGEN SATURATION: 96 % | DIASTOLIC BLOOD PRESSURE: 57 MMHG | BODY MASS INDEX: 36.75 KG/M2 | SYSTOLIC BLOOD PRESSURE: 119 MMHG

## 2017-09-24 LAB
ALBUMIN SERPL BCP-MCNC: 3.7 G/DL (ref 3.2–4.9)
ALBUMIN/GLOB SERPL: 1.4 G/DL
ALP SERPL-CCNC: 103 U/L (ref 30–99)
ALT SERPL-CCNC: 16 U/L (ref 2–50)
ANION GAP SERPL CALC-SCNC: 7 MMOL/L (ref 0–11.9)
AST SERPL-CCNC: 16 U/L (ref 12–45)
BILIRUB SERPL-MCNC: 0.7 MG/DL (ref 0.1–1.5)
BUN SERPL-MCNC: 11 MG/DL (ref 8–22)
CALCIUM SERPL-MCNC: 8.9 MG/DL (ref 8.4–10.2)
CHLORIDE SERPL-SCNC: 108 MMOL/L (ref 96–112)
CO2 SERPL-SCNC: 24 MMOL/L (ref 20–33)
CREAT SERPL-MCNC: 0.46 MG/DL (ref 0.5–1.4)
GFR SERPL CREATININE-BSD FRML MDRD: >60 ML/MIN/1.73 M 2
GLOBULIN SER CALC-MCNC: 2.7 G/DL (ref 1.9–3.5)
GLUCOSE SERPL-MCNC: 82 MG/DL (ref 65–99)
HCG SERPL QL: NEGATIVE
LACTATE BLD-SCNC: 0.99 MMOL/L (ref 0.5–2)
LIPASE SERPL-CCNC: 18 U/L (ref 7–58)
POTASSIUM SERPL-SCNC: 3.5 MMOL/L (ref 3.6–5.5)
PROT SERPL-MCNC: 6.4 G/DL (ref 6–8.2)
SODIUM SERPL-SCNC: 139 MMOL/L (ref 135–145)
SPECIMEN DRAWN FROM PATIENT: NORMAL
TROPONIN I SERPL-MCNC: <0.02 NG/ML (ref 0–0.04)

## 2017-09-24 PROCEDURE — 74177 CT ABD & PELVIS W/CONTRAST: CPT

## 2017-09-24 PROCEDURE — 700117 HCHG RX CONTRAST REV CODE 255: Performed by: EMERGENCY MEDICINE

## 2017-09-24 PROCEDURE — 93005 ELECTROCARDIOGRAM TRACING: CPT | Performed by: EMERGENCY MEDICINE

## 2017-09-24 RX ORDER — SUCRALFATE ORAL 1 G/10ML
1 SUSPENSION ORAL 2 TIMES DAILY
Qty: 280 ML | Refills: 0 | Status: SHIPPED | OUTPATIENT
Start: 2017-09-24 | End: 2017-10-08

## 2017-09-24 RX ORDER — METOCLOPRAMIDE 10 MG/1
10 TABLET ORAL 3 TIMES DAILY PRN
Qty: 12 TAB | Refills: 0 | Status: SHIPPED | OUTPATIENT
Start: 2017-09-24 | End: 2019-11-02

## 2017-09-24 RX ADMIN — IOHEXOL 100 ML: 350 INJECTION, SOLUTION INTRAVENOUS at 00:59

## 2017-09-24 RX ADMIN — IOHEXOL 50 ML: 240 INJECTION, SOLUTION INTRATHECAL; INTRAVASCULAR; INTRAVENOUS; ORAL at 00:02

## 2017-09-24 NOTE — ED NOTES
Pt with recurrent generalized abdominal pain ever since adeline on 8/9. Pt has been seen here for this before.

## 2017-09-24 NOTE — ED NOTES
"At bedside to medicate pt who voiced concern about fentanyl and states \"dilaudid is the only thing that has worked for my pain in the past.\"  Explained efficacy to pt and that fentanyl is a stronger opiate than dilaudid without so many of the side effects and with much lower potential for addiction.   "

## 2017-09-24 NOTE — DISCHARGE INSTRUCTIONS
You were seen and evaluated in the Emergency Department at [facility] for:     Abdominal pain.    You had the following tests and studies:    Blood and urine tests, CT scan, EKG. Everything looks stable and we think you're safe to go home.     You received the following prescriptions:    Carafate, take this three times a day for the next two weeks.  Reglan, take this at most up to three times per day for nausea. Only take if needed!  ----------------------------    Please make sure to follow up with:    Your primary care doctor and GI doctor next week.     Good luck, and feel better!  ----------------------------    We always encourage patients to return IMMEDIATELY if they have:  Increased or changing pain, passing out, fevers over 100.4 (taken in your mouth or rectally) for more than 2 days, redness or swelling of skin or tissues, feeling like your heart is beating fast, chest pain that is new or worsening, trouble breathing, feeling like your throat is closing up and can not breath, inability to walk, weakness of any part of your body, new dizziness, severe bleeding that won't stop from any part of your body, if you can't eat or drink, or if you have any other concerns.   If you feel worse, please know that you can always return with any questions, concerns, worse symptoms, or you are feeling unsafe. We certainly cannot say for sure that we have ruled out every illness or dangerous disease, but we feel that at this specific time, your exam, tests, and vital signs like heart rate and blood pressure are safe for discharge.         Abdominal Pain  Many things can cause belly (abdominal) pain. Most times, the belly pain is not dangerous. Many cases of belly pain can be watched and treated at home.  HOME CARE   · Do not take medicines that help you go poop (laxatives) unless told to by your doctor.  · Only take medicine as told by your doctor.  · Eat or drink as told by your doctor. Your doctor will tell  you if you should be on a special diet.  GET HELP IF:  · You do not know what is causing your belly pain.  · You have belly pain while you are sick to your stomach (nauseous) or have runny poop (diarrhea).  · You have pain while you pee or poop.  · Your belly pain wakes you up at night.  · You have belly pain that gets worse or better when you eat.  · You have belly pain that gets worse when you eat fatty foods.  · You have a fever.  GET HELP RIGHT AWAY IF:   · The pain does not go away within 2 hours.  · You keep throwing up (vomiting).  · The pain changes and is only in the right or left part of the belly.  · You have bloody or tarry looking poop.  MAKE SURE YOU:   · Understand these instructions.  · Will watch your condition.  · Will get help right away if you are not doing well or get worse.     This information is not intended to replace advice given to you by your health care provider. Make sure you discuss any questions you have with your health care provider.     Document Released: 06/05/2009 Document Revised: 01/08/2016 Document Reviewed: 08/27/2014  [employer] Interactive Patient Education ©2016 [employer]

## 2017-09-24 NOTE — ED NOTES
Pt given verbal and written discharge instructions with two prescriptions. Reviewed instructions for meds, verbalized understanding.

## 2017-09-24 NOTE — ED PROVIDER NOTES
ED Provider Note    CHIEF COMPLAINT  Chief Complaint   Patient presents with   • Abdominal Pain       HPI  Farhana Freeman is a 37 y.o. female who presents with recurrent, chronic abdominal pain.    Location: LUQ but also generalized.     Quality: sharp, throbbing, pressure-like pain.    Severity: at worst severe, at present moderate    Duration: onset months to years ago, worse today, no relief since gallbladder removal last month    Timing: constant but waxing/waning severity    Context: prior history of gastric sleeve, hiatal hernia and gastric bypass, and recent cholecystectomy.     Modifying factors: denies attempting alleviating factors, worsened with PO intake    Associated signs/symptoms: denies vomiting, fevers, chills, dysuria, back pain, syncope, chest pain, dyspnea, rash, swelling. +abd pain and nausea.       REVIEW OF SYSTEMS  Constitutional: Negative for fever and chills.   HENT: Negative for nosebleeds or headache.    Eyes: Negative for double vision and discharge.   Respiratory: Negative for cough.    Cardiovascular: Negative for chest pain or palpitations.   Gastrointestinal: Negative for nausea or vomiting.   Genitourinary: Negative for dysuria or flank pain.   Musculoskeletal: Negative for back pain and joint pain.   Skin: Negative for itching and rash.   Neurological: Negative for dizziness, seizures and headaches.   Endo/Heme/Allergies: Negative for weight changes or hives.   Psychiatric/Behavioral: Negative for depression or suicidal ideas.     See HPI for further details. All other systems are negative.     PAST MEDICAL HISTORY   has a past medical history of Anesthesia; ASTHMA (5/17); Heart burn; and Migraine.    PAST FAMILY HISTORY  No family history on file.    SOCIAL HISTORY  Social History     Social History Main Topics   • Smoking status: Never Smoker   • Smokeless tobacco: Never Used   • Alcohol use No   • Drug use: No   • Sexual activity: Not on file       SURGICAL HISTORY   has  "a past surgical history that includes other abdominal surgery (10/12/15); gastric bypass laparoscopic (5/12/2017); and adeline by laparoscopy (8/9/2017).    CURRENT MEDICATIONS  Home Medications    **Home medications have not yet been reviewed for this encounter**         ALLERGIES  Allergies   Allergen Reactions   • Oxycodone Hcl Vomiting   • Morphine Nausea     \"severe nausea vomiting\"   • Sulfa Drugs      \"Causes blindness\"       PHYSICAL EXAM  VITAL SIGNS: /57   Pulse (!) 55   Temp 37.3 °C (99.2 °F)   Resp 19   Ht 1.549 m (5' 1\")   Wt 88.3 kg (194 lb 10.7 oz)   SpO2 97%   BMI 36.78 kg/m²    Pulse ox interpretation: On room air, I interpret this pulse ox as normal.  Constitutional: Alert and in no apparent distress.  HENT: No signs of trauma, Bilateral external ears normal, Nose normal.   Eyes: Pupils are equal and reactive, Conjunctiva normal, Non-icteric.   Neck: Normal range of motion, No tenderness, Supple, No stridor.   Lymphatic: No lymphadenopathy noted.   Cardiovascular: Regular rate and rhythm, no murmurs.   Thorax & Lungs: Normal breath sounds, No respiratory distress, No wheezing, No chest tenderness.   Abdomen: Bowel sounds normal, Soft, TTP LUQ, No masses, No pulsatile masses. No peritoneal signs.  Skin: Warm, Dry, No erythema, No rash.   Back: No bony tenderness, No CVA tenderness.   Extremities: Intact distal pulses, No edema, No tenderness, No cyanosis.  Musculoskeletal: Good range of motion in all major joints. No tenderness to palpation or major deformities noted.   Neurologic: Alert, Normal motor function, Normal sensory function, No focal deficits noted.   Psychiatric: Affect normal, Judgment normal, Mood normal.       DIAGNOSTIC STUDIES / PROCEDURES    LABS & EKG  Results for orders placed or performed during the hospital encounter of 09/23/17   CBC WITH DIFFERENTIAL   Result Value Ref Range    WBC 7.6 4.8 - 10.8 K/uL    RBC 4.38 4.20 - 5.40 M/uL    Hemoglobin 12.2 12.0 - 16.0 g/dL "    Hematocrit 37.3 37.0 - 47.0 %    MCV 85.2 81.4 - 97.8 fL    MCH 27.9 27.0 - 33.0 pg    MCHC 32.7 (L) 33.6 - 35.0 g/dL    RDW 43.9 35.9 - 50.0 fL    Platelet Count 292 164 - 446 K/uL    MPV 10.6 9.0 - 12.9 fL    Neutrophils-Polys 42.80 (L) 44.00 - 72.00 %    Lymphocytes 44.60 (H) 22.00 - 41.00 %    Monocytes 7.10 0.00 - 13.40 %    Eosinophils 4.60 0.00 - 6.90 %    Basophils 0.80 0.00 - 1.80 %    Immature Granulocytes 0.10 0.00 - 0.90 %    Nucleated RBC 0.00 /100 WBC    Neutrophils (Absolute) 3.24 2.00 - 7.15 K/uL    Lymphs (Absolute) 3.38 1.00 - 4.80 K/uL    Monos (Absolute) 0.54 0.00 - 0.85 K/uL    Eos (Absolute) 0.35 0.00 - 0.51 K/uL    Baso (Absolute) 0.06 0.00 - 0.12 K/uL    Immature Granulocytes (abs) 0.01 0.00 - 0.11 K/uL    NRBC (Absolute) 0.00 K/uL   COMP METABOLIC PANEL   Result Value Ref Range    Sodium 139 135 - 145 mmol/L    Potassium 3.5 (L) 3.6 - 5.5 mmol/L    Chloride 108 96 - 112 mmol/L    Co2 24 20 - 33 mmol/L    Anion Gap 7.0 0.0 - 11.9    Glucose 82 65 - 99 mg/dL    Bun 11 8 - 22 mg/dL    Creatinine 0.46 (L) 0.50 - 1.40 mg/dL    Calcium 8.9 8.4 - 10.2 mg/dL    AST(SGOT) 16 12 - 45 U/L    ALT(SGPT) 16 2 - 50 U/L    Alkaline Phosphatase 103 (H) 30 - 99 U/L    Total Bilirubin 0.7 0.1 - 1.5 mg/dL    Albumin 3.7 3.2 - 4.9 g/dL    Total Protein 6.4 6.0 - 8.2 g/dL    Globulin 2.7 1.9 - 3.5 g/dL    A-G Ratio 1.4 g/dL   LIPASE   Result Value Ref Range    Lipase 18 7 - 58 U/L   TROPONIN   Result Value Ref Range    Troponin I <0.02 0.00 - 0.04 ng/mL   LACTIC ACID   Result Value Ref Range    Lactic Acid 0.99 0.50 - 2.00 mmol/L    Specimen Venous    URINALYSIS CULTURE, IF INDICATED   Result Value Ref Range    Color Yellow     Character Clear     Specific Gravity 1.025 <1.035    Ph 6.0 5.0 - 8.0    Glucose Negative Negative mg/dL    Ketones Negative Negative mg/dL    Protein Negative Negative mg/dL    Bilirubin Negative Negative    Nitrite Negative Negative    Leukocyte Esterase Small (A) Negative    Occult  Blood Negative Negative    Micro Urine Req Microscopic     Culture Indicated Yes UA Culture   HCG QUAL SERUM   Result Value Ref Range    Beta-Hcg Qualitative Serum Negative Negative   URINE MICROSCOPIC (W/UA)   Result Value Ref Range    WBC 10-20 (A) /hpf    Bacteria Moderate (A) None /hpf    Epithelial Cells Few Few /hpf    Mucous Threads Moderate /hpf   ESTIMATED GFR   Result Value Ref Range    GFR If African American >60 >60 mL/min/1.73 m 2    GFR If Non African American >60 >60 mL/min/1.73 m 2     I personally reviewed the patient's EKG in the absence of a cardiologist and my findings are as follows:     Indication: abdominal pain    Interpretation: Rate: 43, Rhythm: sinus ingrid, Intervals: wnl, ST Segments: no elevation or depression, T Waves: no concerning inversions.     Impression: sinus ingrid no stemi or strain      RADIOLOGY  CT-ABDOMEN-PELVIS WITH   Final Result         1. Postsurgical change from gastric bypass. Unchanged mild wall thickening of the excluded stomach portion.      2. Status post cholecystectomy.        COURSE & MEDICAL DECISION MAKING    This is a 37 y.o. female who presents with recurrent abdominal pain.     Differential Diagnosis includes but is not limited to:  Postop complication, abscess, obstruction, hernia, PUD, chronic pain    ED Course:  Plan labs, imaging, fluids for rehydration and plan for IV contrast, and parenteral opioids and antiemetics for symptom control.  Lactate within normal limits, doubt ischemia.  CBC with no evidence of significant anemia or leukocytosis.  HCG negative, doubt ectopic or IUP.  UA without equivocal evidence of infection, she has no urinary symptoms so I highly doubt urinary tract infection or pyelonephritis. No red blood cells, doubt nephrolithiasis.  CMP with no evidence of electrolyte abnormality, or acidosis or significant hepatobiliary disease. Lipase normal, doubt pancreatitis.  EKG was sinus bradycardia, no STEMI or strain, troponin negative  doubt ACS medic.  CT with no acute process, postsurgical changes but no evidence of abscess, obstruction, hernia, or other surgical disease.  The patient has normal vital signs and is well-appearing with a soft abdomen on reevaluation, I discussed with her the possibility of ulcerative disease or gastritis and the need to follow up swiftly with her primary care physician and gastroenterologist. I plan to treat her symptomatically with Carafate at home 3 times a day for the next 2 weeks, and Reglan for a short course as needed for nausea. The patient agrees with and appreciates the plan of care, and will return immediately for any new or worse symptoms.    Pertinent Labs & Imaging studies reviewed and verified by myself, as well as nursing notes and the patient's past medical, family, and social histories (See chart for details).    Medications   NS infusion 1,000 mL (1,000 mL Intravenous New Bag 9/23/17 2355)   fentaNYL (SUBLIMAZE) injection 100 mcg (100 mcg Intravenous Given 9/23/17 2355)   ondansetron (ZOFRAN) syringe/vial injection 4 mg (4 mg Intravenous Given 9/23/17 2355)     FINAL IMPRESSION  1. Left upper quadrant pain    2. Chronic abdominal pain    3. History of gastric bypass    4. History of cholecystectomy    5. Obesity, unspecified obesity severity, unspecified obesity type        PRESCRIPTIONS  New Prescriptions    METOCLOPRAMIDE (REGLAN) 10 MG TAB    Take 1 Tab by mouth 3 times a day as needed (nausea).    SUCRALFATE (CARAFATE) 1 GM/10ML SUSPENSION    Take 10 mL by mouth 2 times a day for 14 days.       FOLLOW UP  Graciela Esquivel, A.P.N.  [address]  [address]  [facility] [address] [phone]    Schedule an appointment as soon as possible for a visit in 2 days      [facility], Emergency Dept  [address]  [facility] [address] [phone]  In 1 day  If symptoms worsen    -DISCHARGE-    Results, exam findings, clinical impression, presumed diagnosis, treatment  options, and strict return precautions were discussed with the patient and family, and they verbalized understanding, agreed with, and appreciated the plan of care.    Electronically signed by Zan Weber on 9/24/2017 at 1:23 AM.

## 2017-09-26 LAB
BACTERIA UR CULT: ABNORMAL
BACTERIA UR CULT: ABNORMAL
SIGNIFICANT IND 70042: ABNORMAL
SITE SITE: ABNORMAL
SOURCE SOURCE: ABNORMAL

## 2017-09-27 NOTE — ED NOTES
ED Positive Culture Follow-up/Notification Note:    Date: 9/27/17     Patient seen in the ED on 9/23/2017 for abdominal pain that persisted since her lap-adeline.   1. Left upper quadrant pain    2. Chronic abdominal pain    3. History of gastric bypass    4. History of cholecystectomy    5. Obesity, unspecified obesity severity, unspecified obesity type       Discharge Medication List as of 9/24/2017  1:23 AM      START taking these medications    Details   sucralfate (CARAFATE) 1 GM/10ML Suspension Take 10 mL by mouth 2 times a day for 14 days., Disp-280 mL, R-0, Print Rx Paper      metoclopramide (REGLAN) 10 MG Tab Take 1 Tab by mouth 3 times a day as needed (nausea)., Disp-12 Tab, R-0, Print Rx Paper             Allergies: Oxycodone hcl; Morphine; and Sulfa drugs     Final cultures:   Results     Procedure Component Value Units Date/Time    URINE CULTURE(NEW) [[id number]]  (Abnormal) Collected:  09/23/17 2330    Order Status:  Completed Specimen:  Urine Updated:  09/26/17 0637     Significant Indicator POS (POS)     Source UR     Site --     Urine Culture -- (A)     Growth noted after further incubation,see below for  organism identification.       Urine Culture -- (A)     Lactobacillus species  >100,000 cfu/mL      Narrative:       TEST Urine Culture WAS CANCELLED, 09/24/17 03:28 HIS# [id number]    URINALYSIS CULTURE, IF INDICATED [[id number]]  (Abnormal) Collected:  09/23/17 2330    Order Status:  Completed Specimen:  Urine Updated:  09/23/17 3847     Color Yellow     Character Clear     Specific Gravity 1.025     Ph 6.0     Glucose Negative mg/dL      Ketones Negative mg/dL      Protein Negative mg/dL      Bilirubin Negative     Nitrite Negative     Leukocyte Esterase Small (A)     Occult Blood Negative     Micro Urine Req Microscopic     Culture Indicated Yes UA Culture     URINE CULTURE(NEW) [[id number]]     Order Status:  Canceled           Plan:   Abdominal pain  -Chronic issue that persisted since 8/9. Pt has  presented to the ED with similar complaints.     Urine culture is positive for Lactobacillus spp  -Pt did not complain of UTI symptoms and the culture is consistent with contamination by a common colonizer  -No further follow up regarding pt's urine culture is indicated    Marisa Bullard, SeanD, BCPS

## 2017-10-17 LAB — EKG IMPRESSION: NORMAL

## 2017-12-15 ENCOUNTER — HOSPITAL ENCOUNTER (INPATIENT)
Facility: MEDICAL CENTER | Age: 37
LOS: 4 days | DRG: 393 | End: 2017-12-19
Attending: EMERGENCY MEDICINE | Admitting: SURGERY
Payer: COMMERCIAL

## 2017-12-15 ENCOUNTER — APPOINTMENT (OUTPATIENT)
Dept: RADIOLOGY | Facility: MEDICAL CENTER | Age: 37
DRG: 393 | End: 2017-12-15
Attending: EMERGENCY MEDICINE
Payer: COMMERCIAL

## 2017-12-15 DIAGNOSIS — K66.1 NONTRAUMATIC HEMOPERITONEUM: ICD-10-CM

## 2017-12-15 DIAGNOSIS — K65.9 PERITONITIS (HCC): ICD-10-CM

## 2017-12-15 PROBLEM — I95.1 HYPOTENSION, POSTURAL: Status: ACTIVE | Noted: 2017-12-15

## 2017-12-15 PROBLEM — K63.1 INTESTINAL PERFORATION (HCC): Status: ACTIVE | Noted: 2017-12-15

## 2017-12-15 PROBLEM — R10.9 ABDOMINAL PAIN: Status: ACTIVE | Noted: 2017-12-15

## 2017-12-15 LAB
ALBUMIN SERPL BCP-MCNC: 4.6 G/DL (ref 3.2–4.9)
ALBUMIN/GLOB SERPL: 1.6 G/DL
ALP SERPL-CCNC: 114 U/L (ref 30–99)
ALT SERPL-CCNC: 20 U/L (ref 2–50)
ANION GAP SERPL CALC-SCNC: 9 MMOL/L (ref 0–11.9)
APPEARANCE UR: CLEAR
AST SERPL-CCNC: 22 U/L (ref 12–45)
BACTERIA #/AREA URNS HPF: ABNORMAL /HPF
BASOPHILS # BLD AUTO: 0.3 % (ref 0–1.8)
BASOPHILS # BLD: 0.06 K/UL (ref 0–0.12)
BILIRUB SERPL-MCNC: 2 MG/DL (ref 0.1–1.5)
BILIRUB UR QL STRIP.AUTO: NEGATIVE
BUN SERPL-MCNC: 12 MG/DL (ref 8–22)
CALCIUM SERPL-MCNC: 9.6 MG/DL (ref 8.4–10.2)
CHLORIDE SERPL-SCNC: 103 MMOL/L (ref 96–112)
CO2 SERPL-SCNC: 24 MMOL/L (ref 20–33)
COLOR UR: YELLOW
CREAT SERPL-MCNC: 0.67 MG/DL (ref 0.5–1.4)
EOSINOPHIL # BLD AUTO: 0.01 K/UL (ref 0–0.51)
EOSINOPHIL NFR BLD: 0 % (ref 0–6.9)
EPI CELLS #/AREA URNS HPF: ABNORMAL /HPF
ERYTHROCYTE [DISTWIDTH] IN BLOOD BY AUTOMATED COUNT: 41.9 FL (ref 35.9–50)
GFR SERPL CREATININE-BSD FRML MDRD: >60 ML/MIN/1.73 M 2
GLOBULIN SER CALC-MCNC: 2.9 G/DL (ref 1.9–3.5)
GLUCOSE SERPL-MCNC: 104 MG/DL (ref 65–99)
GLUCOSE UR STRIP.AUTO-MCNC: NEGATIVE MG/DL
HCG UR QL: NEGATIVE
HCT VFR BLD AUTO: 40.4 % (ref 37–47)
HGB BLD-MCNC: 13.5 G/DL (ref 12–16)
IMM GRANULOCYTES # BLD AUTO: 0.12 K/UL (ref 0–0.11)
IMM GRANULOCYTES NFR BLD AUTO: 0.6 % (ref 0–0.9)
KETONES UR STRIP.AUTO-MCNC: NEGATIVE MG/DL
LEUKOCYTE ESTERASE UR QL STRIP.AUTO: NEGATIVE
LIPASE SERPL-CCNC: 15 U/L (ref 7–58)
LYMPHOCYTES # BLD AUTO: 0.82 K/UL (ref 1–4.8)
LYMPHOCYTES NFR BLD: 3.8 % (ref 22–41)
MCH RBC QN AUTO: 28.3 PG (ref 27–33)
MCHC RBC AUTO-ENTMCNC: 33.4 G/DL (ref 33.6–35)
MCV RBC AUTO: 84.7 FL (ref 81.4–97.8)
MICRO URNS: ABNORMAL
MONOCYTES # BLD AUTO: 0.75 K/UL (ref 0–0.85)
MONOCYTES NFR BLD AUTO: 3.5 % (ref 0–13.4)
MUCOUS THREADS #/AREA URNS HPF: ABNORMAL /HPF
NEUTROPHILS # BLD AUTO: 19.79 K/UL (ref 2–7.15)
NEUTROPHILS NFR BLD: 91.8 % (ref 44–72)
NITRITE UR QL STRIP.AUTO: NEGATIVE
NRBC # BLD AUTO: 0 K/UL
NRBC BLD AUTO-RTO: 0 /100 WBC
PH UR STRIP.AUTO: 5.5 [PH]
PLATELET # BLD AUTO: 322 K/UL (ref 164–446)
PMV BLD AUTO: 10.8 FL (ref 9–12.9)
POTASSIUM SERPL-SCNC: 3.6 MMOL/L (ref 3.6–5.5)
PROT SERPL-MCNC: 7.5 G/DL (ref 6–8.2)
PROT UR QL STRIP: NEGATIVE MG/DL
RBC # BLD AUTO: 4.77 M/UL (ref 4.2–5.4)
RBC # URNS HPF: ABNORMAL /HPF
RBC UR QL AUTO: ABNORMAL
SODIUM SERPL-SCNC: 136 MMOL/L (ref 135–145)
SP GR UR REFRACTOMETRY: 1.01
SP GR UR STRIP.AUTO: 1.01
WBC # BLD AUTO: 21.6 K/UL (ref 4.8–10.8)
WBC #/AREA URNS HPF: ABNORMAL /HPF

## 2017-12-15 PROCEDURE — 85025 COMPLETE CBC W/AUTO DIFF WBC: CPT

## 2017-12-15 PROCEDURE — 80053 COMPREHEN METABOLIC PANEL: CPT

## 2017-12-15 PROCEDURE — 81001 URINALYSIS AUTO W/SCOPE: CPT

## 2017-12-15 PROCEDURE — 81025 URINE PREGNANCY TEST: CPT

## 2017-12-15 PROCEDURE — 700111 HCHG RX REV CODE 636 W/ 250 OVERRIDE (IP): Performed by: SURGERY

## 2017-12-15 PROCEDURE — 96365 THER/PROPH/DIAG IV INF INIT: CPT

## 2017-12-15 PROCEDURE — 700111 HCHG RX REV CODE 636 W/ 250 OVERRIDE (IP)

## 2017-12-15 PROCEDURE — 83690 ASSAY OF LIPASE: CPT

## 2017-12-15 PROCEDURE — 36415 COLL VENOUS BLD VENIPUNCTURE: CPT

## 2017-12-15 PROCEDURE — 770006 HCHG ROOM/CARE - MED/SURG/GYN SEMI*

## 2017-12-15 PROCEDURE — 99285 EMERGENCY DEPT VISIT HI MDM: CPT

## 2017-12-15 PROCEDURE — 700105 HCHG RX REV CODE 258: Performed by: EMERGENCY MEDICINE

## 2017-12-15 PROCEDURE — 74177 CT ABD & PELVIS W/CONTRAST: CPT

## 2017-12-15 PROCEDURE — 700117 HCHG RX CONTRAST REV CODE 255: Performed by: EMERGENCY MEDICINE

## 2017-12-15 PROCEDURE — 700101 HCHG RX REV CODE 250: Performed by: EMERGENCY MEDICINE

## 2017-12-15 PROCEDURE — 94760 N-INVAS EAR/PLS OXIMETRY 1: CPT

## 2017-12-15 PROCEDURE — 700105 HCHG RX REV CODE 258

## 2017-12-15 PROCEDURE — 700111 HCHG RX REV CODE 636 W/ 250 OVERRIDE (IP): Performed by: EMERGENCY MEDICINE

## 2017-12-15 PROCEDURE — 700105 HCHG RX REV CODE 258: Performed by: SURGERY

## 2017-12-15 PROCEDURE — 96375 TX/PRO/DX INJ NEW DRUG ADDON: CPT

## 2017-12-15 RX ORDER — PANTOPRAZOLE SODIUM 40 MG/10ML
40 INJECTION, POWDER, LYOPHILIZED, FOR SOLUTION INTRAVENOUS DAILY
Status: DISCONTINUED | OUTPATIENT
Start: 2017-12-16 | End: 2017-12-19 | Stop reason: HOSPADM

## 2017-12-15 RX ORDER — SODIUM CHLORIDE 9 MG/ML
1000 INJECTION, SOLUTION INTRAVENOUS ONCE
Status: COMPLETED | OUTPATIENT
Start: 2017-12-15 | End: 2017-12-15

## 2017-12-15 RX ORDER — ONDANSETRON 2 MG/ML
4 INJECTION INTRAMUSCULAR; INTRAVENOUS ONCE
Status: COMPLETED | OUTPATIENT
Start: 2017-12-15 | End: 2017-12-15

## 2017-12-15 RX ORDER — ONDANSETRON 2 MG/ML
4 INJECTION INTRAMUSCULAR; INTRAVENOUS EVERY 4 HOURS PRN
Status: DISCONTINUED | OUTPATIENT
Start: 2017-12-15 | End: 2017-12-19 | Stop reason: HOSPADM

## 2017-12-15 RX ORDER — HYDROMORPHONE HYDROCHLORIDE 2 MG/ML
1-2 INJECTION, SOLUTION INTRAMUSCULAR; INTRAVENOUS; SUBCUTANEOUS
Status: DISCONTINUED | OUTPATIENT
Start: 2017-12-15 | End: 2017-12-18

## 2017-12-15 RX ORDER — DIPHENHYDRAMINE HYDROCHLORIDE 50 MG/ML
25 INJECTION INTRAMUSCULAR; INTRAVENOUS EVERY 6 HOURS PRN
Status: DISCONTINUED | OUTPATIENT
Start: 2017-12-15 | End: 2017-12-19 | Stop reason: HOSPADM

## 2017-12-15 RX ORDER — CEFTRIAXONE 1 G/1
1 INJECTION, POWDER, FOR SOLUTION INTRAMUSCULAR; INTRAVENOUS ONCE
Status: COMPLETED | OUTPATIENT
Start: 2017-12-15 | End: 2017-12-15

## 2017-12-15 RX ORDER — KETOROLAC TROMETHAMINE 30 MG/ML
30 INJECTION, SOLUTION INTRAMUSCULAR; INTRAVENOUS EVERY 6 HOURS PRN
Status: DISCONTINUED | OUTPATIENT
Start: 2017-12-15 | End: 2017-12-18

## 2017-12-15 RX ORDER — SODIUM CHLORIDE, SODIUM LACTATE, POTASSIUM CHLORIDE, CALCIUM CHLORIDE 600; 310; 30; 20 MG/100ML; MG/100ML; MG/100ML; MG/100ML
INJECTION, SOLUTION INTRAVENOUS CONTINUOUS
Status: DISCONTINUED | OUTPATIENT
Start: 2017-12-15 | End: 2017-12-19 | Stop reason: HOSPADM

## 2017-12-15 RX ORDER — HALOPERIDOL 5 MG/ML
1 INJECTION INTRAMUSCULAR EVERY 6 HOURS PRN
Status: DISCONTINUED | OUTPATIENT
Start: 2017-12-15 | End: 2017-12-19 | Stop reason: HOSPADM

## 2017-12-15 RX ADMIN — PIPERACILLIN SODIUM AND TAZOBACTAM SODIUM: 3; .375 INJECTION, POWDER, FOR SOLUTION INTRAVENOUS at 23:41

## 2017-12-15 RX ADMIN — IOHEXOL 50 ML: 240 INJECTION, SOLUTION INTRATHECAL; INTRAVASCULAR; INTRAVENOUS; ORAL at 18:20

## 2017-12-15 RX ADMIN — SODIUM CHLORIDE 1000 ML: 900 INJECTION, SOLUTION INTRAVENOUS at 19:00

## 2017-12-15 RX ADMIN — IOHEXOL 100 ML: 350 INJECTION, SOLUTION INTRAVENOUS at 19:41

## 2017-12-15 RX ADMIN — CEFTRIAXONE SODIUM 1 G: 1 INJECTION, POWDER, FOR SOLUTION INTRAMUSCULAR; INTRAVENOUS at 20:12

## 2017-12-15 RX ADMIN — ONDANSETRON 4 MG: 2 INJECTION, SOLUTION INTRAMUSCULAR; INTRAVENOUS at 18:29

## 2017-12-15 RX ADMIN — PIPERACILLIN AND TAZOBACTAM 3.38 G: 3; .375 INJECTION, POWDER, FOR SOLUTION INTRAVENOUS at 23:45

## 2017-12-15 RX ADMIN — METRONIDAZOLE 500 MG: 500 INJECTION, SOLUTION INTRAVENOUS at 20:12

## 2017-12-15 RX ADMIN — HYDROMORPHONE HYDROCHLORIDE 1 MG: 1 INJECTION, SOLUTION INTRAMUSCULAR; INTRAVENOUS; SUBCUTANEOUS at 18:29

## 2017-12-15 RX ADMIN — SODIUM CHLORIDE, POTASSIUM CHLORIDE, SODIUM LACTATE AND CALCIUM CHLORIDE: 600; 310; 30; 20 INJECTION, SOLUTION INTRAVENOUS at 23:35

## 2017-12-15 RX ADMIN — SODIUM CHLORIDE 1000 ML: 9 INJECTION, SOLUTION INTRAVENOUS at 20:13

## 2017-12-15 ASSESSMENT — LIFESTYLE VARIABLES
CONSUMPTION TOTAL: INCOMPLETE
EVER FELT BAD OR GUILTY ABOUT YOUR DRINKING: NO
HOW MANY TIMES IN THE PAST YEAR HAVE YOU HAD 5 OR MORE DRINKS IN A DAY: 0
HAVE PEOPLE ANNOYED YOU BY CRITICIZING YOUR DRINKING: NO
ON A TYPICAL DAY WHEN YOU DRINK ALCOHOL HOW MANY DRINKS DO YOU HAVE: 0
TOTAL SCORE: 0
EVER_SMOKED: NEVER
TOTAL SCORE: 0
ALCOHOL_USE: YES
EVER HAD A DRINK FIRST THING IN THE MORNING TO STEADY YOUR NERVES TO GET RID OF A HANGOVER: NO
HAVE YOU EVER FELT YOU SHOULD CUT DOWN ON YOUR DRINKING: NO
TOTAL SCORE: 0

## 2017-12-15 ASSESSMENT — PATIENT HEALTH QUESTIONNAIRE - PHQ9
SUM OF ALL RESPONSES TO PHQ QUESTIONS 1-9: 0
1. LITTLE INTEREST OR PLEASURE IN DOING THINGS: NOT AT ALL
2. FEELING DOWN, DEPRESSED, IRRITABLE, OR HOPELESS: NOT AT ALL
SUM OF ALL RESPONSES TO PHQ9 QUESTIONS 1 AND 2: 0

## 2017-12-15 ASSESSMENT — PAIN SCALES - GENERAL
PAINLEVEL_OUTOF10: 6
PAINLEVEL_OUTOF10: 9
PAINLEVEL_OUTOF10: 7

## 2017-12-16 LAB
ALBUMIN SERPL BCP-MCNC: 2.9 G/DL (ref 3.2–4.9)
ALBUMIN/GLOB SERPL: 1.5 G/DL
ALP SERPL-CCNC: 84 U/L (ref 30–99)
ALT SERPL-CCNC: 15 U/L (ref 2–50)
ANION GAP SERPL CALC-SCNC: 2 MMOL/L (ref 0–11.9)
AST SERPL-CCNC: 14 U/L (ref 12–45)
BASOPHILS # BLD AUTO: 0.3 % (ref 0–1.8)
BASOPHILS # BLD: 0.02 K/UL (ref 0–0.12)
BILIRUB SERPL-MCNC: 1.9 MG/DL (ref 0.1–1.5)
BUN SERPL-MCNC: 8 MG/DL (ref 8–22)
CALCIUM SERPL-MCNC: 8.2 MG/DL (ref 8.4–10.2)
CHLORIDE SERPL-SCNC: 109 MMOL/L (ref 96–112)
CO2 SERPL-SCNC: 27 MMOL/L (ref 20–33)
CREAT SERPL-MCNC: 0.73 MG/DL (ref 0.5–1.4)
EOSINOPHIL # BLD AUTO: 0.24 K/UL (ref 0–0.51)
EOSINOPHIL NFR BLD: 3.2 % (ref 0–6.9)
ERYTHROCYTE [DISTWIDTH] IN BLOOD BY AUTOMATED COUNT: 43.1 FL (ref 35.9–50)
GFR SERPL CREATININE-BSD FRML MDRD: >60 ML/MIN/1.73 M 2
GLOBULIN SER CALC-MCNC: 2 G/DL (ref 1.9–3.5)
GLUCOSE SERPL-MCNC: 83 MG/DL (ref 65–99)
HCT VFR BLD AUTO: 31.1 % (ref 37–47)
HGB BLD-MCNC: 10.4 G/DL (ref 12–16)
IMM GRANULOCYTES # BLD AUTO: 0.02 K/UL (ref 0–0.11)
IMM GRANULOCYTES NFR BLD AUTO: 0.3 % (ref 0–0.9)
LYMPHOCYTES # BLD AUTO: 1.98 K/UL (ref 1–4.8)
LYMPHOCYTES NFR BLD: 26.2 % (ref 22–41)
MCH RBC QN AUTO: 28.2 PG (ref 27–33)
MCHC RBC AUTO-ENTMCNC: 33.1 G/DL (ref 33.6–35)
MCV RBC AUTO: 85.1 FL (ref 81.4–97.8)
MONOCYTES # BLD AUTO: 0.56 K/UL (ref 0–0.85)
MONOCYTES NFR BLD AUTO: 7.4 % (ref 0–13.4)
NEUTROPHILS # BLD AUTO: 4.75 K/UL (ref 2–7.15)
NEUTROPHILS NFR BLD: 62.6 % (ref 44–72)
NRBC # BLD AUTO: 0 K/UL
NRBC BLD AUTO-RTO: 0 /100 WBC
PLATELET # BLD AUTO: 247 K/UL (ref 164–446)
PMV BLD AUTO: 11.2 FL (ref 9–12.9)
POTASSIUM SERPL-SCNC: 3.7 MMOL/L (ref 3.6–5.5)
PROT SERPL-MCNC: 4.9 G/DL (ref 6–8.2)
RBC # BLD AUTO: 3.62 M/UL (ref 4.2–5.4)
SODIUM SERPL-SCNC: 138 MMOL/L (ref 135–145)
WBC # BLD AUTO: 7.6 K/UL (ref 4.8–10.8)

## 2017-12-16 PROCEDURE — 770006 HCHG ROOM/CARE - MED/SURG/GYN SEMI*

## 2017-12-16 PROCEDURE — 36415 COLL VENOUS BLD VENIPUNCTURE: CPT

## 2017-12-16 PROCEDURE — C9113 INJ PANTOPRAZOLE SODIUM, VIA: HCPCS | Performed by: SURGERY

## 2017-12-16 PROCEDURE — 80053 COMPREHEN METABOLIC PANEL: CPT

## 2017-12-16 PROCEDURE — 85025 COMPLETE CBC W/AUTO DIFF WBC: CPT

## 2017-12-16 PROCEDURE — 94760 N-INVAS EAR/PLS OXIMETRY 1: CPT

## 2017-12-16 PROCEDURE — 700105 HCHG RX REV CODE 258: Performed by: SURGERY

## 2017-12-16 PROCEDURE — 700111 HCHG RX REV CODE 636 W/ 250 OVERRIDE (IP): Performed by: SURGERY

## 2017-12-16 RX ORDER — FLUCONAZOLE 2 MG/ML
400 INJECTION, SOLUTION INTRAVENOUS EVERY 24 HOURS
Status: DISCONTINUED | OUTPATIENT
Start: 2017-12-16 | End: 2017-12-19 | Stop reason: HOSPADM

## 2017-12-16 RX ORDER — FLUCONAZOLE 2 MG/ML
200 INJECTION, SOLUTION INTRAVENOUS EVERY 24 HOURS
Status: DISCONTINUED | OUTPATIENT
Start: 2017-12-16 | End: 2017-12-19 | Stop reason: HOSPADM

## 2017-12-16 RX ORDER — SODIUM CHLORIDE 9 MG/ML
INJECTION, SOLUTION INTRAVENOUS ONCE
Status: COMPLETED | OUTPATIENT
Start: 2017-12-16 | End: 2017-12-16

## 2017-12-16 RX ADMIN — HYDROMORPHONE HYDROCHLORIDE 1 MG: 2 INJECTION, SOLUTION INTRAMUSCULAR; INTRAVENOUS; SUBCUTANEOUS at 12:30

## 2017-12-16 RX ADMIN — KETOROLAC TROMETHAMINE 30 MG: 30 INJECTION, SOLUTION INTRAMUSCULAR at 07:16

## 2017-12-16 RX ADMIN — PIPERACILLIN AND TAZOBACTAM 3.38 G: 3; .375 INJECTION, POWDER, FOR SOLUTION INTRAVENOUS at 15:13

## 2017-12-16 RX ADMIN — PIPERACILLIN AND TAZOBACTAM 3.38 G: 3; .375 INJECTION, POWDER, FOR SOLUTION INTRAVENOUS at 20:36

## 2017-12-16 RX ADMIN — KETOROLAC TROMETHAMINE 30 MG: 30 INJECTION, SOLUTION INTRAMUSCULAR at 15:13

## 2017-12-16 RX ADMIN — PANTOPRAZOLE SODIUM 40 MG: 40 INJECTION, POWDER, FOR SOLUTION INTRAVENOUS at 09:18

## 2017-12-16 RX ADMIN — PIPERACILLIN AND TAZOBACTAM 3.38 G: 3; .375 INJECTION, POWDER, FOR SOLUTION INTRAVENOUS at 05:00

## 2017-12-16 RX ADMIN — ONDANSETRON 4 MG: 2 INJECTION INTRAMUSCULAR; INTRAVENOUS at 07:23

## 2017-12-16 RX ADMIN — FLUCONAZOLE 200 MG: 2 INJECTION INTRAVENOUS at 10:56

## 2017-12-16 RX ADMIN — KETOROLAC TROMETHAMINE 30 MG: 30 INJECTION, SOLUTION INTRAMUSCULAR at 00:19

## 2017-12-16 RX ADMIN — SODIUM CHLORIDE: 9 INJECTION, SOLUTION INTRAVENOUS at 09:19

## 2017-12-16 RX ADMIN — HYDROMORPHONE HYDROCHLORIDE 1 MG: 2 INJECTION, SOLUTION INTRAMUSCULAR; INTRAVENOUS; SUBCUTANEOUS at 22:36

## 2017-12-16 RX ADMIN — FLUCONAZOLE 400 MG: 2 INJECTION INTRAVENOUS at 12:29

## 2017-12-16 RX ADMIN — ENOXAPARIN SODIUM 40 MG: 100 INJECTION SUBCUTANEOUS at 09:18

## 2017-12-16 ASSESSMENT — ENCOUNTER SYMPTOMS: ABDOMINAL PAIN: 1

## 2017-12-16 ASSESSMENT — PAIN SCALES - GENERAL
PAINLEVEL_OUTOF10: 5
PAINLEVEL_OUTOF10: 5

## 2017-12-16 NOTE — PROGRESS NOTES
Received report from night RN.  Assumed pt care.  C/O abd cramping and mild nausea, will medicate per MAR.  Abd is tender to touch, pt does have active bowel sounds.  Abx infusing.  Updated w/ POC/  Will continue with care.

## 2017-12-16 NOTE — H&P
HISTORY OF PRESENT ILLNESS:  Patient is a 37-year-old female patient of Dr. Jackson, who I am covering for, who recently came in through the emergency room   complaining of severe abdominal pain and what appeared to be peritonitis.  The   patient states that earlier this day she went out to  her friend and   drive her to her postoperative visit from a .  In any event, she went   and got some type of doughnut and a coffee; at 11:00 in the morning, she   developed severe sweating, lightheadedness, she laid down and then she started   having severe dry heaves.  It is unclear what caused it.  She does not have a   gallbladder.  She has a history of having initially a sleeve followed by   conversion to a Danie-en-Y gastric bypass and hiatal hernia repair back in   , but has not had any surgery since .  She has been losing weight   steadily.  Her present weight is 180 where she was coming in, which is   approximately a 30-40 pound weight loss since .  In any event, the patient   came in and was found to be tachycardic, hypotensive and had what was felt to   be peritonitis.  She was treated conservatively with IV antibiotics, some   pain medication and multiple boluses and now the patient has a pulse in the   70s.  She is comfortable.  She is sitting up talking, says that her pain is   much relieved recently, but she states that she is unclear why she had these   dry heaves.  She underwent a CT scan with contrast, which revealed some   bubbles of free air up in the upper abdomen, but no gross free air throughout   the abdomen.  Also, the fact that she had no extravasation of the p.o.   contrast.    PAST MEDICAL HISTORY:  Again significant for asthma, anesthesia, heartburn,   and migraine.    REVIEW OF SYSTEMS:  She denies at this time any nausea, vomiting, fever, or   chills.  She denies any lightheadedness.  She states that she feels a little   queasy, but nothing too extreme.  She does not have  any lower GI bleed, but   she suffers from chronic diarrhea.    FAMILY HISTORY:  Negative.    SOCIAL HISTORY:  She is here with her .  She does not drink alcohol nor   does she smoke.  She is very in tune to her health    PAST SURGICAL HISTORY:  Again, laparoscopic cholecystectomy followed by Dr. Storey back in August 2017, gastric bypass laparoscopic back in May of 2017 and   a previous gastric sleeve in 2015.    MEDICATIONS:  1.  Reglan.  2.  Vitamin D.  3.  Methylprednisolone.  4.  Medrol Nathan for asthma.  5.  Zofran.  6.  Multivitamin.    ALLERGIES:  TO OXYCODONE, MORPHINE, AND SULFA DRUGS.  She has taken Dilaudid   without any difficulty.    PHYSICAL EXAMINATION:  HEENT:  Extraocular movements are intact.  No sign of jaundice.  NECK:  Soft and supple.  There is no cervical, supraclavicular, or occipital   lymphadenopathy.  LUNGS:  Clear to auscultation, good inspiratory effort.  CARDIOVASCULAR:  Regular rate and rhythm.  ABDOMEN:  Actually soft and she does not have any guarding or peritoneal signs   at this time, but she has some discomfort in the upper quadrant underneath   the xiphoid and no rebound.  EXTREMITIES:  No clubbing, cyanosis or edema.    ASSESSMENT AND PLAN:  Finally, we will manage her conservatively.  We will not   put an NG tube in because she has had a Danie-en-Y gastric bypass.  Patient   will be placed on broad-spectrum antibiotics and antifungals, placed on a   proton pump inhibitor, made n.p.o. except for ice chips and we will monitor   her white count.  Her white count upon admission was 22 and we will see what   it is tomorrow.  She also will be rehydrated fairly aggressively since she has   a good heart.       ____________________________________     MD ASHWINI BYERS / NTS    DD:  12/15/2017 21:11:24  DT:  12/15/2017 21:58:08    D#:  [id number]  Job#:  [id number]    cc: EVIN STOREY MD

## 2017-12-16 NOTE — PROGRESS NOTES
Surgical Progress Note    Author: Scotty Joyce Date & Time created: 2017   9:12 AM     Interval Events:  Feels better still has epigastric discomfort- but improved from last night  Slight low SBp pf 90  Review of Systems   Gastrointestinal: Positive for abdominal pain.        Improved pain   All other systems reviewed and are negative.    Hemodynamics:  Temp (24hrs), Av.7 °C (98 °F), Min:36.3 °C (97.4 °F), Max:37.1 °C (98.7 °F)  Temperature: 37.1 °C (98.7 °F)  Pulse  Av  Min: 55  Max: 81Heart Rate (Monitored): 73  Blood Pressure: (!) 93/56, NIBP: 101/50     Respiratory:    Respiration: 18, Pulse Oximetry: 98 %           Neuro:  GCS       Fluids:    Intake/Output Summary (Last 24 hours) at 17 0912  Last data filed at 17 0400   Gross per 24 hour   Intake             1035 ml   Output                0 ml   Net             1035 ml     Weight: 83.9 kg (185 lb)  Current Diet Order   Procedures   • DIET NPO     Physical Exam   Constitutional: She is oriented to person, place, and time. She appears well-developed and well-nourished.   HENT:   Head: Normocephalic and atraumatic.   Eyes: Conjunctivae are normal. Pupils are equal, round, and reactive to light.   Neck: Normal range of motion. Neck supple.   Cardiovascular: Normal rate and regular rhythm.    SBp in the 90s   Pulmonary/Chest: Effort normal and breath sounds normal.   Abdominal: Soft. Bowel sounds are normal. There is tenderness.   Mostly epigastric pain but no peritoneal signs, rebound or gaurding   Neurological: She is alert and oriented to person, place, and time.   Skin: Skin is warm and dry.   Psychiatric: She has a normal mood and affect. Her behavior is normal. Judgment and thought content normal.   Nursing note and vitals reviewed.    Labs:  Recent Results (from the past 24 hour(s))   URINALYSIS    Collection Time: 12/15/17  4:59 PM   Result Value Ref Range    Color Yellow     Character Clear     Specific Gravity 1.010  <1.035    Ph 5.5 5.0 - 8.0    Glucose Negative Negative mg/dL    Ketones Negative Negative mg/dL    Protein Negative Negative mg/dL    Bilirubin Negative Negative    Nitrite Negative Negative    Leukocyte Esterase Negative Negative    Occult Blood Large (A) Negative    Micro Urine Req Microscopic    BETA-HCG QUALITATIVE URINE    Collection Time: 12/15/17  4:59 PM   Result Value Ref Range    Beta-Hcg Urine Negative Negative   REFRACTOMETER SG    Collection Time: 12/15/17  4:59 PM   Result Value Ref Range    Specific Gravity 1.015    URINE MICROSCOPIC (W/UA)    Collection Time: 12/15/17  4:59 PM   Result Value Ref Range    WBC 2-5 /hpf    RBC 10-20 (A) /hpf    Bacteria Few (A) None /hpf    Epithelial Cells Few Few /hpf    Mucous Threads Moderate /hpf   CBC WITH DIFFERENTIAL    Collection Time: 12/15/17  5:10 PM   Result Value Ref Range    WBC 21.6 (H) 4.8 - 10.8 K/uL    RBC 4.77 4.20 - 5.40 M/uL    Hemoglobin 13.5 12.0 - 16.0 g/dL    Hematocrit 40.4 37.0 - 47.0 %    MCV 84.7 81.4 - 97.8 fL    MCH 28.3 27.0 - 33.0 pg    MCHC 33.4 (L) 33.6 - 35.0 g/dL    RDW 41.9 35.9 - 50.0 fL    Platelet Count 322 164 - 446 K/uL    MPV 10.8 9.0 - 12.9 fL    Neutrophils-Polys 91.80 (H) 44.00 - 72.00 %    Lymphocytes 3.80 (L) 22.00 - 41.00 %    Monocytes 3.50 0.00 - 13.40 %    Eosinophils 0.00 0.00 - 6.90 %    Basophils 0.30 0.00 - 1.80 %    Immature Granulocytes 0.60 0.00 - 0.90 %    Nucleated RBC 0.00 /100 WBC    Neutrophils (Absolute) 19.79 (H) 2.00 - 7.15 K/uL    Lymphs (Absolute) 0.82 (L) 1.00 - 4.80 K/uL    Monos (Absolute) 0.75 0.00 - 0.85 K/uL    Eos (Absolute) 0.01 0.00 - 0.51 K/uL    Baso (Absolute) 0.06 0.00 - 0.12 K/uL    Immature Granulocytes (abs) 0.12 (H) 0.00 - 0.11 K/uL    NRBC (Absolute) 0.00 K/uL   COMP METABOLIC PANEL    Collection Time: 12/15/17  5:10 PM   Result Value Ref Range    Sodium 136 135 - 145 mmol/L    Potassium 3.6 3.6 - 5.5 mmol/L    Chloride 103 96 - 112 mmol/L    Co2 24 20 - 33 mmol/L    Anion Gap 9.0  0.0 - 11.9    Glucose 104 (H) 65 - 99 mg/dL    Bun 12 8 - 22 mg/dL    Creatinine 0.67 0.50 - 1.40 mg/dL    Calcium 9.6 8.4 - 10.2 mg/dL    AST(SGOT) 22 12 - 45 U/L    ALT(SGPT) 20 2 - 50 U/L    Alkaline Phosphatase 114 (H) 30 - 99 U/L    Total Bilirubin 2.0 (H) 0.1 - 1.5 mg/dL    Albumin 4.6 3.2 - 4.9 g/dL    Total Protein 7.5 6.0 - 8.2 g/dL    Globulin 2.9 1.9 - 3.5 g/dL    A-G Ratio 1.6 g/dL   LIPASE    Collection Time: 12/15/17  5:10 PM   Result Value Ref Range    Lipase 15 7 - 58 U/L   ESTIMATED GFR    Collection Time: 12/15/17  5:10 PM   Result Value Ref Range    GFR If African American >60 >60 mL/min/1.73 m 2    GFR If Non African American >60 >60 mL/min/1.73 m 2   CBC with Differential    Collection Time: 12/16/17  4:17 AM   Result Value Ref Range    WBC 7.6 4.8 - 10.8 K/uL    RBC 3.62 (L) 4.20 - 5.40 M/uL    Hemoglobin 10.4 (L) 12.0 - 16.0 g/dL    Hematocrit 31.1 (L) 37.0 - 47.0 %    MCV 85.1 81.4 - 97.8 fL    MCH 28.2 27.0 - 33.0 pg    MCHC 33.1 (L) 33.6 - 35.0 g/dL    RDW 43.1 35.9 - 50.0 fL    Platelet Count 247 164 - 446 K/uL    MPV 11.2 9.0 - 12.9 fL    Neutrophils-Polys 62.60 44.00 - 72.00 %    Lymphocytes 26.20 22.00 - 41.00 %    Monocytes 7.40 0.00 - 13.40 %    Eosinophils 3.20 0.00 - 6.90 %    Basophils 0.30 0.00 - 1.80 %    Immature Granulocytes 0.30 0.00 - 0.90 %    Nucleated RBC 0.00 /100 WBC    Neutrophils (Absolute) 4.75 2.00 - 7.15 K/uL    Lymphs (Absolute) 1.98 1.00 - 4.80 K/uL    Monos (Absolute) 0.56 0.00 - 0.85 K/uL    Eos (Absolute) 0.24 0.00 - 0.51 K/uL    Baso (Absolute) 0.02 0.00 - 0.12 K/uL    Immature Granulocytes (abs) 0.02 0.00 - 0.11 K/uL    NRBC (Absolute) 0.00 K/uL   Comp Metabolic Panel (CMP)    Collection Time: 12/16/17  4:17 AM   Result Value Ref Range    Sodium 138 135 - 145 mmol/L    Potassium 3.7 3.6 - 5.5 mmol/L    Chloride 109 96 - 112 mmol/L    Co2 27 20 - 33 mmol/L    Anion Gap 2.0 0.0 - 11.9    Glucose 83 65 - 99 mg/dL    Bun 8 8 - 22 mg/dL    Creatinine 0.73 0.50 -  1.40 mg/dL    Calcium 8.2 (L) 8.4 - 10.2 mg/dL    AST(SGOT) 14 12 - 45 U/L    ALT(SGPT) 15 2 - 50 U/L    Alkaline Phosphatase 84 30 - 99 U/L    Total Bilirubin 1.9 (H) 0.1 - 1.5 mg/dL    Albumin 2.9 (L) 3.2 - 4.9 g/dL    Total Protein 4.9 (L) 6.0 - 8.2 g/dL    Globulin 2.0 1.9 - 3.5 g/dL    A-G Ratio 1.5 g/dL   ESTIMATED GFR    Collection Time: 12/16/17  4:17 AM   Result Value Ref Range    GFR If African American >60 >60 mL/min/1.73 m 2    GFR If Non African American >60 >60 mL/min/1.73 m 2     Medical Decision Making, by Problem:  Active Hospital Problems    Diagnosis   • Hypotension, postural [I95.1]   • 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

## 2017-12-16 NOTE — ED NOTES
Pt is accompanied by family.  She reports acute onset of diffuse abdominal; pain since approximately 1100 today.  Pt C/O episodic nausea but denies vomiting.

## 2017-12-16 NOTE — PROGRESS NOTES
"Received report from Marion in ER. Patient to floor at 2215. Patient is very pleasant and cooperative, alert, oriented with clear speech. She states that her pain level is tolerable at the time she arrived on the floor.     0030, pain level \"creeping up to 7/10\" per patient. Administered 30mg, Toradol IV. Patient currently sleeping.  "

## 2017-12-16 NOTE — CARE PLAN
Problem: Pain Management  Goal: Pain level will decrease to patient's comfort goal  Patient progressing as expected. Pain management per patient is tolerable.

## 2017-12-16 NOTE — ED NOTES
Pt ambulated to restoom and back to [facility]. Second RN to assist hanging abx and additional IV fluids.

## 2017-12-16 NOTE — ED PROVIDER NOTES
ED Provider Note    CHIEF COMPLAINT  Chief Complaint   Patient presents with   • Abdominal Pain       HPI  Farhana Freeman is a 37 y.o. female who presents With sudden onset of subjective fevers, chills, sweating, severe abdominal pain at 11:00 this morning. She only feels better when she is reclined. She's been having dry heaving and severe nausea. She had her usual soft stool this morning and she denies any changes in urination today. She has a surgical history of gastric bypass surgery with revision and cholecystectomy all within the last 18 months by Dr. perdue. She doesn't think that she has had an appendectomy. He states the pain is consistent, from her breast to her pelvis and across on either side.    REVIEW OF SYSTEMS  See HPI for further details. All other systems are negative.    PAST MEDICAL HISTORY  Past Medical History:   Diagnosis Date   • ASTHMA 5/17    Inhalers as needed, last use 3/17   • Anesthesia     severe n/v with morphine   • Heart burn    • Migraine        FAMILY HISTORY  History reviewed. No pertinent family history.    SOCIAL HISTORY  Social History     Social History   • Marital status:      Spouse name: N/A   • Number of children: N/A   • Years of education: N/A     Social History Main Topics   • Smoking status: Never Smoker   • Smokeless tobacco: Never Used   • Alcohol use No   • Drug use: No   • Sexual activity: Not on file     Other Topics Concern   • Not on file     Social History Narrative   • No narrative on file       SURGICAL HISTORY  Past Surgical History:   Procedure Laterality Date   • LUIS BY LAPAROSCOPY  8/9/2017    Procedure: LUIS BY LAPAROSCOPY;  Surgeon: Shelton Jackson M.D.;  Location: SURGERY [facility];  Service:    • GASTRIC BYPASS LAPAROSCOPIC  5/12/2017    Procedure: GASTRIC BYPASS LAPAROSCOPIC - ISIAH EN Y AND HIATAL HERNIA REPAIR;  Surgeon: Shelton Jackson M.D.;  Location: SURGERY [facility];  Service:    • OTHER ABDOMINAL SURGERY  10/12/15     "vertical gastric sleeve       CURRENT MEDICATIONS      Current Outpatient Prescriptions:   •  metoclopramide (REGLAN) 10 MG Tab, Take 1 Tab by mouth 3 times a day as needed (nausea)., Disp: 12 Tab, Rfl: 0  •  vitamin D (CHOLECALCIFEROL) 1000 UNIT Tab, Take 1,000 Units by mouth every day., Disp: , Rfl:   •  MethylPREDNISolone (MEDROL, DEZ, PO), Take 12 mg by mouth every day., Disp: , Rfl:   •  ondansetron (ZOFRAN ODT) 8 MG TABLET DISPERSIBLE, Take 1 Tab by mouth every 8 hours as needed for Nausea., Disp: 15 Tab, Rfl: 0  •  therapeutic multivitamin-minerals (THERAGRAN-M) Tab, Take 1 Tab by mouth every day., Disp: , Rfl:       ALLERGIES  Allergies   Allergen Reactions   • Oxycodone Hcl Vomiting   • Morphine Nausea     \"severe nausea vomiting\"   • Sulfa Drugs      \"Causes blindness\"       PHYSICAL EXAM  VITAL SIGNS: /60   Pulse 66   Temp 36.3 °C (97.4 °F)   Resp 16   Ht 1.575 m (5' 2\") Comment: Stated  Wt 83.9 kg (185 lb)   LMP 11/15/2017   SpO2 98%   BMI 33.84 kg/m²  @EL[[id number]::@   Constitutional: Well developed,Obese, No acute respiratory distress, Non-toxic appearance.   HENT: Normocephalic, Atraumatic, Bilateral external ears normal, Oropharynx clear, mucous membranes are dry with chapped lips  Eyes: PERRLA, EOMI, Conjunctiva normal, No discharge. No icterus.  Neck: Normal range of motion. Supple, No stridor.   Lymphatic: No cervical lymphadenopathy noted.   Cardiovascular: Normal heart rate, Normal rhythm, No murmurs, No rubs, No gallops.   Thorax & Lungs: Clear to auscultation bilaterally, No respiratory distress, No wheezing.  Abdomen: Soft, positive bowel sounds, diffusely tender. Palpation of any area of the abdomen causes pain throughout the entire abdomen. Negative heel tap. No rebound or guarding  Skin: Warm, Dry, No erythema, No rash.   Extremities: Intact distal pulses, No edema, No tenderness  Musculoskeletal: Good range of motion in all major joints. No tenderness to palpation or major " deformities noted. Normal gait.  Neurologic: Alert & oriented x 3, cranial nerve and cerebellar exams grossly normal. No focal deficits noted.   Psychiatric: Affect mildly anxious, Judgment normal, Mood normal.       RADIOLOGY/PROCEDURES  I was called by the radiologist at 7:55 PM stating that the patient had pneumoperitoneum in the upper abdomen.    CT-ABDOMEN-PELVIS WITH   Final Result      1.  Pneumoperitoneum consistent with viscus structure perforation.      2.  No bowel dilatation or obvious contrast extravasation.      3.  Possible small amount of free versus loculated fluid in the right side of pelvis      This was discussed with Dr. Silvia Salazar at 7:50 PM.            COURSE & MEDICAL DECISION MAKING  Pertinent Labs & Imaging studies reviewed. (See chart for details)  Patient had a CT scan of her abdomen on 9/24/17 which showed the following.  1. Postsurgical change from gastric bypass. Unchanged mild wall thickening of the excluded stomach portion.    2. Status post cholecystectomy.   Reading Provider Reading Date   Koko Rivera M.D. Sep 24, 2017     After initial evaluation, patient was kept nothing by mouth, was given a fluid bolus for likely hypovolemia due to vomiting, Dilaudid and Zofran for her symptoms.    Results for orders placed or performed during the hospital encounter of 12/15/17   CBC WITH DIFFERENTIAL   Result Value Ref Range    WBC 21.6 (H) 4.8 - 10.8 K/uL    RBC 4.77 4.20 - 5.40 M/uL    Hemoglobin 13.5 12.0 - 16.0 g/dL    Hematocrit 40.4 37.0 - 47.0 %    MCV 84.7 81.4 - 97.8 fL    MCH 28.3 27.0 - 33.0 pg    MCHC 33.4 (L) 33.6 - 35.0 g/dL    RDW 41.9 35.9 - 50.0 fL    Platelet Count 322 164 - 446 K/uL    MPV 10.8 9.0 - 12.9 fL    Neutrophils-Polys 91.80 (H) 44.00 - 72.00 %    Lymphocytes 3.80 (L) 22.00 - 41.00 %    Monocytes 3.50 0.00 - 13.40 %    Eosinophils 0.00 0.00 - 6.90 %    Basophils 0.30 0.00 - 1.80 %    Immature Granulocytes 0.60 0.00 - 0.90 %    Nucleated RBC 0.00 /100 WBC     Neutrophils (Absolute) 19.79 (H) 2.00 - 7.15 K/uL    Lymphs (Absolute) 0.82 (L) 1.00 - 4.80 K/uL    Monos (Absolute) 0.75 0.00 - 0.85 K/uL    Eos (Absolute) 0.01 0.00 - 0.51 K/uL    Baso (Absolute) 0.06 0.00 - 0.12 K/uL    Immature Granulocytes (abs) 0.12 (H) 0.00 - 0.11 K/uL    NRBC (Absolute) 0.00 K/uL   COMP METABOLIC PANEL   Result Value Ref Range    Sodium 136 135 - 145 mmol/L    Potassium 3.6 3.6 - 5.5 mmol/L    Chloride 103 96 - 112 mmol/L    Co2 24 20 - 33 mmol/L    Anion Gap 9.0 0.0 - 11.9    Glucose 104 (H) 65 - 99 mg/dL    Bun 12 8 - 22 mg/dL    Creatinine 0.67 0.50 - 1.40 mg/dL    Calcium 9.6 8.4 - 10.2 mg/dL    AST(SGOT) 22 12 - 45 U/L    ALT(SGPT) 20 2 - 50 U/L    Alkaline Phosphatase 114 (H) 30 - 99 U/L    Total Bilirubin 2.0 (H) 0.1 - 1.5 mg/dL    Albumin 4.6 3.2 - 4.9 g/dL    Total Protein 7.5 6.0 - 8.2 g/dL    Globulin 2.9 1.9 - 3.5 g/dL    A-G Ratio 1.6 g/dL   LIPASE   Result Value Ref Range    Lipase 15 7 - 58 U/L   ESTIMATED GFR   Result Value Ref Range    GFR If African American >60 >60 mL/min/1.73 m 2    GFR If Non African American >60 >60 mL/min/1.73 m 2   URINALYSIS   Result Value Ref Range    Color Yellow     Character Clear     Specific Gravity 1.010 <1.035    Ph 5.5 5.0 - 8.0    Glucose Negative Negative mg/dL    Ketones Negative Negative mg/dL    Protein Negative Negative mg/dL    Bilirubin Negative Negative    Nitrite Negative Negative    Leukocyte Esterase Negative Negative    Occult Blood Large (A) Negative    Micro Urine Req Microscopic    BETA-HCG QUALITATIVE URINE   Result Value Ref Range    Beta-Hcg Urine Negative Negative   REFRACTOMETER SG   Result Value Ref Range    Specific Gravity 1.015    URINE MICROSCOPIC (W/UA)   Result Value Ref Range    WBC 2-5 /hpf    RBC 10-20 (A) /hpf    Bacteria Few (A) None /hpf    Epithelial Cells Few Few /hpf    Mucous Threads Moderate /hpf     7:56 PM the radiologist reports that there is no pneumoperitoneum. Pages out for Dr. ANAYA, general  surgery, and I ordered IV Rocephin and Flagyl.    At 825, we got in touch with Dr. Flores Malone who is on-call for Dr. ANAYA. He wanted the patient to have an NG tube and he would come in and evaluate her for conservative versus surgical intervention. Patient is still getting IV fluids and antibiotics. She states that she still has pain, but it is substantially better after IV narcotics. I have informed her of the nasogastric tube as well as consultation by the surgeon. She admits that she was having back pain a week ago that was unexplainable as well as shortness of breath 2 days ago, so perhaps this is an ulcer that has been acting up as of late.    CRITICAL CARE  The very real possibilty of a deterioration of this patient's condition required the highest level of my preparedness for sudden, emergent intervention.  I provided critical care services, which included medication orders, frequent reevaluations of the patient's condition and response to treatment, ordering and reviewing test results, and discussing the case with various consultants.  The critical care time associated with the care of the patient was 35 minutes. Review chart for interventions. This time is exclusive of any other billable procedures.       Disposition:  Admit to Dr. Luis Malone, surgery, in guarded condition  Transfer of care is as of 8:45 PM    FINAL IMPRESSION  1. Peritonitis (CMS-HCC)    2. Nontraumatic hemoperitoneum               Electronically signed by: Silvia Salazar, 12/15/2017 6:07 PM

## 2017-12-16 NOTE — ED NOTES
RN walked into room to pt relaxed,sitting up in [facility], legs on floor texting showing no signs of distress. Reports 9/10 abdominal pain and nausea. Pt medicated for nausea, pain. VS stable, drinking contrast, will continue to monitor.

## 2017-12-16 NOTE — CARE PLAN
Problem: Infection  Goal: Will remain free from infection  Patient progressing as expected. Monitoring for s/s of infection. Patient remains afebrile.

## 2017-12-16 NOTE — ED NOTES
Gastric tube insertion order discontinued by Dr. Joyce. Pt's VS stable, pain at tolerable level at this time. Antibiotics infusing, will continue to monitor.

## 2017-12-16 NOTE — PROGRESS NOTES
1 L NS bolus finished.  Post BP 90/47. Paged Dr. Joyce to update.    11:55:  Updated MD, no new orders.

## 2017-12-17 LAB
ALBUMIN SERPL BCP-MCNC: 2.9 G/DL (ref 3.2–4.9)
ALBUMIN/GLOB SERPL: 1.3 G/DL
ALP SERPL-CCNC: 86 U/L (ref 30–99)
ALT SERPL-CCNC: 12 U/L (ref 2–50)
ANION GAP SERPL CALC-SCNC: 6 MMOL/L (ref 0–11.9)
AST SERPL-CCNC: 14 U/L (ref 12–45)
BASOPHILS # BLD AUTO: 0.4 % (ref 0–1.8)
BASOPHILS # BLD: 0.02 K/UL (ref 0–0.12)
BILIRUB SERPL-MCNC: 2.1 MG/DL (ref 0.1–1.5)
BUN SERPL-MCNC: 12 MG/DL (ref 8–22)
CALCIUM SERPL-MCNC: 8.2 MG/DL (ref 8.4–10.2)
CHLORIDE SERPL-SCNC: 109 MMOL/L (ref 96–112)
CO2 SERPL-SCNC: 22 MMOL/L (ref 20–33)
CREAT SERPL-MCNC: 0.8 MG/DL (ref 0.5–1.4)
EOSINOPHIL # BLD AUTO: 0.23 K/UL (ref 0–0.51)
EOSINOPHIL NFR BLD: 4.2 % (ref 0–6.9)
ERYTHROCYTE [DISTWIDTH] IN BLOOD BY AUTOMATED COUNT: 43.6 FL (ref 35.9–50)
GFR SERPL CREATININE-BSD FRML MDRD: >60 ML/MIN/1.73 M 2
GLOBULIN SER CALC-MCNC: 2.2 G/DL (ref 1.9–3.5)
GLUCOSE SERPL-MCNC: 63 MG/DL (ref 65–99)
HCT VFR BLD AUTO: 33.3 % (ref 37–47)
HGB BLD-MCNC: 10.8 G/DL (ref 12–16)
IMM GRANULOCYTES # BLD AUTO: 0.02 K/UL (ref 0–0.11)
IMM GRANULOCYTES NFR BLD AUTO: 0.4 % (ref 0–0.9)
LYMPHOCYTES # BLD AUTO: 0.99 K/UL (ref 1–4.8)
LYMPHOCYTES NFR BLD: 18.2 % (ref 22–41)
MCH RBC QN AUTO: 28.5 PG (ref 27–33)
MCHC RBC AUTO-ENTMCNC: 32.4 G/DL (ref 33.6–35)
MCV RBC AUTO: 87.9 FL (ref 81.4–97.8)
MONOCYTES # BLD AUTO: 0.26 K/UL (ref 0–0.85)
MONOCYTES NFR BLD AUTO: 4.8 % (ref 0–13.4)
NEUTROPHILS # BLD AUTO: 3.92 K/UL (ref 2–7.15)
NEUTROPHILS NFR BLD: 72 % (ref 44–72)
NRBC # BLD AUTO: 0 K/UL
NRBC BLD AUTO-RTO: 0 /100 WBC
PLATELET # BLD AUTO: 244 K/UL (ref 164–446)
PMV BLD AUTO: 10.8 FL (ref 9–12.9)
POTASSIUM SERPL-SCNC: 3.4 MMOL/L (ref 3.6–5.5)
PROT SERPL-MCNC: 5.1 G/DL (ref 6–8.2)
RBC # BLD AUTO: 3.79 M/UL (ref 4.2–5.4)
SODIUM SERPL-SCNC: 137 MMOL/L (ref 135–145)
WBC # BLD AUTO: 5.4 K/UL (ref 4.8–10.8)

## 2017-12-17 PROCEDURE — 85025 COMPLETE CBC W/AUTO DIFF WBC: CPT

## 2017-12-17 PROCEDURE — A9270 NON-COVERED ITEM OR SERVICE: HCPCS | Performed by: SURGERY

## 2017-12-17 PROCEDURE — 700102 HCHG RX REV CODE 250 W/ 637 OVERRIDE(OP): Performed by: SURGERY

## 2017-12-17 PROCEDURE — C9113 INJ PANTOPRAZOLE SODIUM, VIA: HCPCS | Performed by: SURGERY

## 2017-12-17 PROCEDURE — 36415 COLL VENOUS BLD VENIPUNCTURE: CPT

## 2017-12-17 PROCEDURE — 700111 HCHG RX REV CODE 636 W/ 250 OVERRIDE (IP): Performed by: SURGERY

## 2017-12-17 PROCEDURE — 770006 HCHG ROOM/CARE - MED/SURG/GYN SEMI*

## 2017-12-17 PROCEDURE — 700105 HCHG RX REV CODE 258: Performed by: SURGERY

## 2017-12-17 PROCEDURE — 80053 COMPREHEN METABOLIC PANEL: CPT

## 2017-12-17 RX ORDER — SUCRALFATE ORAL 1 G/10ML
1 SUSPENSION ORAL EVERY 6 HOURS
Status: DISCONTINUED | OUTPATIENT
Start: 2017-12-17 | End: 2017-12-19 | Stop reason: HOSPADM

## 2017-12-17 RX ADMIN — PIPERACILLIN AND TAZOBACTAM 3.38 G: 3; .375 INJECTION, POWDER, FOR SOLUTION INTRAVENOUS at 06:10

## 2017-12-17 RX ADMIN — FLUCONAZOLE 200 MG: 2 INJECTION INTRAVENOUS at 10:09

## 2017-12-17 RX ADMIN — SUCRALFATE 1 G: 1 SUSPENSION ORAL at 23:14

## 2017-12-17 RX ADMIN — PANTOPRAZOLE SODIUM 40 MG: 40 INJECTION, POWDER, FOR SOLUTION INTRAVENOUS at 08:48

## 2017-12-17 RX ADMIN — KETOROLAC TROMETHAMINE 30 MG: 30 INJECTION, SOLUTION INTRAMUSCULAR at 08:59

## 2017-12-17 RX ADMIN — PIPERACILLIN AND TAZOBACTAM 3.38 G: 3; .375 INJECTION, POWDER, FOR SOLUTION INTRAVENOUS at 21:10

## 2017-12-17 RX ADMIN — FLUCONAZOLE 400 MG: 2 INJECTION INTRAVENOUS at 09:00

## 2017-12-17 RX ADMIN — HYDROMORPHONE HYDROCHLORIDE 1 MG: 2 INJECTION, SOLUTION INTRAMUSCULAR; INTRAVENOUS; SUBCUTANEOUS at 21:09

## 2017-12-17 RX ADMIN — ENOXAPARIN SODIUM 40 MG: 100 INJECTION SUBCUTANEOUS at 08:48

## 2017-12-17 RX ADMIN — PIPERACILLIN AND TAZOBACTAM 3.38 G: 3; .375 INJECTION, POWDER, FOR SOLUTION INTRAVENOUS at 13:00

## 2017-12-17 RX ADMIN — SUCRALFATE 1 G: 1 SUSPENSION ORAL at 13:55

## 2017-12-17 ASSESSMENT — PAIN SCALES - GENERAL
PAINLEVEL_OUTOF10: 6
PAINLEVEL_OUTOF10: 5

## 2017-12-17 ASSESSMENT — ENCOUNTER SYMPTOMS: ABDOMINAL PAIN: 1

## 2017-12-17 NOTE — PROGRESS NOTES
Received report from day shift nurse. Assumed pt care at 1915. Pt is A&Ox4, resting comfortably in bed. Pt on r.a. No signs of SOB/respiratory distress noted. Labs noted, VSS, Assessment completed, pass a little bit of gas, had x1 explosive diarrhea, per pt; hypoactive-distant abdominal sounds. Pt reported pain is tolerable and denied pain med. Fall precautions in place. Bed locked & at lowest position. Call light and personal belongings within reach. Encourage pt to call for any assistance. Continue to monitor

## 2017-12-17 NOTE — DISCHARGE PLANNING
Care Transition Team Assessment    Patient lives at home with spouse.  This patient's discharge plan is to return home when medically able.  No current SS needs noted.     Information Source  Orientation : Oriented x 4  Information Given By: Patient  Informant's Name: Farhana  Who is responsible for making decisions for patient? : Patient    Readmission Evaluation  Is this a readmission?: No    Elopement Risk  Legal Hold: No  Ambulatory or Self Mobile in Wheelchair: Yes  Disoriented: No  Psychiatric Symptoms: None  History of Wandering: No  Elopement this Admit: No  Vocalizing Wanting to Leave: No  Displays Behaviors, Body Language Wanting to Leave: No-Not at Risk for Elopement  Elopement Risk: Not at Risk for Elopement    Interdisciplinary Discharge Planning  Does Admitting Nurse Feel This Could be a Complex Discharge?: No  Primary Care Physician: Alfa Xavier  Lives with - Patient's Self Care Capacity: Spouse, Parents, Child Less than 18 Years of Age  Support Systems: Family Member(s)  Housing / Facility: [address] [place] [address]  Do You Take your Prescribed Medications Regularly: Yes  Able to Return to Previous ADL's: Yes  Mobility Issues: No  Patient Expects to be Discharged to:: Home  Assistance Needed: Unknown at this Time    Discharge Preparedness  What is your plan after discharge?: Home with help  Prior Functional Level: Independent with Activities of Daily Living    Functional Assesment  Prior Functional Level: Independent with Activities of Daily Living    Vision / Hearing Impairment  Vision Impairment : Yes  Right Eye Vision: Impaired, Wears Glasses  Left Eye Vision: Impaired, Wears Glasses  Hearing Impairment : No    Values / Beliefs / Concerns  Values / Beliefs Concerns : No    Advance Directive  Advance Directive?: None    Domestic Abuse  Have you ever been the victim of abuse or violence?: No  Physical Abuse or Sexual Abuse: No  Verbal Abuse or Emotional Abuse: No  Possible Abuse Reported to:: Not  Applicable    Psychological Assessment  History of Substance Abuse: None  History of Psychiatric Problems: No  Non-compliant with Treatment: No  Newly Diagnosed Illness: No    Discharge Risks or Barriers  Discharge risks or barriers?: No    Anticipated Discharge Information  Anticipated discharge disposition: Home

## 2017-12-17 NOTE — PROGRESS NOTES
Surgical Progress Note    Author: Scotty Joyce Date & Time created: 2017   9:41 AM     Interval Events:  Feels better  WBC down to 5s  Hungry  Still has epigastric pain but improved  Review of Systems   Gastrointestinal: Positive for abdominal pain.   All other systems reviewed and are negative.    Hemodynamics:  Temp (24hrs), Av.7 °C (98 °F), Min:36.6 °C (97.9 °F), Max:36.7 °C (98.1 °F)  Temperature: 36.7 °C (98.1 °F)  Pulse  Av.5  Min: 43  Max: 81   Blood Pressure: (!) 94/56     Respiratory:    Respiration: 18, Pulse Oximetry: 95 %, O2 Daily Delivery Respiratory : Room Air with O2 Available        RUL Breath Sounds: Clear, RML Breath Sounds: Clear;Diminished, RLL Breath Sounds: Clear;Diminished, RL Breath Sounds: Clear, LLL Breath Sounds: Clear;Diminished  Neuro:  GCS       Fluids:    Intake/Output Summary (Last 24 hours) at 17 0941  Last data filed at 17 0400   Gross per 24 hour   Intake             4039 ml   Output              500 ml   Net             3539 ml        Current Diet Order   Procedures   • DIET NPO     Physical Exam   Constitutional: She is oriented to person, place, and time. She appears well-developed and well-nourished.   Eyes: Conjunctivae are normal. Pupils are equal, round, and reactive to light.   Neck: Normal range of motion. Neck supple.   Cardiovascular: Normal rate and regular rhythm.    Pulmonary/Chest: Effort normal and breath sounds normal.   Abdominal: Soft. Bowel sounds are normal. There is tenderness.   Having BM  Pain improved but still present un epigastric region  No peritoneal signs, guarding, or rebound   Neurological: She is alert and oriented to person, place, and time.   Skin: Skin is warm and dry.   Psychiatric: She has a normal mood and affect. Her behavior is normal. Judgment and thought content normal.   Nursing note and vitals reviewed.    Labs:  Recent Results (from the past 24 hour(s))   CBC with Differential    Collection Time:  12/17/17  5:32 AM   Result Value Ref Range    WBC 5.4 4.8 - 10.8 K/uL    RBC 3.79 (L) 4.20 - 5.40 M/uL    Hemoglobin 10.8 (L) 12.0 - 16.0 g/dL    Hematocrit 33.3 (L) 37.0 - 47.0 %    MCV 87.9 81.4 - 97.8 fL    MCH 28.5 27.0 - 33.0 pg    MCHC 32.4 (L) 33.6 - 35.0 g/dL    RDW 43.6 35.9 - 50.0 fL    Platelet Count 244 164 - 446 K/uL    MPV 10.8 9.0 - 12.9 fL    Neutrophils-Polys 72.00 44.00 - 72.00 %    Lymphocytes 18.20 (L) 22.00 - 41.00 %    Monocytes 4.80 0.00 - 13.40 %    Eosinophils 4.20 0.00 - 6.90 %    Basophils 0.40 0.00 - 1.80 %    Immature Granulocytes 0.40 0.00 - 0.90 %    Nucleated RBC 0.00 /100 WBC    Neutrophils (Absolute) 3.92 2.00 - 7.15 K/uL    Lymphs (Absolute) 0.99 (L) 1.00 - 4.80 K/uL    Monos (Absolute) 0.26 0.00 - 0.85 K/uL    Eos (Absolute) 0.23 0.00 - 0.51 K/uL    Baso (Absolute) 0.02 0.00 - 0.12 K/uL    Immature Granulocytes (abs) 0.02 0.00 - 0.11 K/uL    NRBC (Absolute) 0.00 K/uL   Comp Metabolic Panel (CMP)    Collection Time: 12/17/17  5:33 AM   Result Value Ref Range    Sodium 137 135 - 145 mmol/L    Potassium 3.4 (L) 3.6 - 5.5 mmol/L    Chloride 109 96 - 112 mmol/L    Co2 22 20 - 33 mmol/L    Anion Gap 6.0 0.0 - 11.9    Glucose 63 (L) 65 - 99 mg/dL    Bun 12 8 - 22 mg/dL    Creatinine 0.80 0.50 - 1.40 mg/dL    Calcium 8.2 (L) 8.4 - 10.2 mg/dL    AST(SGOT) 14 12 - 45 U/L    ALT(SGPT) 12 2 - 50 U/L    Alkaline Phosphatase 86 30 - 99 U/L    Total Bilirubin 2.1 (H) 0.1 - 1.5 mg/dL    Albumin 2.9 (L) 3.2 - 4.9 g/dL    Total Protein 5.1 (L) 6.0 - 8.2 g/dL    Globulin 2.2 1.9 - 3.5 g/dL    A-G Ratio 1.3 g/dL   ESTIMATED GFR    Collection Time: 12/17/17  5:33 AM   Result Value Ref Range    GFR If African American >60 >60 mL/min/1.73 m 2    GFR If Non African American >60 >60 mL/min/1.73 m 2     Medical Decision Making, by Problem:  Active Hospital Problems    Diagnosis   • Hypotension, postural [I95.1]   • 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

## 2017-12-18 ENCOUNTER — APPOINTMENT (OUTPATIENT)
Dept: RADIOLOGY | Facility: MEDICAL CENTER | Age: 37
DRG: 393 | End: 2017-12-18
Attending: SURGERY
Payer: COMMERCIAL

## 2017-12-18 ENCOUNTER — APPOINTMENT (OUTPATIENT)
Dept: RADIOLOGY | Facility: MEDICAL CENTER | Age: 37
DRG: 393 | End: 2017-12-18
Attending: PHYSICIAN ASSISTANT
Payer: COMMERCIAL

## 2017-12-18 LAB
ALBUMIN SERPL BCP-MCNC: 3 G/DL (ref 3.2–4.9)
ALBUMIN/GLOB SERPL: 1.2 G/DL
ALP SERPL-CCNC: 83 U/L (ref 30–99)
ALT SERPL-CCNC: 11 U/L (ref 2–50)
ANION GAP SERPL CALC-SCNC: 7 MMOL/L (ref 0–11.9)
AST SERPL-CCNC: 15 U/L (ref 12–45)
BASOPHILS # BLD AUTO: 0.8 % (ref 0–1.8)
BASOPHILS # BLD: 0.04 K/UL (ref 0–0.12)
BILIRUB SERPL-MCNC: 1.4 MG/DL (ref 0.1–1.5)
BUN SERPL-MCNC: 5 MG/DL (ref 8–22)
CALCIUM SERPL-MCNC: 8.4 MG/DL (ref 8.4–10.2)
CHLORIDE SERPL-SCNC: 107 MMOL/L (ref 96–112)
CO2 SERPL-SCNC: 24 MMOL/L (ref 20–33)
CREAT SERPL-MCNC: 0.72 MG/DL (ref 0.5–1.4)
EOSINOPHIL # BLD AUTO: 0.25 K/UL (ref 0–0.51)
EOSINOPHIL NFR BLD: 4.7 % (ref 0–6.9)
ERYTHROCYTE [DISTWIDTH] IN BLOOD BY AUTOMATED COUNT: 41.8 FL (ref 35.9–50)
GFR SERPL CREATININE-BSD FRML MDRD: >60 ML/MIN/1.73 M 2
GLOBULIN SER CALC-MCNC: 2.5 G/DL (ref 1.9–3.5)
GLUCOSE SERPL-MCNC: 74 MG/DL (ref 65–99)
HCT VFR BLD AUTO: 34.1 % (ref 37–47)
HGB BLD-MCNC: 11.3 G/DL (ref 12–16)
IMM GRANULOCYTES # BLD AUTO: 0.04 K/UL (ref 0–0.11)
IMM GRANULOCYTES NFR BLD AUTO: 0.8 % (ref 0–0.9)
LYMPHOCYTES # BLD AUTO: 1.61 K/UL (ref 1–4.8)
LYMPHOCYTES NFR BLD: 30.4 % (ref 22–41)
MCH RBC QN AUTO: 28.5 PG (ref 27–33)
MCHC RBC AUTO-ENTMCNC: 33.1 G/DL (ref 33.6–35)
MCV RBC AUTO: 85.9 FL (ref 81.4–97.8)
MONOCYTES # BLD AUTO: 0.3 K/UL (ref 0–0.85)
MONOCYTES NFR BLD AUTO: 5.7 % (ref 0–13.4)
NEUTROPHILS # BLD AUTO: 3.05 K/UL (ref 2–7.15)
NEUTROPHILS NFR BLD: 57.6 % (ref 44–72)
NRBC # BLD AUTO: 0 K/UL
NRBC BLD AUTO-RTO: 0 /100 WBC
PLATELET # BLD AUTO: 285 K/UL (ref 164–446)
PMV BLD AUTO: 11.2 FL (ref 9–12.9)
POTASSIUM SERPL-SCNC: 3.2 MMOL/L (ref 3.6–5.5)
PROT SERPL-MCNC: 5.5 G/DL (ref 6–8.2)
RBC # BLD AUTO: 3.97 M/UL (ref 4.2–5.4)
SODIUM SERPL-SCNC: 138 MMOL/L (ref 135–145)
WBC # BLD AUTO: 5.3 K/UL (ref 4.8–10.8)

## 2017-12-18 PROCEDURE — 700105 HCHG RX REV CODE 258: Performed by: SURGERY

## 2017-12-18 PROCEDURE — 80053 COMPREHEN METABOLIC PANEL: CPT

## 2017-12-18 PROCEDURE — 74160 CT ABDOMEN W/CONTRAST: CPT

## 2017-12-18 PROCEDURE — 36415 COLL VENOUS BLD VENIPUNCTURE: CPT

## 2017-12-18 PROCEDURE — 770006 HCHG ROOM/CARE - MED/SURG/GYN SEMI*

## 2017-12-18 PROCEDURE — 85025 COMPLETE CBC W/AUTO DIFF WBC: CPT

## 2017-12-18 PROCEDURE — 700102 HCHG RX REV CODE 250 W/ 637 OVERRIDE(OP): Performed by: SURGERY

## 2017-12-18 PROCEDURE — A9270 NON-COVERED ITEM OR SERVICE: HCPCS | Performed by: PHYSICIAN ASSISTANT

## 2017-12-18 PROCEDURE — 700102 HCHG RX REV CODE 250 W/ 637 OVERRIDE(OP): Performed by: PHYSICIAN ASSISTANT

## 2017-12-18 PROCEDURE — A9270 NON-COVERED ITEM OR SERVICE: HCPCS | Performed by: SURGERY

## 2017-12-18 PROCEDURE — 700117 HCHG RX CONTRAST REV CODE 255: Performed by: COLON & RECTAL SURGERY

## 2017-12-18 PROCEDURE — C9113 INJ PANTOPRAZOLE SODIUM, VIA: HCPCS | Performed by: SURGERY

## 2017-12-18 PROCEDURE — 700111 HCHG RX REV CODE 636 W/ 250 OVERRIDE (IP): Performed by: SURGERY

## 2017-12-18 RX ADMIN — SODIUM CHLORIDE, POTASSIUM CHLORIDE, SODIUM LACTATE AND CALCIUM CHLORIDE: 600; 310; 30; 20 INJECTION, SOLUTION INTRAVENOUS at 06:11

## 2017-12-18 RX ADMIN — SUCRALFATE 1 G: 1 SUSPENSION ORAL at 11:45

## 2017-12-18 RX ADMIN — PIPERACILLIN AND TAZOBACTAM 3.38 G: 3; .375 INJECTION, POWDER, FOR SOLUTION INTRAVENOUS at 20:00

## 2017-12-18 RX ADMIN — IOHEXOL 50 ML: 240 INJECTION, SOLUTION INTRATHECAL; INTRAVASCULAR; INTRAVENOUS; ORAL at 09:34

## 2017-12-18 RX ADMIN — IOHEXOL 100 ML: 350 INJECTION, SOLUTION INTRAVENOUS at 09:34

## 2017-12-18 RX ADMIN — HYDROCODONE BITARTRATE AND ACETAMINOPHEN 30 ML: 2.5; 108 SOLUTION ORAL at 15:46

## 2017-12-18 RX ADMIN — PANTOPRAZOLE SODIUM 40 MG: 40 INJECTION, POWDER, FOR SOLUTION INTRAVENOUS at 08:46

## 2017-12-18 RX ADMIN — ENOXAPARIN SODIUM 40 MG: 100 INJECTION SUBCUTANEOUS at 08:46

## 2017-12-18 RX ADMIN — SUCRALFATE 1 G: 1 SUSPENSION ORAL at 17:17

## 2017-12-18 RX ADMIN — KETOROLAC TROMETHAMINE 30 MG: 30 INJECTION, SOLUTION INTRAMUSCULAR at 06:17

## 2017-12-18 RX ADMIN — DIPHENHYDRAMINE HYDROCHLORIDE 25 MG: 50 INJECTION, SOLUTION INTRAMUSCULAR; INTRAVENOUS at 20:11

## 2017-12-18 RX ADMIN — PIPERACILLIN AND TAZOBACTAM 3.38 G: 3; .375 INJECTION, POWDER, FOR SOLUTION INTRAVENOUS at 06:11

## 2017-12-18 RX ADMIN — FLUCONAZOLE 400 MG: 2 INJECTION INTRAVENOUS at 16:17

## 2017-12-18 RX ADMIN — SUCRALFATE 1 G: 1 SUSPENSION ORAL at 06:11

## 2017-12-18 RX ADMIN — FLUCONAZOLE 200 MG: 2 INJECTION INTRAVENOUS at 14:48

## 2017-12-18 RX ADMIN — ONDANSETRON 4 MG: 2 INJECTION INTRAMUSCULAR; INTRAVENOUS at 17:19

## 2017-12-18 ASSESSMENT — PAIN SCALES - GENERAL: PAINLEVEL_OUTOF10: 1

## 2017-12-18 ASSESSMENT — PATIENT HEALTH QUESTIONNAIRE - PHQ9
SUM OF ALL RESPONSES TO PHQ9 QUESTIONS 1 AND 2: 0
SUM OF ALL RESPONSES TO PHQ QUESTIONS 1-9: 0
2. FEELING DOWN, DEPRESSED, IRRITABLE, OR HOPELESS: NOT AT ALL
1. LITTLE INTEREST OR PLEASURE IN DOING THINGS: NOT AT ALL

## 2017-12-18 NOTE — PROGRESS NOTES
Patient was seen and examined.  Feels better, abdomen benign. Vital signs and labs reviewed.  WBC 5, Counseled patient on diet, actively level and progress this far.  Suspected marginal ulcer microperforation without extravasation, improved with Abx, PPI and carafate.  Questions answered.

## 2017-12-18 NOTE — PROGRESS NOTES
Received report from day shift nurse. Assumed pt care at 1915. Pt is A&Ox4, resting comfortably in bed. Pt on r.a. No signs of SOB/respiratory distress noted. Labs noted, VSS, assessment completed. Pt c/o back and abdominal pain, Dilaudid 1mg given per MAR. Fall precautions in place. Bed at lowest position. Call light and personal belongings within reach; encouraged pt to call for any assistance. Continue to monitor

## 2017-12-18 NOTE — PROGRESS NOTES
Ct results reported to Florinda SPANGLER and orders received for clear liquids to full liquids for dinner.  Patient denies abdominal pain with intake of liquids.  HR noted, denies any symptoms.

## 2017-12-19 VITALS
TEMPERATURE: 98 F | WEIGHT: 196.21 LBS | DIASTOLIC BLOOD PRESSURE: 71 MMHG | SYSTOLIC BLOOD PRESSURE: 136 MMHG | HEIGHT: 62 IN | HEART RATE: 41 BPM | OXYGEN SATURATION: 98 % | RESPIRATION RATE: 18 BRPM | BODY MASS INDEX: 36.11 KG/M2

## 2017-12-19 LAB
ALBUMIN SERPL BCP-MCNC: 3.2 G/DL (ref 3.2–4.9)
ALBUMIN/GLOB SERPL: 1.2 G/DL
ALP SERPL-CCNC: 80 U/L (ref 30–99)
ALT SERPL-CCNC: 11 U/L (ref 2–50)
ANION GAP SERPL CALC-SCNC: 5 MMOL/L (ref 0–11.9)
AST SERPL-CCNC: 12 U/L (ref 12–45)
BASOPHILS # BLD AUTO: 1 % (ref 0–1.8)
BASOPHILS # BLD: 0.05 K/UL (ref 0–0.12)
BILIRUB SERPL-MCNC: 1 MG/DL (ref 0.1–1.5)
BUN SERPL-MCNC: <5 MG/DL (ref 8–22)
CALCIUM SERPL-MCNC: 8.3 MG/DL (ref 8.4–10.2)
CHLORIDE SERPL-SCNC: 107 MMOL/L (ref 96–112)
CO2 SERPL-SCNC: 23 MMOL/L (ref 20–33)
CREAT SERPL-MCNC: 0.56 MG/DL (ref 0.5–1.4)
EOSINOPHIL # BLD AUTO: 0.26 K/UL (ref 0–0.51)
EOSINOPHIL NFR BLD: 5.4 % (ref 0–6.9)
ERYTHROCYTE [DISTWIDTH] IN BLOOD BY AUTOMATED COUNT: 40.7 FL (ref 35.9–50)
GFR SERPL CREATININE-BSD FRML MDRD: >60 ML/MIN/1.73 M 2
GLOBULIN SER CALC-MCNC: 2.7 G/DL (ref 1.9–3.5)
GLUCOSE SERPL-MCNC: 77 MG/DL (ref 65–99)
HCT VFR BLD AUTO: 32.1 % (ref 37–47)
HGB BLD-MCNC: 10.8 G/DL (ref 12–16)
IMM GRANULOCYTES # BLD AUTO: 0.01 K/UL (ref 0–0.11)
IMM GRANULOCYTES NFR BLD AUTO: 0.2 % (ref 0–0.9)
LYMPHOCYTES # BLD AUTO: 2.01 K/UL (ref 1–4.8)
LYMPHOCYTES NFR BLD: 42 % (ref 22–41)
MCH RBC QN AUTO: 28.5 PG (ref 27–33)
MCHC RBC AUTO-ENTMCNC: 33.6 G/DL (ref 33.6–35)
MCV RBC AUTO: 84.7 FL (ref 81.4–97.8)
MONOCYTES # BLD AUTO: 0.39 K/UL (ref 0–0.85)
MONOCYTES NFR BLD AUTO: 8.1 % (ref 0–13.4)
NEUTROPHILS # BLD AUTO: 2.07 K/UL (ref 2–7.15)
NEUTROPHILS NFR BLD: 43.3 % (ref 44–72)
NRBC # BLD AUTO: 0 K/UL
NRBC BLD-RTO: 0 /100 WBC
PLATELET # BLD AUTO: 273 K/UL (ref 164–446)
PMV BLD AUTO: 11.3 FL (ref 9–12.9)
POTASSIUM SERPL-SCNC: 3.3 MMOL/L (ref 3.6–5.5)
PROT SERPL-MCNC: 5.9 G/DL (ref 6–8.2)
RBC # BLD AUTO: 3.79 M/UL (ref 4.2–5.4)
SODIUM SERPL-SCNC: 135 MMOL/L (ref 135–145)
WBC # BLD AUTO: 4.8 K/UL (ref 4.8–10.8)

## 2017-12-19 PROCEDURE — 700111 HCHG RX REV CODE 636 W/ 250 OVERRIDE (IP): Performed by: SURGERY

## 2017-12-19 PROCEDURE — 36415 COLL VENOUS BLD VENIPUNCTURE: CPT

## 2017-12-19 PROCEDURE — 700102 HCHG RX REV CODE 250 W/ 637 OVERRIDE(OP): Performed by: SURGERY

## 2017-12-19 PROCEDURE — 700105 HCHG RX REV CODE 258: Performed by: SURGERY

## 2017-12-19 PROCEDURE — A9270 NON-COVERED ITEM OR SERVICE: HCPCS | Performed by: SURGERY

## 2017-12-19 PROCEDURE — C9113 INJ PANTOPRAZOLE SODIUM, VIA: HCPCS | Performed by: SURGERY

## 2017-12-19 PROCEDURE — 85025 COMPLETE CBC W/AUTO DIFF WBC: CPT

## 2017-12-19 PROCEDURE — 80053 COMPREHEN METABOLIC PANEL: CPT

## 2017-12-19 RX ADMIN — SUCRALFATE 1 G: 1 SUSPENSION ORAL at 01:00

## 2017-12-19 RX ADMIN — PANTOPRAZOLE SODIUM 40 MG: 40 INJECTION, POWDER, FOR SOLUTION INTRAVENOUS at 08:52

## 2017-12-19 RX ADMIN — SUCRALFATE 1 G: 1 SUSPENSION ORAL at 05:11

## 2017-12-19 RX ADMIN — PIPERACILLIN AND TAZOBACTAM 3.38 G: 3; .375 INJECTION, POWDER, FOR SOLUTION INTRAVENOUS at 05:10

## 2017-12-19 ASSESSMENT — ENCOUNTER SYMPTOMS
ABDOMINAL PAIN: 1
DIARRHEA: 0
CHILLS: 0
FEVER: 0
HEARTBURN: 0
SHORTNESS OF BREATH: 0
VOMITING: 0
CONSTIPATION: 0
NAUSEA: 0
COUGH: 0

## 2017-12-19 ASSESSMENT — PAIN SCALES - GENERAL: PAINLEVEL_OUTOF10: 1

## 2017-12-19 NOTE — PROGRESS NOTES
No change from morning assessment except took po meds later this PM for generalized discomfort and about 2 hours later became nauseated and had a small emesis.  Medicated for nausea and will attempt diet after nausea passes.

## 2017-12-19 NOTE — DISCHARGE INSTRUCTIONS
D/C instructions:     1. DIET: Upon discharge from the hospital you may resume your normal preoperative diet. Depending on how you are feeling and whether you have nausea or not, you may wish to stay with a bland diet for the first few days. However, you can advance this as quickly as you feel ready.     2. ACTIVITIES: After discharge from the hospital, you may resume full routine activities. However, there should be no heavy lifting (greater than 15 pounds) and no strenuous activities until after your follow-up visit. Otherwise, routine activities of daily living are acceptable.     3. DRIVING: You may drive whenever you are off pain medications and are able to perform the activities needed to drive, i.e. turning, bending, twisting, etc.     4. BATHING: You may get the wound wet at any time after leaving the hospital. You may shower, but do not submerge in a bath for at least a week. Dressings may come off after 48 hours.     5. BOWEL FUNCTION: Constipation is common after an operation, especially with pain medications. The combination of pain medication and decreased activity level can cause constipation in otherwise normal patients. If you feel this is occurring, take a laxative (Miralax, Milk of Magnesia, Ex-Lax, Senokot, etc.) until the problem has resolved.     6. PAIN MEDICATION: You will be given a prescription for pain medication at discharge. Please take these as directed. It is important to remember not to take medications on an empty stomach as this may cause nausea.     7.CALL IF YOU HAVE: (1) Fevers to more than 101.o0 F, (2) Unusual chest or leg pain, (3) Drainage or fluid from incision that may be foul smelling, increased tenderness or soreness at the wound or the wound edges are no longer together, redness or swelling at the incision site. Please do not hesitate to call with any other questions.     8. APPOINTMENT: Contact our office at [phone] for a follow-up appointment in 2-3 weeks following  your procedure.     If you have any additional questions, please do not hesitate to call the office and speak to either myself or the physician on call.     Office address:   [facility].   [address] [place] [address]   [address]   [place] [address]  Discharge Instructions    Discharged to home by car with friend. Discharged via wheelchair, hospital escort: Yes.  Special equipment needed: Not Applicable    Be sure to schedule a follow-up appointment with your primary care doctor or any specialists as instructed.     Discharge Plan:   Influenza Vaccine Indication: Not indicated: History of severe allergic reaction to influenza vaccine (Diarrhea, vomiting, high fever)    I understand that a diet low in cholesterol, fat, and sodium is recommended for good health. Unless I have been given specific instructions below for another diet, I accept this instruction as my diet prescription.   Other diet: Gastric clear    Special Instructions: None    · Is patient discharged on Warfarin / Coumadin?   No     · Is patient Post Blood Transfusion?  No    Depression / Suicide Risk    As you are discharged from this [facility] facility, it is important to learn how to keep safe from harming yourself.    Recognize the warning signs:  · Abrupt changes in personality, positive or negative- including increase in energy   · Giving away possessions  · Change in eating patterns- significant weight changes-  positive or negative  · Change in sleeping patterns- unable to sleep or sleeping all the time   · Unwillingness or inability to communicate  · Depression  · Unusual sadness, discouragement and loneliness  · Talk of wanting to die  · Neglect of personal appearance   · Rebelliousness- reckless behavior  · Withdrawal from people/activities they love  · Confusion- inability to concentrate     If you or a loved one observes any of these behaviors or has concerns about self-harm, here's what you can do:  · Talk about it- your feelings  and reasons for harming yourself  · Remove any means that you might use to hurt yourself (examples: pills, rope, extension cords, firearm)  · Get professional help from the community (Mental Health, Substance Abuse, psychological counseling)  · Do not be alone:Call your Safe Contact- someone whom you trust who will be there for you.  · Call your local CRISIS HOTLINE 387-8184 or 957-856-6369  · Call your local Children's Mobile Crisis Response Team [place] (886) 825-4273 or www.[employer]  · Call the toll free National Suicide Prevention Hotlines   · National Suicide Prevention Lifeline 906-516-APAE (2373)  · National Hope Line Network 800-SUICIDE (831-6901)

## 2017-12-19 NOTE — PROGRESS NOTES
Surgical Progress Note    Author: Leydi Gonsales Date & Time created: 2017   6:39 AM     Interval Events:  Pt admitted for abdominal pain. Hx of sleeve and RYGB. CT normal. She is laying in bed. Tolerating full liquids. Will advance to soft diet. Denies nausea, vomiting. Abdominal pain improved. She is ambulating and voiding. Alert and oriented. NAD. Breathing unlabored. Abdomen soft, non-tender, non-distended. VS reviewed. Labs reviewed. Plan for home later today. Pt will have abx, PPI and Carafate. We will send in prescriptions to pt pharmacy. Discussed with pt. Discussed with Dr. Jackson.   Review of Systems   Constitutional: Negative for chills and fever.   Respiratory: Negative for cough and shortness of breath.    Gastrointestinal: Positive for abdominal pain. Negative for constipation, diarrhea, heartburn, nausea and vomiting.   Skin: Negative for itching and rash.     Hemodynamics:  Temp (24hrs), Av.4 °C (97.6 °F), Min:36.3 °C (97.3 °F), Max:36.7 °C (98 °F)  Temperature: 36.6 °C (97.9 °F)  Pulse  Av.1  Min: 38  Max: 81   Blood Pressure: 136/64     Respiratory:    Respiration: 18, Pulse Oximetry: 98 %           Neuro:  GCS       Fluids:    Intake/Output Summary (Last 24 hours) at 17 0639  Last data filed at 17 0600   Gross per 24 hour   Intake             3200 ml   Output                0 ml   Net             3200 ml        Current Diet Order   Procedures   • DIET ORDER     Physical Exam   Constitutional: She is oriented to person, place, and time. She appears well-developed and well-nourished. No distress.   Cardiovascular: Normal rate.    Pulmonary/Chest: Effort normal. No respiratory distress.   Abdominal: Soft. She exhibits no distension and no mass. There is no tenderness. There is no rebound and no guarding.   Neurological: She is alert and oriented to person, place, and time.   Skin: Skin is warm and dry. No rash noted. She is not diaphoretic. No erythema.   Psychiatric: She has a  normal mood and affect. Her behavior is normal.   Vitals reviewed.    Labs:  Recent Results (from the past 24 hour(s))   CBC with Differential    Collection Time: 12/19/17  5:12 AM   Result Value Ref Range    WBC 4.8 4.8 - 10.8 K/uL    RBC 3.79 (L) 4.20 - 5.40 M/uL    Hemoglobin 10.8 (L) 12.0 - 16.0 g/dL    Hematocrit 32.1 (L) 37.0 - 47.0 %    MCV 84.7 81.4 - 97.8 fL    MCH 28.5 27.0 - 33.0 pg    MCHC 33.6 33.6 - 35.0 g/dL    RDW 40.7 35.9 - 50.0 fL    Platelet Count 273 164 - 446 K/uL    MPV 11.3 9.0 - 12.9 fL    Neutrophils-Polys 43.30 (L) 44.00 - 72.00 %    Lymphocytes 42.00 (H) 22.00 - 41.00 %    Monocytes 8.10 0.00 - 13.40 %    Eosinophils 5.40 0.00 - 6.90 %    Basophils 1.00 0.00 - 1.80 %    Immature Granulocytes 0.20 0.00 - 0.90 %    Nucleated RBC 0.00 /100 WBC    Neutrophils (Absolute) 2.07 2.00 - 7.15 K/uL    Lymphs (Absolute) 2.01 1.00 - 4.80 K/uL    Monos (Absolute) 0.39 0.00 - 0.85 K/uL    Eos (Absolute) 0.26 0.00 - 0.51 K/uL    Baso (Absolute) 0.05 0.00 - 0.12 K/uL    Immature Granulocytes (abs) 0.01 0.00 - 0.11 K/uL    NRBC (Absolute) 0.00 K/uL   Comp Metabolic Panel (CMP)    Collection Time: 12/19/17  5:12 AM   Result Value Ref Range    Sodium 135 135 - 145 mmol/L    Potassium 3.3 (L) 3.6 - 5.5 mmol/L    Chloride 107 96 - 112 mmol/L    Co2 23 20 - 33 mmol/L    Anion Gap 5.0 0.0 - 11.9    Glucose 77 65 - 99 mg/dL    Bun <5 (L) 8 - 22 mg/dL    Creatinine 0.56 0.50 - 1.40 mg/dL    Calcium 8.3 (L) 8.4 - 10.2 mg/dL    AST(SGOT) 12 12 - 45 U/L    ALT(SGPT) 11 2 - 50 U/L    Alkaline Phosphatase 80 30 - 99 U/L    Total Bilirubin 1.0 0.1 - 1.5 mg/dL    Albumin 3.2 3.2 - 4.9 g/dL    Total Protein 5.9 (L) 6.0 - 8.2 g/dL    Globulin 2.7 1.9 - 3.5 g/dL    A-G Ratio 1.2 g/dL   ESTIMATED GFR    Collection Time: 12/19/17  5:12 AM   Result Value Ref Range    GFR If African American >60 >60 mL/min/1.73 m 2    GFR If Non African American >60 >60 mL/min/1.73 m 2     Medical Decision Making, by Problem:  Active Hospital  Problems    Diagnosis   • Hypotension, postural [I95.1]   • 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 Hx of sleeve and RYGB. CT normal. She is laying in bed. Tolerating full liquids. Will advance to soft diet. Denies nausea, vomiting. Abdominal pain improved. She is ambulating and voiding. Alert and oriented. NAD. Breathing unlabored. Abdomen soft, non-tender, non-distended. VS reviewed. Labs reviewed. Plan for home later today. Pt will have abx, PPI and carafate. We will send in prescriptions to pt pharmacy. Discussed with pt. Discussed with Dr. Jackson.     Quality Measures:    Reviewed items::  Medications reviewed and Labs reviewed  Boston catheter::  No Boston  DVT prophylaxis pharmacological::  Enoxaparin (Lovenox)  DVT prophylaxis - mechanical:  SCDs  Ulcer Prophylaxis::  Yes      Discussed patient condition with Patient and Dr. Jackson.

## 2017-12-19 NOTE — CARE PLAN
Problem: Safety  Goal: Will remain free from injury  Outcome: PROGRESSING AS EXPECTED  Pt encouraged to call before getting out of bed to prevent injury. Pt verbalized understanding.    Problem: Pain Management  Goal: Pain level will decrease to patient's comfort goal  Outcome: PROGRESSING AS EXPECTED

## 2017-12-19 NOTE — PROGRESS NOTES
Pt tolerated GI soft diet w/ no difficulty. Pain tolerable. Pt VSS. Ambulating independently.  Voiding adequately. Will d/c after antibiotic administration.

## 2017-12-19 NOTE — PROGRESS NOTES
Pt discharged to home. Discharge instructions given to pt. Pt verbalizes understanding of instructions at this time. IV removed per policy. Dressing applied to site. Pt tolerated well. Escorted to facility entrance by staff via walking. Accompanied by friend. Transported via private vehicle.

## 2017-12-19 NOTE — PROGRESS NOTES
Report received from Day RN, assumed care of pt at this time. POC and medications reviewed with pt. Pt verbalized understanding. Pt denies pain, SOB, or dizziness at this time. Safety measures in place. Will continue to monitor.

## 2017-12-27 NOTE — DISCHARGE SUMMARY
CHIEF COMPLAINT ON ADMISSION  Chief Complaint   Patient presents with   • Abdominal Pain       CODE STATUS  Prior    HPI & HOSPITAL COURSE  This is a 37 y.o. female here with sudden onset of subjective fevers, chills, sweating, severe abdominal pain.She has a history of gastric bypass with revision and cholecystectomy within the last 18 months by Dr. Jackson. Suspected ulcer wit Micro perforation without extravasation. She showed improvement with bowel rest, antibiotics and avoidance of NSAIDS.     Therefore, she is discharged in good and stable condition with close outpatient follow-up.    SPECIFIC OUTPATIENT FOLLOW-UP  Follow up in officein 1-2 weeks.     DISCHARGE PROBLEM LIST  Active Problems:    Hypotension, postural POA: Unknown    Intestinal perforation (CMS-HCC) POA: Unknown    Abdominal pain POA: Unknown  Resolved Problems:    * No resolved hospital problems. *      FOLLOW UP  No future appointments.  Graciela Esquivel A.P[facility]  [address]  [address]  [facility] [address]  [phone]            MEDICATIONS ON DISCHARGE   FreemanJaniceFarhana Charmaine   Home Medication Instructions TRUDY:[id number]    Printed on:12/27/17 1113   Medication Information                      metoclopramide (REGLAN) 10 MG Tab  Take 1 Tab by mouth 3 times a day as needed (nausea).             ondansetron (ZOFRAN ODT) 8 MG TABLET DISPERSIBLE  Take 1 Tab by mouth every 8 hours as needed for Nausea.             therapeutic multivitamin-minerals (THERAGRAN-M) Tab  Take 1 Tab by mouth every day.             vitamin D (CHOLECALCIFEROL) 1000 UNIT Tab  Take 1,000 Units by mouth every day.                 DIET  No orders of the defined types were placed in this encounter.      ACTIVITY  As tolerated.  Weight bearing as tolerated      CONSULTATIONS  None    PROCEDURES  None    LABORATORY  Lab Results   Component Value Date/Time    SODIUM 135 12/19/2017 05:12 AM    POTASSIUM 3.3 (L) 12/19/2017 05:12 AM    CHLORIDE 107 12/19/2017 05:12 AM    CO2 23  12/19/2017 05:12 AM    GLUCOSE 77 12/19/2017 05:12 AM    BUN <5 (L) 12/19/2017 05:12 AM    CREATININE 0.56 12/19/2017 05:12 AM        Lab Results   Component Value Date/Time    WBC 4.8 12/19/2017 05:12 AM    HEMOGLOBIN 10.8 (L) 12/19/2017 05:12 AM    HEMATOCRIT 32.1 (L) 12/19/2017 05:12 AM    PLATELETCT 273 12/19/2017 05:12 AM        Total time of the discharge process exceeds 31 minutes

## 2018-05-14 ENCOUNTER — HOSPITAL ENCOUNTER (OUTPATIENT)
Dept: RADIOLOGY | Facility: MEDICAL CENTER | Age: 38
End: 2018-05-14
Attending: NURSE PRACTITIONER
Payer: COMMERCIAL

## 2018-05-14 DIAGNOSIS — M79.609 PAIN IN EXTREMITY, UNSPECIFIED EXTREMITY: ICD-10-CM

## 2018-05-14 PROCEDURE — 93971 EXTREMITY STUDY: CPT

## 2019-01-12 ENCOUNTER — APPOINTMENT (OUTPATIENT)
Dept: RADIOLOGY | Facility: MEDICAL CENTER | Age: 39
End: 2019-01-12
Attending: EMERGENCY MEDICINE
Payer: MEDICAID

## 2019-01-12 ENCOUNTER — HOSPITAL ENCOUNTER (EMERGENCY)
Facility: MEDICAL CENTER | Age: 39
End: 2019-01-12
Attending: EMERGENCY MEDICINE
Payer: MEDICAID

## 2019-01-12 VITALS
TEMPERATURE: 97.4 F | HEART RATE: 56 BPM | HEIGHT: 62 IN | BODY MASS INDEX: 33.27 KG/M2 | SYSTOLIC BLOOD PRESSURE: 108 MMHG | OXYGEN SATURATION: 99 % | WEIGHT: 180.78 LBS | RESPIRATION RATE: 20 BRPM | DIASTOLIC BLOOD PRESSURE: 73 MMHG

## 2019-01-12 DIAGNOSIS — M25.561 CHRONIC PAIN OF RIGHT KNEE: ICD-10-CM

## 2019-01-12 DIAGNOSIS — R05.8 POST-VIRAL COUGH SYNDROME: ICD-10-CM

## 2019-01-12 DIAGNOSIS — G89.29 CHRONIC PAIN OF RIGHT KNEE: ICD-10-CM

## 2019-01-12 LAB
HCG UR QL: NEGATIVE
SP GR UR REFRACTOMETRY: 1.03

## 2019-01-12 PROCEDURE — 73564 X-RAY EXAM KNEE 4 OR MORE: CPT | Mod: RT

## 2019-01-12 PROCEDURE — 99284 EMERGENCY DEPT VISIT MOD MDM: CPT | Mod: 25

## 2019-01-12 PROCEDURE — 71046 X-RAY EXAM CHEST 2 VIEWS: CPT

## 2019-01-12 PROCEDURE — 81025 URINE PREGNANCY TEST: CPT

## 2019-01-12 RX ORDER — BENZONATATE 100 MG/1
100 CAPSULE ORAL 3 TIMES DAILY PRN
Qty: 20 CAP | Refills: 0 | Status: SHIPPED | OUTPATIENT
Start: 2019-01-12 | End: 2019-11-02

## 2019-01-12 ASSESSMENT — PAIN SCALES - GENERAL: PAINLEVEL_OUTOF10: 3

## 2019-01-12 NOTE — DISCHARGE INSTRUCTIONS
Please follow-up with your primary care physician in 2 days for complete recheck and to verify improvement as expected.  Drink plenty of fluids, you may take the Tessalon Perles as prescribed.  Return to the emergency department if you develop any new or worsening symptoms including worsening cough, shortness of breath, fevers with temperature over 100.4, nausea or vomiting, inability to tolerate fluids, or any further concerns.

## 2019-01-12 NOTE — ED NOTES
Perceptions provided and discussed with patient, pt verbalized understanding. Discharge instructions provided.  Pt verbalized the understanding of discharge instructions to follow up with PCP and to return to ER if condition worsens.  Pt ambulated out of ER without difficulty.

## 2019-01-12 NOTE — ED NOTES
ERP at bedside. Pt agrees with plan of care discussed by ERP. AIDET acknowledged with patient. Julia in low position, side rail up for pt safety. Call light within reach. Will continue to monitor.

## 2019-01-12 NOTE — ED PROVIDER NOTES
ED Provider Note    Chief Complaint:   Cough, congestion, right knee pain    HPI:  Farhana Freeman is a 38 y.o. female who presents with chief complaint of cough and congestion.  Symptoms began 3-4 weeks ago.  She describes cough, nasal congestion, and initially sore throat that has since resolved.  3 weeks ago she was seen at urgent care and prescribed a course of amoxicillin.  She reports this did not significantly improve her congestion nor her cough.  She went back to urgent care 2 weeks ago, was given a course of Augmentin, again which did not alleviate her symptoms.  Today, her primary concern is nasal congestion and persistent dry cough.  She denies any associated fevers.  Denies any leg swelling.  Denies any chest pain.  She is unable to identify any exacerbating or alleviating factors.    Additionally, while she was here she would like to have her right knee evaluated.  2 months ago she developed some mild pain localized just below the patella, exacerbated by kneeling on the knees, no alleviating factors.  She denies any history of trauma to the knee, no constitutional symptoms.  Denies any leg pain or leg swelling, denies any pain to the posterior thigh, posterior calf, or posterior knee.    Review of Systems:  See HPI for pertinent positives and negatives.    Past Medical History:   has a past medical history of Anesthesia; ASTHMA (5/17); Heart burn; and Migraine.    Social History:  Social History     Social History Main Topics   • Smoking status: Never Smoker   • Smokeless tobacco: Never Used   • Alcohol use No   • Drug use: No   • Sexual activity: Not on file       Surgical History:   has a past surgical history that includes other abdominal surgery (10/12/15); gastric bypass laparoscopic (5/12/2017); and adeline by laparoscopy (8/9/2017).    Current Medications:  Home Medications    **Home medications have not yet been reviewed for this encounter**         Allergies:  Allergies   Allergen  "Reactions   • Oxycodone Hcl Vomiting   • Morphine Nausea     \"severe nausea vomiting\"   • Nsaids      Added per MD request   • Sulfa Drugs      \"Causes blindness\"   • Toradol      Added per MD request       Physical Exam:  Vital Signs: /73   Pulse (!) 56   Temp 36.3 °C (97.4 °F) (Temporal)   Resp 20   Ht 1.575 m (5' 2\")   Wt 82 kg (180 lb 12.4 oz)   LMP 01/11/2019 (Approximate)   SpO2 99%   BMI 33.06 kg/m²   Constitutional: Alert, no acute distress  HENT: Moist mucus membranes, normal posterior pharynx, no pharyngeal exudates, no intraoral lesions  Eyes: Pupils equal and reactive, normal conjunctiva  Neck: Supple, normal range of motion, no stridor  Cardiovascular: Extremities are warm and well perfused, no murmur appreciated, normal cardiac auscultation  Pulmonary: No respiratory distress, normal work of breathing, no accessory muscule usage, breath sounds clear and equal bilaterally, no wheezing, no coarse breath sounds  Skin: Warm, dry, no rashes or lesions  Musculoskeletal: Right knee: mild tenderness to palpation just below the patella, full painless range of motion of the right knee, no overlying redness, no overlying excessive warmth, no unilateral swelling  Neurologic: Alert, oriented, normal speech, normal motor function  Psychiatric: Normal and appropriate mood and affect    Medical records reviewed for continuity of care.  Discharge summary reviewed from December 2017.  Patient is noted to have a history of gastric bypass with revision and cholecystectomy performed by Dr. Jackson.  No recent visits to this emergency department for similar complaints.    Labs:  Labs Reviewed   HCG QUALITATIVE UR   REFRACTOMETER SG       Radiology:  DX-KNEE COMPLETE 4+ RIGHT   Final Result      Preserved knee joint. No acute osseous abnormality. No knee effusion.      DX-CHEST-2 VIEWS   Final Result      No acute cardiopulmonary abnormality. No radiographic evidence of pneumonia.         MDM:  With regard to the " patient's cough and congestion, suspect this is likely a resolving viral syndrome with post viral cough.  She has no fevers, no tachypnea, she has a normal pulmonary examination, doubt pneumonia.  Chest x-ray is negative for acute process, no infiltrates concerning for pneumonia.  Plan is for treatment with Tessalon Perles, she will follow-up with her primary care physician early next week for complete recheck and to verify improvement as expected.    With regard to her right knee pain, this is atraumatic.  She has no evidence of septic joint, no evidence of fracture or dislocation. Plain film negative for acute bony process.  She is referred to Dr. Martinez, on-call for orthopedic surgery today for outpatient clinic follow-up.    Disposition:  Discharge home in stable condition    Final Impression:  1. Post-viral cough syndrome    2. Chronic pain of right knee        Electronically signed by: Rita Ceja, 1/12/2019 9:05 AM

## 2019-01-12 NOTE — ED NOTES
Unable to collect urine specimen at this time. Plan discussed with patient, pt verbalized understanding of proper collection and need.

## 2019-01-12 NOTE — ED NOTES
"Pt presents complaining of right knee pain becoming progressively worse for the past 2 weeks.  She also reports being affected by a persisting productive cough for the past week.  She completed two bouts of antibiotics to address a \"nasty\" sinusitis for the past 3 weeks.      "

## 2019-05-29 ENCOUNTER — OFFICE VISIT (OUTPATIENT)
Dept: INTERNAL MEDICINE | Facility: MEDICAL CENTER | Age: 39
End: 2019-05-29
Payer: MEDICAID

## 2019-05-29 VITALS
OXYGEN SATURATION: 97 % | HEART RATE: 86 BPM | DIASTOLIC BLOOD PRESSURE: 60 MMHG | BODY MASS INDEX: 33.34 KG/M2 | WEIGHT: 176.6 LBS | SYSTOLIC BLOOD PRESSURE: 114 MMHG | TEMPERATURE: 98.4 F | RESPIRATION RATE: 18 BRPM | HEIGHT: 61 IN

## 2019-05-29 DIAGNOSIS — Z98.890 STATUS POST GASTRIC SURGERY: ICD-10-CM

## 2019-05-29 DIAGNOSIS — M54.10 RADICULAR PAIN OF LEFT LOWER EXTREMITY: ICD-10-CM

## 2019-05-29 DIAGNOSIS — E55.9 VITAMIN D DEFICIENCY: ICD-10-CM

## 2019-05-29 DIAGNOSIS — E66.01 MORBID OBESITY (HCC): ICD-10-CM

## 2019-05-29 DIAGNOSIS — F41.1 GENERALIZED ANXIETY DISORDER: ICD-10-CM

## 2019-05-29 DIAGNOSIS — Z16.30 INFECTION DUE TO DRUG-RESISTANT ORGANISM: ICD-10-CM

## 2019-05-29 DIAGNOSIS — J45.20 MILD INTERMITTENT ASTHMA, UNSPECIFIED WHETHER COMPLICATED: ICD-10-CM

## 2019-05-29 PROBLEM — I95.1 HYPOTENSION, POSTURAL: Status: RESOLVED | Noted: 2017-12-15 | Resolved: 2019-05-29

## 2019-05-29 PROBLEM — G57.02 PIRIFORMIS SYNDROME OF LEFT SIDE: Status: ACTIVE | Noted: 2019-05-29

## 2019-05-29 PROBLEM — R10.9 ABDOMINAL PAIN: Status: RESOLVED | Noted: 2017-12-15 | Resolved: 2019-05-29

## 2019-05-29 PROBLEM — K63.1 INTESTINAL PERFORATION (HCC): Status: RESOLVED | Noted: 2017-12-15 | Resolved: 2019-05-29

## 2019-05-29 PROBLEM — K80.20 CHOLELITHIASES: Status: RESOLVED | Noted: 2017-08-09 | Resolved: 2019-05-29

## 2019-05-29 PROCEDURE — 99204 OFFICE O/P NEW MOD 45 MIN: CPT | Mod: GC | Performed by: INTERNAL MEDICINE

## 2019-05-29 RX ORDER — OMEPRAZOLE 40 MG/1
40 CAPSULE, DELAYED RELEASE ORAL DAILY
COMMUNITY
End: 2020-03-27 | Stop reason: SDUPTHER

## 2019-05-29 RX ORDER — FLUOXETINE HYDROCHLORIDE 20 MG/1
20 CAPSULE ORAL DAILY
Qty: 30 CAP | Refills: 0 | Status: SHIPPED | OUTPATIENT
Start: 2019-05-29 | End: 2019-06-23 | Stop reason: SDUPTHER

## 2019-05-29 RX ORDER — ALBUTEROL SULFATE 90 UG/1
AEROSOL, METERED RESPIRATORY (INHALATION)
Refills: 2 | COMMUNITY
Start: 2019-03-10 | End: 2020-10-23

## 2019-05-29 ASSESSMENT — PATIENT HEALTH QUESTIONNAIRE - PHQ9: CLINICAL INTERPRETATION OF PHQ2 SCORE: 0

## 2019-05-29 NOTE — PATIENT INSTRUCTIONS
Complete the lab tests ordered, try the recommendations below, return to clinic for follow up in around 3 months.       Sinus Rinse  WHAT IS A SINUS RINSE?  A sinus rinse is a home treatment. It rinses your sinuses with a mixture of salt and water (saline solution). Sinuses are air-filled spaces in your skull behind the bones of your face and forehead. They open into your nasal cavity.  To do a sinus rinse, you will need:  · Saline solution.  · Neti pot or spray bottle. This releases the saline solution into your nose and through your sinuses. You can buy neti pots and spray bottles at:  ¨ Your local pharmacy.  ¨ A [employer] food store.  ¨ Online.  WHEN WOULD I DO A SINUS RINSE?  A sinus rinse can help to clear your nasal cavity. It can clear:  · Mucus.  · Dirt.  · Dust.  · Pollen.  You may do a sinus rinse when you have:  · A cold.  · A virus.  · Allergies.  · A sinus infection.  · A stuffy nose.  If you are considering a sinus rinse:  · Ask your child's doctor before doing a sinus rinse on your child.  · Do not do a sinus rinse if you have had:  ¨ Ear or nasal surgery.  ¨ An ear infection.  ¨ Blocked ears.  HOW DO I DO A SINUS RINSE?  · Wash your hands.  · Disinfect your device using the directions that came with the device.  · Dry your device.  · Use the solution that comes with your device or one that is sold separately in stores. Follow the mixing directions on the package.  · Fill your device with the amount of saline solution as stated in the device instructions.  · Stand over a sink and tilt your head sideways over the sink.  · Place the spout of the device in your upper nostril (the one closer to the ceiling).  · Gently pour or squeeze the saline solution into the nasal cavity. The liquid should drain to the lower nostril if you are not too congested.  · Gently blow your nose. Blowing too hard may cause ear pain.  · Repeat in the other nostril.  · Clean and rinse your device with clean water.  · Air-dry your  device.  ARE THERE RISKS OF A SINUS RINSE?  Sinus rinse is normally very safe and helpful. However, there are a few risks, which include:  · A burning feeling in the sinuses. This may happen if you do not make the saline solution as instructed. Make sure to follow all directions when making the saline solution.  · Infection from unclean water. This is rare, but possible.  · Nasal irritation.  This information is not intended to replace advice given to you by your health care provider. Make sure you discuss any questions you have with your health care provider.  Document Released: 07/15/2015 Document Revised: 08/13/2017 Document Reviewed: 05/05/2015  [employer] Interactive Patient Education © 2017 [employer]      Piriformis Syndrome Rehab  Ask your health care provider which exercises are safe for you. Do exercises exactly as told by your health care provider and adjust them as directed. It is normal to feel mild stretching, pulling, tightness, or discomfort as you do these exercises, but you should stop right away if you feel sudden pain or your pain gets worse. Do not begin these exercises until told by your health care provider.  Stretching and range of motion exercises  These exercises warm up your muscles and joints and improve the movement and flexibility of your hip and pelvis. These exercises also help to relieve pain, numbness, and tingling.  Exercise A: Hip rotators  1. Lie on your back on a firm surface.  2. Pull your left / right knee toward your same shoulder with your left / right hand until your knee is pointing toward the ceiling. Hold your left / right ankle with your other hand.  3. Keeping your knee steady, gently pull your left / right ankle toward your other shoulder until you feel a stretch in your buttocks.  4. Hold this position for __________ seconds.  Repeat __________ times. Complete this stretch __________ times a day.  Exercise B: Hip extensors  1. Lie on your back on a firm surface.  Both of your legs should be straight.  2. Pull your left / right knee to your chest. Hold your leg in this position by holding onto the back of your thigh or the front of your knee.  3. Hold this position for __________ seconds.  4. Slowly return to the starting position.  Repeat __________ times. Complete this stretch __________ times a day.  Strengthening exercises  These exercises build strength and endurance in your hip and thigh muscles. Endurance is the ability to use your muscles for a long time, even after they get tired.  Exercise C: Straight leg raises (hip abductors)  1. Lie on your side with your left / right leg in the top position. Lie so your head, shoulder, knee, and hip line up. Bend your bottom knee to help you balance.  2. Lift your top leg up 4-6 inches (10-15 cm), keeping your toes pointed straight ahead.  3. Hold this position for __________ seconds.  4. Slowly lower your leg to the starting position. Let your muscles relax completely.  Repeat __________ times. Complete this exercise__________ times a day.  Exercise D: Hip abductors and rotators, quadruped  1. Get on your hands and knees on a firm, lightly padded surface. Your hands should be directly below your shoulders, and your knees should be directly below your hips.  2. Lift your left / right knee out to the side. Keep your knee bent. Do not twist your body.  3. Hold this position for __________ seconds.  4. Slowly lower your leg.  Repeat __________ times. Complete this exercise__________ times a day.  Exercise E: Straight leg raises (hip extensors)  1. Lie on your abdomen on a bed or a firm surface with a pillow under your hips.  2. Squeeze your buttock muscles and lift your left / right thigh off the bed. Do not let your back arch.  3. Hold this position for __________ seconds.  4. Slowly return to the starting position. Let your muscles relax completely before doing another repetition.  Repeat __________ times. Complete this  exercise__________ times a day.  This information is not intended to replace advice given to you by your health care provider. Make sure you discuss any questions you have with your health care provider.  Document Released: 12/18/2006 Document Revised: 08/22/2017 Document Reviewed: 11/29/2016  [employer] Interactive Patient Education © 2017 [employer]

## 2019-05-29 NOTE — PROGRESS NOTES
New Patient to Establish    Reason to establish: PCP switch    CC:   Establishing care, left thigh numbness and pain      HPI:  39-year-old female with PMH of morbid obesity status post gastric bypass surgery in 2017 who presents to establish care.  The patient reports losing 100-110 pounds following her surgery. She denies any abdominal pain, constipation or diarrhea. She notes having 1 or more bowel movements daily. She denies lethargy and fatigue. She consumes a healthy diet and exercises regularly.   Regarding her left leg symptoms, she reports numbness and pain beginning in her left upper thigh and radiating down her left leg if she sits or lies on her left side for extended periods of time. Her symptoms are relieved by changing position and taking weight off her left side. The patient denies other symptoms or complaints at this time.    On review of systems, the patient notes the following:  She reports having a history of migraines, her migraines have become less frequent since she lost weight and currently occur 1-2 times per month. She has tried sumatriptan and OTC medications such as Tylenol without relief, the only medication that has worked for her in the past is NORCO, her migraines are currently managed with lifestyle modifications.  She reports having a history of occasional R knee locking that is associated with the sensation of a fluid collection anteriorly below her knee. She denies R knee symptoms currently, her R knee symptoms usually occur in the setting of prolonged weightbearing exercise or activity.  Regarding her gastric surgeries, the patient's starting weight was 278 lbs, her lowest weight has been 168 lbs.   The patient has a history of chronic sinusitis - onset was 12 years ago, became progressively worse and more frequent with time, usually tries OTC medications and home remedies for approximately 1 to 2 weeks before going to the urgent care clinic for further management, is usually  prescribed a course of antibiotics by the urgent care clinic with resolution of symptoms in the next 4 to 5 days, has also been prescribed prednisone with good effect but unfavorable side effects; last occurrence was approximately 2 months ago, required an urgent care visit, resolved with Augmentin.   Denies environmental allergies, allergic to sunscreen and the flu vaccine, has undergone allergy testing, found to be allergic to nickel and colophony.  History of generalized anxiety, prescribed ativan for this in the past with good effect, has also tried xanax, which was not helpful.  LMP was on May 9, 2019, reports regular periods that last 4 days, denies cramping; history of 2 NSVDs, notes gestational diabetes with both pregnancies.      ROS:  Per HPI, additional pertinent systems as noted below.  Constitutional: Negative for fever, chills and malaise.  HENT: Positive for sinusitis, chronic. Negative for ear pain and sore throat.    Eyes: Negative for blurry vision and redness.   Respiratory: Negative for cough and hemoptysis.    Cardiovascular: Negative for chest pain, palpitations and leg swelling.   Gastrointestinal: Negative for melena and hematochezia.   Genitourinary: Negative for dysuria, flank pain and hematuria.   Musculoskeletal: Positive for LLE numbness and pain as detailed above. Negative for falls and myalgia.   Skin: Negative for itching and rash.   Neurological: Negative for dizziness and seizures.   Endocrine: Negative for polydipsia and polyuria.   Hematology: Does not bruise or bleed easily.   Behavioral-Psychiatric: Positive for anxiety. Negative for substance abuse and suicidal ideas.       Patient Active Problem List    Diagnosis Date Noted   • Generalized anxiety disorder 05/29/2019   • Status post gastric surgery 05/29/2019   • Radicular pain of left lower extremity 05/29/2019   • Vitamin D deficiency 05/29/2019   • Morbid obesity (HCC) 05/12/2017   • Asthma 11/01/2005   • Infection due to  drug-resistant organism 08/20/2004       Past Medical History:   Diagnosis Date   • Anesthesia     severe nausea and vomiting with morphine   • ASTHMA 05/2017    Managed with PRN inhaler, last use was in March 2017   • Cholelithiasis 2017    Status post lap adeline   • Gestational diabetes 2007   • Heart burn     Resolved with gastric surgeries.   • Migraine     much less since losing weight, 1-2 times per month   • Postural hypotension     EMR       Current Outpatient Prescriptions   Medication Sig Dispense Refill   • omeprazole (PRILOSEC) 40 MG delayed-release capsule Take 40 mg by mouth every day.     • ALBUTEROL SULFATE INH Inhale  by mouth.     • VENTOLIN  (90 Base) MCG/ACT Aero Soln inhalation aerosol TAKE 2 PUFFS BY MOUTH EVERY 4 TO 6 HOURS AS NEEDED  2   • FLUoxetine (PROZAC) 20 MG Cap Take 1 Cap by mouth every day for 30 days. 30 Cap 0   • therapeutic multivitamin-minerals (THERAGRAN-M) Tab Take 1 Tab by mouth every day.     • benzonatate (TESSALON) 100 MG Cap Take 1 Cap by mouth 3 times a day as needed for Cough. (Patient not taking: Reported on 5/29/2019) 20 Cap 0   • metoclopramide (REGLAN) 10 MG Tab Take 1 Tab by mouth 3 times a day as needed (nausea). (Patient not taking: Reported on 5/29/2019) 12 Tab 0   • vitamin D (CHOLECALCIFEROL) 1000 UNIT Tab Take 1,000 Units by mouth every day.     • ondansetron (ZOFRAN ODT) 8 MG TABLET DISPERSIBLE Take 1 Tab by mouth every 8 hours as needed for Nausea. (Patient not taking: Reported on 5/29/2019) 15 Tab 0     No current facility-administered medications for this visit.        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  Experiences severe illness, high fever, vomiting and diarrhea, with morphine, percocet and the  "flu vaccine.       Social History     Social History   • Marital status:      Spouse name: N/A   • Number of children: N/A   • Years of education: N/A     Occupational History   •  [employer]     Social History Main Topics   • Smoking status: Never Smoker   • Smokeless tobacco: Never Used   • Alcohol use Yes      Comment: Rarely   • Drug use: No   • Sexual activity: Yes     Partners: Male     Other Topics Concern   • Stress Concern Yes   • Special Diet Yes     Social History Narrative    Lives in a house in [place] with her , has 2 biological children, has custody of her niece, reports managing fine at home.    Has cats, dogs, and lizards as pets.       Family History   Problem Relation Age of Onset   • Cancer Mother 30        Ovarian   • Kidney Disease Mother         Stage 3-4   • Hypertension Mother    • Diabetes Father    • Heart Attack Father 61        Status post triple bypass   • Hypertension Father    • Cancer Cousin 21        Benign breast cancer   • Breast Cancer Other         Maternal grandmother's sister       Past Surgical History:   Procedure Laterality Date   • LUIS BY LAPAROSCOPY  8/9/2017    Procedure: LUIS BY LAPAROSCOPY;  Surgeon: Shelton Jackson M.D.;  Location: SURGERY [facility];  Service:    • GASTRIC BYPASS LAPAROSCOPIC  5/12/2017    Procedure: GASTRIC BYPASS LAPAROSCOPIC - ISIAH EN Y AND HIATAL HERNIA REPAIR;  Surgeon: Shelton Jackson M.D.;  Location: SURGERY [facility];  Service:    • OTHER ABDOMINAL SURGERY  10/12/15    vertical gastric sleeve       Physical Exam    /60 (BP Location: Left arm, Patient Position: Sitting, BP Cuff Size: Adult)   Pulse 86   Temp 36.9 °C (98.4 °F) (Temporal)   Resp 18   Ht 1.56 m (5' 1.42\")   Wt 80.1 kg (176 lb 9.6 oz)   LMP 04/10/2019 (Exact Date)   SpO2 97%   BMI 32.92 kg/m²     General: No acute distress, pleasant, cooperative.   Head: Normocephalic, atraumatic.  Eyes: EOMI, PERRLA, normal conjunctivae, sclerae " anicteric.  Throat: Oropharynx clear, oral mucosa moist.  Neck: Supple, no lymphadenopathy.  Heart: Normal rate, regular rhythm, no murmurs.  Lungs: Clear to auscultation bilaterally, no wheezes or crackles.  Abdomen: Soft, obese, normoactive bowel sounds, non-tender, no rebound or guarding.  Extremities: No cyanosis, no edema  Skin: Clear, no suspicious rashes or lesions on visualized skin.  Psych: Normal mood, anxious, appropriate affect.      I have reviewed all pertinent labs and diagnostic tests associated with this visit with specific comments listed under the assessment and plan below      Assessment and Plan    Morbid obesity (CMS-HCC)  BMI 33 this visit, s.p. sleeve gastrectomy in 2015 and Danie-en-Y gastric bypass surgery in 2017 with weight loss of 100 lbs. Continues to follow a healthy diet and exercise regularly, reports regular bowel movements, denies abdominal pain, constipation, diarrhea, melena and hematochezia.  Has not followed up with a gastroenterologist in several years due to insurance changes, would like a referral to reestablish care.    Plan:  Referred to gastroenterology.  Return to clinic in 10 weeks with labs.  Continue home vitamins and supplements.  The patient was congratulated on, and encouraged to continue, her healthy lifestyle habits.    Vitamin D deficiency  Low vitamin D level of 25 in Oct 2017.  Not on a vitamin D supplement at home currently.    Plan:  Repeat labs, return to clinic in 10 weeks for follow-up    Radicular pain of left lower extremity  Reports a several year history of numbness, tingling and pain that originates in her L upper thigh and radiates down to her toes.   Her symptoms occur when she exerts weight on her left side for extended periods of time while seated or supine and have awoken her from sleep.   Her symptoms are relieved with minor changes in position that shift her weight off her left side.   She has tried epsom salt baths and biofreeze without  relief.   She denies any pain or numbness with activity or exertion.    Plan:  The patient's pain is most likely secondary to nerve compression; the patient's clinical evaluation is less consistent with alternate etiologies such as inflammation and ischemia or infarction secondary to vasculitis or atherosclerotic disease.   The patient was provided with information on piriformis syndrome, she will return to clinic in 10 weeks with labs for follow-up.    Status post gastric surgery  Underwent sleeve gastrectomy in 2015 and Danie-en-Y gastric bypass surgery in 2017.   Reports losing 100-110 lbs as a result and notes resolution of GERD and improvement in blood glucose control following gastric bypass surgery.     Plan:  Referred to GI for follow-up.  Encouraged to continue her healthy lifestyle habits.  Scheduled to return to clinic in 10 weeks with labs for follow-up.    Generalized anxiety disorder  Chronic, characterized by nagging, worrisome thoughts that can cause her to feel panicky, has been managed effectively with Ativan in the past, has tried Xanax without benefit. Denies depression and suicidal ideation.     Plan:  Fluoxetine 20 mg daily, provided the patient with information on this medication.  Return to clinic in 10 weeks for follow-up.    Asthma  Chronic, stable, well managed with her PRN albuterol inhaler.      Followup: Return in about 3 months (around 8/29/2019).    Risk Assessment:   The patient is at risk for complications of gastric bypass surgery, including vitamin deficiencies and small bowel obstruction.    Complexity:  The patient has multiple comorbidities, including obesity, a predisposition to diabetes and anxiety disorder.    Signed by: Radha Martinez M.D.

## 2019-05-30 NOTE — ASSESSMENT & PLAN NOTE
Low vitamin D level of 25 in Oct 2017.  Not on a vitamin D supplement at home currently.    Plan:  Repeat labs, return to clinic in 10 weeks for follow-up

## 2019-05-30 NOTE — ASSESSMENT & PLAN NOTE
BMI 33 this visit, s.p. sleeve gastrectomy in 2015 and Danie-en-Y gastric bypass surgery in 2017 with weight loss of 100 lbs. Continues to follow a healthy diet and exercise regularly, reports regular bowel movements, denies abdominal pain, constipation, diarrhea, melena and hematochezia.  Has not followed up with a gastroenterologist in several years due to insurance changes, would like a referral to reestablish care.    Plan:  Referred to gastroenterology.  Return to clinic in 10 weeks with labs.  Continue home vitamins and supplements.  The patient was congratulated on, and encouraged to continue, her healthy lifestyle habits.

## 2019-05-30 NOTE — ASSESSMENT & PLAN NOTE
Reports a several year history of numbness, tingling and pain that originates in her L upper thigh and radiates down to her toes.   Her symptoms occur when she exerts weight on her left side for extended periods of time while seated or supine and have awoken her from sleep.   Her symptoms are relieved with minor changes in position that shift her weight off her left side.   She has tried epsom salt baths and biofreeze without relief.   She denies any pain or numbness with activity or exertion.    Plan:  The patient's pain is most likely secondary to nerve compression; the patient's clinical evaluation is less consistent with alternate etiologies such as inflammation and ischemia or infarction secondary to vasculitis or atherosclerotic disease.   The patient was provided with information on piriformis syndrome, she will return to clinic in 10 weeks with labs for follow-up.

## 2019-05-30 NOTE — ASSESSMENT & PLAN NOTE
Chronic, characterized by nagging, worrisome thoughts that can cause her to feel panicky, has been managed effectively with Ativan in the past, has tried Xanax without benefit. Denies depression and suicidal ideation.     Plan:  Fluoxetine 20 mg daily, provided the patient with information on this medication.  Return to clinic in 10 weeks for follow-up.

## 2019-06-04 ENCOUNTER — TELEPHONE (OUTPATIENT)
Dept: INTERNAL MEDICINE | Facility: MEDICAL CENTER | Age: 39
End: 2019-06-04

## 2019-06-04 NOTE — TELEPHONE ENCOUNTER
1. Caller Name: Aggie from [facility]                      Call Back Number: [phone]    2. Message: Aggie from [facility] called and l/m. They have received a referral for pt. But unfortunately pt has been terminated from their practice.    3. Patient approves office to leave a detailed voicemail/MyChart message: N\A    Sent referral dept a message regarding and asked for pt to be sent to a different place.

## 2019-06-26 DIAGNOSIS — F41.1 GENERALIZED ANXIETY DISORDER: ICD-10-CM

## 2019-06-26 RX ORDER — FLUOXETINE HYDROCHLORIDE 20 MG/1
20 CAPSULE ORAL DAILY
Qty: 30 CAP | Refills: 1 | OUTPATIENT
Start: 2019-06-26 | End: 2019-08-25

## 2019-06-26 NOTE — TELEPHONE ENCOUNTER
Forwarding message to Dr Martinez.      Was the patient seen in the last year in this department? Yes   Last seen: 05/29/19 by Dr. Martinez  Next appt: 08/09/19 with Dr. Martinez      Does patient have an active prescription for medications requested? No     Received Request Via: Pharmacy

## 2019-06-26 NOTE — TELEPHONE ENCOUNTER
----- Message from Dorothy Gallardo sent at 6/26/2019 10:22 AM PDT -----  Regarding: MED REFILL REQUEST  Contact: [phone]  Patient requests med refill for Prozac, 20 MG and send to [facility]. Spouse made the call. Thank you.

## 2019-08-03 ENCOUNTER — HOSPITAL ENCOUNTER (EMERGENCY)
Facility: MEDICAL CENTER | Age: 39
End: 2019-08-03
Attending: EMERGENCY MEDICINE
Payer: MEDICAID

## 2019-08-03 ENCOUNTER — PATIENT OUTREACH (OUTPATIENT)
Dept: HEALTH INFORMATION MANAGEMENT | Facility: OTHER | Age: 39
End: 2019-08-03

## 2019-08-03 VITALS
OXYGEN SATURATION: 98 % | RESPIRATION RATE: 18 BRPM | WEIGHT: 169.53 LBS | TEMPERATURE: 98.9 F | DIASTOLIC BLOOD PRESSURE: 71 MMHG | SYSTOLIC BLOOD PRESSURE: 123 MMHG | BODY MASS INDEX: 30.04 KG/M2 | HEIGHT: 63 IN | HEART RATE: 66 BPM

## 2019-08-03 DIAGNOSIS — R10.2 VAGINAL PAIN: ICD-10-CM

## 2019-08-03 PROCEDURE — 99284 EMERGENCY DEPT VISIT MOD MDM: CPT

## 2019-08-03 SDOH — HEALTH STABILITY: MENTAL HEALTH: HOW OFTEN DO YOU HAVE A DRINK CONTAINING ALCOHOL?: NEVER

## 2019-08-03 NOTE — ED TRIAGE NOTES
"Chief Complaint   Patient presents with   • Vaginal Pain     states pain started this morning - aching w/ sharp/shooting pain. Pt also reports some spotting, and tiredness      /71   Pulse 66   Temp 37.2 °C (98.9 °F) (Temporal)   Resp 18   Ht 1.6 m (5' 3\")   Wt 76.9 kg (169 lb 8.5 oz)   SpO2 98%   BMI 30.03 kg/m²     Pt states she had LMP last week and it was normal.   "

## 2019-08-13 ENCOUNTER — GYNECOLOGY VISIT (OUTPATIENT)
Dept: OBGYN | Facility: CLINIC | Age: 39
End: 2019-08-13
Payer: MEDICAID

## 2019-08-13 ENCOUNTER — HOSPITAL ENCOUNTER (OUTPATIENT)
Facility: MEDICAL CENTER | Age: 39
End: 2019-08-13
Attending: OBSTETRICS & GYNECOLOGY
Payer: MEDICAID

## 2019-08-13 VITALS — DIASTOLIC BLOOD PRESSURE: 82 MMHG | BODY MASS INDEX: 30.47 KG/M2 | WEIGHT: 172 LBS | SYSTOLIC BLOOD PRESSURE: 132 MMHG

## 2019-08-13 DIAGNOSIS — N39.3 SUI (STRESS URINARY INCONTINENCE, FEMALE): ICD-10-CM

## 2019-08-13 DIAGNOSIS — N81.10 PELVIC ORGAN PROLAPSE QUANTIFICATION STAGE 3 CYSTOCELE: ICD-10-CM

## 2019-08-13 DIAGNOSIS — R10.2 PELVIC PRESSURE IN FEMALE: ICD-10-CM

## 2019-08-13 LAB
FORWARD REASON: SPWHY: NORMAL
FORWARDED TO LAB: SPWHR: NORMAL
SPECIMEN SENT: SPWT1: NORMAL

## 2019-08-13 PROCEDURE — 99203 OFFICE O/P NEW LOW 30 MIN: CPT | Performed by: OBSTETRICS & GYNECOLOGY

## 2019-08-13 NOTE — LETTER
August 13, 2019            Farhana Freeman is currently being cared for at [facility].   She should not lift greater than 20 pounds.      Please contact with my office with any questions.         Thank you,          Maria Elena Zhou M.D.

## 2019-08-13 NOTE — PROGRESS NOTES
GYN Visit    CC: pelvic pressure/ED follow Up    HPI: Farhana Freeman is a 39 y.o.  Complaining of vaginal pressure.  Patient had ED visit 8/3/19 for vaginal pain/feeling like something was falling out of her vagina.  Exam was normal, based on symptoms prolapse of some kind was thought to be the likely etiology of symptoms, pt was told to f/u with GYN.  Reports sudden onset that AM of feeling like something was coming out, pressure pushing out of the vagina.  It really happened before.  Reports some vaginal/pelvic pressure since then.  Noticed some spotting previously when seen in ED, now resolved.  No intermenstrual bleeding otherwise, reports very regular menses recently (reports prior history of irregular menstrual cycle prior to weight loss after bariatric surgery.)  Does endorse some leakage with cough/sneeze.  Denies urinary urgency or having to suddenly go to the bathroom and not making it.  Only has had leakage with cough or sneeze or lifting.  Reports some mild constipation but denies real bowel symptoms.    Not sexually active.     ROS:  constitutional: denies fevers, general concerns  CV: denies chest pain, palpitations, edema  Resp: denies shortness of breath, cough  Eyes: Denies vision changes/double vision  ENT: Endorses sinus problems, denies sore throat/mouth/hearing problem  GI: denies abd pain, N/V, endorses occasional mild constipation   : denies irregular vaginal bleeding, discharge, pain, denies urinary complaints  Neuro: Reports no hx of migraines w/ aura  Endo: Reports weight loss, intentional.  Denies irregular menses, other concerns   psych: reports no concerns about mood, feels well    OB History    Para Term  AB Living   2 2 1 1   2   SAB TAB Ectopic Molar Multiple Live Births             2      # Outcome Date GA Lbr Luke/2nd Weight Sex Delivery Anes PTL Lv   2 Term 07 38w0d   M Vag-Spont   GABRIELLE   1  04 35w0d   F Vag-Spont   GABRIELLE       GYN Hx:    Monthly menses  Denies hx of STIs  Denies hx of abnl paps, last pap about 1 yr ago at [place].    Past Medical History:   Diagnosis Date   • Anesthesia     severe nausea and vomiting with morphine   • ASTHMA 05/2017    Managed with PRN inhaler, last use was in March 2017   • Cholelithiasis 2017    Status post lap adeline   • Gestational diabetes 2007   • Heart burn     Resolved with gastric surgeries.   • Migraine     much less since losing weight, 1-2 times per month   • Postural hypotension     EMR       Past Surgical History:   Procedure Laterality Date   • ADELINE BY LAPAROSCOPY  8/9/2017    Procedure: ADELINE BY LAPAROSCOPY;  Surgeon: Shelton Jackson M.D.;  Location: SURGERY [facility];  Service:    • GASTRIC BYPASS LAPAROSCOPIC  5/12/2017    Procedure: GASTRIC BYPASS LAPAROSCOPIC - ISIAH EN Y AND HIATAL HERNIA REPAIR;  Surgeon: Shelton Jackson M.D.;  Location: SURGERY [facility];  Service:    • OTHER ABDOMINAL SURGERY  10/12/15    vertical gastric sleeve       Medications:   Current Outpatient Medications Ordered in Epic   Medication Sig Dispense Refill   • FLUoxetine (PROZAC) 20 MG Cap Take 1 Cap by mouth every day for 60 days. 30 Cap 1   • omeprazole (PRILOSEC) 40 MG delayed-release capsule Take 40 mg by mouth every day.     • ALBUTEROL SULFATE INH Inhale  by mouth.     • benzonatate (TESSALON) 100 MG Cap Take 1 Cap by mouth 3 times a day as needed for Cough. 20 Cap 0   • vitamin D (CHOLECALCIFEROL) 1000 UNIT Tab Take 1,000 Units by mouth every day.     • therapeutic multivitamin-minerals (THERAGRAN-M) Tab Take 1 Tab by mouth every day.     • VENTOLIN  (90 Base) MCG/ACT Aero Soln inhalation aerosol TAKE 2 PUFFS BY MOUTH EVERY 4 TO 6 HOURS AS NEEDED  2     No current [facility]-ordered facility-administered medications on file.        Allergies: Oxycodone hcl; Flu virus vaccine; Ibuprofen; Morphine; Nsaids; Sulfa drugs; and Toradol    Social History     Tobacco Use   • Smoking status: Never Smoker   •  Smokeless tobacco: Never Used   Substance and Sexual Activity   • Alcohol use: Not Currently     Frequency: Never     Comment: Rarely   • Drug use: No   • Sexual activity: Not Currently     Partners: Male     Family History   Problem Relation Age of Onset   • Kidney Disease Mother         Stage 3-4   • Hypertension Mother    • Other Mother         cervical dysplasia treated by cryo   • Diabetes Father    • Heart Attack Father 61        Status post triple bypass   • Hypertension Father    • Cancer Cousin 21        Benign breast cancer   • Breast Cancer Other         Maternal grandmother's sister       Physical Exam:  /82   Wt 78 kg (172 lb)   LMP 2019 (Exact Date)   BMI 30.47 kg/m²   gen: AAO, NAD, affect appropriate  CV: RRR; no LE edema  resp: ctab  abd: soft, NT, ND, no masses, no organomegaly, no hernias  : NEFG, normal urethral meatus, normal anus/perineum, normal vagina and cervix.  Uterus 8-10wk sized, anteverted with descent, no adnexal masses/tenderness -right ovary palpable although does not feel obviously enlarged.  Skin: warm/dry, no lesions    POP Q:  Aa: -1 Ba: -1 C: -5  Gh: 3-4 PB: 3 TVL: 10  Ap: -1 Bp: -2 D: -6    + Urethral hypermobility  Positive leakage of urine with Valsalva    A/P: 39 y.o.  with primarily anterior compartment stage 3 pelvic organ prolapse, ALEIDA  1. Pelvic pressure in female  US-PELVIC COMPLETE (TRANSABDOMINAL/TRANSVAGINAL) (COMBO)   2. ALEIDA (stress urinary incontinence, female)  URINE CULTURE    URINE CULTURE     Discussed pelvic organ prolapse is a common problem as women age, especially if they have had history of vaginal deliveries.  Discussed conservative measures with physical therapy, weight loss, pessary management is symptomatic versus surgical management.  If patient decided to pursue surgery, for her prolapse I would recommend TVH, anterior repair.  On exam here does not appear to need a posterior repair, would recommend reassessment in the or when  fully relaxed and after hysterectomy to see if it was thought that it would be beneficial.  Patient at this time feel she would prefer to move forward with surgical management.  We did also discussed the risk of recurrence after surgical management of prolapse.  ALEIDA: Urine culture today to rule out infectious contribution to her symptoms.  Again discussed conservative measures with physical therapy, weight loss, timed voiding and behavior modifying to decrease episodes of having.  Also discussed urethral sling, which can be done either at time of hysterectomy or as a separate procedure to help with urinary incontinence.  Discussed briefly risk of mesh erosion, complications related to sling.  I discussed with her that I do not personally perform the sling, but that there are other partners in our practice who do.    Patient reports that she recently started a new job.  Should be getting information on possible new health insurance coverage soon.  Discussed that with her current Medicaid, we can seek approval for surgery and cover her here through our [facility] location.  Should she obtain private insurance prior to surgery, may make an appoint with me in the private office.  We discussed the group nature of care here and that if she is seen back at [facility] would likely be with a partner of mine both for office visits and surgical planning.  Patient expressed understanding.    Prescription given for pelvic ultrasound to evaluate uterus and adnexa.  No indication for endometrial sampling at this time.  Record request filled out today to obtain recent Pap smear.    Return to clinic approximately 1 month after ultrasound for discussion of results and additional surgical planning.      Maria Elena Zhou MD  [facility], [facility]

## 2019-08-13 NOTE — NON-PROVIDER
Pt here f/u ED vaginal prolapse  Pt states feeling of prolapse feels better since reducing activities.   Good#[phone]  Pharmacy verified  Pap done at [place] x 1 year, always been normal.

## 2019-09-17 ENCOUNTER — TELEPHONE (OUTPATIENT)
Dept: OBGYN | Facility: CLINIC | Age: 39
End: 2019-09-17

## 2019-09-17 NOTE — TELEPHONE ENCOUNTER
Spoke with pt and notified her that [facility] was not able to perform Urine Culture because it was sent in the wrong tube. Pt will come and  lab slip and go to [facility] to have the urine culture done.  Also, pt will call to schedule Pelvic US and call us to make the follow-up appt.  Pt had no questions. Lab order printed and left at front office

## 2019-09-20 ENCOUNTER — TELEPHONE (OUTPATIENT)
Dept: MEDICAL GROUP | Facility: MEDICAL CENTER | Age: 39
End: 2019-09-20

## 2019-09-27 ENCOUNTER — HOSPITAL ENCOUNTER (OUTPATIENT)
Dept: RADIOLOGY | Facility: MEDICAL CENTER | Age: 39
End: 2019-09-27
Attending: OBSTETRICS & GYNECOLOGY
Payer: MEDICAID

## 2019-09-27 DIAGNOSIS — R10.2 PELVIC PRESSURE IN FEMALE: ICD-10-CM

## 2019-09-27 PROCEDURE — 76830 TRANSVAGINAL US NON-OB: CPT

## 2019-11-02 ENCOUNTER — HOSPITAL ENCOUNTER (EMERGENCY)
Facility: MEDICAL CENTER | Age: 39
End: 2019-11-02
Attending: EMERGENCY MEDICINE
Payer: MEDICAID

## 2019-11-02 VITALS
DIASTOLIC BLOOD PRESSURE: 68 MMHG | OXYGEN SATURATION: 98 % | HEIGHT: 63 IN | WEIGHT: 173.06 LBS | BODY MASS INDEX: 30.66 KG/M2 | TEMPERATURE: 98 F | SYSTOLIC BLOOD PRESSURE: 104 MMHG | HEART RATE: 66 BPM | RESPIRATION RATE: 16 BRPM

## 2019-11-02 DIAGNOSIS — M25.512 ACUTE PAIN OF LEFT SHOULDER: ICD-10-CM

## 2019-11-02 PROCEDURE — 99283 EMERGENCY DEPT VISIT LOW MDM: CPT

## 2019-11-02 RX ORDER — CYCLOBENZAPRINE HCL 10 MG
10 TABLET ORAL 3 TIMES DAILY PRN
Qty: 20 TAB | Refills: 0 | Status: SHIPPED | OUTPATIENT
Start: 2019-11-02 | End: 2019-12-12

## 2019-11-02 RX ORDER — METHYLPREDNISOLONE 4 MG/1
TABLET ORAL
Qty: 1 PACKAGE | Refills: 0 | Status: SHIPPED | OUTPATIENT
Start: 2019-11-02 | End: 2019-12-12

## 2019-11-03 NOTE — ED TRIAGE NOTES
Pt comes in c/o severe L shoulder pain that started on Tuesday  Does not know what could be causing this issue limited ROM to arm

## 2019-11-03 NOTE — ED NOTES
Discharge information reviewed in detail. Patient verbalized understanding of discharge instructions to follow up with Juan and to return to ER if condition worsens.  Patient educated/expressed awareness of not driving or operating heavy machinery.   Patient educated no combining of alcohol with other sedating medications.   Patient educated on two new prescription(s).  to drive home.   Patient ambulated out of ER in a steady gait.

## 2019-11-03 NOTE — ED NOTES
Rounded on patient.  Assessment completed.  No needs currently.  Oriented to call button.  Warm blanket provided

## 2019-11-03 NOTE — ED PROVIDER NOTES
"ED Provider Note    CHIEF COMPLAINT  Chief Complaint   Patient presents with   • Shoulder Pain     L shoulder pain since tuesday   unknown and denies injury         HPI  Farhana Freeman is a 39 y.o. female who presents for evaluation of atraumatic left shoulder pain that began 3 or 4 days ago, noted when she woke up in the morning.  States that the pain initially was involving the shoulder joint as well as her neck but now only involves the deltoid region.  Worse when moving.  No weakness or numbness or tingling, no fever, no other specific complaints.     REVIEW OF SYSTEMS  Negative for fever, weakness, numbness    PAST MEDICAL HISTORY   has a past medical history of Anesthesia, ASTHMA (05/2017), Cholelithiasis (2017), Gestational diabetes (2007), Heart burn, Migraine, and Postural hypotension.    SOCIAL HISTORY  Social History     Tobacco Use   • Smoking status: Never Smoker   • Smokeless tobacco: Never Used   Substance and Sexual Activity   • Alcohol use: Not Currently     Frequency: Never     Comment: Rarely   • Drug use: No   • Sexual activity: Not Currently     Partners: Male       SURGICAL HISTORY   has a past surgical history that includes other abdominal surgery (10/12/15); gastric bypass laparoscopic (5/12/2017); and adeline by laparoscopy (8/9/2017).    CURRENT MEDICATIONS  I personally reviewed the medication list in the charting documentation.     ALLERGIES  Allergies   Allergen Reactions   • Oxycodone Hcl Vomiting   • Flu Virus Vaccine Vomiting     Reports severe illness with nausea, vomiting and diarrhea.     • Ibuprofen      1999-11-18;GI UPSET   • Morphine Nausea     \"severe nausea vomiting\"   • Nsaids      Added per MD request   • Sulfa Drugs      \"Causes blindness\"   • Toradol      Added per MD request       PHYSICAL EXAM  VITAL SIGNS: /70   Pulse 83   Temp 36.8 °C (98.2 °F) (Temporal)   Resp 16   Ht 1.6 m (5' 3\")   Wt 78.5 kg (173 lb 1 oz)   LMP 10/13/2019   SpO2 96%   BMI 30.66 " kg/m²   Constitutional: Alert in no apparent distress.  HENT: No signs of trauma.   Eyes: Conjunctiva normal, Non-icteric.   Chest: Normal nonlabored respirations  Skin: No erythema, No rash.   Musculoskeletal: Unremarkable inspection of the left shoulder, no deformity, no ecchymosis, no erythema, limited range of motion secondary to pain but neurovascular intact distally and tenderness overlying the deltoid without tenderness of the trapezius, no midline neck tenderness.  Neurologic: Alert, No focal deficits noted.   Psychiatric: Affect normal, Judgment normal.    COURSE & MEDICAL DECISION MAKING  Pertinent Labs & Imaging studies reviewed. (See chart for details)    Encounter Summary: This is a 39 y.o. female with atraumatic left shoulder pain, she has some tenderness of the deltoid muscle, neurovascularly intact, will treat her with a Medrol Dosepak and Flexeril, strict return instructions have been discussed, stable and appropriate for discharge.  She will also be placed in a sling for comfort.  She will follow-up with her primary care provider for further evaluation.      DISPOSITION: Discharge Home      FINAL IMPRESSION  1. Acute pain of left shoulder        This dictation was created using voice recognition software. The accuracy of the dictation is limited to the abilities of the software. I expect there may be some errors of grammar and possibly content. The nursing notes were reviewed and certain aspects of this information were incorporated into this note.    Electronically signed by: Rex Bates, 11/2/2019 8:35 PM

## 2019-11-05 ENCOUNTER — GYNECOLOGY VISIT (OUTPATIENT)
Dept: OBGYN | Facility: CLINIC | Age: 39
End: 2019-11-05
Payer: MEDICAID

## 2019-11-05 VITALS — BODY MASS INDEX: 31.18 KG/M2 | WEIGHT: 176 LBS

## 2019-11-05 DIAGNOSIS — N81.10 PELVIC ORGAN PROLAPSE QUANTIFICATION STAGE 3 CYSTOCELE: ICD-10-CM

## 2019-11-05 PROCEDURE — 99212 OFFICE O/P EST SF 10 MIN: CPT | Performed by: OBSTETRICS & GYNECOLOGY

## 2019-11-05 NOTE — PROGRESS NOTES
Patient here for f/u on US Results and to discuss possible TVH and Urethral sling surgery.  Good phone# [phone]  Pharmacy verified with pt.

## 2019-11-27 ENCOUNTER — HOSPITAL ENCOUNTER (OUTPATIENT)
Dept: RADIOLOGY | Facility: MEDICAL CENTER | Age: 39
End: 2019-11-27
Attending: PHYSICIAN ASSISTANT
Payer: COMMERCIAL

## 2019-11-27 ENCOUNTER — ANESTHESIA (OUTPATIENT)
Dept: RADIOLOGY | Facility: MEDICAL CENTER | Age: 39
End: 2019-11-27
Payer: COMMERCIAL

## 2019-11-27 ENCOUNTER — ANESTHESIA EVENT (OUTPATIENT)
Dept: RADIOLOGY | Facility: MEDICAL CENTER | Age: 39
End: 2019-11-27
Payer: COMMERCIAL

## 2019-11-27 ENCOUNTER — TELEPHONE (OUTPATIENT)
Dept: RADIOLOGY | Facility: MEDICAL CENTER | Age: 39
End: 2019-11-27

## 2019-11-27 ENCOUNTER — HOSPITAL ENCOUNTER (OUTPATIENT)
Dept: LAB | Facility: MEDICAL CENTER | Age: 39
End: 2019-11-27
Attending: PHYSICAL MEDICINE & REHABILITATION
Payer: COMMERCIAL

## 2019-11-27 VITALS
HEART RATE: 86 BPM | HEIGHT: 62 IN | OXYGEN SATURATION: 99 % | WEIGHT: 172.4 LBS | BODY MASS INDEX: 31.73 KG/M2 | RESPIRATION RATE: 20 BRPM | TEMPERATURE: 98.6 F

## 2019-11-27 DIAGNOSIS — M54.12 BRACHIAL NEURITIS: ICD-10-CM

## 2019-11-27 LAB — HCG SERPL QL: NEGATIVE

## 2019-11-27 PROCEDURE — 700102 HCHG RX REV CODE 250 W/ 637 OVERRIDE(OP): Performed by: ANESTHESIOLOGY

## 2019-11-27 PROCEDURE — A9270 NON-COVERED ITEM OR SERVICE: HCPCS | Performed by: ANESTHESIOLOGY

## 2019-11-27 PROCEDURE — 700101 HCHG RX REV CODE 250

## 2019-11-27 PROCEDURE — 700105 HCHG RX REV CODE 258: Performed by: ANESTHESIOLOGY

## 2019-11-27 PROCEDURE — 72141 MRI NECK SPINE W/O DYE: CPT

## 2019-11-27 PROCEDURE — 84703 CHORIONIC GONADOTROPIN ASSAY: CPT

## 2019-11-27 PROCEDURE — 700111 HCHG RX REV CODE 636 W/ 250 OVERRIDE (IP)

## 2019-11-27 PROCEDURE — 36415 COLL VENOUS BLD VENIPUNCTURE: CPT

## 2019-11-27 RX ORDER — SODIUM CHLORIDE, SODIUM LACTATE, POTASSIUM CHLORIDE, CALCIUM CHLORIDE 600; 310; 30; 20 MG/100ML; MG/100ML; MG/100ML; MG/100ML
INJECTION, SOLUTION INTRAVENOUS CONTINUOUS
Status: DISCONTINUED | OUTPATIENT
Start: 2019-11-27 | End: 2019-11-28 | Stop reason: HOSPADM

## 2019-11-27 RX ORDER — HALOPERIDOL 5 MG/ML
1 INJECTION INTRAMUSCULAR
Status: DISCONTINUED | OUTPATIENT
Start: 2019-11-27 | End: 2019-11-28 | Stop reason: HOSPADM

## 2019-11-27 RX ORDER — MIDAZOLAM HYDROCHLORIDE 1 MG/ML
1 INJECTION INTRAMUSCULAR; INTRAVENOUS
Status: DISCONTINUED | OUTPATIENT
Start: 2019-11-27 | End: 2019-11-28 | Stop reason: HOSPADM

## 2019-11-27 RX ADMIN — HYDROCODONE BITARTRATE AND ACETAMINOPHEN 15 ML: 7.5; 325 SOLUTION ORAL at 17:03

## 2019-11-27 RX ADMIN — SODIUM CHLORIDE, POTASSIUM CHLORIDE, SODIUM LACTATE AND CALCIUM CHLORIDE: 600; 310; 30; 20 INJECTION, SOLUTION INTRAVENOUS at 16:20

## 2019-11-27 NOTE — ANESTHESIA PREPROCEDURE EVALUATION
Relevant Problems   PULMONARY   (+) Asthma      Other   (+) Generalized anxiety disorder   (+) Morbid obesity (HCC)   (+) Radicular pain of left lower extremity       Physical Exam    Airway   Mallampati: II  TM distance: >3 FB  Neck ROM: full       Cardiovascular - normal exam  Rhythm: regular  Rate: normal  (-) murmur     Dental - normal exam         Pulmonary - normal exam  Breath sounds clear to auscultation     Abdominal    Neurological - normal exam                 Anesthesia Plan    ASA 2       Plan - general       Airway plan will be LMA        Induction: intravenous    Postoperative Plan: Postoperative administration of opioids is intended.    Pertinent diagnostic labs and testing reviewed    Informed Consent:    Anesthetic plan and risks discussed with patient.

## 2019-11-27 NOTE — PROGRESS NOTES
39 y.o.  female , here in follow up for U/S n, and previous question of pelvic organ prolapse .   Patient seen byDr. Krause . And showed evidence for Anatomic ALEIDA     Past Medical History:   Diagnosis Date   • Anesthesia     severe nausea and vomiting with morphine   • ASTHMA 2017    Managed with PRN inhaler, last use was in 2017   • Cholelithiasis 2017    Status post lap adeline   • Gestational diabetes    • Heart burn     Resolved with gastric surgeries.   • Migraine     much less since losing weight, 1-2 times per month   • Postural hypotension     EMR     Social History     Socioeconomic History   • Marital status:      Spouse name: Not on file   • Number of children: Not on file   • Years of education: Not on file   • Highest education level: Not on file   Occupational History     Employer: [employer]   Social Needs   • Financial resource strain: Not on file   • Food insecurity:     Worry: Not on file     Inability: Not on file   • Transportation needs:     Medical: Not on file     Non-medical: Not on file   Tobacco Use   • Smoking status: Never Smoker   • Smokeless tobacco: Never Used   Substance and Sexual Activity   • Alcohol use: Not Currently     Frequency: Never     Comment: Rarely   • Drug use: No   • Sexual activity: Not Currently     Partners: Male   Lifestyle   • Physical activity:     Days per week: Not on file     Minutes per session: Not on file   • Stress: Not on file   Relationships   • Social connections:     Talks on phone: Not on file     Gets together: Not on file     Attends [employer] service: Not on file     Active member of club or organization: Not on file     Attends meetings of clubs or organizations: Not on file     Relationship status: Not on file   • Intimate partner violence:     Fear of current or ex partner: Not on file     Emotionally abused: Not on file     Physically abused: Not on file     Forced sexual activity: Not on file   Other Topics  Concern   •  Service Not Asked   • Blood Transfusions Not Asked   • Caffeine Concern Not Asked   • Occupational Exposure Not Asked   • Hobby Hazards Not Asked   • Sleep Concern Not Asked   • Stress Concern Yes   • Weight Concern Not Asked   • Special Diet Yes   • Back Care Not Asked   • Exercise Not Asked   • Bike Helmet Not Asked   • Seat Belt Not Asked   • Self-Exams Not Asked   Social History Narrative    Lives in a house in [place] with her , has 2 biological children, has custody of her niece, reports managing fine at home.    Has cats, dogs, and lizards as pets.    Works a warehouse job, is on her feet for most of the day.      Consumes a healthy diet consisting of fruits, vegetables, white meats and home cooked meals.     Walks at least 10,000 steps per day currently.       Past Surgical History:   Procedure Laterality Date   • LUIS BY LAPAROSCOPY  8/9/2017    Procedure: LUIS BY LAPAROSCOPY;  Surgeon: Shelton Jackson M.D.;  Location: SURGERY [facility];  Service:    • GASTRIC BYPASS LAPAROSCOPIC  5/12/2017    Procedure: GASTRIC BYPASS LAPAROSCOPIC - ISIAH EN Y AND HIATAL HERNIA REPAIR;  Surgeon: Shelton Jackson M.D.;  Location: SURGERY [facility];  Service:    • OTHER ABDOMINAL SURGERY  10/12/15    vertical gastric sleeve     Encounter Vitals  Standard Vitals  Vitals  Weight: 79.8 kg (176 lb)  Encounter Vitals  Weight: 79.8 kg (176 lb)  Pulmonary-Specific Vitals     Durable Medical Equipment-Specific Vitals       Exam : deferred    Ass :   ALEIDA by hx   Normal pevic Scan     P.   Patient to decide re : surgery

## 2019-11-28 NOTE — ANESTHESIA TIME REPORT
Anesthesia Start and Stop Event Times     Date Time Event    11/27/2019 1607 Ready for Procedure     1619 Anesthesia Start     1649 Anesthesia Stop        Responsible Staff  11/27/19    Name Role Begin End    Randy Anthony M.D. Anesth 1619 1649        Preop Diagnosis (Free Text):  Pre-op Diagnosis             Preop Diagnosis (Codes):    Post op Diagnosis  Cervical radiculopathy      Premium Reason  A. 3PM - 7AM    Comments:

## 2019-11-28 NOTE — DISCHARGE INSTRUCTIONS
MRI ADULT DISCHARGE INSTRUCTIONS    You have been medicated today for your scan. Please follow the instructions below to ensure your safe recovery. If you have any questions or problems, feel free to call us at [phone] or [phone].     1.   Have someone stay with you to assist you as needed.    2.   Do not drive or operate any mechanical devices.    3.   Do not perform any activity that requires concentration. Make no major decisions over the next 24 hours.     4.   Be careful changing positions from laying down to sitting or standing, as you may become dizzy.     5.   Do not drink alcohol for 48 hours.    6.   There are no restrictions for eating your normal meals. Drink fluids.    7.   You may continue your usual medications for pain, tranquilizers, muscle relaxants or sedatives when awake.     8.   Tomorrow, you may continue your normal daily activities.     9.   Pressure dressing on 10 - 15 minutes. If swelling or bleeding occurs when removed, continue placing direct pressure on injection site for another 5 minutes, or until bleeding stops.     I have been informed of and understand the above discharge instructions.

## 2019-11-28 NOTE — ANESTHESIA QCDR
2019 [facility] Clinical Data Registry (for Quality Improvement)     Postoperative nausea/vomiting risk protocol (Adult = 18 yrs and Pediatric 3-17 yrs)- (430 and 463)  General inhalation anesthetic (NOT TIVA) with PONV risk factors: No  Provision of anti-emetic therapy with at least 2 different classes of agents: N/A  Patient DID NOT receive anti-emetic therapy and reason is documented in Medical Record: N/A    Multimodal Pain Management- (AQI59)  Patient undergoing Elective Surgery (i.e. Outpatient, or ASC, or Prescheduled Surgery prior to Hospital Admission): No  Use of Multimodal Pain Management, two or more drugs and/or interventions, NOT including systemic opioids: N/A  Exception: Documented allergy to multiple classes of analgesics: N/A    PACU assessment of acute postoperative pain prior to Anesthesia Care End- Applies to Patients Age = 18- (ABG7)  Initial PACU pain score is which of the following: < 7/10  Patient unable to report pain score: N/A    Post-anesthetic transfer of care checklist/protocol to PACU/ICU- (426 and 427)  Upon conclusion of case, patient transferred to which of the following locations: PACU/Non-ICU  Use of transfer checklist/protocol: Yes  Exclusion: Service Performed in Patient Hospital Room (and thus did not require transfer): N/A    PACU Reintubation- (AQI31)  General anesthesia requiring endotracheal intubation (ETT) along with subsequent extubation in OR or PACU: No  Required reintubation in the PACU: N/A  Extubation was a planned trial documented in the medical record prior to removal of the original airway device: N/A    Unplanned admission to ICU related to anesthesia service up through end of PACU care- (MD51)  Unplanned admission to ICU (not initially anticipated at anesthesia start time): No

## 2019-11-30 NOTE — ANESTHESIA POSTPROCEDURE EVALUATION
Patient: Farhana Freeman    Procedure Summary     Date:  11/27/19 Room / Location:  [facility] - MRI - [address]    Anesthesia Start:  1619 Anesthesia Stop:  1649    Procedure:  MR-CERVICAL SPINE-W/O Diagnosis:       Brachial neuritis      Encounter for preprocedural laboratory examination      (IV Sedation)    Scheduled Providers:   Responsible Provider:  Randy Anthony M.D.    Anesthesia Type:  general ASA Status:  2          Final Anesthesia Type: general  Last vitals See Epic  BP        Temp    Pulse    HR    SpO2      Anesthesia Post Evaluation    Patient location during evaluation: PACU  Patient participation: complete - patient participated  Level of consciousness: awake and alert    Airway patency: patent  Anesthetic complications: no  Cardiovascular status: hemodynamically stable  Respiratory status: acceptable  Hydration status: euvolemic    PONV: none           Nurse Pain Score: 2 (NPRS)

## 2019-12-05 ENCOUNTER — HOSPITAL ENCOUNTER (OUTPATIENT)
Facility: MEDICAL CENTER | Age: 39
End: 2019-12-05
Attending: PHYSICIAN ASSISTANT
Payer: COMMERCIAL

## 2019-12-05 PROCEDURE — 87086 URINE CULTURE/COLONY COUNT: CPT

## 2019-12-08 LAB
BACTERIA UR CULT: NORMAL
SIGNIFICANT IND 70042: NORMAL
SITE SITE: NORMAL
SOURCE SOURCE: NORMAL

## 2019-12-12 ENCOUNTER — HOSPITAL ENCOUNTER (OUTPATIENT)
Dept: RADIOLOGY | Facility: REHABILITATION | Age: 39
End: 2019-12-12
Attending: PHYSICAL MEDICINE & REHABILITATION

## 2019-12-12 ENCOUNTER — HOSPITAL ENCOUNTER (OUTPATIENT)
Dept: PAIN MANAGEMENT | Facility: REHABILITATION | Age: 39
End: 2019-12-12
Attending: PHYSICAL MEDICINE & REHABILITATION
Payer: COMMERCIAL

## 2019-12-12 VITALS
HEART RATE: 74 BPM | OXYGEN SATURATION: 97 % | HEIGHT: 62 IN | RESPIRATION RATE: 18 BRPM | TEMPERATURE: 97.8 F | DIASTOLIC BLOOD PRESSURE: 68 MMHG | WEIGHT: 179.01 LBS | SYSTOLIC BLOOD PRESSURE: 100 MMHG | BODY MASS INDEX: 32.94 KG/M2

## 2019-12-12 PROCEDURE — 62321 NJX INTERLAMINAR CRV/THRC: CPT

## 2019-12-12 PROCEDURE — 700111 HCHG RX REV CODE 636 W/ 250 OVERRIDE (IP)

## 2019-12-12 PROCEDURE — 700117 HCHG RX CONTRAST REV CODE 255

## 2019-12-12 PROCEDURE — 99152 MOD SED SAME PHYS/QHP 5/>YRS: CPT

## 2019-12-12 RX ORDER — METHYLPREDNISOLONE ACETATE 80 MG/ML
INJECTION, SUSPENSION INTRA-ARTICULAR; INTRALESIONAL; INTRAMUSCULAR; SOFT TISSUE
Status: COMPLETED
Start: 2019-12-12 | End: 2019-12-12

## 2019-12-12 RX ORDER — CEFAZOLIN SODIUM 1 G/3ML
INJECTION, POWDER, FOR SOLUTION INTRAMUSCULAR; INTRAVENOUS
Status: COMPLETED
Start: 2019-12-12 | End: 2019-12-12

## 2019-12-12 RX ORDER — PREGABALIN 150 MG/1
150 CAPSULE ORAL DAILY
COMMUNITY
End: 2020-03-27

## 2019-12-12 RX ORDER — MIDAZOLAM HYDROCHLORIDE 1 MG/ML
INJECTION INTRAMUSCULAR; INTRAVENOUS
Status: COMPLETED
Start: 2019-12-12 | End: 2019-12-12

## 2019-12-12 RX ADMIN — FENTANYL CITRATE 50 MCG: 50 INJECTION, SOLUTION INTRAMUSCULAR; INTRAVENOUS at 14:30

## 2019-12-12 RX ADMIN — IOHEXOL 1 ML: 240 INJECTION, SOLUTION INTRATHECAL; INTRAVASCULAR; INTRAVENOUS; ORAL at 14:34

## 2019-12-12 RX ADMIN — MIDAZOLAM HYDROCHLORIDE 1 MG: 1 INJECTION, SOLUTION INTRAMUSCULAR; INTRAVENOUS at 14:25

## 2019-12-12 RX ADMIN — METHYLPREDNISOLONE ACETATE 80 MG: 80 INJECTION, SUSPENSION INTRA-ARTICULAR; INTRALESIONAL; INTRAMUSCULAR; SOFT TISSUE at 14:35

## 2019-12-12 RX ADMIN — CEFAZOLIN 2 G: 1 INJECTION, POWDER, FOR SOLUTION INTRAMUSCULAR; INTRAVENOUS at 14:18

## 2019-12-12 NOTE — NON-PROVIDER
Printed home care education ,and prevention for infection pre and post education verbal instruction given to patient and verbalized understanding.Patient had a  ( Jonny / spouse ).Patient had history of  (Asthma, gastric by pass ).Stop bang score # 1 . Patient denied taking blood thinner and anti-inflammatory medication. Patient had kidney stone removed last Monday( 12/10 )in [facility] and had IV antibiotic( she doesn't know the name of the medication) and had prescription antibiotic ( 3 days) but did not take this medication because of this procedure today. She said that she has a kidney stent and will remove this coming Tuesday  (12/17)Pre-assessment nursing checklist done and communicated to Dr. Jacinto and spoke to the patient and he ordered to give Ancef 2 grams before procedure and was given at 1418 PM with no reaction to medication. Hand off given to CONNIE Cervantes RN

## 2019-12-12 NOTE — NON-PROVIDER
Escorted by RN, ambulatory from procedure room. Tolerated fluids well.  Ice pack applied to affected area.

## 2019-12-12 NOTE — PROCEDURES
Pre-operative Diagnosis: Cervical radiculopathy left    Post-operative Diagnosis: Cervical radiculopathy left    Procedure: Cervical interlaminar epidural steroid injection left C5-6    Description of procedure:    The risks, benefits, and alternatives of the procedure were reviewed and discussed with the patient. Written informed consent was freely obtained. A pre-procedural time-out was conducted by the physician verifying patient’s identity, procedure to be performed, procedure site and side, and allergy verification. Appropriate equipment was determined to be in place for the procedure.     An IV was placed peripherally, and the patient received a low dose of IV Versed for anxiolysis with a low dose of IV Fentanyl for pain control. Iv ancef secondary to stent urethra. The patient's vital signs were carefully monitored before, throughout, and after the procedure.     In the fluoroscopy suite the patient was placed in a prone position, a pillow placed underneath their chest. The skin was prepped and draped in the usual sterile fashion. The fluoroscope was placed over the cervical neck at the appropriate injection AP angle view, and the target for injection was marked. A 25g needle was placed into the marked site, and approx 0.5cc of 1% Lidocaine was injected subcutaneously into the epidermal and dermal layers. The needle was removed. A 20g Tuohy needle was then placed and advanced under fluoroscopic guidance to the C6 lamina and advanced to using loss-of-resistance technique into the epidural space at C5-6. Contrast dye was then injected after negative aspiration good visualization of the epidural space was noted on fluoroscopic imaging. Following negative aspiration, approx 5cc of 0.9% NS preservative free with 80 mg of Depo-Medrol was then injected without any resistance and free flow. The patient tolerated this very well the needle was then removed and the paraspinal muscles and a half cc 1% lidocaine was  injected after negative aspiration and it was then completely withdrawn and area cleansed The patient's back was covered with a 4x4 gauze, the area was cleansed with sterile normal saline, and a dressing was applied. There were no complications noted.     The patient was then evaluated post-procedure, and was hemodynamically stable prior to leaving the post-operative care unit.     Skip Jacinto MD  PM&R/Pain Mgmt

## 2019-12-12 NOTE — NON-PROVIDER
Pre-procedure assessment completed  and pertinent health information (kidney stone removal with kidney stent placement last Monday and failure to continue the antibiotics ordered after that procedure. as well as hx of asthma ) communicated to physician and staff during timeout. Pt positionned pre-procedure on table by RN, CST and xray tech. Pillow placed under knees for support.

## 2020-03-17 ENCOUNTER — TELEPHONE (OUTPATIENT)
Dept: SCHEDULING | Facility: IMAGING CENTER | Age: 40
End: 2020-03-17

## 2020-03-27 ENCOUNTER — OFFICE VISIT (OUTPATIENT)
Dept: MEDICAL GROUP | Facility: PHYSICIAN GROUP | Age: 40
End: 2020-03-27
Payer: COMMERCIAL

## 2020-03-27 VITALS
DIASTOLIC BLOOD PRESSURE: 70 MMHG | OXYGEN SATURATION: 99 % | WEIGHT: 184 LBS | TEMPERATURE: 97.4 F | HEART RATE: 60 BPM | SYSTOLIC BLOOD PRESSURE: 112 MMHG | BODY MASS INDEX: 32.6 KG/M2 | HEIGHT: 63 IN

## 2020-03-27 DIAGNOSIS — M54.32 LEFT SIDED SCIATICA: ICD-10-CM

## 2020-03-27 DIAGNOSIS — Z23 NEED FOR VACCINATION: ICD-10-CM

## 2020-03-27 DIAGNOSIS — Z98.890 STATUS POST GASTRIC SURGERY: ICD-10-CM

## 2020-03-27 DIAGNOSIS — G89.29 CHRONIC NECK PAIN: ICD-10-CM

## 2020-03-27 DIAGNOSIS — N20.0 RENAL STONE: ICD-10-CM

## 2020-03-27 DIAGNOSIS — M54.2 CHRONIC NECK PAIN: ICD-10-CM

## 2020-03-27 DIAGNOSIS — K21.9 GASTROESOPHAGEAL REFLUX DISEASE, ESOPHAGITIS PRESENCE NOT SPECIFIED: ICD-10-CM

## 2020-03-27 DIAGNOSIS — M54.12 CERVICAL RADICULOPATHY AT C6: ICD-10-CM

## 2020-03-27 DIAGNOSIS — G89.29 CHRONIC BILATERAL LOW BACK PAIN WITH LEFT-SIDED SCIATICA: ICD-10-CM

## 2020-03-27 DIAGNOSIS — D50.8 OTHER IRON DEFICIENCY ANEMIA: ICD-10-CM

## 2020-03-27 DIAGNOSIS — M54.42 CHRONIC BILATERAL LOW BACK PAIN WITH LEFT-SIDED SCIATICA: ICD-10-CM

## 2020-03-27 PROBLEM — D50.9 IRON DEFICIENCY ANEMIA: Status: ACTIVE | Noted: 2020-03-27

## 2020-03-27 PROBLEM — M54.9 CHRONIC BACK PAIN: Status: ACTIVE | Noted: 2020-03-27

## 2020-03-27 PROBLEM — E66.01 MORBID OBESITY (HCC): Status: RESOLVED | Noted: 2017-05-12 | Resolved: 2020-03-27

## 2020-03-27 PROCEDURE — 99214 OFFICE O/P EST MOD 30 MIN: CPT | Mod: 25 | Performed by: INTERNAL MEDICINE

## 2020-03-27 PROCEDURE — 90732 PPSV23 VACC 2 YRS+ SUBQ/IM: CPT | Performed by: INTERNAL MEDICINE

## 2020-03-27 PROCEDURE — 90471 IMMUNIZATION ADMIN: CPT | Performed by: INTERNAL MEDICINE

## 2020-03-27 RX ORDER — DULOXETIN HYDROCHLORIDE 60 MG/1
60 CAPSULE, DELAYED RELEASE ORAL DAILY
Qty: 30 CAP | Refills: 3 | Status: SHIPPED | OUTPATIENT
Start: 2020-03-27 | End: 2020-07-22

## 2020-03-27 RX ORDER — LIDOCAINE 50 MG/G
1 PATCH TOPICAL EVERY 24 HOURS
Qty: 10 PATCH | Refills: 2 | Status: SHIPPED | OUTPATIENT
Start: 2020-03-27 | End: 2020-06-16

## 2020-03-27 RX ORDER — OMEPRAZOLE 40 MG/1
40 CAPSULE, DELAYED RELEASE ORAL DAILY
Qty: 90 CAP | Refills: 1 | Status: SHIPPED | OUTPATIENT
Start: 2020-03-27 | End: 2020-08-13

## 2020-03-27 ASSESSMENT — PATIENT HEALTH QUESTIONNAIRE - PHQ9: CLINICAL INTERPRETATION OF PHQ2 SCORE: 0

## 2020-03-27 NOTE — LETTER
[facility]  Luis E Kimbrough M.D.  [address]  [facility] [address]  Fax: [phone]   Authorization for Release/Disclosure of   Protected Health Information   Name: LIZY GARAY : 1980 SSN: [id number]   Address: [address] [place] [address] Phone:    [phone] (home)    I authorize the entity listed below to release/disclose the PHI below to:   [facility]/Luis E Kimbrough M.D. and Luis E Kimbrough M.D.   Provider or Entity Name:  [facility]   Address   City, State, Zip   Phone:      Fax:     Reason for request: continuity of care   Information to be released:    [  ] LAST COLONOSCOPY,  including any PATH REPORT and follow-up  [  ] LAST FIT/COLOGUARD RESULT [  ] LAST DEXA  [  ] LAST MAMMOGRAM  [  ] LAST PAP  [  ] LAST LABS [  ] RETINA EXAM REPORT  [  ] IMMUNIZATION RECORDS  [  ] Release all info  [xx] EGD results       [  xx] Check here and initial the line next to each item to release ALL health information INCLUDING  _____ Care and treatment for drug and / or alcohol abuse  _____ HIV testing, infection status, or AIDS  _____ Genetic Testing    DATES OF SERVICE OR TIME PERIOD TO BE DISCLOSED: _____________  I understand and acknowledge that:  * This Authorization may be revoked at any time by you in writing, except if your health information has already been used or disclosed.  * Your health information that will be used or disclosed as a result of you signing this authorization could be re-disclosed by the recipient. If this occurs, your re-disclosed health information may no longer be protected by State or Federal laws.  * You may refuse to sign this Authorization. Your refusal will not affect your ability to obtain treatment.  * This Authorization becomes effective upon signing and will  on (date) __________.      If no date is indicated, this Authorization will  one (1) year from the signature date.    Name: Lizy Garay    Signature:   Date:          3/27/2020       PLEASE FAX REQUESTED RECORDS BACK TO: [phone]

## 2020-03-27 NOTE — LETTER
[facility]  Luis E Kimbrough M.D.  [address]  [facility] [address]  Fax: [phone]   Authorization for Release/Disclosure of   Protected Health Information   Name: LIZY GARAY : 1980 SSN: [id number]   Address: [address] [place] [address] Phone:    [phone] (home)    I authorize the entity listed below to release/disclose the PHI below to:   [facility]/Luis E Kimbrough M.D. and Luis E Kimbrough M.D.   Provider or Entity Name:  [facility]   Address   City, State, Zip   Phone:      Fax:     Reason for request: continuity of care   Information to be released:    [  ] LAST COLONOSCOPY,  including any PATH REPORT and follow-up  [  ] LAST FIT/COLOGUARD RESULT [  ] LAST DEXA  [  ] LAST MAMMOGRAM  [  ] LAST PAP  [  ] LAST LABS [  ] RETINA EXAM REPORT  [  ] IMMUNIZATION RECORDS  [  ] Release all info  [xx] Last office visit notes       [xx  ] Check here and initial the line next to each item to release ALL health information INCLUDING  _____ Care and treatment for drug and / or alcohol abuse  _____ HIV testing, infection status, or AIDS  _____ Genetic Testing    DATES OF SERVICE OR TIME PERIOD TO BE DISCLOSED: _____________  I understand and acknowledge that:  * This Authorization may be revoked at any time by you in writing, except if your health information has already been used or disclosed.  * Your health information that will be used or disclosed as a result of you signing this authorization could be re-disclosed by the recipient. If this occurs, your re-disclosed health information may no longer be protected by State or Federal laws.  * You may refuse to sign this Authorization. Your refusal will not affect your ability to obtain treatment.  * This Authorization becomes effective upon signing and will  on (date) __________.      If no date is indicated, this Authorization will  one (1) year from the signature date.    Name: Lizy Montano  Lydia    Signature:   Date:     3/27/2020       PLEASE FAX REQUESTED RECORDS BACK TO: [phone]

## 2020-03-27 NOTE — LETTER
[facility]  Luis E Kimbrough M.D.  [address]  [facility] [address]  Fax: [phone]   Authorization for Release/Disclosure of   Protected Health Information   Name: LIZY GARAY : 1980 SSN: [id number]   Address: [address] [place], [place] [id number] Phone:    [phone] (home)    I authorize the entity listed below to release/disclose the PHI below to:   [facility]/Luis E Kimbrough M.D. and Luis E Kimbrough M.D.   Provider or Entity Name:  [facility]    Address   City, State, Zip   Phone:      Fax:     Reason for request: continuity of care   Information to be released:    [  ] LAST COLONOSCOPY,  including any PATH REPORT and follow-up  [  ] LAST FIT/COLOGUARD RESULT [  ] LAST DEXA  [  ] LAST MAMMOGRAM  [  ] LAST PAP  [  ] LAST LABS [  ] RETINA EXAM REPORT  [  ] IMMUNIZATION RECORDS  [  ] Release all info  [xx] EGD results       [ xx ] Check here and initial the line next to each item to release ALL health information INCLUDING  _____ Care and treatment for drug and / or alcohol abuse  _____ HIV testing, infection status, or AIDS  _____ Genetic Testing    DATES OF SERVICE OR TIME PERIOD TO BE DISCLOSED: _____________  I understand and acknowledge that:  * This Authorization may be revoked at any time by you in writing, except if your health information has already been used or disclosed.  * Your health information that will be used or disclosed as a result of you signing this authorization could be re-disclosed by the recipient. If this occurs, your re-disclosed health information may no longer be protected by State or Federal laws.  * You may refuse to sign this Authorization. Your refusal will not affect your ability to obtain treatment.  * This Authorization becomes effective upon signing and will  on (date) __________.      If no date is indicated, this Authorization will  one (1) year from the signature date.    Name: Lizy Garay    Signature:   Date:     3/27/2020       PLEASE FAX REQUESTED RECORDS BACK TO: [phone]

## 2020-03-27 NOTE — PROGRESS NOTES
New Patient to establish    Chief Complaint   Patient presents with   • Establish Care     NP est care/Prior pcp [facility]    • Back Pain     Lower back pain/sciatica ongoing for years and worsening        Subjective:     History of Present Illness: Patient is a 39 y.o. female who is here today to establish primary care, low back pain    1. Cervical radiculopathy at C6  2. Chronic neck pain  MRI cervical spine November 2019:  IMPRESSION:  1.  There is left-sided uncinate joint arthropathy along with broad-based foraminal disc protrusion at C5-6 causing severe left C6 neural foraminal stenosis.  2.  Degenerative disease as described above.    -Left-sided neck pain and shoulder secondary to radiculopathy  - Currently following up by SHANIA as well as physical therapy which are very helpful  -Status post cervical steroid injection by SHANIA      3. Chronic bilateral low back pain with left-sided sciatica  4. Left sided sciatica  -Chronic, since 30 years ago, worsening over the last few months  - Low back pain, mainly left-sided, radiation to the left lower extremity with tingling numbness even to the twos  - Worse on standing especially working in her house and standing a lot, lying on the left side  -Better on CBD oil, she is not taking NSAIDs secondary to gastric bypass and GERD history, sometimes use Epson salt which is helpful  - Patient interfered with sleeping  -Denied having alarm back signs  -Mention tried gabapentin as well as Lyrica with no help      5. Renal stone  -Patient history of left renal stone in March 2019, and then right renal stone in November 2019 status post laser therapy>> at [facility] hospital, mention also has tiny stone in her left renal  - She is following up with urology    6. Status post gastric surgery  DATE OF OPERATION: 5/12/2017  POSTOPERATIVE DIAGNOSIS: Morbid Obesity with medical sequelae; Hiatal hernia  OPERATION PERFORMED: 1.  Laparoscopic Danie-en-Y Gastric Bypass  3. Hiatal hernia  "repair  SURGEON: Shelton Jackson MD    -Mention in 2015 she had a sleeve gastric procedure      8. Other iron deficiency anemia  -Secondary to gastric bypass  -Currently not taking iron pills    9. Gastroesophageal reflux disease, esophagitis presence not specified  -Chronic, on PPI  - Patient was seen by ENT GI specialist, had EGD done lastly with no evidence of peptic ulcer disease, or gastritis per patient  -Mention GI specialist advised to continue PPI chronically      Current Outpatient Medications on File Prior to Visit   Medication Sig Dispense Refill   • VENTOLIN  (90 Base) MCG/ACT Aero Soln inhalation aerosol TAKE 2 PUFFS BY MOUTH EVERY 4 TO 6 HOURS AS NEEDED  2     No current facility-administered medications on file prior to visit.      Allergies   Allergen Reactions   • Oxycodone Hcl Vomiting   • Flu Virus Vaccine Vomiting     Reports severe illness with nausea, vomiting and diarrhea.     • Ibuprofen      1999-11-18;GI UPSET   • Morphine Nausea     \"severe nausea vomiting\"   • Nsaids      Added per MD request   • Sulfa Drugs      \"Causes blindness\"   • Toradol      Added per MD request     Patient Active Problem List    Diagnosis Date Noted   • Cervical radiculopathy at C6 03/27/2020   • Chronic neck pain 03/27/2020   • Chronic back pain 03/27/2020   • Left sided sciatica 03/27/2020   • Renal stone 03/27/2020   • Iron deficiency anemia 03/27/2020   • GERD (gastroesophageal reflux disease) 03/27/2020   • Generalized anxiety disorder 05/29/2019   • Status post gastric surgery 05/29/2019   • Radicular pain of left lower extremity 05/29/2019   • Vitamin D deficiency 05/29/2019   • Asthma 11/01/2005   • 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 08/20/2004     Past Medical History:   Diagnosis Date   • Anesthesia     severe nausea and vomiting with morphine   • ASTHMA 05/2017    Managed with PRN inhaler, last use was in March 2017   • Cholelithiasis 2017    Status post lap adeline   • Gestational diabetes " "2007   • Heart burn     Resolved with gastric surgeries.   • Migraine     much less since losing weight, 1-2 times per month   • Morbid obesity (HCC) 5/12/2017   • Postural hypotension     EMR     Past Surgical History:   Procedure Laterality Date   • LUIS BY LAPAROSCOPY  8/9/2017    Procedure: LUIS BY LAPAROSCOPY;  Surgeon: Shelton Jackson M.D.;  Location: SURGERY [facility];  Service:    • GASTRIC BYPASS LAPAROSCOPIC  5/12/2017    Procedure: GASTRIC BYPASS LAPAROSCOPIC - ISIAH EN Y AND HIATAL HERNIA REPAIR;  Surgeon: Shelton Jackson M.D.;  Location: SURGERY [facility];  Service:    • OTHER ABDOMINAL SURGERY  10/12/15    vertical gastric sleeve     Family History   Problem Relation Age of Onset   • Kidney Disease Mother         Stage 3-4   • Hypertension Mother    • Other Mother         cervical dysplasia treated by cryo   • Diabetes Father    • Heart Attack Father 61        Status post triple bypass   • Hypertension Father    • Cancer Cousin 21        Benign breast cancer   • Breast Cancer Other         Maternal grandmother's sister     Social History     Tobacco Use   • Smoking status: Never Smoker   • Smokeless tobacco: Never Used   Substance Use Topics   • Alcohol use: Not Currently     Frequency: Never     Comment: Rarely   • Drug use: No     ROS:     - Constitutional: Negative for fever, chills,    - Eye: Negative for blurry vision    -ENT: Negative for ear pain    - Respiratory: Negative for cough, hemoptysis    - Cardiovascular: Negative for chest pain     - Gastrointestinal: Negative for abdominal pain    - Genitourinary: Negative for dysuria    - Musculoskeletal: Negative for joint swelling    - Skin: Negative for itching    - Neurological: Negative for focal weakness     - Psychiatric/Behavioral: Negative for depression        Physical Exam:     /70 (BP Location: Left arm, Patient Position: Sitting, BP Cuff Size: Large adult)   Pulse 60   Temp 36.3 °C (97.4 °F)   Ht 1.6 m (5' 3\")   Wt " 83.5 kg (184 lb)   SpO2 99%   BMI 32.59 kg/m²   General: Normal appearing. No distress.  ENT: oropharynx without exudates.    Eyes: conjunctiva clear lids without ptosis  Pulmonary: Clear to ausculation.  Normal effort.   Cardiovascular: Regular rate and rhythm  Abdomen: Soft, nontender, protuberant  Lymph: No cervical or supraclavicular palpable lymph nodes  Psych: Normal mood and affect.   Extremities: Full range of motion of cervical, neck  - Ipsilateral leg raise test was positive, contralateral leg raise test was negative    I have reviewed pertinent labs and diagnostic tests associated with this visit with specific comments listed under the assessment and plan below      Assessment and Plan:     1. Cervical radiculopathy at C6  2. Chronic neck pain  - REFERRAL TO PAIN CLINIC  - lidocaine (LIDODERM) 5 % Patch; Apply 1 Patch to skin as directed every 24 hours.  Dispense: 10 Patch; Refill: 2  - DULoxetine (CYMBALTA) 60 MG Cap DR Particles delayed-release capsule; Take 1 Cap by mouth every day.  Dispense: 30 Cap; Refill: 3  -Advised continued physical therapy     3. Chronic bilateral low back pain with left-sided sciatica  - REFERRAL TO PAIN CLINIC  - lidocaine (LIDODERM) 5 % Patch; Apply 1 Patch to skin as directed every 24 hours.  Dispense: 10 Patch; Refill: 2  - DULoxetine (CYMBALTA) 60 MG Cap DR Particles delayed-release capsule; Take 1 Cap by mouth every day.  Dispense: 30 Cap; Refill: 3      5. Renal stone  -Advised to sign to get records from urology    6. Status post gastric surgery  - CBC WITH DIFFERENTIAL; Future  - HEMOGLOBIN A1C; Future  - Comp Metabolic Panel; Future  - Lipid Profile; Future  - TSH WITH REFLEX TO FT4; Future  - VIT B12,  FOLIC ACID  - VITAMIN D,25 HYDROXY; Future  - FERRITIN; Future  - IRON/TOTAL IRON BIND; Future  - OCCULT BLOOD STOOL; Future  - RETICULOCYTES COUNT; Future  - URINALYSIS; Future  - VITAMIN B1; Future  - VITAMIN B6; Future    7. Need for vaccination  - Pneumoccocal  Polysaccharide Vaccine 23-Valent =>3yo SQ/IM    8. Other iron deficiency anemia  -Likely from gastric bypass complication  - CBC WITH DIFFERENTIAL; Future  - FERRITIN; Future  - IRON/TOTAL IRON BIND; Future  - OCCULT BLOOD STOOL; Future  - RETICULOCYTES COUNT; Future  - URINALYSIS; Future    9. Gastroesophageal reflux disease, esophagitis presence not specified  -Advised son to get EGD records from GI specialist  - omeprazole (PRILOSEC) 40 MG delayed-release capsule; Take 1 Cap by mouth every day.  Dispense: 90 Cap; Refill: 1      Follow Up:      Return in about 4 weeks (around 4/24/2020) for follow up, weight issues and healthy lifestyle.    Please note that this dictation was created using voice recognition software. I have made every reasonable attempt to correct obvious errors, but I expect that there are errors of grammar and possibly content that I did not discover before finalizing the note.    Signed by: Luis E Kimbrough M.D.

## 2020-03-28 ENCOUNTER — HOSPITAL ENCOUNTER (OUTPATIENT)
Dept: LAB | Facility: MEDICAL CENTER | Age: 40
End: 2020-03-28
Attending: INTERNAL MEDICINE
Payer: COMMERCIAL

## 2020-03-28 DIAGNOSIS — Z98.890 STATUS POST GASTRIC SURGERY: ICD-10-CM

## 2020-03-28 DIAGNOSIS — D50.8 OTHER IRON DEFICIENCY ANEMIA: ICD-10-CM

## 2020-04-13 ENCOUNTER — TELEPHONE (OUTPATIENT)
Dept: MEDICAL GROUP | Facility: PHYSICIAN GROUP | Age: 40
End: 2020-04-13

## 2020-04-13 NOTE — TELEPHONE ENCOUNTER
FINAL PRIOR AUTHORIZATION STATUS:    1.  Name of Medication & Dose: Lidocaine 5% Patch     2. Prior Auth Status: Denied.  Reason: Not Stated    3. Action Taken: Pharmacy Notified: yes Patient Notified: no

## 2020-06-02 ENCOUNTER — OFFICE VISIT (OUTPATIENT)
Dept: MEDICAL GROUP | Facility: PHYSICIAN GROUP | Age: 40
End: 2020-06-02
Payer: COMMERCIAL

## 2020-06-02 VITALS
OXYGEN SATURATION: 97 % | DIASTOLIC BLOOD PRESSURE: 70 MMHG | HEIGHT: 63 IN | HEART RATE: 108 BPM | SYSTOLIC BLOOD PRESSURE: 100 MMHG | BODY MASS INDEX: 31.93 KG/M2 | TEMPERATURE: 97.9 F | WEIGHT: 180.2 LBS

## 2020-06-02 DIAGNOSIS — R31.0 GROSS HEMATURIA: ICD-10-CM

## 2020-06-02 DIAGNOSIS — Z01.84 IMMUNITY STATUS TESTING: ICD-10-CM

## 2020-06-02 DIAGNOSIS — N39.3 STRESS INCONTINENCE: ICD-10-CM

## 2020-06-02 PROCEDURE — 99214 OFFICE O/P EST MOD 30 MIN: CPT | Performed by: INTERNAL MEDICINE

## 2020-06-02 RX ORDER — CIPROFLOXACIN 500 MG/1
TABLET, FILM COATED ORAL
COMMUNITY
Start: 2020-06-01 | End: 2020-06-16

## 2020-06-02 NOTE — PROGRESS NOTES
Established Patient    Chief Complaint   Patient presents with   • Follow-Up     urgent care visit 6/1/20, blood in urine.       Subjective:     HPI:   Farhana presents today with the following.    1. Stress incontinence  Probable pelvic/uterine prolapse  -Chronic, exaggerated by coughing, sneezing, lifting heavy objects  -Patient was seen by her OB/GYN and suggested weight loss, pelvic exercise, or even surgery with bladder repair    2. Gross hematuria  -Since yesterday, associated with coke colored urine, frequency of urine, some mild lower abdominal pain and back pain,  -Patient is working at [employer], standing on her feet for 11 hours with no rest  -Patient denied having dysuria, fever, chills, flank pains  -Patient has history of renal stone, however denied having classical presentation of kidney stone that she had before  -Patient was seen by urgent care [facility] yesterday, perform urine analysis with no evidence of infection, they did not check RBC of urine, they send urine for culture and start on Cipro since yesterday>> patient is aware about side effect and complication  -Mention she is following up with urology for her renal stone       3. Immunity status testing  -Patient is requesting for checking COVID-19 antibody secondary to episode of flulike symptoms in January 2020    Patient Active Problem List    Diagnosis Date Noted   • Stress incontinence 06/02/2020   • Gross hematuria 06/02/2020   • Cervical radiculopathy at C6 03/27/2020   • Chronic neck pain 03/27/2020   • Chronic back pain 03/27/2020   • Left sided sciatica 03/27/2020   • Renal stone 03/27/2020   • Iron deficiency anemia 03/27/2020   • GERD (gastroesophageal reflux disease) 03/27/2020   • Generalized anxiety disorder 05/29/2019   • Status post gastric surgery 05/29/2019   • Radicular pain of left lower extremity 05/29/2019   • Vitamin D deficiency 05/29/2019   • Asthma 11/01/2005   • 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 08/20/2004  "      Current Outpatient Medications on File Prior to Visit   Medication Sig Dispense Refill   • ciprofloxacin (CIPRO) 500 MG Tab      • DULoxetine (CYMBALTA) 60 MG Cap DR Particles delayed-release capsule Take 1 Cap by mouth every day. 30 Cap 3   • omeprazole (PRILOSEC) 40 MG delayed-release capsule Take 1 Cap by mouth every day. 90 Cap 1   • VENTOLIN  (90 Base) MCG/ACT Aero Soln inhalation aerosol TAKE 2 PUFFS BY MOUTH EVERY 4 TO 6 HOURS AS NEEDED  2   • lidocaine (LIDODERM) 5 % Patch Apply 1 Patch to skin as directed every 24 hours. (Patient not taking: Reported on 6/2/2020) 10 Patch 2     No current facility-administered medications on file prior to visit.        Allergies, past medical history, past surgical history, family history, social history reviewed and updated    ROS:     - Constitutional: Negative for fever, chills,    - Eye: Negative for blurry vision    - Cardiovascular: Negative for chest pain      Physical Exam:     /70 (BP Location: Right arm, Patient Position: Sitting, BP Cuff Size: Adult)   Pulse (!) 108   Temp 36.6 °C (97.9 °F) (Temporal)   Ht 1.6 m (5' 3\")   Wt 81.7 kg (180 lb 3.2 oz)   SpO2 97%   BMI 31.92 kg/m²   General: Normal appearing. No distress.  ENT: oropharynx without exudates.    Eyes: conjunctiva clear lids without ptosis  Pulmonary: Clear to ausculation.  Normal effort.   Cardiovascular: Regular rate and rhythm  Abdomen: Soft, subjective mild lower abdominal pain tenderness, no guarding, no rebound tenderness  Lymph: No cervical or supraclavicular palpable lymph nodes  Psych: Normal mood and affect.     I have reviewed pertinent labs and diagnostic tests associated with this visit with specific comments listed under the assessment and plan below      Assessment and Plan:     40 y.o. female with the following issues.    1. Stress incontinence  Proper pelvic organ/uterine prolapse  -Again advised loss of weight, pelvic exercise, patient will follow-up with " gynecologist for possible surgery/repair    2. Gross hematuria  -Possible from previous renal stone, other differentials including bladder pathologies, other contributing factor longstanding during work hours with no comfortable interval rest  -Continue perform previous CBC, CMP, other labs ordered before.  - US-RENAL; Future  - US-BLADDER; Future  - US-PELVIC TRANSABDOMINAL ONLY; Future  - CREATINE KINASE; Future  - MYOGLOBIN URINE QUANT; Future  - BILIRUBIN DIRECT; Future  - Pathology Specimen; Future>> rule out RBC in the urine, dysmorphic RBC  - MISCELLANEOUS LAB TEST ([facility]/Other); Future>> urine urobilinogen  - PROTEIN/CREAT RATIO URINE; Future  -If continue, might need CT abdomen pelvis with contrast to rule out any pathologies  -Advised to follow-up with urology for cystoscopy to rule out any bladder pathologies including even cancer>> however patient denied history of smoking  -Continue Cipro as prescribed by urgent care yesterday, pending urine culture>> patient is aware of side effect and complications.  Patient agreed      3. Immunity status testing  - COVID-19 IgG, Qual; Future      Letter provided to patient regarding prevention of longstanding during work hours that could complicate pelvic organ prolapse, kidney problems    Follow Up:      Return in about 4 weeks (around 6/30/2020) for follow up, hematuria.    Please note that this dictation was created using voice recognition software. I have made every reasonable attempt to correct obvious errors, but I expect that there are errors of grammar and possibly content that I did not discover before finalizing the note.    Signed by: Luis E Kimbrough M.D.

## 2020-06-02 NOTE — LETTER
[facility] [place]  [address]  [place] [address]     June 2, 2020    Patient: Farhana Freeman   YOB: 1980   Date of Visit: 6/2/2020       To Whom It May Concern:    Farhana Freeman was seen and treated in our department on 6/2/2020.   Ms. Farhana Freeman needs to have some resting interval during work due to her medical conditions. Long standing hours is harmful for her.    Please let us know for any concerns/questions.      Sincerely,     Luis E Kimbrough M.D.

## 2020-06-05 ENCOUNTER — HOSPITAL ENCOUNTER (OUTPATIENT)
Dept: HOSPITAL 8 - CFH | Age: 40
Discharge: HOME | End: 2020-06-05
Attending: FAMILY MEDICINE
Payer: COMMERCIAL

## 2020-06-05 DIAGNOSIS — N13.2: Primary | ICD-10-CM

## 2020-06-05 DIAGNOSIS — R31.9: ICD-10-CM

## 2020-06-05 DIAGNOSIS — N13.30: ICD-10-CM

## 2020-06-05 PROCEDURE — 74176 CT ABD & PELVIS W/O CONTRAST: CPT

## 2020-06-06 LAB — SARS-COV-2 IGG SERPL QL IA: NEGATIVE

## 2020-06-07 LAB
25(OH)D3+25(OH)D2 SERPL-MCNC: 13.7 NG/ML (ref 30–100)
ALBUMIN SERPL-MCNC: 4.3 G/DL (ref 3.8–4.8)
ALBUMIN/GLOB SERPL: 2 {RATIO} (ref 1.2–2.2)
ALP SERPL-CCNC: 90 IU/L (ref 39–117)
ALT SERPL-CCNC: 21 IU/L (ref 0–32)
APPEARANCE UR: ABNORMAL
AST SERPL-CCNC: 23 IU/L (ref 0–40)
BACTERIA #/AREA URNS HPF: ABNORMAL /[HPF]
BASOPHILS # BLD AUTO: 0.1 X10E3/UL (ref 0–0.2)
BASOPHILS NFR BLD AUTO: 1 %
BILIRUB SERPL-MCNC: 0.9 MG/DL (ref 0–1.2)
BILIRUB UR QL STRIP: NEGATIVE
BUN SERPL-MCNC: 14 MG/DL (ref 6–24)
BUN/CREAT SERPL: 20 (ref 9–23)
CALCIUM SERPL-MCNC: 9.6 MG/DL (ref 8.7–10.2)
CASTS URNS MICRO: ABNORMAL
CASTS URNS QL MICRO: ABNORMAL
CHLORIDE SERPL-SCNC: 105 MMOL/L (ref 96–106)
CHOLEST SERPL-MCNC: 174 MG/DL (ref 100–199)
CO2 SERPL-SCNC: 20 MMOL/L (ref 20–29)
COLOR UR: ABNORMAL
CREAT SERPL-MCNC: 0.7 MG/DL (ref 0.57–1)
CRYSTALS URNS MICRO: ABNORMAL
EOSINOPHIL # BLD AUTO: 0.2 X10E3/UL (ref 0–0.4)
EOSINOPHIL NFR BLD AUTO: 4 %
EPI CELLS #/AREA URNS HPF: ABNORMAL /HPF (ref 0–10)
ERYTHROCYTE [DISTWIDTH] IN BLOOD BY AUTOMATED COUNT: 15.6 % (ref 11.7–15.4)
FERRITIN SERPL-MCNC: 6 NG/ML (ref 15–150)
FOLATE SERPL-MCNC: 10.1 NG/ML
GLOBULIN SER CALC-MCNC: 2.1 G/DL (ref 1.5–4.5)
GLUCOSE SERPL-MCNC: 81 MG/DL (ref 65–99)
GLUCOSE UR QL: NEGATIVE
HBA1C MFR BLD: 5.5 % (ref 4.8–5.6)
HCT VFR BLD AUTO: 38.2 % (ref 34–46.6)
HDLC SERPL-MCNC: 73 MG/DL
HGB BLD-MCNC: 12.3 G/DL (ref 11.1–15.9)
HGB UR QL STRIP: ABNORMAL
IMM GRANULOCYTES # BLD AUTO: 0 X10E3/UL (ref 0–0.1)
IMM GRANULOCYTES NFR BLD AUTO: 0 %
IMMATURE CELLS  115398: ABNORMAL
IRON SATN MFR SERPL: 17 % (ref 15–55)
IRON SERPL-MCNC: 74 UG/DL (ref 27–159)
KETONES UR QL STRIP: NEGATIVE
LABORATORY COMMENT REPORT: NORMAL
LDLC SERPL CALC-MCNC: 91 MG/DL (ref 0–99)
LEUKOCYTE ESTERASE UR QL STRIP: ABNORMAL
LYMPHOCYTES # BLD AUTO: 1.9 X10E3/UL (ref 0.7–3.1)
LYMPHOCYTES NFR BLD AUTO: 37 %
MCH RBC QN AUTO: 26.5 PG (ref 26.6–33)
MCHC RBC AUTO-ENTMCNC: 32.2 G/DL (ref 31.5–35.7)
MCV RBC AUTO: 82 FL (ref 79–97)
MICRO URNS: ABNORMAL
MONOCYTES # BLD AUTO: 0.4 X10E3/UL (ref 0.1–0.9)
MONOCYTES NFR BLD AUTO: 8 %
MORPHOLOGY BLD-IMP: ABNORMAL
MUCOUS THREADS URNS QL MICRO: PRESENT
NEUTROPHILS # BLD AUTO: 2.5 X10E3/UL (ref 1.4–7)
NEUTROPHILS NFR BLD AUTO: 50 %
NITRITE UR QL STRIP: NEGATIVE
NRBC BLD AUTO-RTO: ABNORMAL %
PH UR STRIP: 5.5 [PH] (ref 5–7.5)
PLATELET # BLD AUTO: 379 X10E3/UL (ref 150–450)
POTASSIUM SERPL-SCNC: 5.1 MMOL/L (ref 3.5–5.2)
PROT SERPL-MCNC: 6.4 G/DL (ref 6–8.5)
PROT UR QL STRIP: ABNORMAL
RBC # BLD AUTO: 4.64 X10E6/UL (ref 3.77–5.28)
RBC #/AREA URNS HPF: >30 /HPF (ref 0–2)
RENAL EPI CELLS #/AREA URNS HPF: ABNORMAL /[HPF]
RETICS/RBC NFR AUTO: 0.7 % (ref 0.6–2.6)
SODIUM SERPL-SCNC: 138 MMOL/L (ref 134–144)
SP GR UR: 1.02 (ref 1–1.03)
T VAGINALIS URNS QL MICRO: ABNORMAL
TIBC SERPL-MCNC: 437 UG/DL (ref 250–450)
TRIGL SERPL-MCNC: 49 MG/DL (ref 0–149)
TSH SERPL DL<=0.005 MIU/L-ACNC: 1.05 UIU/ML (ref 0.45–4.5)
UIBC SERPL-MCNC: 363 UG/DL (ref 131–425)
UNIDENT CRYS URNS QL MICRO: ABNORMAL
URNS CMNT MICRO: ABNORMAL
UROBILINOGEN UR STRIP-MCNC: 0.2 MG/DL (ref 0.2–1)
VIT B1 BLD-SCNC: 108.9 NMOL/L (ref 66.5–200)
VIT B12 SERPL-MCNC: 469 PG/ML (ref 232–1245)
VIT B6 SERPL-MCNC: 19.1 UG/L (ref 2–32.8)
VLDLC SERPL CALC-MCNC: 10 MG/DL (ref 5–40)
WBC # BLD AUTO: 5 X10E3/UL (ref 3.4–10.8)
WBC #/AREA URNS HPF: ABNORMAL /HPF (ref 0–5)
YEAST #/AREA URNS HPF: ABNORMAL /[HPF]

## 2020-06-08 DIAGNOSIS — R31.0 GROSS HEMATURIA: ICD-10-CM

## 2020-06-08 DIAGNOSIS — D50.8 OTHER IRON DEFICIENCY ANEMIA: ICD-10-CM

## 2020-06-08 DIAGNOSIS — E55.9 VITAMIN D DEFICIENCY: ICD-10-CM

## 2020-06-08 RX ORDER — FERROUS SULFATE 325(65) MG
TABLET ORAL
Qty: 100 TAB | Refills: 1 | Status: SHIPPED | OUTPATIENT
Start: 2020-06-08 | End: 2021-05-19

## 2020-06-08 NOTE — PROGRESS NOTES
1. Vitamin D deficiency  - Cholecalciferol 24281 UNIT Cap; Take 50,000 units weekly.  Dispense: 12 Cap; Refill: 0    2. Other iron deficiency anemia  - ferrous sulfate 325 (65 Fe) MG tablet; Take every other day.  Dispense: 100 Tab; Refill: 1    3. Gross hematuria  - URINE CULTURE(NEW); Future>> UA with positive leukocyte esterase and few bacteria

## 2020-06-16 ENCOUNTER — OFFICE VISIT (OUTPATIENT)
Dept: MEDICAL GROUP | Facility: PHYSICIAN GROUP | Age: 40
End: 2020-06-16
Payer: COMMERCIAL

## 2020-06-16 VITALS
BODY MASS INDEX: 32.6 KG/M2 | HEART RATE: 76 BPM | HEIGHT: 63 IN | SYSTOLIC BLOOD PRESSURE: 114 MMHG | TEMPERATURE: 97.9 F | DIASTOLIC BLOOD PRESSURE: 70 MMHG | OXYGEN SATURATION: 98 % | WEIGHT: 184 LBS

## 2020-06-16 DIAGNOSIS — E65 SYMPTOMATIC ABDOMINAL PANNICULUS: ICD-10-CM

## 2020-06-16 DIAGNOSIS — M54.42 CHRONIC BILATERAL LOW BACK PAIN WITH LEFT-SIDED SCIATICA: ICD-10-CM

## 2020-06-16 DIAGNOSIS — G89.29 CHRONIC BILATERAL LOW BACK PAIN WITH LEFT-SIDED SCIATICA: ICD-10-CM

## 2020-06-16 DIAGNOSIS — E66.9 OBESITY (BMI 30.0-34.9): ICD-10-CM

## 2020-06-16 DIAGNOSIS — L30.4 INTERTRIGINOUS DERMATITIS ASSOCIATED WITH MOISTURE: ICD-10-CM

## 2020-06-16 DIAGNOSIS — R31.0 GROSS HEMATURIA: ICD-10-CM

## 2020-06-16 PROBLEM — E66.811 OBESITY (BMI 30.0-34.9): Status: ACTIVE | Noted: 2020-06-16

## 2020-06-16 PROCEDURE — 99214 OFFICE O/P EST MOD 30 MIN: CPT | Performed by: INTERNAL MEDICINE

## 2020-06-16 RX ORDER — BACLOFEN 10 MG/1
10 TABLET ORAL 3 TIMES DAILY PRN
Qty: 60 TAB | Refills: 1 | Status: SHIPPED | OUTPATIENT
Start: 2020-06-16 | End: 2020-10-08 | Stop reason: SDUPTHER

## 2020-06-16 RX ORDER — TAMSULOSIN HYDROCHLORIDE 0.4 MG/1
CAPSULE ORAL
COMMUNITY
Start: 2020-06-05 | End: 2020-08-10

## 2020-06-16 ASSESSMENT — FIBROSIS 4 INDEX: FIB4 SCORE: 0.53

## 2020-06-16 NOTE — LETTER
[facility] [place]  [address]  [place] [address]     June 16, 2020    Patient: Farhana Freeman   YOB: 1980   Date of Visit: 6/16/2020       To Whom It May Concern:    Farhana Freeman was seen and treated in our department on 6/16/2020.     Ms. Farhana Freeman Needs to have resting interval during work due to her medical conditions.  Longstanding hours are harmful for her.  Resting and interval recommended at least 10-minute rest every 1 hour for 3 months at least and then reevaluate again.  Thank you.    Please let us know for any concerns/questions      Sincerely,     Luis E Kimbrough M.D.

## 2020-06-16 NOTE — LETTER
June 16, 2020         Patient: Farhana Freeman   YOB: 1980   Date of Visit: 6/16/2020           To Whom it May Concern:    Farhana Freeman was seen in my clinic on 6/16/2020. Please excuse from work 6/16/20 and 6/17/20.    If you have any questions or concerns, please don't hesitate to call.      Sincerely,         Luis E Kimbrough M.D.  Electronically Signed

## 2020-06-17 ENCOUNTER — TELEPHONE (OUTPATIENT)
Dept: MEDICAL GROUP | Facility: PHYSICIAN GROUP | Age: 40
End: 2020-06-17

## 2020-06-17 NOTE — TELEPHONE ENCOUNTER
MEDICATION PRIOR AUTHORIZATION NEEDED:    1. Name of Medication: Diclofenac Sodium 1% Gel    2. Requested By (Name of Pharmacy): [facility]     3. Is insurance on file current? yes    4. What is the name & phone number of the 3rd party payor? [facility] [phone]

## 2020-06-17 NOTE — PROGRESS NOTES
Established Patient    Chief Complaint   Patient presents with   • Follow-Up     would like a doctors note to excuse from work Kidney stones   • UTI     concern for uti, cramping, pressure, frequently       Subjective:     HPI:   Farhana presents today with the following.    1. Intertriginous dermatitis associated with moisture  2. Obesity (BMI 30.0-34.9)  3. Symptomatic abdominal panniculus  -Patient is interested in plastic surgery referral secondary to the above, patient has loose skin and elasticity secondary to weight loss, mention also has some intertriginous dermatitis recurrently which is possibly secondary to moisture versus candidiasis, patient is interested in also some liposuction and reimplantation with breast augmentation      4. Chronic bilateral low back pain with left-sided sciatica  >> Chronic, patient follows up with orthopedic SHANIA, patient would like to have some local analgesic for this chronic issue    5. Gross hematuria  -As mentioned in my previous note, however mention resolved, her previous lab showed microscopic hematuria, some proteinuria, no evidence of infection  -Mention completed 10-day course of Cipro, last day was yesterday,  -Mention she will have another urine culture tomorrow to pursue and make sure there is no more infection  - Mention was in urgent care in [place] recently, had CT scan of the abdomen and there was 5.5 mm stone, distal left ureter, 2 cm proximal to the ureterovesical junction  -Patient called urology and follow-up with urology in June 25, mention she will have a ultrasound renal before seeing urology for renal stone    -Letter to work again explaining she needs some interval rest as mentioned in my letter.      Patient Active Problem List    Diagnosis Date Noted   • Intertriginous dermatitis associated with moisture 06/16/2020   • Obesity (BMI 30.0-34.9) 06/16/2020   • Symptomatic abdominal panniculus 06/16/2020   • Stress incontinence 06/02/2020   • Gross  "hematuria 06/02/2020   • Cervical radiculopathy at C6 03/27/2020   • Chronic neck pain 03/27/2020   • Chronic back pain 03/27/2020   • Left sided sciatica 03/27/2020   • Renal stone 03/27/2020   • Iron deficiency anemia 03/27/2020   • GERD (gastroesophageal reflux disease) 03/27/2020   • Generalized anxiety disorder 05/29/2019   • Status post gastric surgery 05/29/2019   • Radicular pain of left lower extremity 05/29/2019   • Vitamin D deficiency 05/29/2019   • Asthma 11/01/2005   • 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 08/20/2004       Current Outpatient Medications on File Prior to Visit   Medication Sig Dispense Refill   • tamsulosin (FLOMAX) 0.4 MG capsule      • Cholecalciferol (VITAMIN D3 PO)      • ferrous sulfate 325 (65 Fe) MG tablet Take every other day. 100 Tab 1   • Cholecalciferol 42503 UNIT Cap Take 50,000 units weekly. 12 Cap 0   • DULoxetine (CYMBALTA) 60 MG Cap DR Particles delayed-release capsule Take 1 Cap by mouth every day. 30 Cap 3   • omeprazole (PRILOSEC) 40 MG delayed-release capsule Take 1 Cap by mouth every day. 90 Cap 1   • VENTOLIN  (90 Base) MCG/ACT Aero Soln inhalation aerosol TAKE 2 PUFFS BY MOUTH EVERY 4 TO 6 HOURS AS NEEDED  2     No current facility-administered medications on file prior to visit.        Allergies, past medical history, past surgical history, family history, social history reviewed and updated    ROS:     - Constitutional: Negative for fever, chills,    - Eye: Negative for blurry vision    - Cardiovascular: Negative for chest pain      Physical Exam:     /70 (BP Location: Left arm, Patient Position: Sitting, BP Cuff Size: Adult)   Pulse 76   Temp 36.6 °C (97.9 °F) (Temporal)   Ht 1.6 m (5' 3\")   Wt 83.5 kg (184 lb)   SpO2 98%   BMI 32.59 kg/m²   General: Normal appearing. No distress.  ENT: oropharynx without exudates.    Eyes: conjunctiva clear lids without ptosis  Pulmonary: Clear to ausculation.  Normal effort.   Cardiovascular: Regular " rate and rhythm  Abdomen: Soft, nontender,  Lymph: No cervical or supraclavicular palpable lymph nodes  Psych: Normal mood and affect.     I have reviewed pertinent labs and diagnostic tests associated with this visit with specific comments listed under the assessment and plan below      Assessment and Plan:     40 y.o. female with the following issues.    1. Intertriginous dermatitis associated with moisture  2. Obesity (BMI 30.0-34.9)  3. Symptomatic abdominal panniculus  - REFERRAL TO PLASTIC SURGERY    4. Chronic bilateral low back pain with left-sided sciatica  - baclofen (LIORESAL) 10 MG Tab; Take 1 Tab by mouth 3 times a day as needed.  Dispense: 60 Tab; Refill: 1  - Diclofenac Sodium (VOLTAREN) 1 % Gel; Apply 2 g of 1% gel to affected area 4 times daily prn  Dispense: 2 Tube; Refill: 2  -Previously referred to pain clinic, advised to follow-up with pain clinic referral, printed version given to the patient      5. Gross hematuria  >> [place] recently, had CT scan of the abdomen and there was 5.5 mm stone, distal left ureter, 2 cm proximal to the ureterovesical junction  -Most likely secondary to ureteric stone, follow-up with urology, follow-up with ultrasound renal as well as urine culture      Follow Up:      Return in about 4 weeks (around 7/14/2020) for follow up, obesity, weight issues and healthy lifestyle.    Please note that this dictation was created using voice recognition software. I have made every reasonable attempt to correct obvious errors, but I expect that there are errors of grammar and possibly content that I did not discover before finalizing the note.    Signed by: Luis E Kimbrough M.D.

## 2020-06-23 ENCOUNTER — TELEPHONE (OUTPATIENT)
Dept: MEDICAL GROUP | Facility: PHYSICIAN GROUP | Age: 40
End: 2020-06-23

## 2020-06-24 NOTE — TELEPHONE ENCOUNTER
Received a fax from [facility] with information stating that Diclofenac Sodium is not covered by her health insurance. However, they will cover the following: etodolac, ibuprofen, meloxicam, nabumetone, naproxen, oxaprozin.    Please advise.

## 2020-06-29 ENCOUNTER — HOSPITAL ENCOUNTER (OUTPATIENT)
Facility: MEDICAL CENTER | Age: 40
End: 2020-06-29
Attending: OBSTETRICS & GYNECOLOGY
Payer: COMMERCIAL

## 2020-06-29 ENCOUNTER — GYNECOLOGY VISIT (OUTPATIENT)
Dept: OBGYN | Facility: CLINIC | Age: 40
End: 2020-06-29
Payer: COMMERCIAL

## 2020-06-29 VITALS — SYSTOLIC BLOOD PRESSURE: 119 MMHG | WEIGHT: 187 LBS | BODY MASS INDEX: 33.13 KG/M2 | DIASTOLIC BLOOD PRESSURE: 77 MMHG

## 2020-06-29 DIAGNOSIS — Z12.4 CERVICAL CANCER SCREENING: ICD-10-CM

## 2020-06-29 PROCEDURE — 99214 OFFICE O/P EST MOD 30 MIN: CPT | Performed by: OBSTETRICS & GYNECOLOGY

## 2020-06-29 PROCEDURE — 88175 CYTOPATH C/V AUTO FLUID REDO: CPT

## 2020-06-29 PROCEDURE — 87624 HPV HI-RISK TYP POOLED RSLT: CPT

## 2020-06-29 RX ORDER — DULOXETIN HYDROCHLORIDE 60 MG/1
CAPSULE, DELAYED RELEASE ORAL
COMMUNITY
Start: 2020-06-25 | End: 2020-08-10

## 2020-06-29 RX ORDER — CEFDINIR 300 MG/1
CAPSULE ORAL
COMMUNITY
Start: 2020-06-05 | End: 2020-08-10

## 2020-06-29 RX ORDER — TRAMADOL HYDROCHLORIDE 50 MG/1
TABLET ORAL
COMMUNITY
Start: 2020-06-28 | End: 2020-08-10

## 2020-06-29 RX ORDER — FERROUS SULFATE 325(65) MG
TABLET ORAL
COMMUNITY
Start: 2020-06-08 | End: 2020-08-10

## 2020-06-29 RX ORDER — DOCUSATE SODIUM 100 MG/1
CAPSULE, LIQUID FILLED ORAL
COMMUNITY
End: 2020-08-10

## 2020-06-29 RX ORDER — METHYLPREDNISOLONE 4 MG/1
TABLET ORAL
COMMUNITY
End: 2020-08-10

## 2020-06-29 RX ORDER — BACLOFEN 10 MG/1
TABLET ORAL
COMMUNITY
Start: 2020-06-16 | End: 2020-08-10

## 2020-06-29 RX ORDER — FLUOXETINE HYDROCHLORIDE 20 MG/1
CAPSULE ORAL
COMMUNITY
End: 2020-08-10

## 2020-06-29 RX ORDER — METAPROTERENOL SULFATE 20 MG
TABLET ORAL
COMMUNITY
End: 2020-08-10

## 2020-06-29 RX ORDER — OMEPRAZOLE 40 MG/1
CAPSULE, DELAYED RELEASE ORAL
COMMUNITY
Start: 2020-06-23 | End: 2020-08-10

## 2020-06-29 ASSESSMENT — FIBROSIS 4 INDEX: FIB4 SCORE: 0.53

## 2020-06-29 NOTE — TELEPHONE ENCOUNTER
- Please verify with pharmacy for alternative NSAID gel, otherwise we could use lidocaine patch if patient agreed. thanks     Spoke to pharmacist Nikita who stated that they do not have any Nsaids that are topical/gel. Patient's insurance wants her to try the Nsaids first. Nikita did advise that there are over the counter topical creams/gel.

## 2020-06-29 NOTE — PROGRESS NOTES
GYN F/U    Cc: pelvic organ prolapse    HPI: 40 y.o.  here for discussion of desired surgical management for POP/ALEIDA.  Seen by me last summer with POP, anterior compartment leading edge.  Also with urethral hypermobility and ALEIDA. Seen by other provider in group for discussion of sling (as I don't do this), pt reports she was told she doesn't need hysterectomy when saw alternate provider.    Has been considering and now desires definitive surgical management.  No increased bleeding/intermenstrual bleeding but does have crampy bleeding as well.  Has significant vaginal pressure, especially with activity.  Nothing ever falls completely out.          ROS:  Gen: denies fevers, general concerns  Abd: denies abdominal pain  Gu: denies vaginal bleeding, discharge, pain    Past Medical History:   Diagnosis Date   • Anemia    • Anesthesia     severe nausea and vomiting with morphine   • Anxiety    • ASTHMA 2017    Managed with PRN inhaler, last use was in 2017   • Cholelithiasis 2017    Status post lap adeline   • GERD (gastroesophageal reflux disease)    • Gestational diabetes    • Heart burn     Resolved with gastric surgeries.   • Kidney disease    • Migraine     much less since losing weight, 1-2 times per month   • Morbid obesity (HCC) 2017   • Muscle disorder    • Postural hypotension     EMR       OB History    Para Term  AB Living   2 2 1 1   2   SAB TAB Ectopic Molar Multiple Live Births             2      # Outcome Date GA Lbr Luke/2nd Weight Sex Delivery Anes PTL Lv   2 Term 07 38w0d   M Vag-Spont   GABRIELLE   1  04 35w0d   F Vag-Spont   GABRIELLE         /77   Wt 84.8 kg (187 lb)   LMP 2020   Breastfeeding No   BMI 33.13 kg/m²   Gen; AAO, NAD  Gu: NEFG, normal vagina and cervix   No change on full POPQ with valsalva from 2019:  POP Q:  Aa: -1   Ba: -1   C: -5  Gh: 3-4            PB: 3   TVL: 10  Ap: -1   Bp: -2   D: -6    A/P: 40 y.o.  with  pelvic organ prolapse, stage 3, stress urinary incontinence, dysmenorrhea    Discussed definitive surgical management for pelvic organ prolapse/dysmenorrhea via hysterectomy which patient desires.  Would recommend TVH with anterior repair, possible posterior repair/perineorrhaphy.  Minimal posterior prolapse on exam, however would recommend reexamination in the operating room to ensure there is not more prominent posterior prolapse which would benefit from surgical repair.  Discussed that she does not have much apical prolapse now, do not recommend a true apical suspension, but that there is a high risk of recurrence of prolapse and she may need additional procedure for this in the future.  Discussed consideration for mid urethral sling to help her ALEIDA, which otherwise could get worse with hysterectomy.  Patient does desire joint procedure.  Discussed that I do not do slings, but that several of my partners in the practice do and that typically we would do a joint procedure for this.    Pap today.    We will try to get patient in with Dr. Chew this month with plan for surgery this summer.  If unable to get in with Dr. Cehw in a timely fashion, will consider alternate surgeon within the group.    Patient has an appointment with her PCP 7/14: recommend to obtain an echo clearance at that time.        F/U: For preoperative visit once scheduled.    Maria Elena Zhou MD  [facility], Women's Health

## 2020-06-30 LAB
CYTOLOGY REG CYTOL: ABNORMAL
HPV HR 12 DNA CVX QL NAA+PROBE: POSITIVE
HPV16 DNA SPEC QL NAA+PROBE: NEGATIVE
HPV18 DNA SPEC QL NAA+PROBE: NEGATIVE
SPECIMEN SOURCE: ABNORMAL

## 2020-07-02 ENCOUNTER — TELEPHONE (OUTPATIENT)
Dept: RADIOLOGY | Facility: MEDICAL CENTER | Age: 40
End: 2020-07-02

## 2020-07-02 DIAGNOSIS — N20.0 CALCULUS OF KIDNEY: ICD-10-CM

## 2020-07-02 NOTE — TELEPHONE ENCOUNTER
Mssg sent to Provider: Luis E: MRI/ANESTH 7.8.20. Need BMP & HCG within 48hours prior to anesth start. Please order & have your MA contact Pt once ordered as I will be off.  Thank you.

## 2020-07-07 NOTE — TELEPHONE ENCOUNTER
FINAL PRIOR AUTHORIZATION STATUS:    1.  Name of Medication & Dose: diclofenac sodium gel     2. Prior Auth Status: Denied.  Reason: Diagnosis codes are not being approved.    3. Action Taken: Pharmacy Notified: yes Patient Notified: yes

## 2020-07-08 ENCOUNTER — HOSPITAL ENCOUNTER (OUTPATIENT)
Dept: RADIOLOGY | Facility: MEDICAL CENTER | Age: 40
End: 2020-07-08
Attending: PHYSICAL MEDICINE & REHABILITATION
Payer: COMMERCIAL

## 2020-07-18 DIAGNOSIS — G89.29 CHRONIC NECK PAIN: ICD-10-CM

## 2020-07-18 DIAGNOSIS — G89.29 CHRONIC BILATERAL LOW BACK PAIN WITH LEFT-SIDED SCIATICA: ICD-10-CM

## 2020-07-18 DIAGNOSIS — M54.2 CHRONIC NECK PAIN: ICD-10-CM

## 2020-07-18 DIAGNOSIS — M54.42 CHRONIC BILATERAL LOW BACK PAIN WITH LEFT-SIDED SCIATICA: ICD-10-CM

## 2020-07-22 ENCOUNTER — OFFICE VISIT (OUTPATIENT)
Dept: MEDICAL GROUP | Facility: PHYSICIAN GROUP | Age: 40
End: 2020-07-22
Payer: COMMERCIAL

## 2020-07-22 VITALS
RESPIRATION RATE: 18 BRPM | BODY MASS INDEX: 33.35 KG/M2 | HEART RATE: 74 BPM | TEMPERATURE: 99 F | WEIGHT: 188.2 LBS | SYSTOLIC BLOOD PRESSURE: 122 MMHG | HEIGHT: 63 IN | DIASTOLIC BLOOD PRESSURE: 78 MMHG

## 2020-07-22 DIAGNOSIS — E66.9 OBESITY (BMI 30.0-34.9): ICD-10-CM

## 2020-07-22 PROBLEM — M54.10 RADICULAR PAIN OF LEFT LOWER EXTREMITY: Status: RESOLVED | Noted: 2019-05-29 | Resolved: 2020-07-22

## 2020-07-22 PROCEDURE — 99214 OFFICE O/P EST MOD 30 MIN: CPT | Performed by: INTERNAL MEDICINE

## 2020-07-22 RX ORDER — DULOXETIN HYDROCHLORIDE 60 MG/1
CAPSULE, DELAYED RELEASE ORAL
Qty: 30 CAP | Refills: 5 | Status: SHIPPED | OUTPATIENT
Start: 2020-07-22 | End: 2020-08-13

## 2020-07-22 ASSESSMENT — FIBROSIS 4 INDEX: FIB4 SCORE: 0.53

## 2020-07-22 NOTE — LETTER
July 22, 2020         Patient: Farhana Freeman   YOB: 1980   Date of Visit: 7/22/2020           To Whom it May Concern:    Farhana Freeman was seen in my clinic on 7/22/2020.    If you have any questions or concerns, please don't hesitate to call.        Sincerely,           Luis E Kimbrough M.D.  Electronically Signed

## 2020-07-23 NOTE — PROGRESS NOTES
Established Patient    Chief Complaint   Patient presents with   • Weight Check       Subjective:     HPI:   Farhana presents today with the following.    1. Obesity (BMI 30.0-34.9)  -Patient would like to lose weight, healthy lifestyle, healthy diet  -Breakfast with eggs, fruit, [employer] prepared eggs,  -Lunch same as dinner  -Dinner with chicken, fish, red meat, pork, pasta few times, tortilla few times, rice and bread  -Snack with ice cream, potato chips, fruit, hard candies  -Drink with lemonade, fruit punch with flavor and sugar, coke 0, ice tea with alcohol sweetener  -She is mildly active with exercise      Patient Active Problem List    Diagnosis Date Noted   • Intertriginous dermatitis associated with moisture 06/16/2020   • Obesity (BMI 30.0-34.9) 06/16/2020   • Symptomatic abdominal panniculus 06/16/2020   • Stress incontinence 06/02/2020   • Gross hematuria 06/02/2020   • Cervical radiculopathy at C6 03/27/2020   • Chronic neck pain 03/27/2020   • Chronic back pain 03/27/2020   • Left sided sciatica 03/27/2020   • Renal stone 03/27/2020   • Iron deficiency anemia 03/27/2020   • GERD (gastroesophageal reflux disease) 03/27/2020   • Generalized anxiety disorder 05/29/2019   • Status post gastric surgery 05/29/2019   • Vitamin D deficiency 05/29/2019   • Asthma 11/01/2005   • 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 08/20/2004       Current Outpatient Medications on File Prior to Visit   Medication Sig Dispense Refill   • DULoxetine (CYMBALTA) 60 MG Cap DR Particles delayed-release capsule TAKE 1 CAPSULE BY MOUTH EVERY DAY 30 Cap 5   • Pseudoephedrine-APAP  MG Tab acetaminophen 325 mg tablet     • Probiotic Product (ACIDOPHILUS) 90-25 MG Chew Tab acidophilus 25 million cell-pectin, citrus 100 mg tablet     • baclofen (LIORESAL) 10 MG Tab      • cefdinir (OMNICEF) 300 MG Cap      • docusate sodium (COLACE) 100 MG Cap docusate sodium 100 mg capsule     • DULoxetine (CYMBALTA) 60 MG Cap DR Particles  "delayed-release capsule      • ferrous sulfate 325 (65 Fe) MG tablet      • FLUoxetine (PROZAC) 20 MG Cap fluoxetine 20 mg capsule     • hydrocodone/APAP 5/325 mg (GENERIC FOR NORCO) 5-325 Tab      • methylPREDNISolone (MEDROL) 4 MG Tab methylprednisolone 4 mg tablets in a dose pack     • omeprazole (PRILOSEC) 40 MG delayed-release capsule      • Pseudoephedrine-APAP  MG Tab pseudoephedrine 30 mg tablet     • tramadol (ULTRAM) 50 MG Tab      • tamsulosin (FLOMAX) 0.4 MG capsule      • Cholecalciferol (VITAMIN D3 PO)      • baclofen (LIORESAL) 10 MG Tab Take 1 Tab by mouth 3 times a day as needed. 60 Tab 1   • Diclofenac Sodium (VOLTAREN) 1 % Gel Apply 2 g of 1% gel to affected area 4 times daily prn 2 Tube 2   • ferrous sulfate 325 (65 Fe) MG tablet Take every other day. 100 Tab 1   • Cholecalciferol 43220 UNIT Cap Take 50,000 units weekly. 12 Cap 0   • omeprazole (PRILOSEC) 40 MG delayed-release capsule Take 1 Cap by mouth every day. 90 Cap 1   • VENTOLIN  (90 Base) MCG/ACT Aero Soln inhalation aerosol TAKE 2 PUFFS BY MOUTH EVERY 4 TO 6 HOURS AS NEEDED  2     No current facility-administered medications on file prior to visit.        Allergies, past medical history, past surgical history, family history, social history reviewed and updated      ROS:     - Constitutional: Negative for fever, chills,    - Eye: Negative for blurry vision    - Cardiovascular: Negative for chest pain      Physical Exam:     /78 (BP Location: Left arm, Patient Position: Sitting)   Pulse 74   Temp 37.2 °C (99 °F) (Temporal)   Resp 18   Ht 1.6 m (5' 2.99\")   Wt 85.4 kg (188 lb 3.2 oz)   BMI 33.35 kg/m²   General: Normal appearing. No distress.  ENT: oropharynx without exudates.    Eyes: conjunctiva clear lids without ptosis  Pulmonary: Clear to ausculation.  Normal effort.   Cardiovascular: Regular rate and rhythm  Abdomen: Soft, nontender,  Lymph: No cervical or supraclavicular palpable lymph nodes  Psych: Normal " mood and affect.     I have reviewed pertinent labs and diagnostic tests associated with this visit with specific comments listed under the assessment and plan below      Assessment and Plan:     40 y.o. female with the following issues.    1. Obesity (BMI 30.0-34.9)  -Lengthy discussion regarding healthy dietary options including plenty of vegetable, reduce carb as well as no added sugar, regular exercise as tolerated, healthy fat/protein  -Patient would like to start these lifestyle changes to improve obesity/overweight as well as other chronic conditions        Follow Up:      Return in about 6 weeks (around 9/2/2020) for follow up.  First, diagram    Please note that this dictation was created using voice recognition software. I have made every reasonable attempt to correct obvious errors, but I expect that there are errors of grammar and possibly content that I did not discover before finalizing the note.    Signed by: Luis E Kimbrough M.D.

## 2020-07-23 NOTE — PATIENT INSTRUCTIONS
LIFESTYLE MEDICINE:       1) Make SMART lifestyle changes: The lifestyle changes that you need to make are with regards to: nutrition, cardiovascular exercise, sleep, stress management.         2) Nutrition:     1- Reduce carbohydrates to 5% or less, no added sugar at all, try to avoid hidden carbohydrates (including breads, pasta, rice, cereals/oatmeal, potato).  Avoid soda, processed carbs and sugars including cookies, candies, donuts, jellies, avoid all added sugar beverages including avoiding diet soda/Gatorade, Powerade, Dick-Aid, ice tea.  Avoid all the sweeteners.    2- Eat vegetables (organic is much better to avoid herbicide/insecticide): including green leafy vegetables, steamed or cooked with healthy oils such as olive oil, avocado oil or coconut oil.  Avoid vegetable oil (sunflower, soy, corn, canola, sufflower, cottonseed or peanut oil).  Include cruciferous vegetables in your diets.    3-Try to minimize Fruits that has a lot of Fructose (including apples, banana, grapes), best fruits that are rich with vitamin/minerals as well as antioxidants are berries (you could take up to 1 cup of Berries a day). Avoid fruit juices ( even worse than SODA).     4-Eat healthy fat and meats from grass fed animals (grass fed cow meat with healthy fat, grass fed chicken/poultry with skin, wild-caught fish and seafood) as well as pasteurized eggs from grass fed chicken    5-when you eat dairy food, eat as a whole fat, avoid skimmed milk/free fat milk/2% milk    6-Avoid snacks between meals. Eat when you feel hungry. Avoid meals if you don't feel hungry if you can.    7- Mix one glass of water with lemon, add 1-2 tablespoon of apple cider vinegar as well as 2 cloves of garlic>> very helpful    8-INTERMITTENT FASTING is very powerful           3) Cardiovascular Exercise: The center for disease control recommends a minimum of 150 minutes per week of moderate intensity cardiovascular exercise for weight maintenance and  cardiovascular health.  Set this as your initial goal, with at least 30 minutes per session. Types of exercise can include 30 minutes of elliptical, 30 minutes of decently fast jog, 30 minutes of swimming, 30 minutes of heavy gardening (lifting big bags of fertilizer, digging deep holes/ditches).  You can cut down the minute requirements to half, by doing higher intensity sports such as a game of tennis, or soccer.        4) Sleep:    A) Goal: Obtain a minimum of 7-8hours of continuous, uninterrupted, restful sleep per night.    B) Tips for Sleep Hygiene:    I) Go to bed and wake up at consistent times whether work/school day or not.   II) Keep room dark, quiet, and comfortable.  Increase exposure to sunlight during awake times and avoid bright lights (especially anything with a backlight) at least the last 1-2hours before going to sleep.     III) Don't nap.     IV) Avoid stimulant or caffeine use more than 4 hours after wake time.      5) Stress Management: You cannot change the stresses of life necessarily, but you can change how he responds to them. One good way to manage stress is to write things down in order to help you process how to approach things in general or specifically. Another good way is to talk it out with someone you trusts, specifically your significant other or good friend. A definite great way to deal with stress is to have cardiovascular exercise!

## 2020-07-27 ENCOUNTER — GYNECOLOGY VISIT (OUTPATIENT)
Dept: OBGYN | Facility: CLINIC | Age: 40
End: 2020-07-27
Payer: COMMERCIAL

## 2020-07-27 VITALS
WEIGHT: 189 LBS | SYSTOLIC BLOOD PRESSURE: 106 MMHG | DIASTOLIC BLOOD PRESSURE: 70 MMHG | HEART RATE: 74 BPM | BODY MASS INDEX: 33.49 KG/M2

## 2020-07-27 DIAGNOSIS — N39.3 URINARY, INCONTINENCE, STRESS FEMALE: ICD-10-CM

## 2020-07-27 PROCEDURE — 51725 SIMPLE CYSTOMETROGRAM: CPT | Performed by: OBSTETRICS & GYNECOLOGY

## 2020-07-27 PROCEDURE — 99215 OFFICE O/P EST HI 40 MIN: CPT | Mod: 25 | Performed by: OBSTETRICS & GYNECOLOGY

## 2020-07-27 ASSESSMENT — FIBROSIS 4 INDEX: FIB4 SCORE: 0.53

## 2020-07-27 NOTE — PROGRESS NOTES
GYN Visit  CC: Stress urinary incontinence    Farhana Freeman is a 40 y.o.  who presents today for evaluation of stress urinary incontinence.  Patient has symptomatic pelvic organ prolapse and plans to have TVH performed with Dr. Krause.  At the time of presentation she also complained of stress urinary incontinence and is interested in surgical management for this condition as well.  She presents today for further evaluation.  She reports that for the last several years she has had significant leaking with laugh cough or sneeze.  Reports that this occurs several times per day.  It is usually small volumes.  She never loses the entire contents of her bladder.  She does not have any leaking when she has a strong urge to void and does not have difficulty making it to the bathroom.  Reports that she can go at least 4 hours without needing to void if necessary.  No dysuria or foul smelling urine. She has tried Kegel exercises which have only minimally improved her symptoms and has noticed worsening of this incontinence in the last year and therefore is very interested in surgical management.      OB History:    OB History    Para Term  AB Living   2 2 1 1   2   SAB TAB Ectopic Molar Multiple Live Births             2      # Outcome Date GA Lbr Luke/2nd Weight Sex Delivery Anes PTL Lv   2 Term 07 38w0d   M Vag-Spont   GABRIELLE   1  04 35w0d   F Vag-Spont   GABRIELLE       Review of Systems:   Pertinent positives documented in HPI and all other systems reviewed & are negative.    All PMH, PSH, allergies, social history and FH reviewed and updated today:  Past Medical History:  Past Medical History:   Diagnosis Date   • Anemia     Pt states she has no history of anemia   • Anesthesia     severe nausea and vomiting with morphine   • Anxiety    • ASTHMA 2017    Managed with PRN inhaler, last use was in 2017   • Cholelithiasis 2017    Status post lap adeline   • GERD (gastroesophageal  reflux disease)    • Heart burn     Resolved with gastric surgeries.   • Kidney disease    • Migraine     much less since losing weight, 1-2 times per month   • Morbid obesity (HCC) 5/12/2017   • Muscle disorder    • Postural hypotension     EMR   • Radicular pain of left lower extremity 5/29/2019       Past Surgical History:  Past Surgical History:   Procedure Laterality Date   • LUIS BY LAPAROSCOPY  8/9/2017    Procedure: LUIS BY LAPAROSCOPY;  Surgeon: Shelton Jackson M.D.;  Location: SURGERY [facility];  Service:    • GASTRIC BYPASS LAPAROSCOPIC  5/12/2017    Procedure: GASTRIC BYPASS LAPAROSCOPIC - ISIAH EN Y AND HIATAL HERNIA REPAIR;  Surgeon: Shelton Jackson M.D.;  Location: SURGERY [facility];  Service:    • OTHER ABDOMINAL SURGERY  10/12/15    vertical gastric sleeve       Medications:   Current Outpatient Medications Ordered in Epic   Medication Sig Dispense Refill   • DULoxetine (CYMBALTA) 60 MG Cap DR Particles delayed-release capsule TAKE 1 CAPSULE BY MOUTH EVERY DAY 30 Cap 5   • Pseudoephedrine-APAP  MG Tab acetaminophen 325 mg tablet     • Probiotic Product (ACIDOPHILUS) 90-25 MG Chew Tab acidophilus 25 million cell-pectin, citrus 100 mg tablet     • baclofen (LIORESAL) 10 MG Tab      • cefdinir (OMNICEF) 300 MG Cap      • docusate sodium (COLACE) 100 MG Cap docusate sodium 100 mg capsule     • DULoxetine (CYMBALTA) 60 MG Cap DR Particles delayed-release capsule      • ferrous sulfate 325 (65 Fe) MG tablet      • FLUoxetine (PROZAC) 20 MG Cap fluoxetine 20 mg capsule     • hydrocodone/APAP 5/325 mg (GENERIC FOR NORCO) 5-325 Tab      • methylPREDNISolone (MEDROL) 4 MG Tab methylprednisolone 4 mg tablets in a dose pack     • omeprazole (PRILOSEC) 40 MG delayed-release capsule      • Pseudoephedrine-APAP  MG Tab pseudoephedrine 30 mg tablet     • tramadol (ULTRAM) 50 MG Tab      • tamsulosin (FLOMAX) 0.4 MG capsule      • Cholecalciferol (VITAMIN D3 PO)      • baclofen (LIORESAL) 10  MG Tab Take 1 Tab by mouth 3 times a day as needed. 60 Tab 1   • Diclofenac Sodium (VOLTAREN) 1 % Gel Apply 2 g of 1% gel to affected area 4 times daily prn 2 Tube 2   • ferrous sulfate 325 (65 Fe) MG tablet Take every other day. 100 Tab 1   • Cholecalciferol 80116 UNIT Cap Take 50,000 units weekly. 12 Cap 0   • omeprazole (PRILOSEC) 40 MG delayed-release capsule Take 1 Cap by mouth every day. 90 Cap 1   • VENTOLIN  (90 Base) MCG/ACT Aero Soln inhalation aerosol TAKE 2 PUFFS BY MOUTH EVERY 4 TO 6 HOURS AS NEEDED  2     No current Epic-ordered facility-administered medications on file.        Allergies: Oxycodone hcl; Flu virus vaccine; Ibuprofen; Morphine; Nsaids; Sulfa drugs; and Toradol    Social History:  Social History     Socioeconomic History   • Marital status:      Spouse name: Not on file   • Number of children: Not on file   • Years of education: Not on file   • Highest education level: Not on file   Occupational History     Employer: [employer]   Social Needs   • Financial resource strain: Not on file   • Food insecurity     Worry: Not on file     Inability: Not on file   • Transportation needs     Medical: Not on file     Non-medical: Not on file   Tobacco Use   • Smoking status: Never Smoker   • Smokeless tobacco: Never Used   Substance and Sexual Activity   • Alcohol use: Not Currently     Frequency: Never     Comment: Rarely   • Drug use: No   • Sexual activity: Not Currently     Partners: Male   Lifestyle   • Physical activity     Days per week: Not on file     Minutes per session: Not on file   • Stress: Not on file   Relationships   • Social connections     Talks on phone: Not on file     Gets together: Not on file     Attends [employer] service: Not on file     Active member of club or organization: Not on file     Attends meetings of clubs or organizations: Not on file     Relationship status: Not on file   • Intimate partner violence     Fear of current or ex partner: Not  on file     Emotionally abused: Not on file     Physically abused: Not on file     Forced sexual activity: Not on file   Other Topics Concern   •  Service Not Asked   • Blood Transfusions Not Asked   • Caffeine Concern Not Asked   • Occupational Exposure Not Asked   • Hobby Hazards Not Asked   • Sleep Concern Not Asked   • Stress Concern Yes   • Weight Concern Not Asked   • Special Diet Yes   • Back Care Not Asked   • Exercise Not Asked   • Bike Helmet Not Asked   • Seat Belt Not Asked   • Self-Exams Not Asked   Social History Narrative    Lives in a house in [place] with her , has 2 biological children, has custody of her niece, reports managing fine at home.    Has cats, dogs, and lizards as pets.    Works a warehouse job, is on her feet for most of the day.      Consumes a healthy diet consisting of fruits, vegetables, white meats and home cooked meals.     Walks at least 10,000 steps per day currently.        Family History:  Family History   Problem Relation Age of Onset   • Kidney Disease Mother         Stage 3-4   • Hypertension Mother    • Other Mother         cervical dysplasia treated by cryo   • Diabetes Father    • Heart Attack Father 61        Status post triple bypass   • Hypertension Father    • Cancer Cousin 21        Benign breast cancer   • Breast Cancer Other         Maternal grandmother's sister           Objective:   Vitals:  /70   Pulse 74   Wt 85.7 kg (189 lb)   Body mass index is 33.49 kg/m². (Goal BM I>18 <25)    Physical Exam:   Nursing note and vitals reviewed.  GENERAL: No acute distress  HENT: Atraumatic, normocephalic  EYES: Extraocular movements intact, pupils equal and reactive to light  NECK: Supple, Full ROM  CHEST: No deformities, Equal chest expansion  RESP: Unlabored, no stridor or audible wheeze  ABD: Soft, Nontender, Non-Distended  Extremities: No Clubbing, Cyanosis, or Edema  Skin: Warm/dry, without rases  Neuro: A/O x 4, CN 2-12 Grossly intact,  Motor/sensory grossly intact  Psych: Normal mood, behavior, and affect    Genitourinary:  Normal appearing external female genitalia without any rashes, lesions, labial fusion or tenderness.  Vagina is pink moist and well rugated.  Scant bleeding  present within vaginal vault.  Cervix well visualized without masses or lesions.  On bimanual exam there is no cervical motion tenderness, uterus is anteverted, not enlarged, fixed, or tender.  No adnexal masses or tenderness.   General: Alert and oriented, well nourished, no acute distress  Lungs: Clear to auscultation and non-l abored respiration  Heart: Normal rate, regular rhythm, no murmur, gallop or edema  Abdomen: Soft, non-tender, non-distended, no masses  Musculoskeletal: Normal range of motion and strength, no tenderness or swelling  Skin: Skin is warm, dry and pink, no rashes or lesions  Neurologic: Awake, alert, and oriented X3  Psychiatric: Cooperative, appropriate mood and affect    Pelvic Exam:  - Sensation S2-S4, Perianal [place], Bulbocavernosus Reflexes intact  - Dove Tail Sign absent   - normal external genitalia, vaginal epithelium, perineal body, and cervix  - uterus small, midline, mobile, no palpable masses  - adnexa: no palpable masses, non-tender  - pelvic muscle contraction 3/5 for 5 seconds  - empty supine cough test: ++leak and ++urethral hypermobility  - no flatal or fecal incontinence    Simple cystometrics  For sensation: 125 cc  Urge to void: 175 cc  Strong urge to void 300 cc  Bladder is filled to 300 cc.  No detrusor hyperactivity is seen  Positive leak test  Patient voids 300 cc spontaneously following trial  Postvoid residual 0     Assessment/Plan:   Farhana Freeman is a 40 y.o.  female who presents for:    1. Urinary, incontinence, stress female       #Stress urinary incontinence.  Discussed options for management including doing nothing, Kegel's, pelvic floor physical therapy, and definitive surgical management with TVT.   Patient continues to desire surgical management.  No evidence of urge urinary incontinence after interview and on exam today therefore she is good candidate for a sling.  Discussed risk benefits and alternatives.  Risks include but are not limited to infection, bleeding, damage to surrounding structures, prolonged need for catheter use, urinary retention,  mesh complications including pain, erosion, and infection.  Patient reports understanding and continues to desire TVT to be performed at time of her hysterectomy.  Please see Dr. Krause's notes for further operative planning as this will be a joint case.    Patient was seen for 45 minutes of which > 50% of appointment time was spent on face-to-face counseling and coordination of care regarding the above.

## 2020-07-27 NOTE — NON-PROVIDER
Pt here to discuss bladder sling  Pt states no complaints  Good#[phone]  Pharmacy verified

## 2020-08-10 ENCOUNTER — OFFICE VISIT (OUTPATIENT)
Dept: ADMISSIONS | Facility: MEDICAL CENTER | Age: 40
End: 2020-08-10
Attending: OBSTETRICS & GYNECOLOGY
Payer: COMMERCIAL

## 2020-08-10 DIAGNOSIS — Z01.812 PRE-OPERATIVE LABORATORY EXAMINATION: ICD-10-CM

## 2020-08-10 LAB
ABO GROUP BLD: NORMAL
ANION GAP SERPL CALC-SCNC: 10 MMOL/L (ref 7–16)
BASOPHILS # BLD AUTO: 1 % (ref 0–1.8)
BASOPHILS # BLD: 0.06 K/UL (ref 0–0.12)
BLD GP AB SCN SERPL QL: NORMAL
BUN SERPL-MCNC: 14 MG/DL (ref 8–22)
CALCIUM SERPL-MCNC: 9.3 MG/DL (ref 8.5–10.5)
CHLORIDE SERPL-SCNC: 103 MMOL/L (ref 96–112)
CO2 SERPL-SCNC: 25 MMOL/L (ref 20–33)
COVID ORDER STATUS COVID19: NORMAL
CREAT SERPL-MCNC: 0.71 MG/DL (ref 0.5–1.4)
EOSINOPHIL # BLD AUTO: 0.28 K/UL (ref 0–0.51)
EOSINOPHIL NFR BLD: 4.5 % (ref 0–6.9)
ERYTHROCYTE [DISTWIDTH] IN BLOOD BY AUTOMATED COUNT: 47.4 FL (ref 35.9–50)
GLUCOSE SERPL-MCNC: 94 MG/DL (ref 65–99)
HCG UR QL: NEGATIVE
HCT VFR BLD AUTO: 39.4 % (ref 37–47)
HGB BLD-MCNC: 12.3 G/DL (ref 12–16)
IMM GRANULOCYTES # BLD AUTO: 0.01 K/UL (ref 0–0.11)
IMM GRANULOCYTES NFR BLD AUTO: 0.2 % (ref 0–0.9)
LYMPHOCYTES # BLD AUTO: 2.39 K/UL (ref 1–4.8)
LYMPHOCYTES NFR BLD: 38.6 % (ref 22–41)
MCH RBC QN AUTO: 27.2 PG (ref 27–33)
MCHC RBC AUTO-ENTMCNC: 31.2 G/DL (ref 33.6–35)
MCV RBC AUTO: 87 FL (ref 81.4–97.8)
MONOCYTES # BLD AUTO: 0.35 K/UL (ref 0–0.85)
MONOCYTES NFR BLD AUTO: 5.7 % (ref 0–13.4)
NEUTROPHILS # BLD AUTO: 3.1 K/UL (ref 2–7.15)
NEUTROPHILS NFR BLD: 50 % (ref 44–72)
NRBC # BLD AUTO: 0 K/UL
NRBC BLD-RTO: 0 /100 WBC
PLATELET # BLD AUTO: 379 K/UL (ref 164–446)
PMV BLD AUTO: 10.5 FL (ref 9–12.9)
POTASSIUM SERPL-SCNC: 4.7 MMOL/L (ref 3.6–5.5)
RBC # BLD AUTO: 4.53 M/UL (ref 4.2–5.4)
RH BLD: NORMAL
SODIUM SERPL-SCNC: 138 MMOL/L (ref 135–145)
WBC # BLD AUTO: 6.2 K/UL (ref 4.8–10.8)

## 2020-08-10 PROCEDURE — 86850 RBC ANTIBODY SCREEN: CPT

## 2020-08-10 PROCEDURE — 80048 BASIC METABOLIC PNL TOTAL CA: CPT

## 2020-08-10 PROCEDURE — 81025 URINE PREGNANCY TEST: CPT

## 2020-08-10 PROCEDURE — 36415 COLL VENOUS BLD VENIPUNCTURE: CPT

## 2020-08-10 PROCEDURE — 85025 COMPLETE CBC W/AUTO DIFF WBC: CPT

## 2020-08-10 PROCEDURE — 86900 BLOOD TYPING SEROLOGIC ABO: CPT

## 2020-08-10 PROCEDURE — U0003 INFECTIOUS AGENT DETECTION BY NUCLEIC ACID (DNA OR RNA); SEVERE ACUTE RESPIRATORY SYNDROME CORONAVIRUS 2 (SARS-COV-2) (CORONAVIRUS DISEASE [COVID-19]), AMPLIFIED PROBE TECHNIQUE, MAKING USE OF HIGH THROUGHPUT TECHNOLOGIES AS DESCRIBED BY CMS-2020-01-R: HCPCS

## 2020-08-10 PROCEDURE — 86901 BLOOD TYPING SEROLOGIC RH(D): CPT

## 2020-08-10 RX ORDER — LORATADINE 10 MG/1
10 TABLET ORAL EVERY MORNING
COMMUNITY
End: 2020-10-23

## 2020-08-10 RX ORDER — GLUCOSAMINE/D3/BOSWELLIA SERRA 1500MG-400
2 TABLET ORAL EVERY MORNING
COMMUNITY
End: 2022-04-29

## 2020-08-10 ASSESSMENT — FIBROSIS 4 INDEX: FIB4 SCORE: 0.53

## 2020-08-11 ENCOUNTER — GYNECOLOGY VISIT (OUTPATIENT)
Dept: OBGYN | Facility: CLINIC | Age: 40
End: 2020-08-11
Payer: COMMERCIAL

## 2020-08-11 VITALS — WEIGHT: 182 LBS | BODY MASS INDEX: 33.29 KG/M2 | DIASTOLIC BLOOD PRESSURE: 87 MMHG | SYSTOLIC BLOOD PRESSURE: 134 MMHG

## 2020-08-11 DIAGNOSIS — N39.3 URINARY, INCONTINENCE, STRESS FEMALE: ICD-10-CM

## 2020-08-11 DIAGNOSIS — N81.10 PELVIC ORGAN PROLAPSE QUANTIFICATION STAGE 3 CYSTOCELE: Primary | ICD-10-CM

## 2020-08-11 DIAGNOSIS — Z98.890 POSTOPERATIVE STATE: ICD-10-CM

## 2020-08-11 LAB
SARS-COV-2 RNA RESP QL NAA+PROBE: NOTDETECTED
SPECIMEN SOURCE: NORMAL

## 2020-08-11 RX ORDER — HYDROCODONE BITARTRATE AND ACETAMINOPHEN 10; 300 MG/15ML; MG/15ML
7.5 SYRUP ORAL EVERY 6 HOURS PRN
Qty: 420 ML | Refills: 0 | Status: SHIPPED | OUTPATIENT
Start: 2020-08-11 | End: 2020-08-11

## 2020-08-11 RX ORDER — ONDANSETRON 4 MG/1
4 TABLET, FILM COATED ORAL EVERY 4 HOURS PRN
Qty: 20 TAB | Refills: 0 | Status: SHIPPED | OUTPATIENT
Start: 2020-08-11 | End: 2020-08-16

## 2020-08-11 ASSESSMENT — FIBROSIS 4 INDEX: FIB4 SCORE: 0.53

## 2020-08-11 NOTE — PROGRESS NOTES
GYN Pre-Op    CC: surgical planning      HPI: Farhana Freeman is a 40 y.o.  with POP/ALEIDA here for final preoperative visit.    Patient is doing well today, denies concerns.  No changes since her last visit with me.  Has seen Dr. Chew for sling for her stress urinary incontinence, plan to do combined procedure.    Patient has symptomatic prolapse with vaginal pressure.  Also leakage of urine with cough/sneeze and desires sling for this.      ROS:  constitutional: denies fevers, general concerns  CV: denies chest pain, palpitations, edema  Resp: denies shortness of breath, cough  GI: denies abd pain, N/V, diarrhea/constipation, blood in stool  : denies irregular vaginal bleeding, discharge, pain, denies urinary complaints  Neuro: denies HAs, numbness/weakness  Endo: denies significant weight changes, irregular menses, temperature intolerance, denies hotflashes/nightsweats  Heme/lymph: denies hx of blood transfusion  Psych: denies anxiety/depression  Allergy: denies concerns    OB History    Para Term  AB Living   2 2 1 1   2   SAB TAB Ectopic Molar Multiple Live Births             2      # Outcome Date GA Lbr Luke/2nd Weight Sex Delivery Anes PTL Lv   2 Term 07 38w0d   M Vag-Spont   GABRIELLE   1  04 35w0d   F Vag-Spont   GABRIELLE       GYN Hx:   Monthly menses  Denies hx of STIs  Denies hx of abnl paps last pap NILM/hpv + (16/18 neg)     Past Medical History:   Diagnosis Date   • Anemia     Pt states she has no history of anemia   • Anesthesia     severe nausea and vomiting with morphine   • Anxiety    • ASTHMA 2017    Managed with PRN inhaler, last use was in 2017   • Cholelithiasis 2017    Status post lap adeline   • Dental disorder     dentures    • GERD (gastroesophageal reflux disease)    • Heart burn     Resolved with gastric surgeries.   • Kidney disease     kidney stones    • Migraine     much less since losing weight, 1-2 times per month   • Morbid obesity  ([facility]) 2017   • Muscle disorder    • Postural hypotension     EMR   • Psychiatric problem     anxiety    • Radicular pain of left lower extremity 2019   • Urinary bladder disorder    • Urinary incontinence        Past Surgical History:   Procedure Laterality Date   • LITHOTRIPSY  2020   • LUIS BY LAPAROSCOPY  2017    Procedure: LUIS BY LAPAROSCOPY;  Surgeon: Shelton Jackson M.D.;  Location: SURGERY [facility];  Service:    • GASTRIC BYPASS LAPAROSCOPIC  2017    Procedure: GASTRIC BYPASS LAPAROSCOPIC - SIIAH EN Y AND HIATAL HERNIA REPAIR;  Surgeon: Shelton Jackson M.D.;  Location: SURGERY [facility];  Service:    • OTHER ABDOMINAL SURGERY  10/12/15    vertical gastric sleeve       Medications:   cymbalta  Omeprazole  Vit D  Biotin  Ferrous sulfate  Albuterol PRN (rare use)      Allergies: Oxycodone hcl, Flu virus vaccine, Ibuprofen, Morphine, Nsaids, Percocet [perloxx], Sulfa drugs, and Toradol    Social History     Tobacco Use   • Smoking status: Never Smoker   • Smokeless tobacco: Never Used   Substance Use Topics   • Alcohol use: Not Currently     Frequency: Never     Comment: Rarely   • Drug use: No         Family History   Problem Relation Age of Onset   • Kidney Disease Mother         Stage 3-4   • Hypertension Mother    • Other Mother         cervical dysplasia treated by cryo   • Diabetes Father    • Heart Attack Father 61        Status post triple bypass   • Hypertension Father    • Cancer Cousin 21        Benign breast cancer   • Breast Cancer Other         Maternal grandmother's sister         Physical Exam:  /87   Wt 82.6 kg (182 lb)   LMP 2020 (Exact Date)   Breastfeeding No   BMI 33.29 kg/m²   gen: AAO, NAD, affect appropriate  CV: RRR  resp: ctab  abd: soft, NT, ND  : Deferred  Skin: warm/dry, no lesions    A/P: 40 y.o.  with page 3 pelvic organ prolapse, stress urinary incontinence  1. Pelvic organ prolapse quantification stage 3 cystocele     2.  Postoperative state  HYDROcodone-acetaminophen 2.5-108 mg/5mL (HYCET) 7.5-325 MG/15ML solution    DISCONTINUED: HYDROcodone-Acetaminophen (LORTAB)  MG/15ML Solution   3. Urinary, incontinence, stress female         Plan for total vaginal hysterectomy, bilateral salpingectomy, anterior colporrhaphy, possible posterior colporrhaphy and perineorrhaphy, midurethral sling and cystoscopy.    I discussed w/ pt risks of surgery including pain, bleeding, infection, risk of damage to intraabdominal structures including bowel/bladder/ureters.  Pt confirms she is accepting of blood transfusion in case of emergency.  Reviewed risks of transfusion including transfusion reaction (fever, damage to heart/lungs/kidneys), risk of exposure to infectious disease including HIV and hepatitis.  Discussed risk of conversion to open/abdominal procedure.  All questions answered.  Consent to be signed on day of surgery.  Patient further discussed sling risks with Dr. Chew at her visit with her, will review in preoperative area tomorrow.  Aware of risk that she may need to go home with Boston catheter tomorrow.  Discussed typical medical student involvement in GYN cases, pt amenable to med student involvement in surgery.    To have nothing to eat after midnight tonight.    Discussed typical recovery, plan for same day discharge and that she is having significant nausea and vomiting.  Patient reports that she has had trouble coming out of anesthesia in the past..    Cannot take NSAIDs with hx of gastric bypass  Percocet makes her vomit.  Does best with Lortab elixir, rx sent for pt to  now to have postop; zofran as well.  Discussed to monitor max tylenol dosing, especially as we are transitioning her from narcotic to nonnarcotic pain medication.    Daughter to come to surgery with her (16yrs) -patient reports she was told that her daughter will need to leave the hospital and not be allowed back in after she herself goes to  surgery.  Discussed I am not sure why she was told this, will need to discuss tomorrow with preop/recovery nursing administration.  Does plan to have her  come to pick her up from the hospital.  Reports that her daughter will be the primary one helping her at home while she is recovering from surgery.    Discussed recovery/avoid constipation, nothing in the vagina for 8 weeks.  She has Colace, will take 100 mg twice daily as needed for constipation.        Maria Elena Zhou MD  [facility], Women's Health

## 2020-08-11 NOTE — NON-PROVIDER
Pt. Here for Pre Op visit with   Pt. Reports no concerns   Good # [phone]  Pharmacy verified.

## 2020-08-11 NOTE — PROGRESS NOTES
COVID-19 Pre-surgery screening:    Do you have an undiagnosed respiratory illness or symptoms such as coughing or sneezing?   No    1. Do you have an unexplained fever greater than 100.4 degrees Fahrenheit or 38 degrees Celsius?     No  2. Have you had direct exposure to a patient who tested positive for Covid-19?    No    3. Have you had any loss of your sense of taste or smell? Have you had N/V or sore throat?   No  Patient has been informed of visitor policy and asked to wear a mask upon entering the hospital  yes

## 2020-08-12 ENCOUNTER — HOSPITAL ENCOUNTER (OUTPATIENT)
Facility: MEDICAL CENTER | Age: 40
End: 2020-08-12
Attending: OBSTETRICS & GYNECOLOGY | Admitting: OBSTETRICS & GYNECOLOGY
Payer: COMMERCIAL

## 2020-08-12 ENCOUNTER — ANESTHESIA (OUTPATIENT)
Dept: SURGERY | Facility: MEDICAL CENTER | Age: 40
End: 2020-08-12
Payer: COMMERCIAL

## 2020-08-12 ENCOUNTER — ANESTHESIA EVENT (OUTPATIENT)
Dept: SURGERY | Facility: MEDICAL CENTER | Age: 40
End: 2020-08-12
Payer: COMMERCIAL

## 2020-08-12 VITALS
RESPIRATION RATE: 16 BRPM | OXYGEN SATURATION: 98 % | TEMPERATURE: 97.6 F | DIASTOLIC BLOOD PRESSURE: 72 MMHG | HEART RATE: 82 BPM | SYSTOLIC BLOOD PRESSURE: 116 MMHG | BODY MASS INDEX: 33.27 KG/M2 | HEIGHT: 62 IN | WEIGHT: 180.78 LBS

## 2020-08-12 LAB
ABO + RH BLD: NORMAL
PATHOLOGY CONSULT NOTE: NORMAL

## 2020-08-12 PROCEDURE — A9270 NON-COVERED ITEM OR SERVICE: HCPCS

## 2020-08-12 PROCEDURE — 88307 TISSUE EXAM BY PATHOLOGIST: CPT

## 2020-08-12 PROCEDURE — 502240 HCHG MISC OR SUPPLY RC 0272: Performed by: OBSTETRICS & GYNECOLOGY

## 2020-08-12 PROCEDURE — 700101 HCHG RX REV CODE 250: Performed by: OBSTETRICS & GYNECOLOGY

## 2020-08-12 PROCEDURE — 501838 HCHG SUTURE GENERAL: Performed by: OBSTETRICS & GYNECOLOGY

## 2020-08-12 PROCEDURE — 700111 HCHG RX REV CODE 636 W/ 250 OVERRIDE (IP)

## 2020-08-12 PROCEDURE — 700102 HCHG RX REV CODE 250 W/ 637 OVERRIDE(OP): Performed by: OBSTETRICS & GYNECOLOGY

## 2020-08-12 PROCEDURE — 160009 HCHG ANES TIME/MIN: Performed by: OBSTETRICS & GYNECOLOGY

## 2020-08-12 PROCEDURE — A9270 NON-COVERED ITEM OR SERVICE: HCPCS | Performed by: OBSTETRICS & GYNECOLOGY

## 2020-08-12 PROCEDURE — 58552 LAPARO-VAG HYST INCL T/O: CPT | Performed by: OBSTETRICS & GYNECOLOGY

## 2020-08-12 PROCEDURE — 700102 HCHG RX REV CODE 250 W/ 637 OVERRIDE(OP)

## 2020-08-12 PROCEDURE — C1771 REP DEV, URINARY, W/SLING: HCPCS | Performed by: OBSTETRICS & GYNECOLOGY

## 2020-08-12 PROCEDURE — 160025 RECOVERY II MINUTES (STATS): Performed by: OBSTETRICS & GYNECOLOGY

## 2020-08-12 PROCEDURE — 160048 HCHG OR STATISTICAL LEVEL 1-5: Performed by: OBSTETRICS & GYNECOLOGY

## 2020-08-12 PROCEDURE — 160002 HCHG RECOVERY MINUTES (STAT): Performed by: OBSTETRICS & GYNECOLOGY

## 2020-08-12 PROCEDURE — 57260 CMBN ANT PST COLPRHY: CPT | Mod: 51 | Performed by: OBSTETRICS & GYNECOLOGY

## 2020-08-12 PROCEDURE — 160047 HCHG PACU  - EA ADDL 30 MINS PHASE II: Performed by: OBSTETRICS & GYNECOLOGY

## 2020-08-12 PROCEDURE — 700111 HCHG RX REV CODE 636 W/ 250 OVERRIDE (IP): Performed by: OBSTETRICS & GYNECOLOGY

## 2020-08-12 PROCEDURE — 500901 HCHG PACKING, VAG 2 X-RAY: Performed by: OBSTETRICS & GYNECOLOGY

## 2020-08-12 PROCEDURE — 160046 HCHG PACU - 1ST 60 MINS PHASE II: Performed by: OBSTETRICS & GYNECOLOGY

## 2020-08-12 PROCEDURE — 700101 HCHG RX REV CODE 250

## 2020-08-12 PROCEDURE — 700105 HCHG RX REV CODE 258: Performed by: OBSTETRICS & GYNECOLOGY

## 2020-08-12 PROCEDURE — 500892 HCHG PACK, PERI-GYN: Performed by: OBSTETRICS & GYNECOLOGY

## 2020-08-12 PROCEDURE — 58552 LAPARO-VAG HYST INCL T/O: CPT | Mod: 80,51 | Performed by: OBSTETRICS & GYNECOLOGY

## 2020-08-12 PROCEDURE — 57288 REPAIR BLADDER DEFECT: CPT | Performed by: OBSTETRICS & GYNECOLOGY

## 2020-08-12 PROCEDURE — 160035 HCHG PACU - 1ST 60 MINS PHASE I: Performed by: OBSTETRICS & GYNECOLOGY

## 2020-08-12 PROCEDURE — 57260 CMBN ANT PST COLPRHY: CPT | Mod: 80,51 | Performed by: OBSTETRICS & GYNECOLOGY

## 2020-08-12 PROCEDURE — 700105 HCHG RX REV CODE 258

## 2020-08-12 PROCEDURE — 501330 HCHG SET, CYSTO IRRIG TUBING: Performed by: OBSTETRICS & GYNECOLOGY

## 2020-08-12 PROCEDURE — 502704 HCHG DEVICE, LIGASURE IMPACT: Performed by: OBSTETRICS & GYNECOLOGY

## 2020-08-12 PROCEDURE — 160036 HCHG PACU - EA ADDL 30 MINS PHASE I: Performed by: OBSTETRICS & GYNECOLOGY

## 2020-08-12 PROCEDURE — 160041 HCHG SURGERY MINUTES - EA ADDL 1 MIN LEVEL 4: Performed by: OBSTETRICS & GYNECOLOGY

## 2020-08-12 PROCEDURE — 160029 HCHG SURGERY MINUTES - 1ST 30 MINS LEVEL 4: Performed by: OBSTETRICS & GYNECOLOGY

## 2020-08-12 PROCEDURE — A4338 INDWELLING CATHETER LATEX: HCPCS | Performed by: OBSTETRICS & GYNECOLOGY

## 2020-08-12 DEVICE — SYSTEM TRANSVAGINAL MID-URETHRAL SLING ADVANTAGE FIT: Type: IMPLANTABLE DEVICE | Site: VAGINA | Status: FUNCTIONAL

## 2020-08-12 RX ORDER — DIPHENHYDRAMINE HYDROCHLORIDE 50 MG/ML
12.5 INJECTION INTRAMUSCULAR; INTRAVENOUS
Status: DISCONTINUED | OUTPATIENT
Start: 2020-08-12 | End: 2020-08-12 | Stop reason: HOSPADM

## 2020-08-12 RX ORDER — DEXAMETHASONE SODIUM PHOSPHATE 4 MG/ML
INJECTION, SOLUTION INTRA-ARTICULAR; INTRALESIONAL; INTRAMUSCULAR; INTRAVENOUS; SOFT TISSUE PRN
Status: DISCONTINUED | OUTPATIENT
Start: 2020-08-12 | End: 2020-08-12 | Stop reason: SURG

## 2020-08-12 RX ORDER — ONDANSETRON 2 MG/ML
4 INJECTION INTRAMUSCULAR; INTRAVENOUS
Status: DISCONTINUED | OUTPATIENT
Start: 2020-08-12 | End: 2020-08-12 | Stop reason: HOSPADM

## 2020-08-12 RX ORDER — VASOPRESSIN 20 U/ML
INJECTION PARENTERAL
Status: DISCONTINUED
Start: 2020-08-12 | End: 2020-08-12 | Stop reason: HOSPADM

## 2020-08-12 RX ORDER — METOCLOPRAMIDE HYDROCHLORIDE 5 MG/ML
INJECTION INTRAMUSCULAR; INTRAVENOUS PRN
Status: DISCONTINUED | OUTPATIENT
Start: 2020-08-12 | End: 2020-08-12 | Stop reason: SURG

## 2020-08-12 RX ORDER — BUPIVACAINE HYDROCHLORIDE AND EPINEPHRINE 2.5; 5 MG/ML; UG/ML
INJECTION, SOLUTION EPIDURAL; INFILTRATION; INTRACAUDAL; PERINEURAL
Status: DISCONTINUED
Start: 2020-08-12 | End: 2020-08-12 | Stop reason: HOSPADM

## 2020-08-12 RX ORDER — BUPIVACAINE HYDROCHLORIDE AND EPINEPHRINE 2.5; 5 MG/ML; UG/ML
INJECTION, SOLUTION EPIDURAL; INFILTRATION; INTRACAUDAL; PERINEURAL
Status: DISCONTINUED | OUTPATIENT
Start: 2020-08-12 | End: 2020-08-12 | Stop reason: HOSPADM

## 2020-08-12 RX ORDER — CEFAZOLIN SODIUM 1 G/3ML
INJECTION, POWDER, FOR SOLUTION INTRAMUSCULAR; INTRAVENOUS PRN
Status: DISCONTINUED | OUTPATIENT
Start: 2020-08-12 | End: 2020-08-12 | Stop reason: SURG

## 2020-08-12 RX ORDER — HALOPERIDOL 5 MG/ML
1 INJECTION INTRAMUSCULAR
Status: DISCONTINUED | OUTPATIENT
Start: 2020-08-12 | End: 2020-08-12 | Stop reason: HOSPADM

## 2020-08-12 RX ORDER — SODIUM CHLORIDE, SODIUM LACTATE, POTASSIUM CHLORIDE, CALCIUM CHLORIDE 600; 310; 30; 20 MG/100ML; MG/100ML; MG/100ML; MG/100ML
INJECTION, SOLUTION INTRAVENOUS CONTINUOUS
Status: DISCONTINUED | OUTPATIENT
Start: 2020-08-12 | End: 2020-08-12 | Stop reason: HOSPADM

## 2020-08-12 RX ORDER — ONDANSETRON 2 MG/ML
INJECTION INTRAMUSCULAR; INTRAVENOUS PRN
Status: DISCONTINUED | OUTPATIENT
Start: 2020-08-12 | End: 2020-08-12 | Stop reason: SURG

## 2020-08-12 RX ADMIN — ONDANSETRON 8 MG: 2 INJECTION INTRAMUSCULAR; INTRAVENOUS at 07:35

## 2020-08-12 RX ADMIN — DIPHENHYDRAMINE HYDROCHLORIDE 12.5 MG: 50 INJECTION INTRAMUSCULAR; INTRAVENOUS at 12:32

## 2020-08-12 RX ADMIN — METOCLOPRAMIDE 10 MG: 5 INJECTION, SOLUTION INTRAMUSCULAR; INTRAVENOUS at 07:35

## 2020-08-12 RX ADMIN — PROPOFOL 200 MG: 10 INJECTION, EMULSION INTRAVENOUS at 07:36

## 2020-08-12 RX ADMIN — FENTANYL CITRATE 50 MCG: 50 INJECTION INTRAMUSCULAR; INTRAVENOUS at 11:55

## 2020-08-12 RX ADMIN — SODIUM CHLORIDE, POTASSIUM CHLORIDE, SODIUM LACTATE AND CALCIUM CHLORIDE: 600; 310; 30; 20 INJECTION, SOLUTION INTRAVENOUS at 06:47

## 2020-08-12 RX ADMIN — DEXAMETHASONE SODIUM PHOSPHATE 8 MG: 4 INJECTION, SOLUTION INTRA-ARTICULAR; INTRALESIONAL; INTRAMUSCULAR; INTRAVENOUS; SOFT TISSUE at 07:35

## 2020-08-12 RX ADMIN — CEFAZOLIN 2 G: 330 INJECTION, POWDER, FOR SOLUTION INTRAMUSCULAR; INTRAVENOUS at 07:35

## 2020-08-12 RX ADMIN — Medication 15 ML: at 11:56

## 2020-08-12 RX ADMIN — POVIDONE-IODINE 15 ML: 10 SOLUTION TOPICAL at 06:47

## 2020-08-12 RX ADMIN — SUGAMMADEX 200 MG: 100 INJECTION, SOLUTION INTRAVENOUS at 10:12

## 2020-08-12 RX ADMIN — ROCURONIUM BROMIDE 50 MG: 10 INJECTION, SOLUTION INTRAVENOUS at 07:35

## 2020-08-12 RX ADMIN — FENTANYL CITRATE 250 MCG: 50 INJECTION INTRAMUSCULAR; INTRAVENOUS at 07:35

## 2020-08-12 RX ADMIN — HYDROCODONE BITARTRATE AND ACETAMINOPHEN 15 ML: 7.5; 325 SOLUTION ORAL at 10:43

## 2020-08-12 RX ADMIN — HYDROCODONE BITARTRATE AND ACETAMINOPHEN 15 ML: 7.5; 325 SOLUTION ORAL at 11:56

## 2020-08-12 RX ADMIN — SODIUM CHLORIDE, POTASSIUM CHLORIDE, SODIUM LACTATE AND CALCIUM CHLORIDE: 600; 310; 30; 20 INJECTION, SOLUTION INTRAVENOUS at 07:35

## 2020-08-12 ASSESSMENT — FIBROSIS 4 INDEX: FIB4 SCORE: 0.53

## 2020-08-12 NOTE — ANESTHESIA POSTPROCEDURE EVALUATION
Patient: Farhana Freeman    Procedure Summary     Date: 08/12/20 Room / Location: [facility] ROOM 25 / SURGERY SAME DAY [facility]    Anesthesia Start: 0735 Anesthesia Stop: 1029    Procedures:       HYSTERECTOMY, TOTAL, VAGINAL (N/A Vagina )      COLPORRHAPHY, ANTERIOR, TENSION FREE VAGINAL TAPE, PERINORRHAPHY (N/A Vagina )      COLPORRHAPHY, POSTERIOR (N/A Vagina )      SALPINGECTOMY (Bilateral Vagina )      CYSTOSCOPY (N/A Urethra) Diagnosis: (PELVIC PROLAPSE, STRESS INCONTINENCE, DYSMENORRHEA)    Surgeon: Maria Elena Zhou M.D. Responsible Provider: Juni Fuentes M.D.    Anesthesia Type: general ASA Status: 3          Final Anesthesia Type: general  Last vitals  BP   Blood Pressure: 100/65    Temp   36.6 °C (97.8 °F)    Pulse   Pulse: 65   Resp   17    SpO2   100 %      Anesthesia Post Evaluation    Patient location during evaluation: PACU  Patient participation: complete - patient participated  Level of consciousness: awake and alert    Airway patency: patent  Anesthetic complications: no  Cardiovascular status: hemodynamically stable  Respiratory status: acceptable  Hydration status: euvolemic    PONV: none           Nurse Pain Score: 4 (NPRS)

## 2020-08-12 NOTE — ANESTHESIA PREPROCEDURE EVALUATION
Relevant Problems   PULMONARY   (+) Asthma      GI   (+) GERD (gastroesophageal reflux disease)         (+) Renal stone       Physical Exam    Airway   Mallampati: II  TM distance: >3 FB  Neck ROM: full       Cardiovascular - normal exam  Rhythm: regular  Rate: normal  (-) murmur     Dental - normal exam           Pulmonary - normal exam  Breath sounds clear to auscultation     Abdominal    Neurological - normal exam                 Anesthesia Plan    ASA 3       Plan - general       Airway plan will be ETT        Induction: intravenous    Postoperative Plan: Postoperative administration of opioids is intended.    Pertinent diagnostic labs and testing reviewed    Informed Consent:    Anesthetic plan and risks discussed with patient.    Use of blood products discussed with: patient whom consented to blood products.

## 2020-08-12 NOTE — PROGRESS NOTES
Pt up to attempt voiding at this time.     1510- pt bladder scanned with about 354 mL urineresidual in bladder.  Dr. Chew called and updated. MD order for lowe catheter to be reinserted at this time. Pt to follow up with MD at office tomorrow, 8/13 at 1415 for removal.

## 2020-08-12 NOTE — ANESTHESIA QCDR
2019 [facility] Clinical Data Registry (for Quality Improvement)     Postoperative nausea/vomiting risk protocol (Adult = 18 yrs and Pediatric 3-17 yrs)- (430 and 463)  General inhalation anesthetic (NOT TIVA) with PONV risk factors: Yes  Provision of anti-emetic therapy with at least 2 different classes of agents: Yes   Patient DID NOT receive anti-emetic therapy and reason is documented in Medical Record:  N/A    Multimodal Pain Management- (477)  Non-emergent surgery AND patient age >= 18: Yes  Use of Multimodal Pain Management, two or more drugs and/or interventions, NOT including systemic opioids:   Exception: Documented allergy to multiple classes of analgesics:     Smoking Abstinence (404)  Patient is current smoker (cigarette, pipe, e-cig, marijuanna):   Elective Surgery:   Abstinence instructions provided prior to day of surgery:   Patient abstained from smoking on day of surgery:     Pre-Op Beta-Blocker in Isolated CABG (44)  Isolated CABG AND patient age >= 18:   Beta-blocker admin within 24 hours of surgical incision:   Exception:of medical reason(s) for not administering beta blocker within 24 hours prior to surgical incision (e.g., not  indicated,other medical reason):     PACU assessment of acute postoperative pain prior to Anesthesia Care End- Applies to Patients Age = 18- (ABG7)  Initial PACU pain score is which of the following: < 7/10  Patient unable to report pain score: N/A    Post-anesthetic transfer of care checklist/protocol to PACU/ICU- (426 and 427)  Upon conclusion of case, patient transferred to which of the following locations: PACU/Non-ICU  Use of transfer checklist/protocol: Yes  Exclusion: Service Performed in Patient Hospital Room (and thus did not require transfer): N/A  Unplanned admission to ICU related to anesthesia service up through end of PACU care- (MD51)  Unplanned admission to ICU (not initially anticipated at anesthesia start time): No

## 2020-08-12 NOTE — ANESTHESIA PROCEDURE NOTES
Airway    Date/Time: 8/12/2020 7:38 AM  Performed by: Juni Fuentes M.D.  Authorized by: Juni Fuentes M.D.     Location:  OR  Urgency:  Elective  Indications for Airway Management:  Anesthesia      Spontaneous Ventilation: absent    Sedation Level:  Deep  Preoxygenated: Yes    Patient Position:  Sniffing  Final Airway Type:  Endotracheal airway  Final Endotracheal Airway:  ETT  Cuffed: Yes    Technique Used for Successful ETT Placement:  Video laryngoscopy    Insertion Site:  Oral  Blade Type:  Brad  Laryngoscope Blade/Videolaryngoscope Blade Size:  3  ETT Size (mm):  7.0  Measured from:  Teeth  ETT to Teeth (cm):  22  Placement Verified by: auscultation and capnometry    Cormack-Lehane Classification:  Grade I - full view of glottis  Number of Attempts at Approach:  1

## 2020-08-12 NOTE — ANESTHESIA TIME REPORT
Anesthesia Start and Stop Event Times     Date Time Event    8/12/2020 0716 Ready for Procedure     0735 Anesthesia Start     1029 Anesthesia Stop        Responsible Staff  08/12/20    Name Role Begin End    Juni Fuentes M.D. Anesth 0750 1029        Preop Diagnosis (Free Text):  Pre-op Diagnosis     PELVIC PROLAPSE, STRESS INCONTINENCE, DYSMENORRHEA        Preop Diagnosis (Codes):    Post op Diagnosis  Abnormal uterine bleeding      Premium Reason  Non-Premium    Comments:

## 2020-08-12 NOTE — OR NURSING
1330 Pt arrived to phase 2, report taken from Marion NELSON.  and BP cuff applied. Pt c/o pressure like pain 3/10. No c/o nausea.  2 small incisions on lower abdomen CDI with dermabond    1400 Tolerating PO liquids, VSS.     1420 Up to BR to void, steady gait, pt able to void small amount, PVR scan aprox 350ml. Will wait short period and try again.    1440 Report to Dipika NELSON.     1510 Report from Dipika NELSON. Dr Chew called, Lowe order received. Pt to follow up in DR Chew's office 8/13 at 1415 for removal.     1540 Lowe placed.  Urine return noted. Pt teaching on lowe care and emptying completed.    1614 DC instructions given to pt and family regarding meds, lowe, follow up, activities, when to call MD. Pt and family verbalized understanding of instructions. Pt DC'd home with family via CNA WC escort out.

## 2020-08-12 NOTE — OP REPORT
Operative Report  08/12/20      PreOp Diagnosis:   1. Stage 3 pelvic organ prolapse  2. Stress urinary incontinence    PostOp Diagnosis:   1. Stage 3 pelvic organ prolapse  2. Stress urinary incontinence      Procedure(s):  HYSTERECTOMY, TOTAL, VAGINAL - Wound Class: Clean Contaminated  COLPORRHAPHY, ANTERIOR, TENSION FREE VAGINAL TAPE, PERINORRHAPHY - Wound Class: Clean Contaminated  COLPORRHAPHY, POSTERIOR - Wound Class: Clean Contaminated  SALPINGECTOMY - Wound Class: Clean Contaminated  CYSTOSCOPY - Wound Class: Clean Contaminated      Surgeon(s):  SUSANA Ovalle D.O.    Anesthesiologist/Type of Anesthesia:  Anesthesiologist: Juni Fuentes M.D./General    Surgical Staff:  Circulator: Marie Manriquez R.N.  Relief Circulator: Hansa Jackson R.N.  Relief Scrub: Leslie Alas  Scrub Person: Mary Freitas    Specimens removed if any:  ID Type Source Tests Collected by Time Destination   A : uterus, cervix, bilateral fallopian tubes. Tissue Uterus PATHOLOGY SPECIMEN Maria Elena Zhou M.D. 8/12/2020  8:13 AM        Estimated Blood Loss: 100cc  IVF: 2200cc LR  UOP: 100cc    Findings: normal uterus, tubes, and ovaries.      Complications: none    Procedure in Detail:  The pt was taken to the operating room where SCDs were placed and GETA was initiated. She was placed in dorsal lithotomy position in [facility] and received 2g ancef for preoperative antibiotics prior to incision. A lowe catheter was placed in the bladder.  A weighted speculum was placed and a right angle retractor used for adequate cervical exposure.  The anterior and posterior lips of the cervix were grasped with single tooth tenaculum.  Dilute vasopressin (20units in 100cc injectable NS) was used to infiltrate circumferentially around the cervix under the vaginal mucosa.  A circumferential incision was made around the cervix with the bovie.  The posterior cul-de-sac was entered with the Gillespie scissors and  pickups with teeth a long weighted speculum was inserted into the peritoneal cavity.  The uterosacral ligaments were bilaterally clamped with Evy clamps, transected with gillespie scissors and transfixed with 0-vicryl.  These were tagged with hemostats for identification and manipulation later in the case.  The bladder was dissected off of the anterior cervix with Metzenbaum scissors, the peritoneum was identified and the [id number]-sac entered with Metzenbaum scissors.  A right angle retractor was placed in the anterior cul-de-sac.  The ligasure was used to bilaterally seal and divide up the cardinal and broad ligaments until the utero-ovarian ligaments were reached, taking care to incorporate the anterior and posterior peritoneum into each bite.  The utero-ovarian ligaments were bilaterally clamped with Evy clamps and the uterus was amputated with Gillespie scissors.  The utero-ovarian pedicales were doubly suture ligated with 0-vicryl with transfixion stitch.  All pedicles were examined and noted to be hemostatic.  The long weighted speculum was replaced with a short weighted speculum.  The bilateral vaginal cuff angles were then bilaterally sutured to the uterosacral ligament for apical support with 0-vicryl.  The cuff was closed in running, locked fashion with 2-0 vicryl.  Attention was then turned to the anterior colporrhaphy.  Superior to the level of the midurethral sling placement, the vaginal mucosa was hydrodissected with dilute vasopressin.  The mucosa was incised with the 15 blade and the vaginal mucosa dissected laterally off of the underlying vesicovaginal fascia.  The fascia was then imbricated together over the midline interrupted mattress sutures of 2-0 PDS.  The excess vaginal mucosa was trimmed and the vaginal mucosa closed in running-locked fashion with 2-0 vicryl.    Attention was then turned to the mid-urethral sling.  Please see Dr. Dunaway's operative report for details on the sling  placement. The bilateral ureters were noted to efflux vigorously during cystoscopy.  The lowe catheter was left out after sling placement.    Attention was then turned to the posterior colporrhaphy and perineorrhaphy.    The perineal skin and vaginal mucosa was hydrodissected with 0.25% marcaine with dilute epinephrine.  The 15blade was used to incise the perineal skin in triangular fashion. The metzenbaum scissors were used to trim the perineal skin and to dissected superiorly under the vaginal mucosa towards the top of the vagina.  The vaginal mucosa was dissected off the underlying rectovaginal fascia bilaterally and the rectovaginal fascia was imbricated over the midline with interrupted mattress sutures of 2-0 PDS.  The excess vaginal mucosa was trimmed and the remaining second degree was repaired in standard fashion with 2-0 vicryl.      The vagina was re-examined and noted to be hemostatic.  All instruments were removed from the vagina.  The pt was awakened from GETA and taken to recovery in good condition.  All counts were correct, there were no complications.       Maria Elena Zhou MD  [facility], Women's Health

## 2020-08-12 NOTE — OR NURSING
Dr. Zhou at bedside to speak with pt. MD updated on pt voiding a small amount but still retaining 300 mL in bladder. MD states to give it a little more time and to attempt voiding again. If pt still retains to call Dr. Chew to update.

## 2020-08-12 NOTE — OR NURSING
1024- Pt arrives from OR and report received.  Per Dr Fuentes, okay to have hycet.    1043- Pt denies nausea, c/o small amount of discomfort, tolerating sips of water, medicated with hycet, ice cream provided.    1054- Pt resting, no distress noted.    1120- Pt continues to sleep, appears comfortable.    1155- Pt requesting more pain meds, pt medicated with another 15ml hycet and 50mcg IV fent.  Small amount of vaginal bleeding noted.    1205- Pt resting, no distress noted, appears more comfortable.    1225- Pt up to bathroom with help, unable to void at this time.  Clarisse pad changed.    1232- Pt c/o itching, medicated with benadryl.    1300- Pt sleeping, no distress noted.    1330- Report to Jayme, pt transferred to phase 2.

## 2020-08-12 NOTE — OP REPORT
Please see Dr. Krause's OP report for majority of case.  This report is for TVT placement ONLY.    PREOPERATIVE DIAGNOSIS:  Stress urinary incontinence.    POSTOPERATIVE DIAGNOSIS:  Stress urinary incontinence.    PROCEDURE PERFORMED:  Midurethral sling via the retropubic approach and cystoscopy.    MEDICAL DEVICE USED: Fandium Advantage Fit    OPERATIVE FINDINGS:  Included a normal cystoscopy without injury after sling placement.    MEDICATIONS:  2 grams of Ancef.    COMPLICATIONS:  None.    DISPOSITION:  Stable to the PACU.    PROCEDURE IN DETAIL:  See Dr. Krause's note for initial portion of case.      After anterior repair attention was turned to placement of midurethral sling.  Lowe catheter was in place.  The planned site to manny for exit of the sling was marked directly above the pubic bone, 2 centimeters lateral to midline on both sides. These were marked and injected with 10 milliliters of dilute solution of vasopressin.  With assistance of Allis clamps and lowe bulb the mid urethra was identified, and Allis clamps were placed above and below this.  10cc of 0.25% Marcaine with epinephrine was injected in the midline and along the vaginal fornices.  A scalpel was used to incise between the 2 Allises. Metzenbaum scissors were then used to create a tunnel tracking towards the retropubic space,  the overlying vaginal epithelium from the underlying periurethral tissues. This was accomplished on both sides. Urethra was diverted away with lowe guide.  Starting on the patient's right, the trocar was inserted through the vaginal incision, passed behind the pubic bone, through the retropubic space, and exited out the marked suprapubic site directed towards ipsilateral shoulder. This was performed in a similar fashion on the patient's left side. Lowe catheter was removed, and a 70-degree cystoscope was then performed. A 360-degree survey of the bladder revealed normal-appearing bladder mucosa,  normal-appearing urethral mucosa. There were no trabeculations, diverticula, stones, or masses seen. There was no injury to the bladder. There were no trocars in the bladder, and there was bilateral ureteral efflux and minimal squamous metaplasia at the trigone. The cystoscope was removed through the urethra, which was normal in appearance as well. The bladder was drained, and the Boston catheter was reinserted. The mesh sling was then positioned by gently pulling on both of the suprapubic mesh arms. A Elaine clamp was placed in between the urethra and the mesh until it was noted to be appropriately positioned, lying flat, at the mid urethra, and in a tension-free fashion. The plastic sheaths were then removed. The Elaine clamp was removed from behind the mesh, and it was again noted to be appropriately positioned and lying in a tension-free fashion. The mesh was trimmed below the suprapubic incisions. The skin was pulled up and closed with 4-0 Monocryl. The vaginal incision was closed with 2-0 Vicryl in a running fashion, and excellent hemostasis was noted. The vaginal fornices were inspected and had no injury.     The case was turned back over to Dr. Krause.

## 2020-08-13 ENCOUNTER — GYNECOLOGY VISIT (OUTPATIENT)
Dept: OBGYN | Facility: CLINIC | Age: 40
End: 2020-08-13
Payer: COMMERCIAL

## 2020-08-13 VITALS — SYSTOLIC BLOOD PRESSURE: 117 MMHG | BODY MASS INDEX: 33.65 KG/M2 | WEIGHT: 184 LBS | DIASTOLIC BLOOD PRESSURE: 88 MMHG

## 2020-08-13 DIAGNOSIS — R33.9 URINARY RETENTION: ICD-10-CM

## 2020-08-13 ASSESSMENT — FIBROSIS 4 INDEX: FIB4 SCORE: 0.53

## 2020-08-13 NOTE — PROGRESS NOTES
GYN Visit  CC: bladder trial    Patient is postop day 1 status post TVT and failed voiding trial in PACU.  She returns today for voiding trial.  She reports that catheter extremely bothersome and she had difficulty sleeping because of the catheter.  She strongly desires for it to be removed today.  Denies any excess pain, vaginal bleeding, or other postoperative issues.    Trial of void is performed by backfilling the bladder with 300 cc of normal saline.  Patient is then asked to spontaneously void and she successfully spontaneously voids 230 cc.      Catheter is left out and patient is instructed to return for her routine postoperative visit.

## 2020-08-19 ENCOUNTER — TELEPHONE (OUTPATIENT)
Dept: OBGYN | Facility: MEDICAL CENTER | Age: 40
End: 2020-08-19

## 2020-08-19 NOTE — TELEPHONE ENCOUNTER
----- Message from Elbert Guillen Ass't sent at 8/19/2020  4:17 PM PDT -----  Regarding: FW: Procedure Question  Contact: [phone]    ----- Message -----  From: Farhana Freeman  Sent: 8/18/2020   6:48 PM PDT  To: [facility]  Subject: Procedure Question                               I’m Seven days postop. I am having some yellowish discharge. No blood but I also have some pain where my stitches are. Is this normal? I have a follow up appointment with you on the 26th.        Called pt back to address concerns.  Pt reports she is a little sore vaginally.  Very little blood with wiping, on dylon bleeding.  Small yellowish discharge, no dylon pus; no malodor. Discussed some exudate/discharge is normal while sutures are dissolving.  If with increasing pain, foul smelling discharge, etc, needs to be seen right away.  I gave pt  # in the office, I am not at our [address] location prior to the weekend but if with increasing concerns will try to get in with someone as needed.    Maria Elena Zhou MD  [facility], Women[facility]

## 2020-08-26 ENCOUNTER — HOSPITAL ENCOUNTER (OUTPATIENT)
Facility: MEDICAL CENTER | Age: 40
End: 2020-08-26
Attending: OBSTETRICS & GYNECOLOGY
Payer: COMMERCIAL

## 2020-08-26 ENCOUNTER — GYNECOLOGY VISIT (OUTPATIENT)
Dept: OBGYN | Facility: CLINIC | Age: 40
End: 2020-08-26
Payer: COMMERCIAL

## 2020-08-26 VITALS — BODY MASS INDEX: 33.65 KG/M2 | DIASTOLIC BLOOD PRESSURE: 75 MMHG | SYSTOLIC BLOOD PRESSURE: 131 MMHG | WEIGHT: 184 LBS

## 2020-08-26 DIAGNOSIS — N89.8 VAGINAL DISCHARGE: ICD-10-CM

## 2020-08-26 LAB
CANDIDA DNA VAG QL PROBE+SIG AMP: NEGATIVE
G VAGINALIS DNA VAG QL PROBE+SIG AMP: POSITIVE
T VAGINALIS DNA VAG QL PROBE+SIG AMP: NEGATIVE

## 2020-08-26 PROCEDURE — 99024 POSTOP FOLLOW-UP VISIT: CPT | Performed by: OBSTETRICS & GYNECOLOGY

## 2020-08-26 PROCEDURE — 87510 GARDNER VAG DNA DIR PROBE: CPT

## 2020-08-26 PROCEDURE — 87660 TRICHOMONAS VAGIN DIR PROBE: CPT

## 2020-08-26 PROCEDURE — 87480 CANDIDA DNA DIR PROBE: CPT

## 2020-08-26 RX ORDER — M-VIT,TX,IRON,MINS/CALC/FOLIC 27MG-0.4MG
TABLET ORAL
COMMUNITY
End: 2020-10-23

## 2020-08-26 RX ORDER — ONDANSETRON 4 MG/1
TABLET, ORALLY DISINTEGRATING ORAL
COMMUNITY
End: 2020-10-23

## 2020-08-26 RX ORDER — PREGABALIN 150 MG/1
CAPSULE ORAL
COMMUNITY
End: 2020-10-23

## 2020-08-26 RX ORDER — DULOXETIN HYDROCHLORIDE 20 MG/1
CAPSULE, DELAYED RELEASE ORAL
COMMUNITY
End: 2020-08-28 | Stop reason: SDUPTHER

## 2020-08-26 RX ORDER — NORTRIPTYLINE HYDROCHLORIDE 10 MG/1
CAPSULE ORAL
COMMUNITY
End: 2020-10-23

## 2020-08-26 RX ORDER — HYDROCODONE BITARTRATE AND ACETAMINOPHEN 7.5; 325 MG/1; MG/1
TABLET ORAL
COMMUNITY
End: 2020-10-23

## 2020-08-26 RX ORDER — OMEPRAZOLE 40 MG/1
CAPSULE, DELAYED RELEASE ORAL
COMMUNITY
End: 2021-06-22 | Stop reason: SDUPTHER

## 2020-08-26 RX ORDER — MECLIZINE HYDROCHLORIDE 25 MG/1
TABLET ORAL
COMMUNITY
End: 2020-10-23

## 2020-08-26 RX ORDER — METHYLPREDNISOLONE 4 MG/1
TABLET ORAL
COMMUNITY
Start: 2020-07-23 | End: 2020-10-23

## 2020-08-26 RX ORDER — HYDROCODONE BITARTRATE AND ACETAMINOPHEN 5; 325 MG/1; MG/1
TABLET ORAL
COMMUNITY
End: 2022-04-29

## 2020-08-26 RX ORDER — CEFDINIR 300 MG/1
CAPSULE ORAL
COMMUNITY
End: 2020-08-26

## 2020-08-26 RX ORDER — METHOCARBAMOL 750 MG/1
TABLET, FILM COATED ORAL
COMMUNITY
End: 2020-10-23

## 2020-08-26 RX ORDER — CLINDAMYCIN HYDROCHLORIDE 150 MG/1
CAPSULE ORAL
COMMUNITY
End: 2020-08-26

## 2020-08-26 RX ORDER — METRONIDAZOLE 500 MG/1
500 TABLET ORAL 2 TIMES DAILY
Qty: 14 TAB | Refills: 0 | Status: SHIPPED | OUTPATIENT
Start: 2020-08-26 | End: 2020-09-02

## 2020-08-26 RX ORDER — GABAPENTIN 100 MG/1
CAPSULE ORAL
COMMUNITY
End: 2020-10-23

## 2020-08-26 RX ORDER — FLUCONAZOLE 150 MG/1
TABLET ORAL
COMMUNITY
End: 2020-10-23

## 2020-08-26 RX ORDER — CYCLOBENZAPRINE HCL 10 MG
TABLET ORAL
COMMUNITY
End: 2020-10-23

## 2020-08-26 RX ORDER — DIAZEPAM 5 MG/1
TABLET ORAL
COMMUNITY
End: 2020-10-23

## 2020-08-26 RX ORDER — BENZONATATE 100 MG/1
CAPSULE ORAL
COMMUNITY
End: 2020-10-23

## 2020-08-26 RX ORDER — ALBUTEROL SULFATE 90 UG/1
AEROSOL, METERED RESPIRATORY (INHALATION)
COMMUNITY
End: 2021-07-06 | Stop reason: SDUPTHER

## 2020-08-26 RX ORDER — METHOCARBAMOL 750 MG/1
TABLET, FILM COATED ORAL
COMMUNITY
Start: 2020-07-23 | End: 2020-10-23

## 2020-08-26 ASSESSMENT — FIBROSIS 4 INDEX: FIB4 SCORE: 0.53

## 2020-08-26 NOTE — PROGRESS NOTES
GYN Visit:    CC: postop check    HPI: 40 y.o.yo  s/p TVH/BS/Ant, post repair/perinorrhaphy and TVT on  with myself and Dr. Chew for POP/ALEIDA.    Pt reports doing well.  Minimal pain, .  Denies vaginal bleeding, sometimes has spotting when she wipes.  Does report an ongoing discharge, no malodor, no real change to this but reports that it is there, very slightly yellowish in color.  No problems with urination or bowel movement.     ROS:   Gen: denies fevers, general concerns  Abd: denies abd pain, N/V, constipation  : denies vaginal bleeding, discharge    /75   Wt 83.5 kg (184 lb)   LMP 2020 (Exact Date)   Breastfeeding No   BMI 33.65 kg/m²   Gen: AAO, NAD  Abd: soft, NT, ND  : NEFG, normal vagina with sutures intact, perineum intact, cuff intact   Moderate think yellowish discharge noted; no ondor.     Bimanual: cuff intact, nontender.        Pathology  FINAL DIAGNOSIS:     A. Uterus, cervix, bilateral fallopian tubes:          Cervix demonstrating ectocervical parakeratosis; no squamous           intraepithelial lesion or malignancy identified.          Proliferative phase endometrium with benign endometrial polyp           (0.5 cm); no hyperplasia, atypia or malignancy identified.          Myometrium with leiomyoma (1.0 cm); no atypical or malignant           features identified.          Bilateral fimbriated fallopian tubes, shorter segment with           benign paratubal cyst (0.5 cm); no malignancy identified.       A/P:40 y.o.yo  s/p TVH/BS/Ant, post repair/perinorrhaphy and TVT on  doing well  - pathology benign  - no signs of postop complications; vaginal pathogen swab collected today and if positive will treat for BV.  Discharge appears exudative and not infectious today.    F/u: 4-5wks     Maria Elena Zhou MD  [facility], Women[facility]

## 2020-08-26 NOTE — LETTER
August 26, 2020        To Whom it May Conerns    Farhana Freeman is currently being cared for at [facility].  This pt had surgery and should be cleared to return to work without restrictions starting after 9/23/2020.      Please contact my office with any concerns.     Sincerely,          Maria Elena Zhou M.D.

## 2020-08-28 ENCOUNTER — TELEPHONE (OUTPATIENT)
Dept: MEDICAL GROUP | Facility: PHYSICIAN GROUP | Age: 40
End: 2020-08-28

## 2020-08-28 NOTE — TELEPHONE ENCOUNTER
----- Message from Farhana Freeman sent at 8/27/2020  6:22 PM PDT -----  Regarding: Prescription Question  Contact: [phone]  I went to  my cymbolta at the pharmacy and they said my doctor canceled the script. I don’t know why this happened. Can I please get the refill?     Thank you   Farhana Freeman.

## 2020-08-28 NOTE — TELEPHONE ENCOUNTER
----- Message from Farhana Freeman sent at 8/28/2020  3:09 PM PDT -----  Regarding: RE: Prescription Question  Contact: [phone]  I take 1 60mg capsule a day. Sorry about the typo

## 2020-08-31 RX ORDER — DULOXETIN HYDROCHLORIDE 60 MG/1
CAPSULE, DELAYED RELEASE ORAL
Qty: 90 CAP | Refills: 1 | Status: SHIPPED | OUTPATIENT
Start: 2020-08-31 | End: 2020-11-16 | Stop reason: SDUPTHER

## 2020-09-22 ENCOUNTER — GYNECOLOGY VISIT (OUTPATIENT)
Dept: OBGYN | Facility: CLINIC | Age: 40
End: 2020-09-22
Payer: COMMERCIAL

## 2020-09-22 VITALS — WEIGHT: 179 LBS | DIASTOLIC BLOOD PRESSURE: 76 MMHG | BODY MASS INDEX: 32.74 KG/M2 | SYSTOLIC BLOOD PRESSURE: 123 MMHG

## 2020-09-22 DIAGNOSIS — Z09 POSTOP CHECK: ICD-10-CM

## 2020-09-22 PROCEDURE — 99024 POSTOP FOLLOW-UP VISIT: CPT | Performed by: OBSTETRICS & GYNECOLOGY

## 2020-09-22 ASSESSMENT — FIBROSIS 4 INDEX: FIB4 SCORE: 0.53

## 2020-09-22 NOTE — LETTER
September 22, 2020      To Whom it May Concern:      Farhana Freeman is currently being cared for at [facility].  She is cleared to return to work without restriction on Monday 9/28/2020.        Sincerely,        Maria Elena Zhou M.D.

## 2020-09-22 NOTE — PROGRESS NOTES
GYN Visit:    CC: postop check    HPI: 40 y.o.yo  s/p TVH/BS/Ant, post repair/perinorrhaphy and TVT on  with myself and Dr. Chew for POP/ALEIDA here for cuff check.    Pt reports doing well.   No problems with urination or bowel movement.  Reports rare light spotting, decreasing discharge.       ROS:   Gen: denies fevers, general concerns  Abd: denies abd pain, N/V, constipation  : denies vaginal bleeding, discharge    /76   Wt 81.2 kg (179 lb)   LMP 2020 (Exact Date)   BMI 32.74 kg/m²   Gen: AAO, NAD  : NEFG, normal vagina and cuff intact with approx 1-2mm area to the right of midline with granulation tissue noted, silver nitrate applied.    A/P:40 y.o.yo  s/p /p TVH/BS/Ant, post repair/perinorrhaphy and TVT on  with myself and Dr. Chew for POP/ALEIDA.  - cuff appears well healed, no signs of complications -small area of granulation tissue taking care with silver nitrate today.  Instructed to return for additional vaginal bleeding but given the area was so small I do not think she needs to return for routine repeat examination of this.      - return to normal activity    F/u: prn/annually    Maria Elena Zhou MD  [facility], Women[facility]

## 2020-10-07 NOTE — ASSESSMENT & PLAN NOTE
Underwent sleeve gastrectomy in 2015 and Danie-en-Y gastric bypass surgery in 2017.   Reports losing 100-110 lbs as a result and notes resolution of GERD and improvement in blood glucose control following gastric bypass surgery.     Plan:  Referred to GI for follow-up.  Encouraged to continue her healthy lifestyle habits.  Scheduled to return to clinic in 10 weeks with labs for follow-up.   None

## 2020-10-08 DIAGNOSIS — G89.29 CHRONIC BILATERAL LOW BACK PAIN WITH LEFT-SIDED SCIATICA: ICD-10-CM

## 2020-10-08 DIAGNOSIS — M54.42 CHRONIC BILATERAL LOW BACK PAIN WITH LEFT-SIDED SCIATICA: ICD-10-CM

## 2020-10-08 RX ORDER — BACLOFEN 10 MG/1
10 TABLET ORAL 3 TIMES DAILY PRN
Qty: 60 TAB | Refills: 1 | Status: SHIPPED | OUTPATIENT
Start: 2020-10-08 | End: 2021-05-19

## 2020-10-21 ENCOUNTER — NURSE TRIAGE (OUTPATIENT)
Dept: HEALTH INFORMATION MANAGEMENT | Facility: OTHER | Age: 40
End: 2020-10-21

## 2020-10-21 NOTE — TELEPHONE ENCOUNTER
Regarding: symptoms - headaches  ----- Message from Verna Singleton sent at 10/21/2020  9:28 AM PDT -----  Pt wants to make an appt with pcp, is having headaches.

## 2020-10-21 NOTE — TELEPHONE ENCOUNTER
History of Migraines and was seen at [facility] 10/19/2020 and was given Toradol for symptoms and was instructed to take two days off and follow up with MD.    Attempted to schedule appt for today or 10/22 but not appointments available.  Soonest appt is 10/23/2020        Reason for Disposition  • Headache is a chronic symptom (recurrent or ongoing AND lasting > 4 weeks)    Additional Information  • Negative: Difficult to awaken or acting confused (e.g., disoriented, slurred speech)  • Negative: Weakness of the face, arm or leg on one side of the body and new onset  • Negative: Numbness of the face, arm or leg on one side of the body and new onset  • Negative: Loss of speech or garbled speech and new onset  • Negative: Passed out (i.e., fainted, collapsed and was not responding)  • Negative: Sounds like a life-threatening emergency to the triager  • Negative: Followed a head injury within last 3 days  • Negative: Traumatic Brain Injury (TBI) is suspected  • Negative: Sinus pain of forehead and yellow or green nasal discharge  • Negative: Pregnant  • Negative: Unable to walk without falling  • Negative: Stiff neck (can't touch chin to chest)  • Negative: Possibility of carbon monoxide exposure  • Negative: SEVERE headache, states 'worst headache' of life  • Negative: SEVERE headache, sudden onset (i.e., reaching maximum intensity within 30 seconds)  • Negative: Severe pain in one eye  • Negative: Loss of vision or double vision  • Negative: Patient sounds very sick or weak to the triager  • Negative: Fever > 103 F (39.4 C)  • Negative: Fever > 100.0 F (37.8 C) and has diabetes mellitus or a weak immune system (e.g., HIV positive, cancer chemotherapy, organ transplant, splenectomy, chronic steroids)  • Negative: SEVERE headache (e.g., excruciating) and has had severe headaches before  • Negative: SEVERE headache and not relieved by pain meds  • Negative: SEVERE headache and vomiting  • Negative: SEVERE headache and  "fever  • Negative: New headache and weak immune system (e.g., HIV positive, cancer chemotherapy, chronic steroid treatment)  • Negative: Fever present > 3 days (72 hours)  • Negative: Patient wants to be seen  • Negative: Unexplained headache that is present > 24 hours  • Negative: New headache and age > 50  • Negative: Headache started during sex    Answer Assessment - Initial Assessment Questions  1. LOCATION: \"Where does it hurt?\"       Hurts behind the nose  2. ONSET: \"When did the headache start?\" (Minutes, hours or days)       10/19 when migraine started.    3. PATTERN: \"Does the pain come and go, or has it been constant since it started?\"      stays  4. SEVERITY: \"How bad is the pain?\" and \"What does it keep you from doing?\"  (e.g., Scale 1-10; mild, moderate, or severe)    - MILD (1-3): doesn't interfere with normal activities     - MODERATE (4-7): interferes with normal activities or awakens from sleep     - SEVERE (8-10): excruciating pain, unable to do any normal activities     3/10 now.  Migraine pain is 10/10  5. RECURRENT SYMPTOM: \"Have you ever had headaches before?\" If so, ask: \"When was the last time?\" and \"What happened that time?\"      History of Migraine  6. CAUSE: \"What do you think is causing the headache?\"      migraine  7. MIGRAINE: \"Have you been diagnosed with migraine headaches?\" If so, ask: \"Is this headache similar?\"       yes  8. HEAD INJURY: \"Has there been any recent injury to the head?\"       no  9. OTHER SYMPTOMS: \"Do you have any other symptoms?\" (fever, stiff neck, eye pain, sore throat, cold symptoms)      no  10. PREGNANCY: \"Is there any chance you are pregnant?\" \"When was your last menstrual period?\"        No hysterectomy    Protocols used: HEADACHE-A-OH      "

## 2020-10-23 ENCOUNTER — OFFICE VISIT (OUTPATIENT)
Dept: MEDICAL GROUP | Facility: PHYSICIAN GROUP | Age: 40
End: 2020-10-23
Payer: COMMERCIAL

## 2020-10-23 VITALS
WEIGHT: 176 LBS | DIASTOLIC BLOOD PRESSURE: 70 MMHG | BODY MASS INDEX: 32.39 KG/M2 | TEMPERATURE: 98.4 F | OXYGEN SATURATION: 100 % | HEART RATE: 99 BPM | HEIGHT: 62 IN | SYSTOLIC BLOOD PRESSURE: 110 MMHG

## 2020-10-23 DIAGNOSIS — R51.9 INTRACTABLE HEADACHE, UNSPECIFIED CHRONICITY PATTERN, UNSPECIFIED HEADACHE TYPE: ICD-10-CM

## 2020-10-23 PROCEDURE — 99214 OFFICE O/P EST MOD 30 MIN: CPT | Performed by: FAMILY MEDICINE

## 2020-10-23 RX ORDER — METHYLPREDNISOLONE SODIUM SUCCINATE 40 MG/ML
40 INJECTION, POWDER, LYOPHILIZED, FOR SOLUTION INTRAMUSCULAR; INTRAVENOUS ONCE
Status: COMPLETED | OUTPATIENT
Start: 2020-10-23 | End: 2020-10-23

## 2020-10-23 RX ADMIN — METHYLPREDNISOLONE SODIUM SUCCINATE 40 MG: 40 INJECTION, POWDER, LYOPHILIZED, FOR SOLUTION INTRAMUSCULAR; INTRAVENOUS at 08:59

## 2020-10-23 ASSESSMENT — FIBROSIS 4 INDEX: FIB4 SCORE: 0.53

## 2020-10-23 NOTE — LETTER
[facility]  [address]  [place] [address]     October 23, 2020    Patient: Farhana Freeman   YOB: 1980   Date of Visit: 10/23/2020       To Whom It May Concern:    Farhana Freeman was seen and treated in our department on 10/23/2020. She should be excused 10/21/2020 through 10/24/2020.     Sincerely,     Erica Paulson D.O.

## 2020-10-23 NOTE — PROGRESS NOTES
CC: Headache    HISTORY OF THE PRESENT ILLNESS: Patient is a 40 y.o. female. This pleasant patient is here today to discuss the following issue.    Patient is here today with concerns for a migraine headache.  With regard to her history of migraines, patient does have history of these.  She notes that after getting bariatric surgery, migraines seem to go away.  However, now they have returned.  She was treated at [facility] for sinus infection with an antibiotic and Medrol Dosepak a couple of weeks ago.  She notes that since this time, she has had continued bilateral retro-orbital headache with some mild photosensitivity.  She did end up again going to [facility] urgent care, and they gave her a Toradol injection.  She is unable to do oral NSAIDs due to history of bariatric surgery.  She does take Norco for chronic back pain and again this provides minimal relief for a couple of hours.  She states that it helped minimally and caused her stomach issues.  She denies any visual changes, neurologic symptoms such as difficulty word finding, numbness weakness tingling anywhere in the body.  She has missed work as a result of headache this week.    She notes that when she previously had migraines she essentially failed every medication both abortive and prophylactic, and she is hoping for referral to neurology as well today.    Allergies: Oxycodone hcl, Flu virus vaccine, Ibuprofen, Morphine, Nsaids, Percocet [perloxx], Sulfa drugs, and Toradol    Current Outpatient Medications Ordered in Epic   Medication Sig Dispense Refill   • baclofen (LIORESAL) 10 MG Tab Take 1 Tab by mouth 3 times a day as needed. 60 Tab 1   • DULoxetine (CYMBALTA) 60 MG Cap DR Particles delayed-release capsule Take 1 capsule by mouth daily 90 Cap 1   • HYDROcodone-acetaminophen (NORCO) 5-325 MG Tab per tablet hydrocodone 5 mg-acetaminophen 325 mg tablet     • omeprazole (PRILOSEC) 40 MG delayed-release capsule omeprazole 40 mg capsule,delayed  release     • albuterol 108 (90 Base) MCG/ACT Aero Soln inhalation aerosol albuterol sulfate HFA 90 mcg/actuation aerosol inhaler     • Biotin 86727 MCG Tab Take 2 Tabs by mouth every morning.     • FIBER PO Take 1 Tab by mouth every morning.     • ferrous sulfate 325 (65 Fe) MG tablet Take every other day. 100 Tab 1     No current Epic-ordered facility-administered medications on file.        Past Medical History:   Diagnosis Date   • Anemia     Pt states she has no history of anemia   • Anesthesia     severe nausea and vomiting with morphine   • Anxiety    • ASTHMA 05/2017    Managed with PRN inhaler, last use was in March 2017   • Cholelithiasis 2017    Status post lap adeline   • Dental disorder     dentures    • GERD (gastroesophageal reflux disease)    • Heart burn     Resolved with gastric surgeries.   • Kidney disease     kidney stones    • Migraine     much less since losing weight, 1-2 times per month   • Morbid obesity (HCC) 5/12/2017   • Muscle disorder    • Postural hypotension     EMR   • Psychiatric problem     anxiety    • Radicular pain of left lower extremity 5/29/2019   • Urinary bladder disorder    • Urinary incontinence        Past Surgical History:   Procedure Laterality Date   • PB ANTER COLPORRHAPHY,BLAD/VAGINA N/A 8/12/2020    Procedure: COLPORRHAPHY, ANTERIOR, TENSION FREE VAGINAL TAPE, PERINORRHAPHY;  Surgeon: Maria Elena Zhou M.D.;  Location: SURGERY SAME DAY [facility] ORS;  Service: Obstetrics   • PB POST COLPORRHAPHY,RECTUM/VAGINA N/A 8/12/2020    Procedure: COLPORRHAPHY, POSTERIOR;  Surgeon: Maria Elena Zhou M.D.;  Location: SURGERY SAME DAY [facility] ORS;  Service: Obstetrics   • VAGINAL HYSTERECTOMY TOTAL N/A 8/12/2020    Procedure: HYSTERECTOMY, TOTAL, VAGINAL;  Surgeon: Maria Elena Zhou M.D.;  Location: SURGERY SAME DAY [facility] ORS;  Service: Obstetrics   • SALPINGECTOMY Bilateral 8/12/2020    Procedure: SALPINGECTOMY;  Surgeon: Maria Elena Zhou M.D.;  Location:  SURGERY SAME DAY [facility] ORS;  Service: Obstetrics   • CYSTOSCOPY N/A 8/12/2020    Procedure: CYSTOSCOPY;  Surgeon: Maria Elena Zhou M.D.;  Location: SURGERY SAME DAY [facility] ORS;  Service: Obstetrics   • LITHOTRIPSY  06/2020   • LUIS BY LAPAROSCOPY  8/9/2017    Procedure: LUIS BY LAPAROSCOPY;  Surgeon: Shelton Jackson M.D.;  Location: SURGERY [facility] ORS;  Service:    • GASTRIC BYPASS LAPAROSCOPIC  5/12/2017    Procedure: GASTRIC BYPASS LAPAROSCOPIC - ISIAH EN Y AND HIATAL HERNIA REPAIR;  Surgeon: Shelton Jackson M.D.;  Location: SURGERY [facility] ORS;  Service:    • OTHER ABDOMINAL SURGERY  10/12/15    vertical gastric sleeve       Social History     Tobacco Use   • Smoking status: Never Smoker   • Smokeless tobacco: Never Used   Substance Use Topics   • Alcohol use: Not Currently     Frequency: Never     Comment: Rarely   • Drug use: No       Social History     Social History Narrative    Lives in a house in [place] with her , has 2 biological children, has custody of her niece, reports managing fine at home.    Has cats, dogs, and lizards as pets.    Works a warehouse job, is on her feet for most of the day.      Consumes a healthy diet consisting of fruits, vegetables, white meats and home cooked meals.     Walks at least 10,000 steps per day currently.        Family History   Problem Relation Age of Onset   • Kidney Disease Mother         Stage 3-4   • Hypertension Mother    • Other Mother         cervical dysplasia treated by cryo   • Diabetes Father    • Heart Attack Father 61        Status post triple bypass   • Hypertension Father    • Cancer Cousin 21        Benign breast cancer   • Breast Cancer Other         Maternal grandmother's sister       ROS:     - Constitutional: Negative for fever, chills, unexpected weight change, and fatigue/generalized weakness.       - Respiratory: Negative for cough, sputum production, chest congestion, dyspnea, wheezing, and crackles.      - Cardiovascular:  "Negative for chest pain, palpitations, orthopnea, PND, and bilateral lower extremity edema.     Exam: /70 (BP Location: Left arm, Patient Position: Sitting, BP Cuff Size: Adult)   Pulse 99   Temp 36.9 °C (98.4 °F) (Temporal)   Ht 1.575 m (5' 2\")   Wt 79.8 kg (176 lb)   SpO2 100%  Body mass index is 32.19 kg/m².    General: Well appearing, NAD  HEENT: Normocephalic. Conjunctiva clear, lids without ptosis, pupils equal and reactive to light accommodation  Pulmonary: Clear to ausculation.  Normal effort. No rales, ronchi, or wheezing.  Cardiovascular: Regular rate and rhythm without murmur, rubs or gallop.   Neurologic: normal gait, cranial nerves II through XII grossly intact  Skin: Warm and dry.  No obvious lesions.  Musculoskeletal:  No extremity cyanosis, clubbing, or edema.  Psych: Normal mood and affect. Alert and oriented. Judgment and insight is normal.    Please note that this dictation was created using voice recognition software. I have made every reasonable attempt to correct obvious errors, but I expect that there are errors of grammar and possibly content that I did not discover before finalizing the note.      Assessment/Plan  Farhana was seen today for migraine.    Diagnoses and all orders for this visit:    Intractable headache, unspecified chronicity pattern, unspecified headache type  Patient here today with intractable headache, in the setting of known history of migraines.  It is a bilateral retro-orbital headache with some mild photosensitivity in the setting of recently having had a sinus infection.  Patient has normal neuro exam and does not appear ill or uncomfortable at this time.  Could be residual sinus inflammation.  She is requesting referral to neurology given her history of migraines and recent recurrence of headaches.  She has already failed Toradol, Norco.  We will go ahead and trial Solu-Medrol injection to see if we can break the headache.  Also recommend starting Flonase " daily as well as it may be related to sinus issues.  Red flags reviewed and reasons to go to the emergency department.  -     methylPREDNISolone (SOLU-MEDROL) 40 MG injection 40 mg  -     REFERRAL TO NEUROLOGY      Follow up if symptoms not improving.     Erica Paulson DO  [facility]

## 2020-11-07 ENCOUNTER — TELEMEDICINE (OUTPATIENT)
Dept: TELEHEALTH | Facility: TELEMEDICINE | Age: 40
End: 2020-11-07
Payer: COMMERCIAL

## 2020-11-07 DIAGNOSIS — S91.209A AVULSION OF TOENAIL, INITIAL ENCOUNTER: Primary | ICD-10-CM

## 2020-11-07 PROBLEM — G57.12 MERALGIA PARESTHETICA, LEFT: Status: ACTIVE | Noted: 2019-11-08

## 2020-11-07 PROCEDURE — 99214 OFFICE O/P EST MOD 30 MIN: CPT | Mod: 95,CR | Performed by: PHYSICIAN ASSISTANT

## 2020-11-07 ASSESSMENT — ENCOUNTER SYMPTOMS
COUGH: 0
NAUSEA: 0
FEVER: 0
CHILLS: 0
SHORTNESS OF BREATH: 0
CONSTIPATION: 0
ABDOMINAL PAIN: 0
VOMITING: 0
DIARRHEA: 0

## 2020-11-07 NOTE — PROGRESS NOTES
"Telemedicine Visit: Established Patient     This encounter was conducted via Zoom.   Verbal consent was obtained. Patient's identity was verified.    Subjective:   CC:   Chief Complaint   Patient presents with   • Nail Problem     Toenail came off      Farhana Freeman is a 40 y.o. female presenting for evaluation and management of:    Nail Problem  This is a new problem. The current episode started yesterday. The problem occurs constantly. The problem has been unchanged. Pertinent negatives include no abdominal pain, chills, coughing, fever, nausea or vomiting.       A majority of the patient's toenail lifted off yesterday.  She did notice clear yellow discharge that has resolved now.  There is no surrounding redness.  The area is tender to the touch.  She does not recall specific injury.  She does work on her feet for 10 hours a day.  No history of diabetes.  This is the first episode of its kind.      Review of Systems   Constitutional: Negative for chills, fever and malaise/fatigue.   Respiratory: Negative for cough and shortness of breath.    Gastrointestinal: Negative for abdominal pain, constipation, diarrhea, nausea and vomiting.   All other systems reviewed and are negative.        Allergies   Allergen Reactions   • Oxycodone Hcl Vomiting   • Flu Virus Vaccine Vomiting     Reports severe illness with nausea, vomiting and diarrhea.     • Ibuprofen      1999-11-18;GI UPSET   • Morphine Nausea     \"severe nausea vomiting\"   • Nsaids      Added per MD request   • Percocet [Perloxx]      Vomiting    • Sulfa Drugs      \"Causes blindness\"   • Toradol      Added per MD request       Current medicines (including changes today)  Current Outpatient Medications   Medication Sig Dispense Refill   • mupirocin (BACTROBAN) 2 % Ointment Apply 1 Application to affected area(s) every day for 14 days. 30 g 0   • baclofen (LIORESAL) 10 MG Tab Take 1 Tab by mouth 3 times a day as needed. 60 Tab 1   • DULoxetine (CYMBALTA) " 60 MG Cap DR Particles delayed-release capsule Take 1 capsule by mouth daily 90 Cap 1   • HYDROcodone-acetaminophen (NORCO) 5-325 MG Tab per tablet hydrocodone 5 mg-acetaminophen 325 mg tablet     • omeprazole (PRILOSEC) 40 MG delayed-release capsule omeprazole 40 mg capsule,delayed release     • albuterol 108 (90 Base) MCG/ACT Aero Soln inhalation aerosol albuterol sulfate HFA 90 mcg/actuation aerosol inhaler     • Biotin 70864 MCG Tab Take 2 Tabs by mouth every morning.     • FIBER PO Take 1 Tab by mouth every morning.     • ferrous sulfate 325 (65 Fe) MG tablet Take every other day. 100 Tab 1     No current facility-administered medications for this visit.        Patient Active Problem List    Diagnosis Date Noted   • Intertriginous dermatitis associated with moisture 06/16/2020   • Obesity (BMI 30.0-34.9) 06/16/2020   • Symptomatic abdominal panniculus 06/16/2020   • Stress incontinence 06/02/2020   • Gross hematuria 06/02/2020   • Cervical radiculopathy at C6 03/27/2020   • Chronic neck pain 03/27/2020   • Chronic back pain 03/27/2020   • Left sided sciatica 03/27/2020   • Renal stone 03/27/2020   • Iron deficiency anemia 03/27/2020   • GERD (gastroesophageal reflux disease) 03/27/2020   • Meralgia paresthetica, left 11/08/2019   • Generalized anxiety disorder 05/29/2019   • Status post gastric surgery 05/29/2019   • Vitamin D deficiency 05/29/2019   • Asthma 11/01/2005   • 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 08/20/2004       Family History   Problem Relation Age of Onset   • Kidney Disease Mother         Stage 3-4   • Hypertension Mother    • Other Mother         cervical dysplasia treated by cryo   • Diabetes Father    • Heart Attack Father 61        Status post triple bypass   • Hypertension Father    • Cancer Cousin 21        Benign breast cancer   • Breast Cancer Other         Maternal grandmother's sister       PMH: has a past medical history of Anemia, Anesthesia, Anxiety, ASTHMA (05/2017),  Cholelithiasis (2017), Dental disorder, GERD (gastroesophageal reflux disease), Heart burn, Kidney disease, Migraine, Morbid obesity (HCC) (5/12/2017), Muscle disorder, Postural hypotension, Psychiatric problem, Radicular pain of left lower extremity (5/29/2019), Urinary bladder disorder, and Urinary incontinence. She also has no past medical history of Addisons disease (HCC), Adrenal disorder (HCC), Allergy, Blood transfusion without reported diagnosis, Clotting disorder (HCC), COPD (chronic obstructive pulmonary disease) (HCC), Cushings syndrome (HCC), Depression, Diabetic neuropathy (HCC), Goiter, Head ache, HIV (human immunodeficiency virus infection) ([facility]), Hyperlipidemia, IBD (inflammatory bowel disease), Meningitis, Osteoporosis, Parathyroid disorder (HCC), Pituitary disease (HCC), Sickle cell disease (HCC), Substance abuse (HCC), or Urinary tract infection.  Surgical Hx:   Past Surgical History:   Procedure Laterality Date   • PB ANTER COLPORRHAPHY,BLAD/VAGINA N/A 8/12/2020    Procedure: COLPORRHAPHY, ANTERIOR, TENSION FREE VAGINAL TAPE, PERINORRHAPHY;  Surgeon: Maria Elena Zhou M.D.;  Location: SURGERY SAME DAY [facility];  Service: Obstetrics   • PB POST COLPORRHAPHY,RECTUM/VAGINA N/A 8/12/2020    Procedure: COLPORRHAPHY, POSTERIOR;  Surgeon: Maria Elena Zhou M.D.;  Location: SURGERY SAME DAY [facility];  Service: Obstetrics   • VAGINAL HYSTERECTOMY TOTAL N/A 8/12/2020    Procedure: HYSTERECTOMY, TOTAL, VAGINAL;  Surgeon: Maria Elena Zhou M.D.;  Location: SURGERY SAME DAY [facility];  Service: Obstetrics   • SALPINGECTOMY Bilateral 8/12/2020    Procedure: SALPINGECTOMY;  Surgeon: Maria Elena Zhou M.D.;  Location: SURGERY SAME DAY [facility];  Service: Obstetrics   • CYSTOSCOPY N/A 8/12/2020    Procedure: CYSTOSCOPY;  Surgeon: Maria Elena Zhou M.D.;  Location: SURGERY [facility];  Service: Obstetrics   • LITHOTRIPSY  06/2020   • LUIS BY LAPAROSCOPY  8/9/2017     Procedure: LUIS BY LAPAROSCOPY;  Surgeon: Shelton Jackson M.D.;  Location: SURGERY [facility];  Service:    • GASTRIC BYPASS LAPAROSCOPIC  5/12/2017    Procedure: GASTRIC BYPASS LAPAROSCOPIC - ISIAH EN Y AND HIATAL HERNIA REPAIR;  Surgeon: Shelton Jackson M.D.;  Location: SURGERY [facility];  Service:    • OTHER ABDOMINAL SURGERY  10/12/15    vertical gastric sleeve      Social hx:   Social History     Tobacco Use   • Smoking status: Never Smoker   • Smokeless tobacco: Never Used   Substance Use Topics   • Alcohol use: Not Currently     Frequency: Never     Comment: Rarely   • Drug use: No               Objective:   Vitals obtained by patient:  LMP 07/25/2020 (Exact Date)     Physical Exam:  Constitutional: Alert, no distress, well-groomed.  Skin: No rashes in visible areas.  Eye: Round. Conjunctiva clear, lids normal. No icterus.  Intact nailbed with partial toenail removed.  No surrounding erythema.  No discharge.  ENMT: Lips pink without lesions, good dentition, moist mucous membranes. Phonation normal.  Neck: No masses, no thyromegaly. Moves freely without pain.  CV: Pulse as reported by patient  Respiratory: Unlabored respiratory effort, no cough or audible wheeze  Psych: Alert and oriented x3, normal affect and mood.       Assessment and Plan:   The following treatment plan was discussed:     1. Avulsion of toenail, initial encounter  - mupirocin (BACTROBAN) 2 % Ointment; Apply 1 Application to affected area(s) every day for 14 days.  Dispense: 30 g; Refill: 0  - REFERRAL TO PODIATRY    Supportive care reviewed.  Monitor area closely.  Wound care reviewed.  A referral was placed to follow-up with podiatry.    If symptoms worsen or persist patient can return to clinic for reevaluation.  All side effects of medication discussed including allergic response, GI upset, tendon injury, etc. Patient confirmed understanding of information.      Follow-up: Return if symptoms worsen or fail to improve.

## 2020-11-07 NOTE — LETTER
November 7, 2020         Patient: Farhana Freeman   YOB: 1980   Date of Visit: 11/7/2020           To Whom it May Concern:    Farhana Freeman was seen in my clinic on 11/7/2020. Please allow for light duty - no prolonged standing/walking. Standing/walking approximately 3 hours a day.     If you have any questions or concerns, please don't hesitate to call.        Sincerely,           Stephanie Yañez P.A.-C.  Electronically Signed

## 2020-11-16 DIAGNOSIS — M54.12 CERVICAL RADICULOPATHY AT C6: ICD-10-CM

## 2020-11-16 DIAGNOSIS — G89.29 CHRONIC BILATERAL LOW BACK PAIN WITH LEFT-SIDED SCIATICA: ICD-10-CM

## 2020-11-16 DIAGNOSIS — G89.29 CHRONIC NECK PAIN: ICD-10-CM

## 2020-11-16 DIAGNOSIS — M54.2 CHRONIC NECK PAIN: ICD-10-CM

## 2020-11-16 DIAGNOSIS — G57.12 MERALGIA PARESTHETICA, LEFT: ICD-10-CM

## 2020-11-16 DIAGNOSIS — M54.42 CHRONIC BILATERAL LOW BACK PAIN WITH LEFT-SIDED SCIATICA: ICD-10-CM

## 2020-11-16 DIAGNOSIS — M54.32 LEFT SIDED SCIATICA: ICD-10-CM

## 2020-11-16 RX ORDER — DULOXETIN HYDROCHLORIDE 60 MG/1
60 CAPSULE, DELAYED RELEASE ORAL 2 TIMES DAILY
Qty: 180 CAP | Refills: 1 | Status: SHIPPED | OUTPATIENT
Start: 2020-11-16 | End: 2022-05-10

## 2020-11-16 NOTE — PROGRESS NOTES
1. Left sided sciatica  2. Meralgia paresthetica, left  3. Chronic neck pain  4. Chronic bilateral low back pain with left-sided sciatica  5. Cervical radiculopathy at C6  - DULoxetine (CYMBALTA) 60 MG Cap DR Particles delayed-release capsule; Take 1 Cap by mouth 2 times a day. Take 1 capsule by mouth daily  Dispense: 180 Cap; Refill: 1  >> Patient mention she is taking Cymbalta 60 mg twice daily for the above, patient is very helpful, mention if she reduce the dose to 60 mg daily is not sufficient for pain control, also mention she is currently seen by pain specialist for further work-up and management  -Explained to patient in detail regarding side effect in the light of maximum dose of Cymbalta.  Patient is aware

## 2021-05-19 ENCOUNTER — OFFICE VISIT (OUTPATIENT)
Dept: MEDICAL GROUP | Facility: PHYSICIAN GROUP | Age: 41
End: 2021-05-19
Payer: COMMERCIAL

## 2021-05-19 VITALS
SYSTOLIC BLOOD PRESSURE: 108 MMHG | TEMPERATURE: 98.2 F | DIASTOLIC BLOOD PRESSURE: 80 MMHG | RESPIRATION RATE: 20 BRPM | WEIGHT: 198 LBS | HEART RATE: 68 BPM | BODY MASS INDEX: 36.44 KG/M2 | HEIGHT: 62 IN | OXYGEN SATURATION: 98 %

## 2021-05-19 DIAGNOSIS — G89.29 CHRONIC NECK PAIN: ICD-10-CM

## 2021-05-19 DIAGNOSIS — Z00.00 WELLNESS EXAMINATION: ICD-10-CM

## 2021-05-19 DIAGNOSIS — M54.2 CHRONIC NECK PAIN: ICD-10-CM

## 2021-05-19 DIAGNOSIS — J45.20 MILD INTERMITTENT ASTHMA, UNSPECIFIED WHETHER COMPLICATED: ICD-10-CM

## 2021-05-19 DIAGNOSIS — Z12.31 ENCOUNTER FOR SCREENING MAMMOGRAM FOR BREAST CANCER: ICD-10-CM

## 2021-05-19 DIAGNOSIS — E55.9 VITAMIN D DEFICIENCY: ICD-10-CM

## 2021-05-19 DIAGNOSIS — M79.602 PAIN OF LEFT UPPER EXTREMITY: ICD-10-CM

## 2021-05-19 PROBLEM — Z98.890 STATUS POST GASTRIC SURGERY: Status: RESOLVED | Noted: 2019-05-29 | Resolved: 2021-05-19

## 2021-05-19 PROCEDURE — 99214 OFFICE O/P EST MOD 30 MIN: CPT | Performed by: NURSE PRACTITIONER

## 2021-05-19 RX ORDER — CLINDAMYCIN HYDROCHLORIDE 300 MG/1
CAPSULE ORAL
COMMUNITY
Start: 2021-03-07 | End: 2021-05-19

## 2021-05-19 RX ORDER — FLUTICASONE PROPIONATE 50 MCG
SPRAY, SUSPENSION (ML) NASAL
COMMUNITY
Start: 2021-03-23 | End: 2021-05-19

## 2021-05-19 ASSESSMENT — FIBROSIS 4 INDEX: FIB4 SCORE: 0.54

## 2021-05-19 ASSESSMENT — PATIENT HEALTH QUESTIONNAIRE - PHQ9: CLINICAL INTERPRETATION OF PHQ2 SCORE: 0

## 2021-05-19 NOTE — ASSESSMENT & PLAN NOTE
Chronic, ongoing.  Reports fatigue.  Denies any muscle weakness.  No history of CKD.  Reports having a good diet, including dairy products.  No history of IBD's, celiac, CF, or surgeries causing malabsorption.  Is not taking vitamin d and/or calcium supplement.   Due for updated lab work.     5/4/2017 11:25 8/4/2017 06:46 6/2/2020 06:46   25-Hydroxy   Vitamin D 25 15 (L) 25 (L) 13.7 (L)

## 2021-05-19 NOTE — ASSESSMENT & PLAN NOTE
Acute and ongoing. Has been going on for the past three weeks. She works in [employer] which causes her to lift boxes repeatedly. She is starting to notice a cramping like pain in her right forearm area. She has not taken any medication for it. She states that it is not always consistent and will happen if she moves her arm in a certain way.

## 2021-05-19 NOTE — ASSESSMENT & PLAN NOTE
Chronic and ongoing. She notices it more when she is physically active and in the summer with the heat and fires. She does have an Albuterol inhaler to use as needed. She has not needed to use it for awhile. Denies any recent shortness of breath.

## 2021-05-19 NOTE — PROGRESS NOTES
Subjective  Chief Complaint  Establish care to manage her chronic conditions    History of Present Illness  Farhana Freeman is a 41 y.o. female. This patient is here today to establish care.  Her prior PCP was Dr Luis E Kimbrough.    Asthma  Chronic and ongoing. She notices it more when she is physically active and in the summer with the heat and fires. She does have an Albuterol inhaler to use as needed. She has not needed to use it for awhile. Denies any recent shortness of breath.    Chronic neck pain  Chronic and ongoing. She is currently on Duloxetine 60 mg twice a day. She does go to [facility]. She is also on Norco 5-325 mg, she takes it at least once a day, she can take it up to four times a day.    Pain of left upper extremity  Acute and ongoing. Has been going on for the past three weeks. She works in [employer] which causes her to lift boxes repeatedly. She is starting to notice a cramping like pain in her right forearm area. She has not taken any medication for it. She states that it is not always consistent and will happen if she moves her arm in a certain way.    Vitamin D deficiency  Chronic, ongoing.  Reports fatigue.  Denies any muscle weakness.  No history of CKD.  Reports having a good diet, including dairy products.  No history of IBD's, celiac, CF, or surgeries causing malabsorption.  Is not taking vitamin d and/or calcium supplement.   Due for updated lab work.     5/4/2017 11:25 8/4/2017 06:46 6/2/2020 06:46   25-Hydroxy   Vitamin D 25 15 (L) 25 (L) 13.7 (L)     Past Medical History    Allergies: Oxycodone hcl, Flu virus vaccine, Ibuprofen, Morphine, Nsaids, Percocet [perloxx], Sulfa drugs, and Toradol  Past Medical History:   Diagnosis Date   • Anemia     Pt states she has no history of anemia   • Anesthesia     severe nausea and vomiting with morphine   • Anxiety    • ASTHMA 05/2017    Managed with PRN inhaler, last use was in March 2017   • Cholelithiasis 2017    Status  post lap luis   • Dental disorder     dentures    • GERD (gastroesophageal reflux disease)    • Heart burn     Resolved with gastric surgeries.   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    • Kidney disease     kidney stones    • Migraine     much less since losing weight, 1-2 times per month   • Morbid obesity (HCC) 5/12/2017   • Muscle disorder    • Postural hypotension     EMR   • Psychiatric problem     anxiety    • Radicular pain of left lower extremity 5/29/2019   • Status post gastric surgery 5/29/2019    DATE OF OPERATION: 5/12/2017 POSTOPERATIVE DIAGNOSIS: Morbid Obesity with medical sequelae; Hiatal hernia OPERATION PERFORMED: 1.  Laparoscopic Danie-en-Y Gastric Bypass  3. Hiatal hernia repair SURGEON: Shelton Jackson MD   • Urinary bladder disorder    • Urinary incontinence      Past Surgical History:   Procedure Laterality Date   • PB ANTER COLPORRHAPHY,BLAD/VAGINA N/A 8/12/2020    Procedure: COLPORRHAPHY, ANTERIOR, TENSION FREE VAGINAL TAPE, PERINORRHAPHY;  Surgeon: Maria Elena Zhou M.D.;  Location: SURGERY SAME DAY [facility];  Service: Obstetrics   • PB POST COLPORRHAPHY,RECTUM/VAGINA N/A 8/12/2020    Procedure: COLPORRHAPHY, POSTERIOR;  Surgeon: Maria Elena Zhou M.D.;  Location: SURGERY SAME DAY [facility];  Service: Obstetrics   • VAGINAL HYSTERECTOMY TOTAL N/A 8/12/2020    Procedure: HYSTERECTOMY, TOTAL, VAGINAL;  Surgeon: Maria Elena Zhou M.D.;  Location: SURGERY SAME DAY [facility];  Service: Obstetrics   • SALPINGECTOMY Bilateral 8/12/2020    Procedure: SALPINGECTOMY;  Surgeon: Maria Elena Zhou M.D.;  Location: SURGERY SAME DAY [facility];  Service: Obstetrics   • CYSTOSCOPY N/A 8/12/2020    Procedure: CYSTOSCOPY;  Surgeon: Maria Elena Zhou M.D.;  Location: SURGERY SAME DAY [facility];  Service: Obstetrics   • LITHOTRIPSY  06/2020   • LUIS BY LAPAROSCOPY  8/9/2017    Procedure: LUIS BY LAPAROSCOPY;  Surgeon:  Shelton Jackson M.D.;  Location: SURGERY [facility];  Service:    • GASTRIC BYPASS LAPAROSCOPIC  5/12/2017    Procedure: GASTRIC BYPASS LAPAROSCOPIC - ISIAH EN Y AND HIATAL HERNIA REPAIR;  Surgeon: Shelton Jackson M.D.;  Location: SURGERY [facility];  Service:    • OTHER ABDOMINAL SURGERY  10/12/15    vertical gastric sleeve     Current Outpatient Medications Ordered in Epic   Medication Sig Dispense Refill   • DULoxetine (CYMBALTA) 60 MG Cap DR Particles delayed-release capsule Take 1 Cap by mouth 2 times a day. Take 1 capsule by mouth daily 180 Cap 1   • HYDROcodone-acetaminophen (NORCO) 5-325 MG Tab per tablet hydrocodone 5 mg-acetaminophen 325 mg tablet     • omeprazole (PRILOSEC) 40 MG delayed-release capsule omeprazole 40 mg capsule,delayed release     • albuterol 108 (90 Base) MCG/ACT Aero Soln inhalation aerosol albuterol sulfate HFA 90 mcg/actuation aerosol inhaler     • Biotin 50502 MCG Tab Take 2 Tabs by mouth every morning.       No current [facility]-ordered facility-administered medications on file.     Family History:    Family History   Problem Relation Age of Onset   • Kidney Disease Mother         Stage 3-4   • Hypertension Mother    • Other Mother         cervical dysplasia treated by cryo   • Diabetes Father    • Heart Attack Father 61        Status post triple bypass   • Hypertension Father    • Cancer Cousin 21        Benign breast cancer   • Breast Cancer Other         Maternal grandmother's sister      Personal/Social History:    Social History     Tobacco Use   • Smoking status: Never Smoker   • Smokeless tobacco: Never Used   Vaping Use   • Vaping Use: Never used   Substance Use Topics   • Alcohol use: Yes     Comment: Rarely   • Drug use: No     Social History     Social History Narrative    Lives in a house in [place] with her , has 2 biological children, has custody of her niece, reports managing fine at home.    Has cats, dogs, and lizards as pets.    Works a warehouse job, is on  "her feet for most of the day.      Consumes a healthy diet consisting of fruits, vegetables, white meats and home cooked meals.     Walks at least 10,000 steps per day currently.       Review of Systems:     General: Negative for fever/chills and unexpected weight change.    Eyes:  Negative for vision changes, eye pain.   ENT:  Negative for hearing changes, ear pain, congestion, sore throat, and neck pain.    Respiratory:  Negative for cough and dyspnea.     Cardiovascular:  Negative for chest pain and palpitations.   Gastrointestinal:  Negative for nausea/vomiting, changes in bowel habits, and abdominal pain.    Genitourinary:  Negative for dysuria and hematuria.    Musculoskeletal:  Positive arm pain.   Skin:  Negative for rash.    Neurological:  Negative for numbness/tingling and headaches.    Heme/Lymph:  Does not bruise/bleed easily.     Objective  Physical Exam:   /80 (BP Location: Right arm, Patient Position: Sitting, BP Cuff Size: Large adult)   Pulse 68   Temp 36.8 °C (98.2 °F) (Temporal)   Resp 20   Ht 1.575 m (5' 2\")   Wt 89.8 kg (198 lb)   SpO2 98%  Body mass index is 36.21 kg/m².  General:  Alert and oriented.  Well appearing.  NAD.  Head:  Normocephalic.   Eyes:  Eyes conjunctiva clear lids without ptosis, pupils equal and reactive to light accommodation.    Neck: Supple without JVD. No lymphadenopathy.  Pulmonary:  Normal effort.  Clear to ausculation without rales, ronchi, or wheezing.  Cardiovascular:  Regular rate and rhythm without murmur, rubs or gallop.  Radial pulses are intact and equal bilaterally.  Musculoskeletal:  No extremity cyanosis, clubbing, or edema.  Skin:  Warm and dry.  No obvious lesions.  Psych: Normal mood and affect. Alert and oriented x3. Judgment and insight is normal.      Assessment/Plan   1. Mild intermittent asthma, unspecified whether complicated  Chronic and ongoing.  Continue to use Albuterol inhaler as needed.    2. Chronic neck pain  Chronic and " ongoing.  Continue to take Duloxetine 60 mg twice a day.  Continue to take Norco 5-325 mg up to four times a day as needed.  Continue to follow up with [facility].    3. Pain of left upper extremity  Acute and ongoing.  She works in [employer] where she has to move boxes a lot. She has started to notice a cramping sensation in her forearm. She has not tried taking any medication for it.  Educated that she should wear a compression brace while at work to help.    4. Vitamin D deficiency  Chronic and ongoing.  Currently not taking any Vitamin D or Calcium supplement.  Due for updated labs.  - VITAMIN D,25 HYDROXY; Future    5. Encounter for screening mammogram for breast cancer  - MA-SCREENING MAMMO BILAT W/TOMOSYNTHESIS W/CAD; Future    6. Wellness examination  Due for updated labs.  - Comp Metabolic Panel; Future  - Lipid Profile; Future  - HEMOGLOBIN A1C; Future      Health Maintenance: Completed    Return in about 1 year (around 5/19/2022), or if symptoms worsen or fail to improve.    Please note that this dictation was created using voice recognition software. I have made every reasonable attempt to correct obvious errors, but I expect that there are errors of grammar and possibly content that I did not discover before finalizing the note.    DIMAS Duncan  [facility]

## 2021-05-19 NOTE — ASSESSMENT & PLAN NOTE
Chronic and ongoing. She is currently on Duloxetine 60 mg twice a day. She does go to [facility]. She is also on Norco 5-325 mg, she takes it at least once a day, she can take it up to four times a day.

## 2021-06-08 ENCOUNTER — APPOINTMENT (OUTPATIENT)
Dept: RADIOLOGY | Facility: MEDICAL CENTER | Age: 41
End: 2021-06-08
Attending: NURSE PRACTITIONER
Payer: COMMERCIAL

## 2021-06-17 ENCOUNTER — HOSPITAL ENCOUNTER (OUTPATIENT)
Dept: RADIOLOGY | Facility: MEDICAL CENTER | Age: 41
End: 2021-06-17
Attending: NURSE PRACTITIONER
Payer: COMMERCIAL

## 2021-06-17 DIAGNOSIS — M54.17 RADICULOPATHY, LUMBOSACRAL REGION: ICD-10-CM

## 2021-06-17 PROCEDURE — 72072 X-RAY EXAM THORAC SPINE 3VWS: CPT

## 2021-06-17 PROCEDURE — 72110 X-RAY EXAM L-2 SPINE 4/>VWS: CPT

## 2021-06-19 ENCOUNTER — HOSPITAL ENCOUNTER (OUTPATIENT)
Dept: LAB | Facility: MEDICAL CENTER | Age: 41
End: 2021-06-19
Attending: NURSE PRACTITIONER
Payer: COMMERCIAL

## 2021-06-19 DIAGNOSIS — E55.9 VITAMIN D DEFICIENCY: ICD-10-CM

## 2021-06-19 DIAGNOSIS — Z00.00 WELLNESS EXAMINATION: ICD-10-CM

## 2021-06-19 LAB
25(OH)D3 SERPL-MCNC: 27 NG/ML (ref 30–100)
ALBUMIN SERPL BCP-MCNC: 4 G/DL (ref 3.2–4.9)
ALBUMIN/GLOB SERPL: 1.5 G/DL
ALP SERPL-CCNC: 87 U/L (ref 30–99)
ALT SERPL-CCNC: 17 U/L (ref 2–50)
ANION GAP SERPL CALC-SCNC: 9 MMOL/L (ref 7–16)
AST SERPL-CCNC: 24 U/L (ref 12–45)
BILIRUB SERPL-MCNC: 0.7 MG/DL (ref 0.1–1.5)
BUN SERPL-MCNC: 10 MG/DL (ref 8–22)
CALCIUM SERPL-MCNC: 9.1 MG/DL (ref 8.5–10.5)
CHLORIDE SERPL-SCNC: 105 MMOL/L (ref 96–112)
CHOLEST SERPL-MCNC: 206 MG/DL (ref 100–199)
CO2 SERPL-SCNC: 27 MMOL/L (ref 20–33)
CREAT SERPL-MCNC: 0.68 MG/DL (ref 0.5–1.4)
EST. AVERAGE GLUCOSE BLD GHB EST-MCNC: 114 MG/DL
GLOBULIN SER CALC-MCNC: 2.6 G/DL (ref 1.9–3.5)
GLUCOSE SERPL-MCNC: 73 MG/DL (ref 65–99)
HBA1C MFR BLD: 5.6 % (ref 4–5.6)
HDLC SERPL-MCNC: 77 MG/DL
LDLC SERPL CALC-MCNC: 116 MG/DL
POTASSIUM SERPL-SCNC: 5 MMOL/L (ref 3.6–5.5)
PROT SERPL-MCNC: 6.6 G/DL (ref 6–8.2)
SODIUM SERPL-SCNC: 141 MMOL/L (ref 135–145)
TRIGL SERPL-MCNC: 63 MG/DL (ref 0–149)

## 2021-06-19 PROCEDURE — 83036 HEMOGLOBIN GLYCOSYLATED A1C: CPT

## 2021-06-19 PROCEDURE — 80061 LIPID PANEL: CPT

## 2021-06-19 PROCEDURE — 80053 COMPREHEN METABOLIC PANEL: CPT

## 2021-06-19 PROCEDURE — 82306 VITAMIN D 25 HYDROXY: CPT

## 2021-06-19 PROCEDURE — 36415 COLL VENOUS BLD VENIPUNCTURE: CPT

## 2021-06-22 DIAGNOSIS — K21.9 GASTROESOPHAGEAL REFLUX DISEASE, UNSPECIFIED WHETHER ESOPHAGITIS PRESENT: ICD-10-CM

## 2021-06-22 RX ORDER — OMEPRAZOLE 40 MG/1
40 CAPSULE, DELAYED RELEASE ORAL DAILY
Qty: 90 CAPSULE | Refills: 3 | Status: SHIPPED | OUTPATIENT
Start: 2021-06-22 | End: 2022-07-13 | Stop reason: SDUPTHER

## 2021-06-22 NOTE — PROGRESS NOTES
1. Gastroesophageal reflux disease, unspecified whether esophagitis present  - omeprazole (PRILOSEC) 40 MG delayed-release capsule; Take 1 capsule by mouth every day.  Dispense: 90 capsule; Refill: 3  - REFERRAL TO GASTROENTEROLOGY

## 2021-07-06 ENCOUNTER — TELEMEDICINE (OUTPATIENT)
Dept: MEDICAL GROUP | Facility: PHYSICIAN GROUP | Age: 41
End: 2021-07-06
Payer: COMMERCIAL

## 2021-07-06 VITALS — WEIGHT: 204 LBS | HEIGHT: 63 IN | RESPIRATION RATE: 16 BRPM | BODY MASS INDEX: 36.14 KG/M2

## 2021-07-06 DIAGNOSIS — J40 BRONCHITIS: ICD-10-CM

## 2021-07-06 PROCEDURE — 99214 OFFICE O/P EST MOD 30 MIN: CPT | Mod: 95 | Performed by: NURSE PRACTITIONER

## 2021-07-06 RX ORDER — ALBUTEROL SULFATE 90 UG/1
2 AEROSOL, METERED RESPIRATORY (INHALATION) EVERY 6 HOURS PRN
Qty: 8.5 G | Refills: 2 | Status: SHIPPED | OUTPATIENT
Start: 2021-07-06 | End: 2021-11-18

## 2021-07-06 RX ORDER — METHYLPREDNISOLONE 4 MG/1
TABLET ORAL
Qty: 21 TABLET | Refills: 0 | Status: SHIPPED | OUTPATIENT
Start: 2021-07-06 | End: 2021-07-23

## 2021-07-06 ASSESSMENT — FIBROSIS 4 INDEX: FIB4 SCORE: 0.63

## 2021-07-06 NOTE — ASSESSMENT & PLAN NOTE
Acute and ongoing. She went to [facility] on July 3rd and was told that she has bronchitis. She was prescribed a Z-pack for 10 days, Tessalon Perles and oral steroids for 3 days. She states that she does not feel any better at this point in time. She states that the cough is hurting her throat. She also feels as though her ears are starting to plug. She has also started taking OTC Robitussin DM.

## 2021-07-06 NOTE — PROGRESS NOTES
Virtual Visit: Established Patient   This visit was conducted via Zoom using secure and encrypted videoconferencing technology. The patient was in a private location in the state of [place].    The patient's identity was confirmed and verbal consent was obtained for this virtual visit.    Subjective  Chief Complaint  Bronchitis    History of Present Illness  Farhana Freeman is a 41 y.o. female. This established patient is here today to discuss bronchitis.    Bronchitis  Acute and ongoing. She went to [facility] on July 3rd and was told that she has bronchitis. She was prescribed a Z-pack for 10 days, Tessalon Perles and oral steroids for 3 days. She states that she does not feel any better at this point in time. She states that the cough is hurting her throat. She also feels as though her ears are starting to plug. She has also started taking OTC Robitussin DM.    Past Medical History    Allergies: Oxycodone hcl, Flu virus vaccine, Ibuprofen, Morphine, Nsaids, Percocet [perloxx], Sulfa drugs, and Toradol  Past Medical History:   Diagnosis Date   • Anemia     Pt states she has no history of anemia   • Anesthesia     severe nausea and vomiting with morphine   • Anxiety    • ASTHMA 05/2017    Managed with PRN inhaler, last use was in March 2017   • Cholelithiasis 2017    Status post lap adeline   • Dental disorder     dentures    • GERD (gastroesophageal reflux disease)    • Heart burn     Resolved with gastric surgeries.   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    • Kidney disease     kidney stones    • Migraine     much less since losing weight, 1-2 times per month   • Morbid obesity (HCC) 5/12/2017   • Muscle disorder    • Postural hypotension     EMR   • Psychiatric problem     anxiety    • Radicular pain of left lower extremity 5/29/2019   • Status post gastric surgery 5/29/2019    DATE OF OPERATION: 5/12/2017 POSTOPERATIVE DIAGNOSIS: Morbid  Obesity with medical sequelae; Hiatal hernia OPERATION PERFORMED: 1.  Laparoscopic Isiah-en-Y Gastric Bypass  3. Hiatal hernia repair SURGEON: Shelton Jackson MD   • Urinary bladder disorder    • Urinary incontinence      Past Surgical History:   Procedure Laterality Date   • PB ANTER COLPORRHAPHY,BLAD/VAGINA N/A 8/12/2020    Procedure: COLPORRHAPHY, ANTERIOR, TENSION FREE VAGINAL TAPE, PERINORRHAPHY;  Surgeon: Maria Elena Zhou M.D.;  Location: SURGERY SAME DAY [facility] ORS;  Service: Obstetrics   • PB POST COLPORRHAPHY,RECTUM/VAGINA N/A 8/12/2020    Procedure: COLPORRHAPHY, POSTERIOR;  Surgeon: Maria Elena Zhou M.D.;  Location: SURGERY SAME DAY [facility] ORS;  Service: Obstetrics   • VAGINAL HYSTERECTOMY TOTAL N/A 8/12/2020    Procedure: HYSTERECTOMY, TOTAL, VAGINAL;  Surgeon: Maria Elena Zhou M.D.;  Location: SURGERY SAME DAY [facility] ORS;  Service: Obstetrics   • SALPINGECTOMY Bilateral 8/12/2020    Procedure: SALPINGECTOMY;  Surgeon: Maria Elena Zhou M.D.;  Location: SURGERY SAME DAY [facility] ORS;  Service: Obstetrics   • CYSTOSCOPY N/A 8/12/2020    Procedure: CYSTOSCOPY;  Surgeon: Maria Elena Zhou M.D.;  Location: SURGERY SAME DAY [facility] ORS;  Service: Obstetrics   • LITHOTRIPSY  06/2020   • LUIS BY LAPAROSCOPY  8/9/2017    Procedure: LUIS BY LAPAROSCOPY;  Surgeon: Shelton Jackson M.D.;  Location: SURGERY [facility] ORS;  Service:    • GASTRIC BYPASS LAPAROSCOPIC  5/12/2017    Procedure: GASTRIC BYPASS LAPAROSCOPIC - ISIAH EN Y AND HIATAL HERNIA REPAIR;  Surgeon: Shelton Jackson M.D.;  Location: SURGERY [facility] ORS;  Service:    • OTHER ABDOMINAL SURGERY  10/12/15    vertical gastric sleeve     Current Outpatient Medications Ordered in Epic   Medication Sig Dispense Refill   • albuterol 108 (90 Base) MCG/ACT Aero Soln inhalation aerosol Inhale 2 Puffs every 6 hours as needed for Shortness of Breath. 8.5 g 2   • methylPREDNISolone (MEDROL DOSEPAK) 4 MG Tablet Therapy Pack As directed  on the packaging label. 21 tablet 0   • omeprazole (PRILOSEC) 40 MG delayed-release capsule Take 1 capsule by mouth every day. 90 capsule 3   • DULoxetine (CYMBALTA) 60 MG Cap DR Particles delayed-release capsule Take 1 Cap by mouth 2 times a day. Take 1 capsule by mouth daily 180 Cap 1   • HYDROcodone-acetaminophen (NORCO) 5-325 MG Tab per tablet hydrocodone 5 mg-acetaminophen 325 mg tablet     • Biotin 90440 MCG Tab Take 2 Tabs by mouth every morning.       No current [facility]-ordered facility-administered medications on file.     Family History:    Family History   Problem Relation Age of Onset   • Kidney Disease Mother         Stage 3-4   • Hypertension Mother    • Other Mother         cervical dysplasia treated by cryo   • Diabetes Father    • Heart Attack Father 61        Status post triple bypass   • Hypertension Father    • Cancer Cousin 21        Benign breast cancer   • Breast Cancer Other         Maternal grandmother's sister      Personal/Social History:    Social History     Tobacco Use   • Smoking status: Never Smoker   • Smokeless tobacco: Never Used   Vaping Use   • Vaping Use: Never used   Substance Use Topics   • Alcohol use: Yes     Comment: Rarely   • Drug use: No     Social History     Social History Narrative    Lives in a house in [place] with her , has 2 biological children, has custody of her niece, reports managing fine at home.    Has cats, dogs, and lizards as pets.    Works a warehouse job, is on her feet for most of the day.      Consumes a healthy diet consisting of fruits, vegetables, white meats and home cooked meals.     Walks at least 10,000 steps per day currently.         Review of Systems:   General: Negative for fever/chills and expected weight change.   Respiratory:  Positive for cough.  Negative for dyspnea.    Cardiovascular:  Negative for chest pain and palpitations.  Gastrointestinal:  Negative for nausea/vomiting, changes in bowel habits, and abdominal pain.   Skin:   "Negative for rash.   Neurological:  Negative for numbness/tingling and headaches.     Objective  Physical Exam:   Resp 16   Ht 1.6 m (5' 3\")   Wt 92.5 kg (204 lb)  Body mass index is 36.14 kg/m².  Constitutional: Alert, no distress, well-groomed.  Skin: No rashes in visible areas.  Eye: Round. Conjunctiva clear, lids normal. No icterus.   ENMT: Lips pink without lesions, good dentition, moist mucous membranes. Phonation normal.  Neck: No masses, no thyromegaly on visible inspection. Moves freely without pain.  Respiratory: Unlabored respiratory effort, no cough or audible wheeze  Psych: Alert and oriented x3, normal affect and mood.       Assessment/Plan  1. Bronchitis  Acute and ongoing.  Continue to take Z-Pack that urgent care prescribed.  Discussed using Albuterol inhaler as needed.  Take Medrol Dosepak as prescribed.  - albuterol 108 (90 Base) MCG/ACT Aero Soln inhalation aerosol; Inhale 2 Puffs every 6 hours as needed for Shortness of Breath.  Dispense: 8.5 g; Refill: 2  - methylPREDNISolone (MEDROL DOSEPAK) 4 MG Tablet Therapy Pack; As directed on the packaging label.  Dispense: 21 tablet; Refill: 0      Health Maintenance: Completed    Return if symptoms worsen or fail to improve.    Please note that this dictation was created using voice recognition software. I have made every reasonable attempt to correct obvious errors, but I expect that there are errors of grammar and possibly content that I did not discover before finalizing the note.    DIMAS Duncan  [facility]  "

## 2021-07-06 NOTE — LETTER
July 6, 2021    To Whom It May Concern:         This is confirmation that Farhana Freeman attended her scheduled appointment with JESSICA Sharma on 7/06/21. She should stay home from work for at least two days. She can return to work on 7/09/21.         If you have any questions please do not hesitate to call me at the phone number listed below.    Sincerely,          DELL Sharma.  [phone]

## 2021-07-13 ENCOUNTER — TELEMEDICINE (OUTPATIENT)
Dept: MEDICAL GROUP | Facility: PHYSICIAN GROUP | Age: 41
End: 2021-07-13
Payer: COMMERCIAL

## 2021-07-13 VITALS — BODY MASS INDEX: 36.8 KG/M2 | WEIGHT: 200 LBS | HEIGHT: 62 IN | RESPIRATION RATE: 20 BRPM

## 2021-07-13 DIAGNOSIS — R11.2 NON-INTRACTABLE VOMITING WITH NAUSEA: ICD-10-CM

## 2021-07-13 PROCEDURE — 99213 OFFICE O/P EST LOW 20 MIN: CPT | Mod: 95 | Performed by: NURSE PRACTITIONER

## 2021-07-13 RX ORDER — PROMETHAZINE HYDROCHLORIDE 25 MG/1
25 TABLET ORAL EVERY 6 HOURS PRN
Qty: 30 TABLET | Refills: 0 | Status: SHIPPED | OUTPATIENT
Start: 2021-07-13 | End: 2022-04-29

## 2021-07-13 RX ORDER — MECLIZINE HYDROCHLORIDE 25 MG/1
TABLET ORAL
COMMUNITY
Start: 2021-06-30 | End: 2021-11-11

## 2021-07-13 RX ORDER — ONDANSETRON 4 MG/1
TABLET, FILM COATED ORAL
COMMUNITY
Start: 2021-06-30 | End: 2021-07-23

## 2021-07-13 RX ORDER — TIZANIDINE 4 MG/1
TABLET ORAL 3 TIMES DAILY PRN
COMMUNITY
Start: 2021-06-30

## 2021-07-13 RX ORDER — BENZONATATE 200 MG/1
CAPSULE ORAL
COMMUNITY
Start: 2021-07-03 | End: 2021-07-23

## 2021-07-13 RX ORDER — DEXAMETHASONE 4 MG/1
TABLET ORAL
COMMUNITY
Start: 2021-07-03 | End: 2021-07-23

## 2021-07-13 RX ORDER — AZITHROMYCIN 250 MG/1
TABLET, FILM COATED ORAL
COMMUNITY
Start: 2021-07-06 | End: 2021-07-23

## 2021-07-13 ASSESSMENT — FIBROSIS 4 INDEX: FIB4 SCORE: 0.63

## 2021-07-13 NOTE — LETTER
July 13, 2021    To Whom It May Concern:         This is confirmation that Farhana Freeman attended her scheduled appointment with JESSICA Sharma on 7/13/21. She was unable to work on 7/13/21 due to her current illness. She may return to work on 7/15/21.         If you have any questions please do not hesitate to call me at the phone number listed below.    Sincerely,          DELL Sharma.  [phone]

## 2021-07-13 NOTE — PROGRESS NOTES
Virtual Visit: Established Patient   This visit was conducted via Zoom using secure and encrypted videoconferencing technology. The patient was in a private location in the state of [place].    The patient's identity was confirmed and verbal consent was obtained for this virtual visit.    Subjective  Chief Complaint  Food Poisoning    History of Present Illness  Farhana Freeman is a 41 y.o. female. This established patient is here today to discuss nausea and vomiting from possible food poisoning.    Non-intractable vomiting with nausea  Acute and ongoing. She went to [place] on Sunday evening and ate at the [employer] there. She states that by the end of the day Sunday she started not to feel good. She states that on Monday she was vomiting for the majority of the day. She states that she does have some intense cramping of her stomach. She did have some Zofran at her house that she was able to use. She states that it helped a little bit. She is still unable to hold down a lot of food. She has not been able to eat much of anything.    Past Medical History    Allergies: Oxycodone hcl, Flu virus vaccine, Ibuprofen, Morphine, Nsaids, Percocet [perloxx], Sulfa drugs, and Toradol  Past Medical History:   Diagnosis Date   • Anemia     Pt states she has no history of anemia   • Anesthesia     severe nausea and vomiting with morphine   • Anxiety    • ASTHMA 05/2017    Managed with PRN inhaler, last use was in March 2017   • Cholelithiasis 2017    Status post lap adeline   • Dental disorder     dentures    • GERD (gastroesophageal reflux disease)    • Heart burn     Resolved with gastric surgeries.   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    • Kidney disease     kidney stones    • Migraine     much less since losing weight, 1-2 times per month   • Morbid obesity (HCC) 5/12/2017   • Muscle disorder    • Postural hypotension     EMR   • Psychiatric problem     anxiety    •  Radicular pain of left lower extremity 5/29/2019   • Status post gastric surgery 5/29/2019    DATE OF OPERATION: 5/12/2017 POSTOPERATIVE DIAGNOSIS: Morbid Obesity with medical sequelae; Hiatal hernia OPERATION PERFORMED: 1.  Laparoscopic Isiah-en-Y Gastric Bypass  3. Hiatal hernia repair SURGEON: Shelton Jackson MD   • Urinary bladder disorder    • Urinary incontinence      Past Surgical History:   Procedure Laterality Date   • PB ANTER COLPORRHAPHY,BLAD/VAGINA N/A 8/12/2020    Procedure: COLPORRHAPHY, ANTERIOR, TENSION FREE VAGINAL TAPE, PERINORRHAPHY;  Surgeon: Maria Elena Zhou M.D.;  Location: SURGERY SAME DAY [facility] ORS;  Service: Obstetrics   • PB POST COLPORRHAPHY,RECTUM/VAGINA N/A 8/12/2020    Procedure: COLPORRHAPHY, POSTERIOR;  Surgeon: Maria Elena Zhou M.D.;  Location: SURGERY SAME DAY [facility] ORS;  Service: Obstetrics   • VAGINAL HYSTERECTOMY TOTAL N/A 8/12/2020    Procedure: HYSTERECTOMY, TOTAL, VAGINAL;  Surgeon: Maria Elena Zhou M.D.;  Location: SURGERY SAME DAY [facility] ORS;  Service: Obstetrics   • SALPINGECTOMY Bilateral 8/12/2020    Procedure: SALPINGECTOMY;  Surgeon: Maria Elena Zhou M.D.;  Location: SURGERY SAME DAY [facility] ORS;  Service: Obstetrics   • CYSTOSCOPY N/A 8/12/2020    Procedure: CYSTOSCOPY;  Surgeon: Maria Elena Zhou M.D.;  Location: SURGERY SAME DAY [facility] ORS;  Service: Obstetrics   • LITHOTRIPSY  06/2020   • LUIS BY LAPAROSCOPY  8/9/2017    Procedure: LUIS BY LAPAROSCOPY;  Surgeon: Shelton Jackson M.D.;  Location: SURGERY [facility] ORS;  Service:    • GASTRIC BYPASS LAPAROSCOPIC  5/12/2017    Procedure: GASTRIC BYPASS LAPAROSCOPIC - ISIAH EN Y AND HIATAL HERNIA REPAIR;  Surgeon: Shelton Jackson M.D.;  Location: SURGERY [facility] ORS;  Service:    • OTHER ABDOMINAL SURGERY  10/12/15    vertical gastric sleeve     Current Outpatient Medications Ordered in Epic   Medication Sig Dispense Refill   • benzonatate (TESSALON) 200 MG capsule TAKE 1 CAPSULE BY  MOUTH THREE TIMES A DAY AS NEEDED FOR COUGH     • dexamethasone (DECADRON) 4 MG Tab TAKE 2 TABLETS BY MOUTH EVERY DAY FOR 3 DAYS FOR THROAT SWELLING, HOARSENESS     • meclizine (ANTIVERT) 25 MG Tab TAKE 25MG BY MOUTH EVERY 4 HOURS AS NEEDED FOR DIZZINESS     • ondansetron (ZOFRAN) 4 MG Tab tablet TAKE 1 TAB BY MOUTH EVERY 8 HOURS FOR 4 DAYS     • tizanidine (ZANAFLEX) 4 MG Tab Take  by mouth 3 times a day as needed. Take 1 tablet by mouth three times a day as needed     • promethazine (PHENERGAN) 25 MG Tab Take 1 tablet by mouth every 6 hours as needed for Nausea/Vomiting. 30 tablet 0   • albuterol 108 (90 Base) MCG/ACT Aero Soln inhalation aerosol Inhale 2 Puffs every 6 hours as needed for Shortness of Breath. 8.5 g 2   • methylPREDNISolone (MEDROL DOSEPAK) 4 MG Tablet Therapy Pack As directed on the packaging label. 21 tablet 0   • omeprazole (PRILOSEC) 40 MG delayed-release capsule Take 1 capsule by mouth every day. 90 capsule 3   • DULoxetine (CYMBALTA) 60 MG Cap DR Particles delayed-release capsule Take 1 Cap by mouth 2 times a day. Take 1 capsule by mouth daily 180 Cap 1   • HYDROcodone-acetaminophen (NORCO) 5-325 MG Tab per tablet hydrocodone 5 mg-acetaminophen 325 mg tablet     • Biotin 36592 MCG Tab Take 2 Tabs by mouth every morning.     • azithromycin (ZITHROMAX) 250 MG Tab TAKE 2 TABLETS BY MOUTH TODAY, THEN TAKE 1 TABLET DAILY FOR 4 DAYS (Patient not taking: Reported on 7/13/2021)       No current [facility]-ordered facility-administered medications on file.     Family History:    Family History   Problem Relation Age of Onset   • Kidney Disease Mother         Stage 3-4   • Hypertension Mother    • Other Mother         cervical dysplasia treated by cryo   • Diabetes Father    • Heart Attack Father 61        Status post triple bypass   • Hypertension Father    • Cancer Cousin 21        Benign breast cancer   • Breast Cancer Other         Maternal grandmother's sister      Personal/Social History:    Social  "History     Tobacco Use   • Smoking status: Never Smoker   • Smokeless tobacco: Never Used   Vaping Use   • Vaping Use: Never used   Substance Use Topics   • Alcohol use: Yes     Comment: Rarely   • Drug use: No     Social History     Social History Narrative    Lives in a house in [place] with her , has 2 biological children, has custody of her niece, reports managing fine at home.    Has cats, dogs, and lizards as pets.    Works a warehouse job, is on her feet for most of the day.      Consumes a healthy diet consisting of fruits, vegetables, white meats and home cooked meals.     Walks at least 10,000 steps per day currently.         Review of Systems:   General: Negative for fever/chills and expected weight change.   Respiratory:  Negative for cough and dyspnea.    Cardiovascular:  Negative for chest pain and palpitations.  Gastrointestinal:  Positive for nausea/vomiting.  Negative for changes in bowel habits and abdominal pain.   Skin:  Negative for rash.   Neurological:  Negative for numbness/tingling and headaches.     Objective  Physical Exam:   Resp 20   Ht 1.575 m (5' 2\")   Wt 90.7 kg (200 lb)  Body mass index is 36.58 kg/m².  Constitutional: Alert, no distress, well-groomed.  Skin: No rashes in visible areas.  Eye: Round. Conjunctiva clear, lids normal. No icterus.   ENMT: Lips pink without lesions, good dentition, moist mucous membranes. Phonation normal.  Neck: No masses, no thyromegaly on visible inspection. Moves freely without pain.  Respiratory: Unlabored respiratory effort, no cough or audible wheeze  Psych: Alert and oriented x3, normal affect and mood.       Assessment/Plan  1. Non-intractable vomiting with nausea  Acute and ongoing.  Take Promethazine 25 mg as needed every 6 hours for nausea/vomiting.  Educated to eat a bland diet as she is starting to feel better. Educated to drink something with electrolytes in it since she is vomiting.  A letter was provided to her for missing work.  - " promethazine (PHENERGAN) 25 MG Tab; Take 1 tablet by mouth every 6 hours as needed for Nausea/Vomiting.  Dispense: 30 tablet; Refill: 0      Health Maintenance: Completed    Return if symptoms worsen or fail to improve.    Please note that this dictation was created using voice recognition software. I have made every reasonable attempt to correct obvious errors, but I expect that there are errors of grammar and possibly content that I did not discover before finalizing the note.    DIMAS Duncan  [facility]

## 2021-07-13 NOTE — ASSESSMENT & PLAN NOTE
Acute and ongoing. She went to [place] on Sunday evening and ate at the [employer] there. She states that by the end of the day Sunday she started not to feel good. She states that on Monday she was vomiting for the majority of the day. She states that she does have some intense cramping of her stomach. She did have some Zofran at her house that she was able to use. She states that it helped a little bit. She is still unable to hold down a lot of food. She has not been able to eat much of anything.

## 2021-07-23 ENCOUNTER — TELEMEDICINE (OUTPATIENT)
Dept: MEDICAL GROUP | Facility: PHYSICIAN GROUP | Age: 41
End: 2021-07-23
Payer: COMMERCIAL

## 2021-07-23 VITALS — HEIGHT: 63 IN | RESPIRATION RATE: 16 BRPM | BODY MASS INDEX: 36.32 KG/M2 | WEIGHT: 205 LBS

## 2021-07-23 DIAGNOSIS — J40 BRONCHITIS: ICD-10-CM

## 2021-07-23 DIAGNOSIS — R09.89 CHEST CONGESTION: ICD-10-CM

## 2021-07-23 PROCEDURE — 99214 OFFICE O/P EST MOD 30 MIN: CPT | Mod: 95 | Performed by: NURSE PRACTITIONER

## 2021-07-23 RX ORDER — MECLIZINE HYDROCHLORIDE 25 MG/1
25 TABLET ORAL 3 TIMES DAILY PRN
Qty: 30 TABLET | Refills: 0 | Status: SHIPPED | OUTPATIENT
Start: 2021-07-23 | End: 2022-04-29

## 2021-07-23 RX ORDER — AZITHROMYCIN 1 G/1
1 POWDER, FOR SUSPENSION ORAL ONCE
Qty: 1 EACH | Refills: 0 | Status: SHIPPED | OUTPATIENT
Start: 2021-07-23 | End: 2021-07-23

## 2021-07-23 ASSESSMENT — FIBROSIS 4 INDEX: FIB4 SCORE: 0.63

## 2021-07-23 NOTE — PROGRESS NOTES
Virtual Visit: Established Patient   This visit was conducted via Zoom using secure and encrypted videoconferencing technology. The patient was in a private location in the state of [place].    The patient's identity was confirmed and verbal consent was obtained for this virtual visit.    Subjective  Chief Complaint  Chest congestion    History of Present Illness  Farhana Freeman is a 41 y.o. female. This established patient is here today to discuss chest congestion.    Chest congestion  Acute and ongoing. She did have Bronchitis on 7/3/2021 She states that her chest is starting to get congestion again in it. She also has noticed that her throat is starting to hurt. Her voice is also starting to be more raspy. She finished a course of medication two weeks ago for Bronchitis. She has tried OTC medication and her symptoms are not improving. She notes that she will also get dizzy from the congestion when she first stands up. She has been taking Meclizine 25 mg as needed. She states that the medication is working well for her dizziness.    Past Medical History    Allergies: Oxycodone hcl, Flu virus vaccine, Ibuprofen, Morphine, Nsaids, Percocet [perloxx], Sulfa drugs, and Toradol  Past Medical History:   Diagnosis Date   • Anemia     Pt states she has no history of anemia   • Anesthesia     severe nausea and vomiting with morphine   • Anxiety    • ASTHMA 05/2017    Managed with PRN inhaler, last use was in March 2017   • Cholelithiasis 2017    Status post lap adeline   • Dental disorder     dentures    • GERD (gastroesophageal reflux disease)    • Heart burn     Resolved with gastric surgeries.   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    • Kidney disease     kidney stones    • Migraine     much less since losing weight, 1-2 times per month   • Morbid obesity (HCC) 5/12/2017   • Muscle disorder    • Postural hypotension     EMR   • Psychiatric problem     anxiety     • Radicular pain of left lower extremity 5/29/2019   • Status post gastric surgery 5/29/2019    DATE OF OPERATION: 5/12/2017 POSTOPERATIVE DIAGNOSIS: Morbid Obesity with medical sequelae; Hiatal hernia OPERATION PERFORMED: 1.  Laparoscopic Isiah-en-Y Gastric Bypass  3. Hiatal hernia repair SURGEON: Shelton Jackson MD   • Urinary bladder disorder    • Urinary incontinence      Past Surgical History:   Procedure Laterality Date   • PB ANTER COLPORRHAPHY,BLAD/VAGINA N/A 8/12/2020    Procedure: COLPORRHAPHY, ANTERIOR, TENSION FREE VAGINAL TAPE, PERINORRHAPHY;  Surgeon: Maria Elena Zhou M.D.;  Location: SURGERY SAME DAY [facility] ORS;  Service: Obstetrics   • PB POST COLPORRHAPHY,RECTUM/VAGINA N/A 8/12/2020    Procedure: COLPORRHAPHY, POSTERIOR;  Surgeon: Maria Elena Zhou M.D.;  Location: SURGERY SAME DAY [facility] ORS;  Service: Obstetrics   • VAGINAL HYSTERECTOMY TOTAL N/A 8/12/2020    Procedure: HYSTERECTOMY, TOTAL, VAGINAL;  Surgeon: Maria Elena Zhou M.D.;  Location: SURGERY SAME DAY [facility] ORS;  Service: Obstetrics   • SALPINGECTOMY Bilateral 8/12/2020    Procedure: SALPINGECTOMY;  Surgeon: Maria Elena Zhou M.D.;  Location: SURGERY SAME DAY [facility] ORS;  Service: Obstetrics   • CYSTOSCOPY N/A 8/12/2020    Procedure: CYSTOSCOPY;  Surgeon: Maria Elena Zhou M.D.;  Location: SURGERY SAME DAY [facility] ORS;  Service: Obstetrics   • LITHOTRIPSY  06/2020   • LUIS BY LAPAROSCOPY  8/9/2017    Procedure: LUIS BY LAPAROSCOPY;  Surgeon: Shelton Jackson M.D.;  Location: SURGERY [facility] OR[facility];  Service:    • GASTRIC BYPASS LAPAROSCOPIC  5/12/2017    Procedure: GASTRIC BYPASS LAPAROSCOPIC - ISIAH EN Y AND HIATAL HERNIA REPAIR;  Surgeon: Shelton Jackson M.D.;  Location: SURGERY [facility] ORS;  Service:    • OTHER ABDOMINAL SURGERY  10/12/15    vertical gastric sleeve     Current Outpatient Medications Ordered in Epic   Medication Sig Dispense Refill   • azithromycin (ZITHROMAX) 1 GM powder Take 1 Packet  by mouth one time for 1 dose. 1 Each 0   • meclizine (ANTIVERT) 25 MG Tab Take 1 tablet by mouth 3 times a day as needed. 30 tablet 0   • meclizine (ANTIVERT) 25 MG Tab TAKE 25MG BY MOUTH EVERY 4 HOURS AS NEEDED FOR DIZZINESS     • tizanidine (ZANAFLEX) 4 MG Tab Take  by mouth 3 times a day as needed. Take 1 tablet by mouth three times a day as needed     • promethazine (PHENERGAN) 25 MG Tab Take 1 tablet by mouth every 6 hours as needed for Nausea/Vomiting. 30 tablet 0   • albuterol 108 (90 Base) MCG/ACT Aero Soln inhalation aerosol Inhale 2 Puffs every 6 hours as needed for Shortness of Breath. 8.5 g 2   • omeprazole (PRILOSEC) 40 MG delayed-release capsule Take 1 capsule by mouth every day. 90 capsule 3   • DULoxetine (CYMBALTA) 60 MG Cap DR Particles delayed-release capsule Take 1 Cap by mouth 2 times a day. Take 1 capsule by mouth daily 180 Cap 1   • HYDROcodone-acetaminophen (NORCO) 5-325 MG Tab per tablet hydrocodone 5 mg-acetaminophen 325 mg tablet     • Biotin 90897 MCG Tab Take 2 Tabs by mouth every morning.       No current [facility]-ordered facility-administered medications on file.     Family History:    Family History   Problem Relation Age of Onset   • Kidney Disease Mother         Stage 3-4   • Hypertension Mother    • Other Mother         cervical dysplasia treated by cryo   • Diabetes Father    • Heart Attack Father 61        Status post triple bypass   • Hypertension Father    • Cancer Cousin 21        Benign breast cancer   • Breast Cancer Other         Maternal grandmother's sister      Personal/Social History:    Social History     Tobacco Use   • Smoking status: Never Smoker   • Smokeless tobacco: Never Used   Vaping Use   • Vaping Use: Never used   Substance Use Topics   • Alcohol use: Yes     Comment: Rarely   • Drug use: No     Social History     Social History Narrative    Lives in a house in [place] with her , has 2 biological children, has custody of her niece, reports managing fine at  "home.    Has cats, dogs, and lizards as pets.    Works a warehouse job, is on her feet for most of the day.      Consumes a healthy diet consisting of fruits, vegetables, white meats and home cooked meals.     Walks at least 10,000 steps per day currently.         Review of Systems:   General: Negative for fever/chills and expected weight change.   Respiratory:  Negative for cough and dyspnea.  Positive for chest congestion.  Cardiovascular:  Negative for chest pain and palpitations.  Gastrointestinal:  Negative for nausea/vomiting, changes in bowel habits, and abdominal pain.   Skin:  Negative for rash.   Neurological:  Negative for numbness/tingling and headaches.     Objective  Physical Exam:   Resp 16   Ht 1.6 m (5' 3\")   Wt 93 kg (205 lb)  Body mass index is 36.31 kg/m².  Constitutional: Alert, no distress, well-groomed.  Skin: No rashes in visible areas.  Eye: Round. Conjunctiva clear, lids normal. No icterus.   ENMT: Lips pink without lesions, good dentition, moist mucous membranes. Phonation normal.  Neck: No masses, no thyromegaly on visible inspection. Moves freely without pain.  Respiratory: Unlabored respiratory effort, no cough or audible wheeze  Psych: Alert and oriented x3, normal affect and mood.       Assessment/Plan  1. Chest congestion  2. Bronchitis  Acute and ongoing.  She has been dealing with bronchitis on and off since the start of July. She was given a Z pack and a Medrol Dose pack which did help. She states that it helped but she is starting to get the symptoms back again.  Take Z Pack as prescribed.  Take Meclizine 25 mg as needed up to three times a day for dizziness.  - azithromycin (ZITHROMAX) 1 GM powder; Take 1 Packet by mouth one time for 1 dose.  Dispense: 1 Each; Refill: 0  - meclizine (ANTIVERT) 25 MG Tab; Take 1 tablet by mouth 3 times a day as needed.  Dispense: 30 tablet; Refill: 0      Health Maintenance: Completed    Return if symptoms worsen or fail to improve.    Please " note that this dictation was created using voice recognition software. I have made every reasonable attempt to correct obvious errors, but I expect that there are errors of grammar and possibly content that I did not discover before finalizing the note.    DIMAS Duncan  [facility]

## 2021-07-23 NOTE — ASSESSMENT & PLAN NOTE
Acute and ongoing. She did have Bronchitis on 7/3/2021 She states that her chest is starting to get congestion again in it. She also has noticed that her throat is starting to hurt. Her voice is also starting to be more raspy. She finished a course of medication two weeks ago for Bronchitis. She has tried OTC medication and her symptoms are not improving. She notes that she will also get dizzy from the congestion when she first stands up. She has been taking Meclizine 25 mg as needed. She states that the medication is working well for her dizziness.

## 2021-07-23 NOTE — LETTER
July 23, 2021    To Whom It May Concern:         This is confirmation that Farhana Freeman attended her scheduled appointment with DENY SharmaRJUICE on 7/23/21. Please excuse her from work on 7/22/21 and 7/23/21.         If you have any questions please do not hesitate to call me at the phone number listed below.    Sincerely,          LENNY Sharma.P.R.N.  [phone]

## 2021-07-28 ENCOUNTER — OFFICE VISIT (OUTPATIENT)
Dept: MEDICAL GROUP | Facility: PHYSICIAN GROUP | Age: 41
End: 2021-07-28
Payer: COMMERCIAL

## 2021-07-28 ENCOUNTER — HOSPITAL ENCOUNTER (OUTPATIENT)
Facility: MEDICAL CENTER | Age: 41
End: 2021-07-28
Attending: NURSE PRACTITIONER
Payer: COMMERCIAL

## 2021-07-28 VITALS
RESPIRATION RATE: 16 BRPM | BODY MASS INDEX: 37.03 KG/M2 | DIASTOLIC BLOOD PRESSURE: 62 MMHG | TEMPERATURE: 98.1 F | WEIGHT: 209 LBS | SYSTOLIC BLOOD PRESSURE: 110 MMHG | HEART RATE: 80 BPM | OXYGEN SATURATION: 98 % | HEIGHT: 63 IN

## 2021-07-28 DIAGNOSIS — H92.01 RIGHT EAR PAIN: ICD-10-CM

## 2021-07-28 DIAGNOSIS — R05.9 COUGH: ICD-10-CM

## 2021-07-28 DIAGNOSIS — H65.491 OTHER CHRONIC NONSUPPURATIVE OTITIS MEDIA OF RIGHT EAR: ICD-10-CM

## 2021-07-28 PROCEDURE — 99214 OFFICE O/P EST MOD 30 MIN: CPT | Performed by: NURSE PRACTITIONER

## 2021-07-28 PROCEDURE — U0005 INFEC AGEN DETEC AMPLI PROBE: HCPCS

## 2021-07-28 PROCEDURE — U0003 INFECTIOUS AGENT DETECTION BY NUCLEIC ACID (DNA OR RNA); SEVERE ACUTE RESPIRATORY SYNDROME CORONAVIRUS 2 (SARS-COV-2) (CORONAVIRUS DISEASE [COVID-19]), AMPLIFIED PROBE TECHNIQUE, MAKING USE OF HIGH THROUGHPUT TECHNOLOGIES AS DESCRIBED BY CMS-2020-01-R: HCPCS

## 2021-07-28 RX ORDER — CODEINE PHOSPHATE AND GUAIFENESIN 10; 100 MG/5ML; MG/5ML
5 SOLUTION ORAL EVERY 4 HOURS PRN
Qty: 420 ML | Refills: 0 | Status: SHIPPED | OUTPATIENT
Start: 2021-07-28 | End: 2021-08-11

## 2021-07-28 RX ORDER — AMOXICILLIN AND CLAVULANATE POTASSIUM 875; 125 MG/1; MG/1
1 TABLET, FILM COATED ORAL 2 TIMES DAILY
Qty: 14 TABLET | Refills: 0 | Status: SHIPPED | OUTPATIENT
Start: 2021-07-28 | End: 2021-08-04

## 2021-07-28 ASSESSMENT — FIBROSIS 4 INDEX: FIB4 SCORE: 0.63

## 2021-07-28 NOTE — ASSESSMENT & PLAN NOTE
Chronic and ongoing. She has been having pain in her right ear for at least a month. She states she gest a sensation that her ear is opening and closing all day long. She has been treated twice with a Z pack for Bronchitis. She still have head fullness, ear pain and a cough.

## 2021-07-28 NOTE — ASSESSMENT & PLAN NOTE
Chronic and ongoing. She has had a cough for at least a month. She was told at the Urgent Care that she had Bronchitis. She was treated with antibiotics two separate times. She states that her cough continues. She states that nothing has helped with her symptoms. She also states that her head feels extremely congested.

## 2021-07-29 DIAGNOSIS — R05.9 COUGH: ICD-10-CM

## 2021-07-29 LAB
COVID ORDER STATUS COVID19: NORMAL
SARS-COV-2 RNA RESP QL NAA+PROBE: NOTDETECTED
SPECIMEN SOURCE: NORMAL

## 2021-08-03 DIAGNOSIS — B37.9 YEAST INFECTION: ICD-10-CM

## 2021-08-03 RX ORDER — FLUCONAZOLE 100 MG/1
100 TABLET ORAL ONCE
Qty: 1 TABLET | Refills: 0 | Status: SHIPPED | OUTPATIENT
Start: 2021-08-03 | End: 2021-08-03

## 2021-08-03 NOTE — PROGRESS NOTES
1. Yeast infection  - fluconazole (DIFLUCAN) 100 MG Tab; Take 1 tablet by mouth one time for 1 dose.  Dispense: 1 tablet; Refill: 0

## 2021-08-12 DIAGNOSIS — K21.9 GASTROESOPHAGEAL REFLUX DISEASE, UNSPECIFIED WHETHER ESOPHAGITIS PRESENT: ICD-10-CM

## 2021-08-12 NOTE — PROGRESS NOTES
1. Gastroesophageal reflux disease, unspecified whether esophagitis present  - REFERRAL TO GASTROENTEROLOGY

## 2021-10-26 ENCOUNTER — APPOINTMENT (OUTPATIENT)
Dept: MEDICAL GROUP | Facility: PHYSICIAN GROUP | Age: 41
End: 2021-10-26
Payer: COMMERCIAL

## 2021-10-26 RX ORDER — HYDROCODONE BITARTRATE AND ACETAMINOPHEN 7.5; 325 MG/1; MG/1
TABLET ORAL
COMMUNITY
Start: 2021-10-25 | End: 2024-03-04

## 2021-10-26 RX ORDER — AMOXICILLIN 400 MG/5ML
POWDER, FOR SUSPENSION ORAL
COMMUNITY
Start: 2021-08-30 | End: 2021-11-11

## 2021-10-26 RX ORDER — CHLORHEXIDINE GLUCONATE ORAL RINSE 1.2 MG/ML
SOLUTION DENTAL
COMMUNITY
Start: 2021-08-30 | End: 2021-12-22

## 2021-10-26 RX ORDER — DULOXETINE 40 MG/1
CAPSULE, DELAYED RELEASE ORAL
COMMUNITY
Start: 2021-10-25 | End: 2021-11-11

## 2021-10-26 RX ORDER — PREGABALIN 150 MG/1
CAPSULE ORAL 2 TIMES DAILY
COMMUNITY
Start: 2021-09-28 | End: 2022-06-28

## 2021-10-27 NOTE — DISCHARGE INSTRUCTIONS
ACTIVITY: Rest and take it easy for the first 24 hours.  A responsible adult is recommended to remain with you during that time.  It is normal to feel sleepy.  We encourage you to not do anything that requires balance, judgment or coordination.    MILD FLU-LIKE SYMPTOMS ARE NORMAL. YOU MAY EXPERIENCE GENERALIZED MUSCLE ACHES, THROAT IRRITATION, HEADACHE AND/OR SOME NAUSEA.    FOR 24 HOURS DO NOT:  Drive, operate machinery or run household appliances.  Drink beer or alcoholic beverages.   Make important decisions or sign legal documents.    SPECIAL INSTRUCTIONS: SEE ATTACHED SHEET.    DIET: To avoid nausea, slowly advance diet as tolerated, avoiding spicy or greasy foods for the first day.  Add more substantial food to your diet according to your physician's instructions.  Babies can be fed formula or breast milk as soon as they are hungry.  INCREASE FLUIDS AND FIBER TO AVOID CONSTIPATION.    SURGICAL DRESSING/BATHING: Okay to shower tomorrow.  No hot tubs, no tub baths, and no swimming until cleared by your doctor.    FOLLOW-UP APPOINTMENT:  A follow-up appointment should be arranged with your doctor; call to schedule.    You should CALL YOUR PHYSICIAN if you develop:  Fever greater than 101 degrees F.  Pain not relieved by medication, or persistent nausea or vomiting.  Excessive bleeding (blood soaking through dressing) or unexpected drainage from the wound.  Extreme redness or swelling around the incision site, drainage of pus or foul smelling drainage.  Inability to urinate or empty your bladder within 8 hours.  Problems with breathing or chest pain.    You should call 911 if you develop problems with breathing or chest pain.  If you are unable to contact your doctor or surgical center, you should go to the nearest emergency room or urgent care center.  Physician's telephone #: Dr Zhou [phone]    If any questions arise, call your doctor.  If your doctor is not available, please feel free to call the  Surgical Center at [phone].  The Center is open Monday through Friday from 7AM to 7PM.  You can also call the HEALTH HOTLINE open 24 hours/day, 7 days/week and speak to a nurse at [phone], or toll free at [phone].    A registered nurse may call you a few days after your surgery to see how you are doing after your procedure.    MEDICATIONS: Resume taking daily medication.  Take prescribed pain medication with food.  If no medication is prescribed, you may take non-aspirin pain medication if needed.  PAIN MEDICATION CAN BE VERY CONSTIPATING.  Take a stool softener or laxative such as senokot, pericolace, or milk of magnesia if needed.    Prescription given at pre-op visit.  Last pain medication given at 10:40am and 11:55am (Hycet).    If your physician has prescribed pain medication that includes Acetaminophen (Tylenol), do not take additional Acetaminophen (Tylenol) while taking the prescribed medication.    Depression / Suicide Risk    As you are discharged from this [facility] facility, it is important to learn how to keep safe from harming yourself.    Recognize the warning signs:  · Abrupt changes in personality, positive or negative- including increase in energy   · Giving away possessions  · Change in eating patterns- significant weight changes-  positive or negative  · Change in sleeping patterns- unable to sleep or sleeping all the time   · Unwillingness or inability to communicate  · Depression  · Unusual sadness, discouragement and loneliness  · Talk of wanting to die  · Neglect of personal appearance   · Rebelliousness- reckless behavior  · Withdrawal from people/activities they love  · Confusion- inability to concentrate     If you or a loved one observes any of these behaviors or has concerns about self-harm, here's what you can do:  · Talk about it- your feelings and reasons for harming yourself  · Remove any means that you might use to hurt yourself (examples: pills, rope,  extension cords, firearm)  · Get professional help from the community (Mental Health, Substance Abuse, psychological counseling)  · Do not be alone:Call your Safe Contact- someone whom you trust who will be there for you.  · Call your local CRISIS HOTLINE 162-9825 or 760-342-0560  · Call your local Children's Mobile Crisis Response Team [place] (384) 071-4458 or www.Clinical Data  · Call the toll free National Suicide Prevention Hotlines   · National Suicide Prevention Lifeline 504-708-TCEL (9331)  · National Hope Line Network 800-SUICIDE (269-3832)   constant/aching

## 2021-11-11 ENCOUNTER — OFFICE VISIT (OUTPATIENT)
Dept: MEDICAL GROUP | Facility: PHYSICIAN GROUP | Age: 41
End: 2021-11-11
Payer: COMMERCIAL

## 2021-11-11 VITALS
HEART RATE: 70 BPM | HEIGHT: 62 IN | RESPIRATION RATE: 20 BRPM | DIASTOLIC BLOOD PRESSURE: 80 MMHG | SYSTOLIC BLOOD PRESSURE: 114 MMHG | WEIGHT: 216 LBS | OXYGEN SATURATION: 100 % | TEMPERATURE: 98.7 F | BODY MASS INDEX: 39.75 KG/M2

## 2021-11-11 DIAGNOSIS — T63.301D SPIDER BITE WOUND, ACCIDENTAL OR UNINTENTIONAL, SUBSEQUENT ENCOUNTER: ICD-10-CM

## 2021-11-11 DIAGNOSIS — B37.9 ANTIBIOTIC-INDUCED YEAST INFECTION: ICD-10-CM

## 2021-11-11 DIAGNOSIS — T36.95XA ANTIBIOTIC-INDUCED YEAST INFECTION: ICD-10-CM

## 2021-11-11 PROBLEM — T63.301A SPIDER BITE: Status: ACTIVE | Noted: 2021-11-11

## 2021-11-11 PROBLEM — Z78.9 UNDER CARE OF PAIN MANAGEMENT SPECIALIST: Status: ACTIVE | Noted: 2021-09-08

## 2021-11-11 PROBLEM — S93.409A SPRAIN OF LATERAL LIGAMENT OF ANKLE JOINT: Status: ACTIVE | Noted: 2021-09-08

## 2021-11-11 PROCEDURE — 99214 OFFICE O/P EST MOD 30 MIN: CPT | Performed by: NURSE PRACTITIONER

## 2021-11-11 RX ORDER — SULFAMETHOXAZOLE AND TRIMETHOPRIM 800; 160 MG/1; MG/1
TABLET ORAL
COMMUNITY
Start: 2021-11-02 | End: 2021-11-11

## 2021-11-11 RX ORDER — SULFAMETHOXAZOLE AND TRIMETHOPRIM 800; 160 MG/1; MG/1
1 TABLET ORAL 2 TIMES DAILY
Qty: 14 TABLET | Refills: 0 | Status: SHIPPED | OUTPATIENT
Start: 2021-11-11 | End: 2021-11-18

## 2021-11-11 RX ORDER — SULFAMETHOXAZOLE AND TRIMETHOPRIM 800; 160 MG/1; MG/1
TABLET ORAL
COMMUNITY
End: 2021-11-11

## 2021-11-11 RX ORDER — M-VIT,TX,IRON,MINS/CALC/FOLIC 27MG-0.4MG
TABLET ORAL
COMMUNITY
End: 2022-04-29

## 2021-11-11 RX ORDER — AMOXICILLIN AND CLAVULANATE POTASSIUM 875; 125 MG/1; MG/1
TABLET, FILM COATED ORAL
COMMUNITY
End: 2021-11-11

## 2021-11-11 RX ORDER — FLUCONAZOLE 150 MG/1
150 TABLET ORAL ONCE
Qty: 1 TABLET | Refills: 0 | Status: SHIPPED | OUTPATIENT
Start: 2021-11-11 | End: 2021-11-11

## 2021-11-11 RX ORDER — AMOXICILLIN AND CLAVULANATE POTASSIUM 875; 125 MG/1; MG/1
TABLET, FILM COATED ORAL
COMMUNITY
Start: 2021-11-01 | End: 2021-11-11

## 2021-11-11 ASSESSMENT — FIBROSIS 4 INDEX: FIB4 SCORE: 0.63

## 2021-11-11 NOTE — ASSESSMENT & PLAN NOTE
Acute and ongoing. She got a spider bite on 10-31-21. [facility] and was started on Augmentin. On 11-2-21 she went to [facility] ER because the spider bite wound was draining and she did not know what to expect, she was started on Bactrim and was told to also continue Augmentin. She states that it is healing slowly but she is concerned that it is still taking time. 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 She does keep the area open while at home. Denies any fever or chills.

## 2021-11-11 NOTE — PROGRESS NOTES
Subjective  Chief Complaint  Spider Bite    History of Present Illness  Farhana Freeman is a 41 y.o. female. This established patient is here today to follow up on a spider bite.    Spider bite  Acute and ongoing. She got a spider bite on 10-31-21. She went to [facility] and was started on Augmentin. On 11-2-21 she went to [facility] ER because the spider bite wound was draining and she did not know what to expect, she was started on Bactrim and was told to also continue Augmentin. She states that it is healing slowly but she is concerned that it is still taking time. 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 She does keep the area open while at home. Denies any fever or chills.    Past Medical History    Allergies: Oxycodone hcl, Flu virus vaccine, Ibuprofen, Morphine, Nsaids, Oxycodone-acetaminophen, Percocet [perloxx], Sulfa drugs, and Toradol  Past Medical History:   Diagnosis Date   • Anemia     Pt states she has no history of anemia   • Anesthesia     severe nausea and vomiting with morphine   • Anxiety    • ASTHMA 05/2017    Managed with PRN inhaler, last use was in March 2017   • Cholelithiasis 2017    Status post lap adeline   • Dental disorder     dentures    • GERD (gastroesophageal reflux disease)    • Heart burn     Resolved with gastric surgeries.   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    • Kidney disease     kidney stones    • Migraine     much less since losing weight, 1-2 times per month   • Morbid obesity (HCC) 5/12/2017   • Muscle disorder    • Postural hypotension     EMR   • Psychiatric problem     anxiety    • Radicular pain of left lower extremity 5/29/2019   • Status post gastric surgery 5/29/2019    DATE OF OPERATION: 5/12/2017 POSTOPERATIVE DIAGNOSIS: Morbid Obesity with medical sequelae; Hiatal hernia OPERATION PERFORMED: 1.  Laparoscopic  Isiah-en-Y Gastric Bypass  3. Hiatal hernia repair SURGEON: Shelton Jackson MD   • Urinary bladder disorder    • Urinary incontinence      Past Surgical History:   Procedure Laterality Date   • PB ANTER COLPORRHAPHY,BLAD/VAGINA N/A 8/12/2020    Procedure: COLPORRHAPHY, ANTERIOR, TENSION FREE VAGINAL TAPE, PERINORRHAPHY;  Surgeon: Maria Elena Zhou M.D.;  Location: SURGERY SAME DAY [facility];  Service: Obstetrics   • PB POST COLPORRHAPHY,RECTUM/VAGINA N/A 8/12/2020    Procedure: COLPORRHAPHY, POSTERIOR;  Surgeon: Maria Elena Zhou M.D.;  Location: SURGERY SAME DAY [facility];  Service: Obstetrics   • VAGINAL HYSTERECTOMY TOTAL N/A 8/12/2020    Procedure: HYSTERECTOMY, TOTAL, VAGINAL;  Surgeon: Maria Elena Zhou M.D.;  Location: SURGERY SAME DAY [facility];  Service: Obstetrics   • SALPINGECTOMY Bilateral 8/12/2020    Procedure: SALPINGECTOMY;  Surgeon: Maria Elena Zhou M.D.;  Location: SURGERY SAME DAY [facility];  Service: Obstetrics   • CYSTOSCOPY N/A 8/12/2020    Procedure: CYSTOSCOPY;  Surgeon: Maria Elena Zhou M.D.;  Location: SURGERY SAME DAY [facility];  Service: Obstetrics   • LITHOTRIPSY  06/2020   • LUIS BY LAPAROSCOPY  8/9/2017    Procedure: LUIS BY LAPAROSCOPY;  Surgeon: Shelton Jackson M.D.;  Location: SURGERY [facility];  Service:    • GASTRIC BYPASS LAPAROSCOPIC  5/12/2017    Procedure: GASTRIC BYPASS LAPAROSCOPIC - ISIAH EN Y AND HIATAL HERNIA REPAIR;  Surgeon: Shelton Jackson M.D.;  Location: SURGERY [facility];  Service:    • OTHER ABDOMINAL SURGERY  10/12/15    vertical gastric sleeve     Current Outpatient Medications Ordered in Epic   Medication Sig Dispense Refill   • therapeutic multivitamin-minerals (THERAGRAN-M) Tab THERAPEUTIC MULTIVIT/MINERAL TABS     • mupirocin (BACTROBAN) 2 % Ointment mupirocin 2 % topical ointment     • sulfamethoxazole-trimethoprim (BACTRIM DS) 800-160 MG tablet Take 1 Tablet by mouth 2 times a day for 7 days. 14 Tablet 0   •  fluconazole (DIFLUCAN) 150 MG tablet Take 1 Tablet by mouth one time for 1 dose. 1 Tablet 0   • chlorhexidine (PERIDEX) 0.12 % Solution      • pregabalin (LYRICA) 150 MG Cap      • HYDROcodone-acetaminophen (NORCO) 7.5-325 MG per tablet      • HYDROcodone-acetaminophen 2.5-108 mg/5mL (HYCET) 7.5-325 MG/15ML solution      • meclizine (ANTIVERT) 25 MG Tab Take 1 tablet by mouth 3 times a day as needed. 30 tablet 0   • tizanidine (ZANAFLEX) 4 MG Tab Take  by mouth 3 times a day as needed. Take 1 tablet by mouth three times a day as needed     • promethazine (PHENERGAN) 25 MG Tab Take 1 tablet by mouth every 6 hours as needed for Nausea/Vomiting. 30 tablet 0   • albuterol 108 (90 Base) MCG/ACT Aero Soln inhalation aerosol Inhale 2 Puffs every 6 hours as needed for Shortness of Breath. 8.5 g 2   • omeprazole (PRILOSEC) 40 MG delayed-release capsule Take 1 capsule by mouth every day. 90 capsule 3   • DULoxetine (CYMBALTA) 60 MG Cap DR Particles delayed-release capsule Take 1 Cap by mouth 2 times a day. Take 1 capsule by mouth daily 180 Cap 1   • HYDROcodone-acetaminophen (NORCO) 5-325 MG Tab per tablet hydrocodone 5 mg-acetaminophen 325 mg tablet     • Biotin 37409 MCG Tab Take 2 Tabs by mouth every morning.       No current [facility]-ordered facility-administered medications on file.     Family History:    Family History   Problem Relation Age of Onset   • Kidney Disease Mother         Stage 3-4   • Hypertension Mother    • Other Mother         cervical dysplasia treated by cryo   • Diabetes Father    • Heart Attack Father 61        Status post triple bypass   • Hypertension Father    • Cancer Cousin 21        Benign breast cancer   • Breast Cancer Other         Maternal grandmother's sister      Personal/Social History:    Social History     Tobacco Use   • Smoking status: Never Smoker   • Smokeless tobacco: Never Used   Vaping Use   • Vaping Use: Never used   Substance Use Topics   • Alcohol use: Yes     Comment: Rarely   •  "Drug use: No     Social History     Social History Narrative    Lives in a house in [place] with her , has 2 biological children, has custody of her niece, reports managing fine at home.    Has cats, dogs, and lizards as pets.    Works a warehouse job, is on her feet for most of the day.      Consumes a healthy diet consisting of fruits, vegetables, white meats and home cooked meals.     Walks at least 10,000 steps per day currently.       Review of Systems:   General: Negative for fever/chills and unexpected weight change.   Respiratory:  Negative for cough and dyspnea.    Cardiovascular:  Negative for chest pain and palpitations.  Musculoskeletal:  Negative for myalgias.   Skin: Positive for spider bite.    Objective  Physical Exam:   /80 (BP Location: Right arm, Patient Position: Sitting, BP Cuff Size: Adult)   Pulse 70   Temp 37.1 °C (98.7 °F) (Temporal)   Resp 20   Ht 1.575 m (5' 2\")   Wt 98 kg (216 lb)   SpO2 100%  Body mass index is 39.51 kg/m².  General:  Alert and oriented.  Well appearing.  NAD  Neck: Supple without JVD. No lymphadenopathy.  Pulmonary:  Normal effort.  Clear to ausculation without rales, ronchi, or wheezing.  Cardiovascular:  Regular rate and rhythm without murmur, rubs or gallop.   Skin: Posterior right ankle with round erythema and yellow in the center approximately 0.5 cm in diameter.      Assessment/Plan  1. Spider bite wound, accidental or unintentional, subsequent encounter  Acute and ongoing.  From pictures on her phone her spider bite is improving but there is still redness and healing to be done. Discussed that she could do another week of antibiotics, she would like to do that.  Take Bactrim -160 mg 1 tablet twice a day for 7 days.  Educated to take the entire course of antibiotics even if she starts to feel better, she verbalized understanding.  - sulfamethoxazole-trimethoprim (BACTRIM DS) 800-160 MG tablet; Take 1 Tablet by mouth 2 times a day for 7 days.  " Dispense: 14 Tablet; Refill: 0    2. Antibiotic-induced yeast infection  History of getting yeast infections from antibiotics, she is requesting a Diflucan tablet to have on hand in case she does develop a yeast infection.  Take Diflucan 150 mg one tablet one time if yeast infection develops.  - fluconazole (DIFLUCAN) 150 MG tablet; Take 1 Tablet by mouth one time for 1 dose.  Dispense: 1 Tablet; Refill: 0      Health Maintenance: Completed    Return if symptoms worsen or fail to improve.    I spent a total of 31 minutes with record review, exam, and communication with the patient, communication with other providers, and documentation of this encounter.    Please note that this dictation was created using voice recognition software. I have made every reasonable attempt to correct obvious errors, but I expect that there are errors of grammar and possibly content that I did not discover before finalizing the note.    DIMAS Duncan  [facility]

## 2021-11-17 DIAGNOSIS — J40 BRONCHITIS: ICD-10-CM

## 2021-11-18 RX ORDER — ALBUTEROL SULFATE 90 UG/1
AEROSOL, METERED RESPIRATORY (INHALATION)
Qty: 8.5 EACH | Refills: 2 | Status: SHIPPED | OUTPATIENT
Start: 2021-11-18 | End: 2022-05-03

## 2021-12-15 ENCOUNTER — TELEMEDICINE (OUTPATIENT)
Dept: MEDICAL GROUP | Facility: PHYSICIAN GROUP | Age: 41
End: 2021-12-15
Payer: COMMERCIAL

## 2021-12-15 VITALS — HEIGHT: 62 IN | RESPIRATION RATE: 20 BRPM | BODY MASS INDEX: 41.41 KG/M2 | WEIGHT: 225 LBS

## 2021-12-15 DIAGNOSIS — R53.82 CHRONIC FATIGUE: ICD-10-CM

## 2021-12-15 DIAGNOSIS — E66.01 CLASS 3 SEVERE OBESITY DUE TO EXCESS CALORIES WITH BODY MASS INDEX (BMI) OF 40.0 TO 44.9 IN ADULT, UNSPECIFIED WHETHER SERIOUS COMORBIDITY PRESENT (HCC): ICD-10-CM

## 2021-12-15 DIAGNOSIS — F41.1 GENERALIZED ANXIETY DISORDER: ICD-10-CM

## 2021-12-15 PROBLEM — E66.813 CLASS 3 SEVERE OBESITY DUE TO EXCESS CALORIES WITH BODY MASS INDEX (BMI) OF 40.0 TO 44.9 IN ADULT: Status: ACTIVE | Noted: 2020-06-16

## 2021-12-15 PROCEDURE — 99214 OFFICE O/P EST MOD 30 MIN: CPT | Mod: 95 | Performed by: NURSE PRACTITIONER

## 2021-12-15 RX ORDER — DULOXETINE 40 MG/1
CAPSULE, DELAYED RELEASE ORAL
COMMUNITY
Start: 2021-11-22 | End: 2021-12-15

## 2021-12-15 RX ORDER — METHYLPREDNISOLONE 4 MG/1
TABLET ORAL
COMMUNITY
Start: 2021-11-23 | End: 2021-12-22

## 2021-12-15 RX ORDER — HYDROXYZINE HYDROCHLORIDE 10 MG/1
10-20 TABLET, FILM COATED ORAL 3 TIMES DAILY PRN
Qty: 60 TABLET | Refills: 1 | Status: SHIPPED | OUTPATIENT
Start: 2021-12-15 | End: 2022-05-03

## 2021-12-15 RX ORDER — METHYLPREDNISOLONE 4 MG/1
TABLET ORAL
COMMUNITY
End: 2021-12-15

## 2021-12-15 ASSESSMENT — FIBROSIS 4 INDEX: FIB4 SCORE: 0.63

## 2021-12-16 NOTE — ASSESSMENT & PLAN NOTE
Chronic and ongoing. She has been dealing with anxiety for a long time now. She is recently going through a divorce and she is starting to feel more anxious. Currently not taking any medication for her anxiety. She has been on Fluoxetine and Xanax in the past. She did not like the way Xanax made her feel. She is wanting something to have as needed when her anxiety increases.

## 2021-12-16 NOTE — ASSESSMENT & PLAN NOTE
Chronic and ongoing. She got gastric sleeve surgery in 2015. She was able to get down to 170 pounds. She is currently struggling with her weight. She states that she has tried Keto but is not able to get herself going with any weight loss. She is trying to walk more but has not been able to do much lately. She also notes being tired a lot.

## 2021-12-16 NOTE — ASSESSMENT & PLAN NOTE
Chronic and ongoing. She states that she feels by the end of the day she is exhausted more then normal. She has noticed recently that she will lay down for a nap and end up sleeping for almost 4 hours. She is wanting to get some updated lab work.

## 2021-12-16 NOTE — PROGRESS NOTES
Virtual Visit: Established Patient   This visit was conducted via Zoom using secure and encrypted videoconferencing technology. The patient was in a private location in the [facility].    The patient's identity was confirmed and verbal consent was obtained for this virtual visit.    Subjective  Chief Complaint  Anixety    History of Present Illness  Farhana Freeman is a 41 y.o. female. This established patient is here today to discuss anxiety and weight loss.    Generalized anxiety disorder  Chronic and ongoing. She has been dealing with anxiety for a long time now. She is recently going through a divorce and she is starting to feel more anxious. Currently not taking any medication for her anxiety. She has been on Fluoxetine and Xanax in the past. She did not like the way Xanax made her feel. She is wanting something to have as needed when her anxiety increases.    Class 3 severe obesity due to excess calories with body mass index (BMI) of 40.0 to 44.9 in adult (HCC)  Chronic and ongoing. She got gastric sleeve surgery in 2015. She was able to get down to 170 pounds. She is currently struggling with her weight. She states that she has tried Keto but is not able to get herself going with any weight loss. She is trying to walk more but has not been able to do much lately. She also notes being tired a lot.    Chronic fatigue  Chronic and ongoing. She states that she feels by the end of the day she is exhausted more then normal. She has noticed recently that she will lay down for a nap and end up sleeping for almost 4 hours. She is wanting to get some updated lab work.    Past Medical History    Allergies: Oxycodone hcl, Flu virus vaccine, Ibuprofen, Morphine, Nsaids, Oxycodone-acetaminophen, Percocet [perloxx], Sulfa drugs, and Toradol  Past Medical History:   Diagnosis Date   • Anemia     Pt states she has no history of anemia   • Anesthesia     severe nausea and vomiting with morphine   • Anxiety    •  ASTHMA 05/2017    Managed with PRN inhaler, last use was in March 2017   • Cholelithiasis 2017    Status post lap adeline   • Dental disorder     dentures    • GERD (gastroesophageal reflux disease)    • Heart burn     Resolved with gastric surgeries.   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    • Kidney disease     kidney stones    • Migraine     much less since losing weight, 1-2 times per month   • Morbid obesity (HCC) 5/12/2017   • Muscle disorder    • Postural hypotension     EMR   • Psychiatric problem     anxiety    • Radicular pain of left lower extremity 5/29/2019   • Status post gastric surgery 5/29/2019    DATE OF OPERATION: 5/12/2017 POSTOPERATIVE DIAGNOSIS: Morbid Obesity with medical sequelae; Hiatal hernia OPERATION PERFORMED: 1.  Laparoscopic Danie-en-Y Gastric Bypass  3. Hiatal hernia repair SURGEON: Shelton Jackson MD   • Urinary bladder disorder    • Urinary incontinence      Past Surgical History:   Procedure Laterality Date   • PB ANTER COLPORRHAPHY,BLAD/VAGINA N/A 8/12/2020    Procedure: COLPORRHAPHY, ANTERIOR, TENSION FREE VAGINAL TAPE, PERINORRHAPHY;  Surgeon: Maria Elena Zhou M.D.;  Location: SURGERY SAME DAY [facility];  Service: Obstetrics   • PB POST COLPORRHAPHY,RECTUM/VAGINA N/A 8/12/2020    Procedure: COLPORRHAPHY, POSTERIOR;  Surgeon: Maria Elena Zhou M.D.;  Location: SURGERY SAME DAY [facility];  Service: Obstetrics   • VAGINAL HYSTERECTOMY TOTAL N/A 8/12/2020    Procedure: HYSTERECTOMY, TOTAL, VAGINAL;  Surgeon: Maria Elena Zhou M.D.;  Location: SURGERY SAME DAY [facility];  Service: Obstetrics   • SALPINGECTOMY Bilateral 8/12/2020    Procedure: SALPINGECTOMY;  Surgeon: Maria Elena Zhou M.D.;  Location: SURGERY SAME DAY [facility];  Service: Obstetrics   • CYSTOSCOPY N/A 8/12/2020    Procedure: CYSTOSCOPY;  Surgeon: Maria Elena Zhou M.D.;  Location: SURGERY SAME DAY [facility];  Service: Obstetrics   •  LITHOTRIPSY  06/2020   • LUIS BY LAPAROSCOPY  8/9/2017    Procedure: LUIS BY LAPAROSCOPY;  Surgeon: Shelton Jackson M.D.;  Location: SURGERY [facility];  Service:    • GASTRIC BYPASS LAPAROSCOPIC  5/12/2017    Procedure: GASTRIC BYPASS LAPAROSCOPIC - ISIAH EN Y AND HIATAL HERNIA REPAIR;  Surgeon: Shelton Jackson M.D.;  Location: SURGERY [facility];  Service:    • OTHER ABDOMINAL SURGERY  10/12/15    vertical gastric sleeve     Current Outpatient Medications Ordered in Epic   Medication Sig Dispense Refill   • methylPREDNISolone (MEDROL) 4 MG Tab      • hydrOXYzine HCl (ATARAX) 10 MG Tab Take 1-2 Tablets by mouth 3 times a day as needed for Anxiety. 60 Tablet 1   • albuterol 108 (90 Base) MCG/ACT Aero Soln inhalation aerosol INHALE 2 PUFFS BY MOUTH EVERY 6 HOURS AS NEEDED FOR SHORTNESS OF BREATH 8.5 Each 2   • therapeutic multivitamin-minerals (THERAGRAN-M) Tab THERAPEUTIC MULTIVIT/MINERAL TABS     • mupirocin (BACTROBAN) 2 % Ointment mupirocin 2 % topical ointment     • chlorhexidine (PERIDEX) 0.12 % Solution      • pregabalin (LYRICA) 150 MG Cap      • HYDROcodone-acetaminophen (NORCO) 7.5-325 MG per tablet      • HYDROcodone-acetaminophen 2.5-108 mg/5mL (HYCET) 7.5-325 MG/15ML solution      • meclizine (ANTIVERT) 25 MG Tab Take 1 tablet by mouth 3 times a day as needed. 30 tablet 0   • tizanidine (ZANAFLEX) 4 MG Tab Take  by mouth 3 times a day as needed. Take 1 tablet by mouth three times a day as needed     • promethazine (PHENERGAN) 25 MG Tab Take 1 tablet by mouth every 6 hours as needed for Nausea/Vomiting. 30 tablet 0   • omeprazole (PRILOSEC) 40 MG delayed-release capsule Take 1 capsule by mouth every day. 90 capsule 3   • DULoxetine (CYMBALTA) 60 MG Cap DR Particles delayed-release capsule Take 1 Cap by mouth 2 times a day. Take 1 capsule by mouth daily 180 Cap 1   • HYDROcodone-acetaminophen (NORCO) 5-325 MG Tab per tablet hydrocodone 5 mg-acetaminophen 325 mg tablet     • Biotin 55449 MCG Tab  "Take 2 Tabs by mouth every morning.       No current [facility]-ordered facility-administered medications on file.     Family History:    Family History   Problem Relation Age of Onset   • Kidney Disease Mother         Stage 3-4   • Hypertension Mother    • Other Mother         cervical dysplasia treated by cryo   • Diabetes Father    • Heart Attack Father 61        Status post triple bypass   • Hypertension Father    • Cancer Cousin 21        Benign breast cancer   • Breast Cancer Other         Maternal grandmother's sister      Personal/Social History:    Social History     Tobacco Use   • Smoking status: Never Smoker   • Smokeless tobacco: Never Used   Vaping Use   • Vaping Use: Never used   Substance Use Topics   • Alcohol use: Yes     Comment: Rarely   • Drug use: No     Social History     Social History Narrative    Lives in a house in [place] with her , has 2 biological children, has custody of her niece, reports managing fine at home.    Has cats, dogs, and lizards as pets.    Works a warehouse job, is on her feet for most of the day.      Consumes a healthy diet consisting of fruits, vegetables, white meats and home cooked meals.     Walks at least 10,000 steps per day currently.         Review of Systems:   General: Negative for fever/chills and expected weight change. Positive for fatigue.  Respiratory:  Negative for cough and dyspnea.    Cardiovascular:  Negative for chest pain and palpitations.  Gastrointestinal:  Negative for nausea/vomiting, changes in bowel habits, and abdominal pain.   Skin:  Negative for rash.   Neurological:  Negative for numbness/tingling and headaches.     Objective  Physical Exam:   Resp 20   Ht 1.575 m (5' 2\")   Wt 102 kg (225 lb)  Body mass index is 41.15 kg/m².  Constitutional: Alert, no distress, well-groomed.  Skin: No rashes in visible areas.  Eye: Round. Conjunctiva clear, lids normal. No icterus.   ENMT: Lips pink without lesions, good dentition, moist mucous membranes. " Phonation normal.  Neck: No masses, no thyromegaly on visible inspection. Moves freely without pain.  Respiratory: Unlabored respiratory effort, no cough or audible wheeze  Psych: Alert and oriented x3, normal affect and mood.       Assessment/Plan  1. Generalized anxiety disorder  Chronic and ongoing.  Discussed Hydroxyzine risks, benefits and side effects.  Take Hydroxyzine 10 mg 1-2 tablets up to 3 times a day as needed.  - hydrOXYzine HCl (ATARAX) 10 MG Tab; Take 1-2 Tablets by mouth 3 times a day as needed for Anxiety.  Dispense: 60 Tablet; Refill: 1    2. Class 3 severe obesity due to excess calories with body mass index (BMI) of 40.0 to 44.9 in adult, unspecified whether serious comorbidity present (HCC)  Chronic and ongoing.  Educated on a healthy diet and exercise.    3. Chronic fatigue  Chronic and ongoing.  Discussed getting updated labs, she is agreeable to the plan.  - TSH WITH REFLEX TO FT4; Future  - FREE THYROXINE; Future  - TRIIDOTHYRONINE; Future  - THYROID PEROXIDASE  (TPO) AB; Future      Health Maintenance: Completed    Return in about 1 month (around 1/15/2022) for Weight Loss.    Please note that this dictation was created using voice recognition software. I have made every reasonable attempt to correct obvious errors, but I expect that there are errors of grammar and possibly content that I did not discover before finalizing the note.    DIMAS Duncan  [facility]

## 2021-12-22 ENCOUNTER — PATIENT MESSAGE (OUTPATIENT)
Dept: MEDICAL GROUP | Facility: PHYSICIAN GROUP | Age: 41
End: 2021-12-22

## 2021-12-23 NOTE — TELEPHONE ENCOUNTER
----- Message from Kate Hamilton, Med Ass't sent at 12/22/2021  3:53 PM PST -----  Regarding: FW: Please call  NOEMI  ----- Message -----  From: Farhana Freeman  Sent: 12/22/2021   3:49 PM PST  To: [facility]  Subject: Please call                                      Regarding [facility] paperwork  [phone]

## 2021-12-23 NOTE — PROGRESS NOTES
Spoke with patient via telephone about [facility] paperwork process. She still needs to contact her works insurance company to get the paperwork. Discussed with her that once she has the paperwork that I can fill the paperwork out for her. She will have to make an in person appointment so we can go over the paperwork together. She verbalized understanding and all questions answered.

## 2021-12-28 ENCOUNTER — HOSPITAL ENCOUNTER (OUTPATIENT)
Dept: LAB | Facility: MEDICAL CENTER | Age: 41
End: 2021-12-28
Attending: NURSE PRACTITIONER
Payer: COMMERCIAL

## 2021-12-28 DIAGNOSIS — R53.82 CHRONIC FATIGUE: ICD-10-CM

## 2021-12-28 LAB
T3 SERPL-MCNC: 99.7 NG/DL (ref 60–181)
T4 FREE SERPL-MCNC: 0.8 NG/DL (ref 0.93–1.7)
THYROPEROXIDASE AB SERPL-ACNC: <9 IU/ML (ref 0–9)
TSH SERPL DL<=0.005 MIU/L-ACNC: 1.09 UIU/ML (ref 0.38–5.33)

## 2021-12-28 PROCEDURE — 84439 ASSAY OF FREE THYROXINE: CPT

## 2021-12-28 PROCEDURE — 86376 MICROSOMAL ANTIBODY EACH: CPT

## 2021-12-28 PROCEDURE — 36415 COLL VENOUS BLD VENIPUNCTURE: CPT

## 2021-12-28 PROCEDURE — 84480 ASSAY TRIIODOTHYRONINE (T3): CPT

## 2021-12-28 PROCEDURE — 84443 ASSAY THYROID STIM HORMONE: CPT

## 2022-01-04 PROBLEM — E03.8 SUBCLINICAL HYPOTHYROIDISM: Status: ACTIVE | Noted: 2022-01-04

## 2022-01-17 ENCOUNTER — PRE-ADMISSION TESTING (OUTPATIENT)
Dept: ADMISSIONS | Facility: MEDICAL CENTER | Age: 42
End: 2022-01-17
Attending: NURSE PRACTITIONER
Payer: COMMERCIAL

## 2022-01-17 DIAGNOSIS — Z01.811 PRE-OPERATIVE RESPIRATORY EXAMINATION: ICD-10-CM

## 2022-01-17 LAB — COVID ORDER STATUS COVID19: NORMAL

## 2022-01-17 PROCEDURE — U0003 INFECTIOUS AGENT DETECTION BY NUCLEIC ACID (DNA OR RNA); SEVERE ACUTE RESPIRATORY SYNDROME CORONAVIRUS 2 (SARS-COV-2) (CORONAVIRUS DISEASE [COVID-19]), AMPLIFIED PROBE TECHNIQUE, MAKING USE OF HIGH THROUGHPUT TECHNOLOGIES AS DESCRIBED BY CMS-2020-01-R: HCPCS

## 2022-01-17 PROCEDURE — U0005 INFEC AGEN DETEC AMPLI PROBE: HCPCS

## 2022-01-18 LAB
SARS-COV-2 RNA RESP QL NAA+PROBE: NOTDETECTED
SPECIMEN SOURCE: NORMAL

## 2022-01-24 ENCOUNTER — HOSPITAL ENCOUNTER (OUTPATIENT)
Dept: RADIOLOGY | Facility: MEDICAL CENTER | Age: 42
End: 2022-01-24
Attending: NURSE PRACTITIONER
Payer: COMMERCIAL

## 2022-01-24 ENCOUNTER — ANESTHESIA (OUTPATIENT)
Dept: RADIOLOGY | Facility: MEDICAL CENTER | Age: 42
End: 2022-01-24
Payer: COMMERCIAL

## 2022-01-24 ENCOUNTER — ANESTHESIA EVENT (OUTPATIENT)
Dept: RADIOLOGY | Facility: MEDICAL CENTER | Age: 42
End: 2022-01-24
Payer: COMMERCIAL

## 2022-01-24 VITALS
WEIGHT: 232.81 LBS | HEART RATE: 61 BPM | TEMPERATURE: 96.7 F | BODY MASS INDEX: 41.25 KG/M2 | HEIGHT: 63 IN | DIASTOLIC BLOOD PRESSURE: 68 MMHG | SYSTOLIC BLOOD PRESSURE: 123 MMHG | OXYGEN SATURATION: 95 % | RESPIRATION RATE: 18 BRPM

## 2022-01-24 DIAGNOSIS — M47.817 SPONDYLOSIS OF LUMBOSACRAL REGION, UNSPECIFIED SPINAL OSTEOARTHRITIS COMPLICATION STATUS: ICD-10-CM

## 2022-01-24 DIAGNOSIS — M54.16 LUMBAR RADICULOPATHY: ICD-10-CM

## 2022-01-24 PROCEDURE — 72148 MRI LUMBAR SPINE W/O DYE: CPT

## 2022-01-24 PROCEDURE — 700105 HCHG RX REV CODE 258: Performed by: ANESTHESIOLOGY

## 2022-01-24 PROCEDURE — 700111 HCHG RX REV CODE 636 W/ 250 OVERRIDE (IP): Performed by: ANESTHESIOLOGY

## 2022-01-24 PROCEDURE — 700101 HCHG RX REV CODE 250: Performed by: ANESTHESIOLOGY

## 2022-01-24 RX ORDER — LORAZEPAM 2 MG/ML
0.5 INJECTION INTRAMUSCULAR
Status: DISCONTINUED | OUTPATIENT
Start: 2022-01-24 | End: 2022-01-25 | Stop reason: HOSPADM

## 2022-01-24 RX ORDER — HYDRALAZINE HYDROCHLORIDE 20 MG/ML
5 INJECTION INTRAMUSCULAR; INTRAVENOUS
Status: DISCONTINUED | OUTPATIENT
Start: 2022-01-24 | End: 2022-01-25 | Stop reason: HOSPADM

## 2022-01-24 RX ORDER — DEXAMETHASONE SODIUM PHOSPHATE 4 MG/ML
INJECTION, SOLUTION INTRA-ARTICULAR; INTRALESIONAL; INTRAMUSCULAR; INTRAVENOUS; SOFT TISSUE PRN
Status: DISCONTINUED | OUTPATIENT
Start: 2022-01-24 | End: 2022-01-24 | Stop reason: SURG

## 2022-01-24 RX ORDER — SODIUM CHLORIDE, SODIUM LACTATE, POTASSIUM CHLORIDE, CALCIUM CHLORIDE 600; 310; 30; 20 MG/100ML; MG/100ML; MG/100ML; MG/100ML
INJECTION, SOLUTION INTRAVENOUS
Status: DISCONTINUED | OUTPATIENT
Start: 2022-01-24 | End: 2022-01-24 | Stop reason: SURG

## 2022-01-24 RX ORDER — LIDOCAINE HYDROCHLORIDE 20 MG/ML
INJECTION, SOLUTION EPIDURAL; INFILTRATION; INTRACAUDAL; PERINEURAL PRN
Status: DISCONTINUED | OUTPATIENT
Start: 2022-01-24 | End: 2022-01-24 | Stop reason: SURG

## 2022-01-24 RX ORDER — DIPHENHYDRAMINE HYDROCHLORIDE 50 MG/ML
12.5 INJECTION INTRAMUSCULAR; INTRAVENOUS
Status: DISCONTINUED | OUTPATIENT
Start: 2022-01-24 | End: 2022-01-25 | Stop reason: HOSPADM

## 2022-01-24 RX ORDER — HALOPERIDOL 5 MG/ML
1 INJECTION INTRAMUSCULAR
Status: DISCONTINUED | OUTPATIENT
Start: 2022-01-24 | End: 2022-01-25 | Stop reason: HOSPADM

## 2022-01-24 RX ORDER — MIDAZOLAM HYDROCHLORIDE 1 MG/ML
INJECTION INTRAMUSCULAR; INTRAVENOUS
Status: COMPLETED
Start: 2022-01-24 | End: 2022-01-24

## 2022-01-24 RX ORDER — ONDANSETRON 2 MG/ML
4 INJECTION INTRAMUSCULAR; INTRAVENOUS
Status: DISCONTINUED | OUTPATIENT
Start: 2022-01-24 | End: 2022-01-25 | Stop reason: HOSPADM

## 2022-01-24 RX ORDER — LABETALOL HYDROCHLORIDE 5 MG/ML
5 INJECTION, SOLUTION INTRAVENOUS
Status: DISCONTINUED | OUTPATIENT
Start: 2022-01-24 | End: 2022-01-25 | Stop reason: HOSPADM

## 2022-01-24 RX ORDER — ONDANSETRON 2 MG/ML
INJECTION INTRAMUSCULAR; INTRAVENOUS PRN
Status: DISCONTINUED | OUTPATIENT
Start: 2022-01-24 | End: 2022-01-24 | Stop reason: SURG

## 2022-01-24 RX ADMIN — MIDAZOLAM 2 MG: 1 INJECTION INTRAMUSCULAR; INTRAVENOUS at 09:57

## 2022-01-24 RX ADMIN — SODIUM CHLORIDE, POTASSIUM CHLORIDE, SODIUM LACTATE AND CALCIUM CHLORIDE: 600; 310; 30; 20 INJECTION, SOLUTION INTRAVENOUS at 09:57

## 2022-01-24 RX ADMIN — DEXAMETHASONE SODIUM PHOSPHATE 10 MG: 4 INJECTION, SOLUTION INTRA-ARTICULAR; INTRALESIONAL; INTRAMUSCULAR; INTRAVENOUS; SOFT TISSUE at 10:05

## 2022-01-24 RX ADMIN — PROPOFOL 150 MG: 10 INJECTION, EMULSION INTRAVENOUS at 10:01

## 2022-01-24 RX ADMIN — ONDANSETRON 4 MG: 2 INJECTION INTRAMUSCULAR; INTRAVENOUS at 10:05

## 2022-01-24 RX ADMIN — LIDOCAINE HYDROCHLORIDE 100 MG: 20 INJECTION, SOLUTION EPIDURAL; INFILTRATION; INTRACAUDAL at 10:01

## 2022-01-24 ASSESSMENT — FIBROSIS 4 INDEX: FIB4 SCORE: 0.63

## 2022-01-24 ASSESSMENT — PAIN SCALES - GENERAL: PAIN_LEVEL: 0

## 2022-01-24 NOTE — DISCHARGE INSTRUCTIONS
MRI ADULT DISCHARGE INSTRUCTIONS    You have been medicated today for your scan. Please follow the instructions below to ensure your safe recovery. If you have any questions or problems, feel free to call us at [phone] or [phone].     1.   Have someone stay with you to assist you as needed.    2.   Do not drive or operate any mechanical devices.    3.   Do not perform any activity that requires concentration. Make no major decisions over the next 24 hours.     4.   Be careful changing positions from laying down to sitting or standing, as you may become dizzy.     5.   Do not drink alcohol for 48 hours.    6.   There are no restrictions for eating your normal meals. Drink fluids.    7.   You may continue your usual medications for pain, tranquilizers, muscle relaxants or sedatives when awake.     8.   Tomorrow, you may continue your normal daily activities.     9.   Pressure dressing on 10 - 15 minutes. If swelling or bleeding occurs when removed, continue placing direct pressure on injection site for another 5 minutes, or until bleeding stops.     I have been informed of and understand the above discharge instructions.

## 2022-01-24 NOTE — ANESTHESIA POSTPROCEDURE EVALUATION
Patient: Farhana Freeman    Procedure Summary     Date: 01/24/22 Room / Location: [facility] MRI  [address]    Anesthesia Start: 0957 Anesthesia Stop: 1041    Procedure: MR-LUMBAR SPINE-W/O Diagnosis:       Spondylosis of lumbosacral region, unspecified spinal osteoarthritis complication status      Lumbar radiculopathy    Scheduled Providers:  Responsible Provider: Soraya Garcia M.D.    Anesthesia Type: general ASA Status: 3          Final Anesthesia Type: general  Last vitals  BP   Blood Pressure: 123/82    Temp   35.9 °C (96.7 °F)    Pulse   62   Resp   18    SpO2   93 %      Anesthesia Post Evaluation    Patient location during evaluation: PACU  Patient participation: complete - patient participated  Level of consciousness: awake and alert  Pain score: 0    Airway patency: patent  Anesthetic complications: no  Cardiovascular status: adequate  Respiratory status: acceptable  Hydration status: acceptable    PONV: none          No complications documented.     Nurse Pain Score: 0 (NPRS)

## 2022-01-24 NOTE — ANESTHESIA PROCEDURE NOTES
Airway    Date/Time: 1/24/2022 10:02 AM  Performed by: Soraya Garcia M.D.  Authorized by: Soraya Garcia M.D.     Location:  OR  Urgency:  Elective  Indications for Airway Management:  Anesthesia      Spontaneous Ventilation: absent    Sedation Level:  Deep  Preoxygenated: Yes    Final Airway Type:  Supraglottic airway  Final Supraglottic Airway:  Standard LMA    SGA Size:  4  Number of Attempts at Approach:  1

## 2022-01-24 NOTE — PROGRESS NOTES
Patient to MRI outpatient dept. Patient informed process and plan of care during this visit.  Anesthesiologist Jose spoke with Patient and discussed provider's plan of care.  Consent obtained.  MRI completed without incident.  Patient tolerated diet and activities once awake, alert, and appropriate.  DC instructions discussed with Patient and .  Questions encouraged and answered.  Defer to Provider for further instruction.  Patient discharged in stable condition to responsible adult (Spouse Jonny).

## 2022-01-24 NOTE — ANESTHESIA TIME REPORT
Anesthesia Start and Stop Event Times     Date Time Event    1/24/2022 0955 Ready for Procedure     0957 Anesthesia Start     1041 Anesthesia Stop        Responsible Staff  01/24/22    Name Role Begin End    Soraya Garcia M.D. Anesth 0957 1041        Preop Diagnosis (Free Text):  Pre-op Diagnosis             Preop Diagnosis (Codes):    Premium Reason  Non-Premium    Comments:

## 2022-01-24 NOTE — ANESTHESIA PREPROCEDURE EVALUATION
Date/Time: 01/24/22 1015    Procedure: MR-LUMBAR SPINE-W/O    Diagnosis:       Spondylosis of lumbosacral region, unspecified spinal osteoarthritis complication status [M47.817]      Lumbar radiculopathy [M54.16]    Location: [facility] - MRI - [address]          Relevant Problems   PULMONARY   (positive) Asthma      GI   (positive) GERD (gastroesophageal reflux disease)         (positive) Renal stone      ENDO   (positive) Subclinical hypothyroidism       Physical Exam    Airway   Mallampati: I  TM distance: >3 FB  Neck ROM: full       Cardiovascular - normal exam     Dental   (+) upper dentures, lower dentures           Pulmonary   Breath sounds clear to auscultation     Abdominal   (+) obese     Neurological - normal exam                 Anesthesia Plan    ASA 3   ASA physical status 3 criteria: morbid obesity - BMI greater than or equal to 40    Plan - general       Airway plan will be LMA          Induction: intravenous      Pertinent diagnostic labs and testing reviewed    Informed Consent:    Anesthetic plan and risks discussed with patient.

## 2022-04-29 ENCOUNTER — OFFICE VISIT (OUTPATIENT)
Dept: MEDICAL GROUP | Facility: PHYSICIAN GROUP | Age: 42
End: 2022-04-29
Payer: COMMERCIAL

## 2022-04-29 VITALS
TEMPERATURE: 97.6 F | HEIGHT: 62 IN | BODY MASS INDEX: 44.72 KG/M2 | DIASTOLIC BLOOD PRESSURE: 72 MMHG | OXYGEN SATURATION: 97 % | SYSTOLIC BLOOD PRESSURE: 110 MMHG | WEIGHT: 243 LBS | RESPIRATION RATE: 18 BRPM | HEART RATE: 66 BPM

## 2022-04-29 DIAGNOSIS — G43.909 MIGRAINE WITHOUT STATUS MIGRAINOSUS, NOT INTRACTABLE, UNSPECIFIED MIGRAINE TYPE: ICD-10-CM

## 2022-04-29 DIAGNOSIS — Z12.31 ENCOUNTER FOR SCREENING MAMMOGRAM FOR MALIGNANT NEOPLASM OF BREAST: ICD-10-CM

## 2022-04-29 PROCEDURE — 99214 OFFICE O/P EST MOD 30 MIN: CPT | Performed by: NURSE PRACTITIONER

## 2022-04-29 RX ORDER — KETOROLAC TROMETHAMINE 30 MG/ML
60 INJECTION, SOLUTION INTRAMUSCULAR; INTRAVENOUS ONCE
Status: COMPLETED | OUTPATIENT
Start: 2022-04-29 | End: 2022-04-29

## 2022-04-29 RX ORDER — SUMATRIPTAN 100 MG/1
100 TABLET, FILM COATED ORAL
Qty: 10 TABLET | Refills: 3 | Status: SHIPPED | OUTPATIENT
Start: 2022-04-29 | End: 2022-06-28

## 2022-04-29 RX ADMIN — KETOROLAC TROMETHAMINE 60 MG: 30 INJECTION, SOLUTION INTRAMUSCULAR; INTRAVENOUS at 13:42

## 2022-04-29 ASSESSMENT — FIBROSIS 4 INDEX: FIB4 SCORE: 0.65

## 2022-04-29 NOTE — ASSESSMENT & PLAN NOTE
Acute and ongoing. She states that for the past week she has been having what seems like a headache to her left upper head/eye area. She did see her eye doctor yesterday and was told that there was nothing wrong with her eyes. She has tried heat and ice. She states that once she falls asleep she is good for awhile. She has tried Imitrex in the past and gotten a Toradol injection in the past.

## 2022-04-29 NOTE — PROGRESS NOTES
Subjective  Chief Complaint  Headache    History of Present Illness  Farhana Freeman is a 42 y.o. female. This established patient is here today to discuss a headache.    Migraine  Acute and ongoing. She states that for the past week she has been having what seems like a headache to her left upper head/eye area. She did see her eye doctor yesterday and was told that there was nothing wrong with her eyes. She has tried heat and ice. She states that once she falls asleep she is good for awhile. She has tried Imitrex in the past and gotten a Toradol injection in the past.    Past Medical History    Allergies: Oxycodone hcl, Flu virus vaccine, Ibuprofen, Morphine, Nsaids, Oxycodone-acetaminophen, Percocet [perloxx], Sulfa drugs, and Toradol  Past Medical History:   Diagnosis Date   • Anemia     Pt states she has no history of anemia   • Anesthesia     severe nausea and vomiting with morphine   • Anxiety    • ASTHMA 05/2017    Managed with PRN inhaler, last use was in March 2017   • Cholelithiasis 2017    Status post lap adeline   • Dental disorder     dentures    • GERD (gastroesophageal reflux disease)    • Heart burn     Resolved with gastric surgeries.   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    • Kidney disease     kidney stones    • Migraine     much less since losing weight, 1-2 times per month   • Morbid obesity (HCC) 5/12/2017   • Muscle disorder    • Postural hypotension     EMR   • Psychiatric problem     anxiety    • Radicular pain of left lower extremity 5/29/2019   • Status post gastric surgery 5/29/2019    DATE OF OPERATION: 5/12/2017 POSTOPERATIVE DIAGNOSIS: Morbid Obesity with medical sequelae; Hiatal hernia OPERATION PERFORMED: 1.  Laparoscopic Danie-en-Y Gastric Bypass  3. Hiatal hernia repair SURGEON: Shelton Jackson MD   • Urinary bladder disorder    • Urinary incontinence      Past Surgical History:   Procedure Laterality Date   • [place] ANTER  COLPORRHAPHY,BLAD/VAGINA N/A 8/12/2020    Procedure: COLPORRHAPHY, ANTERIOR, TENSION FREE VAGINAL TAPE, PERINORRHAPHY;  Surgeon: Maria Elena Zhou M.D.;  Location: SURGERY SAME DAY [facility];  Service: Obstetrics   • [place] POST COLPORRHAPHY,RECTUM/VAGINA N/A 8/12/2020    Procedure: COLPORRHAPHY, POSTERIOR;  Surgeon: Maria Elena Zhou M.D.;  Location: SURGERY SAME DAY [facility];  Service: Obstetrics   • VAGINAL HYSTERECTOMY TOTAL N/A 8/12/2020    Procedure: HYSTERECTOMY, TOTAL, VAGINAL;  Surgeon: Maria Elena Zhou M.D.;  Location: SURGERY SAME DAY [facility];  Service: Obstetrics   • SALPINGECTOMY Bilateral 8/12/2020    Procedure: SALPINGECTOMY;  Surgeon: Maria Elena Zhou M.D.;  Location: SURGERY SAME DAY [facility];  Service: Obstetrics   • CYSTOSCOPY N/A 8/12/2020    Procedure: CYSTOSCOPY;  Surgeon: Maria Elena Zhou M.D.;  Location: SURGERY SAME DAY [facility];  Service: Obstetrics   • LITHOTRIPSY  06/2020   • LUIS BY LAPAROSCOPY  8/9/2017    Procedure: LUIS BY LAPAROSCOPY;  Surgeon: Shelton Jackson M.D.;  Location: SURGERY [facility];  Service:    • GASTRIC BYPASS LAPAROSCOPIC  5/12/2017    Procedure: GASTRIC BYPASS LAPAROSCOPIC - ISIAH EN Y AND HIATAL HERNIA REPAIR;  Surgeon: Shelton Jackson M.D.;  Location: SURGERY [facility];  Service:    • OTHER ABDOMINAL SURGERY  10/12/15    vertical gastric sleeve     Current Outpatient Medications Ordered in Epic   Medication Sig Dispense Refill   • sumatriptan (IMITREX) 100 MG tablet Take 1 Tablet by mouth one time as needed for Migraine for up to 1 dose. 10 Tablet 3   • hydrOXYzine HCl (ATARAX) 10 MG Tab Take 1-2 Tablets by mouth 3 times a day as needed for Anxiety. 60 Tablet 1   • albuterol 108 (90 Base) MCG/ACT Aero Soln inhalation aerosol INHALE 2 PUFFS BY MOUTH EVERY 6 HOURS AS NEEDED FOR SHORTNESS OF BREATH 8.5 Each 2   • pregabalin (LYRICA) 150 MG Cap 2 times a day.     • HYDROcodone-acetaminophen (NORCO) 7.5-325 MG per tablet       • tizanidine (ZANAFLEX) 4 MG Tab Take  by mouth 3 times a day as needed. Take 1 tablet by mouth three times a day as needed     • omeprazole (PRILOSEC) 40 MG delayed-release capsule Take 1 capsule by mouth every day. 90 capsule 3   • DULoxetine (CYMBALTA) 60 MG Cap DR Particles delayed-release capsule Take 1 Cap by mouth 2 times a day. Take 1 capsule by mouth daily (Patient taking differently: Take 40 mg by mouth 2 times a day.) 180 Cap 1     Current Facility-Administered Medications Ordered in Epic   Medication Dose Route Frequency Provider Last Rate Last Admin   • ketorolac (TORADOL) injection 60 mg  60 mg Intramuscular Once Leydi Love, A.P.R.N.         Family History:    Family History   Problem Relation Age of Onset   • Kidney Disease Mother         Stage 3-4   • Hypertension Mother    • Other Mother         cervical dysplasia treated by cryo   • Diabetes Father    • Heart Attack Father 61        Status post triple bypass   • Hypertension Father    • Cancer Cousin 21        Benign breast cancer   • Breast Cancer Other         Maternal grandmother's sister      Personal/Social History:    Social History     Tobacco Use   • Smoking status: Never Smoker   • Smokeless tobacco: Never Used   Vaping Use   • Vaping Use: Never used   Substance Use Topics   • Alcohol use: Yes     Comment: Rarely   • Drug use: No     Social History     Social History Narrative    Lives in a house in [place] with her , has 2 biological children, has custody of her niece, reports managing fine at home.    Has cats, dogs, and lizards as pets.    Works a warehouse job, is on her feet for most of the day.      Consumes a healthy diet consisting of fruits, vegetables, white meats and home cooked meals.     Walks at least 10,000 steps per day currently.       Review of Systems:   General: Negative for fever/chills and unexpected weight change.   Eyes:  Negative for vision changes, eye pain.  Respiratory:  Negative for cough and  "dyspnea.    Cardiovascular:  Negative for chest pain and palpitations.  Musculoskeletal:  Negative for myalgias.   Skin:  Negative for rash.   Neurological:  Negative for numbness/tingling. Positive for migraine.     Objective  Physical Exam:   /72 (BP Location: Left arm, Patient Position: Sitting, BP Cuff Size: Large adult)   Pulse 66   Temp 36.4 °C (97.6 °F) (Temporal)   Resp 18   Ht 1.575 m (5' 2\")   Wt 110 kg (243 lb)   SpO2 97%  Body mass index is 44.45 kg/m².  General:  Alert and oriented.  Well appearing.  NAD  Neck: Supple without JVD. No lymphadenopathy.  Pulmonary:  Normal effort.  Clear to ausculation without rales, ronchi, or wheezing.  Cardiovascular:  Regular rate and rhythm without murmur, rubs or gallop.   Skin:  Warm and dry.  No obvious lesions.  Musculoskeletal:  No extremity cyanosis, clubbing, or edema.      Assessment/Plan  1. Migraine without status migrainosus, not intractable, unspecified migraine type  Acute and ongoing.  Discussed a Toradol injection risks, benefits and side effects, she verbalized understanding. She does not have a true allergy to NSAIDs but was advised to avoid them because of gastric bypass surgery. She has gotten a Toradol injection in the past and it has helped her migraine so she would like to go ahead and get the injection today.  Discussed Imitrex risks, benefits and side effects, she verbalized understanding.  - sumatriptan (IMITREX) 100 MG tablet; Take 1 Tablet by mouth one time as needed for Migraine for up to 1 dose.  Dispense: 10 Tablet; Refill: 3  - ketorolac (TORADOL) injection 60 mg    2. Encounter for screening mammogram for malignant neoplasm of breast  - MA-SCREENING MAMMO BILAT W/TOMOSYNTHESIS W/CAD; Future      Health Maintenance: Completed    Return if symptoms worsen or fail to improve.    I have placed the above orders and discussed them with an approved delegating provider.  The MA is performing the above orders under the direction of " Dr. Jef Mclain MD/DO.    Please note that this dictation was created using voice recognition software. I have made every reasonable attempt to correct obvious errors, but I expect that there are errors of grammar and possibly content that I did not discover before finalizing the note.    DIMAS Duncan  [facility]

## 2022-05-01 DIAGNOSIS — J40 BRONCHITIS: ICD-10-CM

## 2022-05-01 DIAGNOSIS — F41.1 GENERALIZED ANXIETY DISORDER: ICD-10-CM

## 2022-05-02 RX ORDER — PREDNISONE 50 MG/1
50 TABLET ORAL DAILY
COMMUNITY
End: 2022-05-10

## 2022-05-02 NOTE — TELEPHONE ENCOUNTER
Received request via: Pharmacy    Was the patient seen in the last year in this department? Unknown     Does the patient have an active prescription (recently filled or refills available) for medication(s) requested? No

## 2022-05-03 DIAGNOSIS — G43.909 MIGRAINE WITHOUT STATUS MIGRAINOSUS, NOT INTRACTABLE, UNSPECIFIED MIGRAINE TYPE: ICD-10-CM

## 2022-05-03 DIAGNOSIS — R11.0 NAUSEA: ICD-10-CM

## 2022-05-03 RX ORDER — ALBUTEROL SULFATE 90 UG/1
AEROSOL, METERED RESPIRATORY (INHALATION)
Qty: 8.5 EACH | Refills: 2 | Status: SHIPPED | OUTPATIENT
Start: 2022-05-03 | End: 2022-09-22

## 2022-05-03 RX ORDER — HYDROXYZINE HYDROCHLORIDE 10 MG/1
10-20 TABLET, FILM COATED ORAL 3 TIMES DAILY PRN
Qty: 60 TABLET | Refills: 1 | Status: SHIPPED | OUTPATIENT
Start: 2022-05-03 | End: 2022-06-28

## 2022-05-03 RX ORDER — ONDANSETRON 4 MG/1
4 TABLET, FILM COATED ORAL EVERY 4 HOURS PRN
Qty: 20 TABLET | Refills: 1 | Status: SHIPPED | OUTPATIENT
Start: 2022-05-03 | End: 2022-06-28

## 2022-05-03 NOTE — PROGRESS NOTES
1. Migraine without status migrainosus, not intractable, unspecified migraine type  - Referral to Neurology

## 2022-05-03 NOTE — PROGRESS NOTES
1. Nausea  - ondansetron (ZOFRAN) 4 MG Tab tablet; Take 1 Tablet by mouth every four hours as needed for Nausea/Vomiting.  Dispense: 20 Tablet; Refill: 1

## 2022-05-04 RX ORDER — PREDNISONE 50 MG/1
50 TABLET ORAL DAILY
Qty: 10 TABLET | OUTPATIENT
Start: 2022-05-04

## 2022-05-13 DIAGNOSIS — B37.9 YEAST INFECTION: ICD-10-CM

## 2022-05-13 RX ORDER — FLUCONAZOLE 150 MG/1
150 TABLET ORAL ONCE
Qty: 1 TABLET | Refills: 0 | Status: SHIPPED | OUTPATIENT
Start: 2022-05-13 | End: 2022-05-13

## 2022-05-13 NOTE — PROGRESS NOTES
1. Yeast infection  - fluconazole (DIFLUCAN) 150 MG tablet; Take 1 Tablet by mouth one time for 1 dose.  Dispense: 1 Tablet; Refill: 0

## 2022-05-18 ENCOUNTER — TELEMEDICINE (OUTPATIENT)
Dept: MEDICAL GROUP | Facility: PHYSICIAN GROUP | Age: 42
End: 2022-05-18
Payer: COMMERCIAL

## 2022-05-18 VITALS — RESPIRATION RATE: 16 BRPM | WEIGHT: 240 LBS | HEIGHT: 62 IN | BODY MASS INDEX: 44.16 KG/M2

## 2022-05-18 DIAGNOSIS — B96.89 BACTERIAL SINUSITIS: ICD-10-CM

## 2022-05-18 DIAGNOSIS — J32.9 RECURRENT SINUS INFECTIONS: ICD-10-CM

## 2022-05-18 DIAGNOSIS — J32.9 BACTERIAL SINUSITIS: ICD-10-CM

## 2022-05-18 DIAGNOSIS — R09.81 SINUS CONGESTION: ICD-10-CM

## 2022-05-18 DIAGNOSIS — J02.9 SORE THROAT: ICD-10-CM

## 2022-05-18 PROCEDURE — 99214 OFFICE O/P EST MOD 30 MIN: CPT | Mod: 95 | Performed by: NURSE PRACTITIONER

## 2022-05-18 RX ORDER — AMOXICILLIN AND CLAVULANATE POTASSIUM 875; 125 MG/1; MG/1
1 TABLET, FILM COATED ORAL 2 TIMES DAILY
Qty: 14 TABLET | Refills: 0 | Status: SHIPPED | OUTPATIENT
Start: 2022-05-18 | End: 2022-05-25

## 2022-05-18 RX ORDER — DEXAMETHASONE 4 MG/1
TABLET ORAL
Qty: 10 TABLET | OUTPATIENT
Start: 2022-05-18

## 2022-05-18 RX ORDER — DEXAMETHASONE 4 MG/1
TABLET ORAL
Qty: 10 TABLET | Refills: 0 | Status: SHIPPED | OUTPATIENT
Start: 2022-05-18 | End: 2022-06-28

## 2022-05-18 ASSESSMENT — PATIENT HEALTH QUESTIONNAIRE - PHQ9: CLINICAL INTERPRETATION OF PHQ2 SCORE: 0

## 2022-05-18 ASSESSMENT — FIBROSIS 4 INDEX: FIB4 SCORE: 0.65

## 2022-05-18 NOTE — PROGRESS NOTES
Virtual Visit: Established Patient   This visit was conducted via Zoom using secure and encrypted videoconferencing technology. The patient was in a private location in the state [facility].    The patient's identity was confirmed and verbal consent was obtained for this virtual visit.    Subjective  Chief Complaint  Sinus Congestion    History of Present Illness  Farhana Freeman is a 42 y.o. female. This established patient is here today to discuss her sinus congestion.    Sinus congestion  Acute and ongoing. She states that a few days ago she noticed a lot of sinus congestion and nasal drainage. She states that her nose will start running and then will feel congested again. She has been using a sinus rinse but not getting much relief from that. She has had several sinus infections lately. Denies any fever or chills.    Past Medical History    Allergies: Oxycodone hcl, Flu virus vaccine, Ibuprofen, Morphine, Nsaids, Oxycodone-acetaminophen, Percocet [perloxx], Sulfa drugs, and Toradol  Past Medical History:   Diagnosis Date   • Anemia     Pt states she has no history of anemia   • Anesthesia     severe nausea and vomiting with morphine   • Anxiety    • ASTHMA 05/2017    Managed with PRN inhaler, last use was in March 2017   • Cholelithiasis 2017    Status post lap adeline   • Dental disorder     dentures    • GERD (gastroesophageal reflux disease)    • Heart burn     Resolved with gastric surgeries.   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    • Kidney disease     kidney stones    • Migraine     much less since losing weight, 1-2 times per month   • Morbid obesity (HCC) 5/12/2017   • Muscle disorder    • Postural hypotension     EMR   • Psychiatric problem     anxiety    • Radicular pain of left lower extremity 5/29/2019   • Status post gastric surgery 5/29/2019    DATE OF OPERATION: 5/12/2017 POSTOPERATIVE DIAGNOSIS: Morbid Obesity with medical sequelae;  Hiatal hernia OPERATION PERFORMED: 1.  Laparoscopic Isiah-en-Y Gastric Bypass  3. Hiatal hernia repair SURGEON: Shelton Jackson MD   • Urinary bladder disorder    • Urinary incontinence      Past Surgical History:   Procedure Laterality Date   • [place] ANTER COLPORRHAPHY,BLAD/VAGINA N/A 8/12/2020    Procedure: COLPORRHAPHY, ANTERIOR, TENSION FREE VAGINAL TAPE, PERINORRHAPHY;  Surgeon: Maria Elena Zhou M.D.;  Location: SURGERY SAME DAY [facility];  Service: Obstetrics   • [place] POST COLPORRHAPHY,RECTUM/VAGINA N/A 8/12/2020    Procedure: COLPORRHAPHY, POSTERIOR;  Surgeon: Maria Elena Zhou M.D.;  Location: SURGERY SAME DAY [facility];  Service: Obstetrics   • VAGINAL HYSTERECTOMY TOTAL N/A 8/12/2020    Procedure: HYSTERECTOMY, TOTAL, VAGINAL;  Surgeon: Maria Elena Zhou M.D.;  Location: SURGERY SAME DAY [facility];  Service: Obstetrics   • SALPINGECTOMY Bilateral 8/12/2020    Procedure: SALPINGECTOMY;  Surgeon: Maria Elena Zhou M.D.;  Location: SURGERY SAME DAY [facility];  Service: Obstetrics   • CYSTOSCOPY N/A 8/12/2020    Procedure: CYSTOSCOPY;  Surgeon: Maria Elena Zhou M.D.;  Location: SURGERY SAME DAY [facility];  Service: Obstetrics   • LITHOTRIPSY  06/2020   • LUIS BY LAPAROSCOPY  8/9/2017    Procedure: LUIS BY LAPAROSCOPY;  Surgeon: Shelton Jackson M.D.;  Location: SURGERY [facility];  Service:    • GASTRIC BYPASS LAPAROSCOPIC  5/12/2017    Procedure: GASTRIC BYPASS LAPAROSCOPIC - ISIAH EN Y AND HIATAL HERNIA REPAIR;  Surgeon: Shelton Jackson M.D.;  Location: SURGERY [facility]S;  Service:    • OTHER ABDOMINAL SURGERY  10/12/15    vertical gastric sleeve     Current Outpatient Medications Ordered in Epic   Medication Sig Dispense Refill   • amoxicillin-clavulanate (AUGMENTIN) 875-125 MG Tab Take 1 Tablet by mouth 2 times a day for 7 days. 14 Tablet 0   • DULoxetine HCl 40 MG Cap DR Particles Take 1 Capsule by mouth 2 times a day.     • fluconazole (DIFLUCAN) 150 MG tablet Take 1  Tablet by mouth every day. 1 Tablet 0   • escitalopram (LEXAPRO) 10 MG Tab Take 1 Tablet by mouth every day. 90 Tablet 0   • albuterol 108 (90 Base) MCG/ACT Aero Soln inhalation aerosol INHALE 2 PUFFS BY MOUTH EVERY 6 HOURS AS NEEDED FOR SHORTNESS OF BREATH 8.5 Each 2   • hydrOXYzine HCl (ATARAX) 10 MG Tab TAKE 1-2 TABLETS BY MOUTH 3 TIMES A DAY AS NEEDED FOR ANXIETY. 60 Tablet 1   • ondansetron (ZOFRAN) 4 MG Tab tablet Take 1 Tablet by mouth every four hours as needed for Nausea/Vomiting. 20 Tablet 1   • sumatriptan (IMITREX) 100 MG tablet Take 1 Tablet by mouth one time as needed for Migraine for up to 1 dose. 10 Tablet 3   • pregabalin (LYRICA) 150 MG Cap 2 times a day.     • HYDROcodone-acetaminophen (NORCO) 7.5-325 MG per tablet      • tizanidine (ZANAFLEX) 4 MG Tab Take  by mouth 3 times a day as needed. Take 1 tablet by mouth three times a day as needed     • omeprazole (PRILOSEC) 40 MG delayed-release capsule Take 1 capsule by mouth every day. 90 capsule 3     No current [facility]-ordered facility-administered medications on file.     Family History:    Family History   Problem Relation Age of Onset   • Kidney Disease Mother         Stage 3-4   • Hypertension Mother    • Other Mother         cervical dysplasia treated by cryo   • Diabetes Father    • Heart Attack Father 61        Status post triple bypass   • Hypertension Father    • Cancer Cousin 21        Benign breast cancer   • Breast Cancer Other         Maternal grandmother's sister      Personal/Social History:    Social History     Tobacco Use   • Smoking status: Never Smoker   • Smokeless tobacco: Never Used   Vaping Use   • Vaping Use: Never used   Substance Use Topics   • Alcohol use: Yes     Comment: Rarely   • Drug use: No     Social History     Social History Narrative    Lives in a house in [place] with her , has 2 biological children, has custody of her niece, reports managing fine at home.    Has cats, dogs, and lizards as pets.    Works a  "warehouse job, is on her feet for most of the day.      Consumes a healthy diet consisting of fruits, vegetables, white meats and home cooked meals.     Walks at least 10,000 steps per day currently.         Review of Systems:   General: Negative for fever/chills and expected weight change.   ENT: Positive for sinus congestion.  Respiratory:  Negative for cough and dyspnea.    Cardiovascular:  Negative for chest pain and palpitations.  Gastrointestinal:  Negative for nausea/vomiting, changes in bowel habits, and abdominal pain.   Skin:  Negative for rash.   Neurological:  Negative for numbness/tingling and headaches.     Objective  Physical Exam:   Resp 16   Ht 1.575 m (5' 2\")   Wt 109 kg (240 lb)  Body mass index is 43.9 kg/m².  Constitutional: Alert, no distress, well-groomed.  Skin: No rashes in visible areas.  Eye: Round. Conjunctiva clear, lids normal. No icterus.   ENMT: Lips pink without lesions, good dentition, moist mucous membranes. Phonation normal.  Neck: No masses, no thyromegaly on visible inspection. Moves freely without pain.  Respiratory: Unlabored respiratory effort, no cough or audible wheeze  Psych: Alert and oriented x3, normal affect and mood.       Assessment/Plan  1. Sinus congestion  2. Bacterial sinusitis  Acute and ongoing.  Discussed Augmentin risks, benefits and side effects, she verbalized understanding.  Discussed with her that she needs to take the entire course of antibiotics even if she starts to feel better, she verbalized understanding.  - amoxicillin-clavulanate (AUGMENTIN) 875-125 MG Tab; Take 1 Tablet by mouth 2 times a day for 7 days.  Dispense: 14 Tablet; Refill: 0    3. Recurrent sinus infections  Chronic and ongoing.  Discussed with her a referral to ENT since she is getting so many sinus infections, she is agreeable.  - Referral to ENT      Health Maintenance: Completed    Return if symptoms worsen or fail to improve.    Please note that this dictation was created using " voice recognition software. I have made every reasonable attempt to correct obvious errors, but I expect that there are errors of grammar and possibly content that I did not discover before finalizing the note.    DIMAS Duncan  [facility]

## 2022-05-18 NOTE — LETTER
May 18, 2022    To Whom It May Concern:         This is confirmation that Farhana Freeman attended her scheduled appointment with JESSICA Sharma on 5/18/22. Please excuse her from work starting on 5/17/22 to 5/20/22. She can return to work on 5/23/22.         If you have any questions please do not hesitate to call me at the phone number listed below.    Sincerely,      [email or web address]  [phone]

## 2022-05-18 NOTE — ASSESSMENT & PLAN NOTE
Acute and ongoing. She states that a few days ago she noticed a lot of sinus congestion and nasal drainage. She states that her nose will start running and then will feel congested again. She has been using a sinus rinse but not getting much relief from that. She has had several sinus infections lately. Denies any fever or chills.

## 2022-05-18 NOTE — PROGRESS NOTES
1. Sore throat  - dexamethasone (DECADRON) 4 MG Tab; Take one tablet by mouth twice a day for three days, then take one tablet by mouth for four days.  Dispense: 10 Tablet; Refill: 0

## 2022-05-31 ENCOUNTER — OFFICE VISIT (OUTPATIENT)
Dept: MEDICAL GROUP | Facility: PHYSICIAN GROUP | Age: 42
End: 2022-05-31
Payer: COMMERCIAL

## 2022-05-31 VITALS
RESPIRATION RATE: 20 BRPM | WEIGHT: 251 LBS | DIASTOLIC BLOOD PRESSURE: 72 MMHG | BODY MASS INDEX: 46.19 KG/M2 | TEMPERATURE: 97.7 F | HEART RATE: 80 BPM | OXYGEN SATURATION: 99 % | HEIGHT: 62 IN | SYSTOLIC BLOOD PRESSURE: 114 MMHG

## 2022-05-31 DIAGNOSIS — E55.9 VITAMIN D DEFICIENCY: ICD-10-CM

## 2022-05-31 DIAGNOSIS — Z13.6 ENCOUNTER FOR LIPID SCREENING FOR CARDIOVASCULAR DISEASE: ICD-10-CM

## 2022-05-31 DIAGNOSIS — Z13.220 ENCOUNTER FOR LIPID SCREENING FOR CARDIOVASCULAR DISEASE: ICD-10-CM

## 2022-05-31 DIAGNOSIS — E66.01 CLASS 3 SEVERE OBESITY DUE TO EXCESS CALORIES WITH BODY MASS INDEX (BMI) OF 40.0 TO 44.9 IN ADULT, UNSPECIFIED WHETHER SERIOUS COMORBIDITY PRESENT (HCC): ICD-10-CM

## 2022-05-31 DIAGNOSIS — Z13.0 SCREENING FOR DEFICIENCY ANEMIA: ICD-10-CM

## 2022-05-31 DIAGNOSIS — F41.1 GENERALIZED ANXIETY DISORDER: ICD-10-CM

## 2022-05-31 DIAGNOSIS — Z13.1 ENCOUNTER FOR SCREENING FOR DIABETES MELLITUS: ICD-10-CM

## 2022-05-31 DIAGNOSIS — E03.8 SUBCLINICAL HYPOTHYROIDISM: ICD-10-CM

## 2022-05-31 PROBLEM — S93.409A SPRAIN OF LATERAL LIGAMENT OF ANKLE JOINT: Status: RESOLVED | Noted: 2021-09-08 | Resolved: 2022-05-31

## 2022-05-31 PROBLEM — R09.89 CHEST CONGESTION: Status: RESOLVED | Noted: 2021-07-23 | Resolved: 2022-05-31

## 2022-05-31 PROBLEM — E65 SYMPTOMATIC ABDOMINAL PANNICULUS: Status: RESOLVED | Noted: 2020-06-16 | Resolved: 2022-05-31

## 2022-05-31 PROBLEM — J40 BRONCHITIS: Status: RESOLVED | Noted: 2021-07-06 | Resolved: 2022-05-31

## 2022-05-31 PROBLEM — M79.602 PAIN OF LEFT UPPER EXTREMITY: Status: RESOLVED | Noted: 2021-05-19 | Resolved: 2022-05-31

## 2022-05-31 PROBLEM — L30.4 INTERTRIGINOUS DERMATITIS ASSOCIATED WITH MOISTURE: Status: RESOLVED | Noted: 2020-06-16 | Resolved: 2022-05-31

## 2022-05-31 PROBLEM — R09.81 SINUS CONGESTION: Status: RESOLVED | Noted: 2022-05-18 | Resolved: 2022-05-31

## 2022-05-31 PROBLEM — N20.0 CALCULUS OF KIDNEY: Status: RESOLVED | Noted: 2020-03-27 | Resolved: 2022-05-31

## 2022-05-31 PROBLEM — R05.9 COUGH: Status: RESOLVED | Noted: 2021-07-28 | Resolved: 2022-05-31

## 2022-05-31 PROBLEM — R31.0 GROSS HEMATURIA: Status: RESOLVED | Noted: 2020-06-02 | Resolved: 2022-05-31

## 2022-05-31 PROCEDURE — 99214 OFFICE O/P EST MOD 30 MIN: CPT | Performed by: NURSE PRACTITIONER

## 2022-05-31 RX ORDER — OFLOXACIN 3 MG/ML
SOLUTION/ DROPS OPHTHALMIC
COMMUNITY
Start: 2022-05-30 | End: 2022-06-28

## 2022-05-31 RX ORDER — ESCITALOPRAM OXALATE 20 MG/1
20 TABLET ORAL DAILY
Qty: 30 TABLET | Refills: 1 | Status: SHIPPED | OUTPATIENT
Start: 2022-05-31 | End: 2022-07-13 | Stop reason: SDUPTHER

## 2022-05-31 ASSESSMENT — FIBROSIS 4 INDEX: FIB4 SCORE: 0.65

## 2022-05-31 NOTE — ASSESSMENT & PLAN NOTE
Chronic and ongoing. She states that she has noticed since being on Lyrica that her weight gain has gotten out of control. She is wanting to start back to exercising but doing it slowly after her surgery. She is interested in speaking with someone about her diet and see how she can make adjustments.

## 2022-05-31 NOTE — ASSESSMENT & PLAN NOTE
Chronic and ongoing. Not currently on any medication. She states that she is noticing that she is more fatigued and she is also noticing an increase in her weight.

## 2022-05-31 NOTE — PROGRESS NOTES
Subjective  Chief Complaint  Medication Follow Up    History of Present Illness  Farhana Freeman is a 42 y.o. female. This established patient is here today to follow up on her anxiety medication.    Generalized anxiety disorder  Chronic and ongoing. Currently taking Escitalopram 10 mg daily. She also has Hydroxyzine to use as needed. She feels as though she might want to increase the dosage of Escitalopram to see if it helps more with her anxiety. Denies any side effects from the medication.    Subclinical hypothyroidism  Chronic and ongoing. Not currently on any medication. She states that she is noticing that she is more fatigued and she is also noticing an increase in her weight.    Class 3 severe obesity due to excess calories with body mass index (BMI) of 40.0 to 44.9 in adult (HCC)  Chronic and ongoing. She states that she has noticed since being on Lyrica that her weight gain has gotten out of control. She is wanting to start back to exercising but doing it slowly after her surgery. She is interested in speaking with someone about her diet and see how she can make adjustments.    Vitamin D deficiency  Chronic, ongoing.  Reports fatigue.  Denies any muscle weakness.  No history of CKD.  Reports having a good diet, including dairy products.  No history of IBD's, celiac, CF, or surgeries causing malabsorption.  Patient is obese.  Is not taking vitamin d or calcium supplement.   Due for updated labs.     08/04/17 06:46 06/02/20 06:46 06/19/21 08:18   25-Hydroxy   Vitamin D 25 25 (L) [1] 13.7 (L) [2] 27 (L) [3]         Past Medical History    Allergies: Oxycodone hcl, Flu virus vaccine, Ibuprofen, Morphine, Nsaids, Oxycodone-acetaminophen, Percocet [perloxx], Sulfa drugs, and Toradol  Past Medical History:   Diagnosis Date   • Anemia     Pt states she has no history of anemia   • Anesthesia     severe nausea and vomiting with morphine   • Anxiety    • ASTHMA 05/2017    Managed with PRN inhaler, last use was  in March 2017   • Bronchitis 7/6/2021   • Chest congestion 7/23/2021   • Cholelithiasis 2017    Status post lap adeline   • Cough 7/28/2021   • Dental disorder     dentures    • GERD (gastroesophageal reflux disease)    • Gross hematuria 6/2/2020   • Heart burn     Resolved with gastric surgeries.   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    • Intertriginous dermatitis associated with moisture 6/16/2020   • Kidney disease     kidney stones    • Migraine     much less since losing weight, 1-2 times per month   • Morbid obesity (HCC) 5/12/2017   • Muscle disorder    • Pain of left upper extremity 5/19/2021   • Postural hypotension     EMR   • Psychiatric problem     anxiety    • Radicular pain of left lower extremity 5/29/2019   • Renal stone 3/27/2020    IMO load March 2020   • Sinus congestion 5/18/2022   • Sprain of lateral ligament of ankle joint 9/8/2021   • Status post gastric surgery 5/29/2019    DATE OF OPERATION: 5/12/2017 POSTOPERATIVE DIAGNOSIS: Morbid Obesity with medical sequelae; Hiatal hernia OPERATION PERFORMED: 1.  Laparoscopic Danie-en-Y Gastric Bypass  3. Hiatal hernia repair SURGEON: Shelton Jackson MD   • Symptomatic abdominal panniculus 6/16/2020   • Urinary bladder disorder    • Urinary incontinence      Past Surgical History:   Procedure Laterality Date   • [place] ANTER COLPORRHAPHY,BLAD/VAGINA N/A 8/12/2020    Procedure: COLPORRHAPHY, ANTERIOR, TENSION FREE VAGINAL TAPE, PERINORRHAPHY;  Surgeon: Maria Elena Zhou M.D.;  Location: SURGERY SAME DAY [facility] ORS;  Service: Obstetrics   • OH POST COLPORRHAPHY,RECTUM/VAGINA N/A 8/12/2020    Procedure: COLPORRHAPHY, POSTERIOR;  Surgeon: Maria Elena Zhou M.D.;  Location: SURGERY SAME DAY [facility] ORS;  Service: Obstetrics   • VAGINAL HYSTERECTOMY TOTAL N/A 8/12/2020    Procedure: HYSTERECTOMY, TOTAL, VAGINAL;  Surgeon: Maria Elena Zhou M.D.;  Location: SURGERY SAME DAY [facility] ORS;  Service:  Obstetrics   • SALPINGECTOMY Bilateral 8/12/2020    Procedure: SALPINGECTOMY;  Surgeon: Maria Elena Zhou M.D.;  Location: SURGERY SAME DAY [facility] ORS;  Service: Obstetrics   • CYSTOSCOPY N/A 8/12/2020    Procedure: CYSTOSCOPY;  Surgeon: Maria Elena Zhou M.D.;  Location: SURGERY SAME DAY [facility] ORS;  Service: Obstetrics   • LITHOTRIPSY  06/2020   • LUIS BY LAPAROSCOPY  8/9/2017    Procedure: LUIS BY LAPAROSCOPY;  Surgeon: Shelton Jackson M.D.;  Location: SURGERY [facility] ORS;  Service:    • GASTRIC BYPASS LAPAROSCOPIC  5/12/2017    Procedure: GASTRIC BYPASS LAPAROSCOPIC - ISIAH EN Y AND HIATAL HERNIA REPAIR;  Surgeon: Shelton Jackson M.D.;  Location: SURGERY [facility] ORS;  Service:    • OTHER ABDOMINAL SURGERY  10/12/15    vertical gastric sleeve     Current Outpatient Medications Ordered in Epic   Medication Sig Dispense Refill   • ofloxacin (OCUFLOX) 0.3 % Solution      • escitalopram (LEXAPRO) 20 MG tablet Take 1 Tablet by mouth every day. 30 Tablet 1   • dexamethasone (DECADRON) 4 MG Tab Take one tablet by mouth twice a day for three days, then take one tablet by mouth for four days. 10 Tablet 0   • DULoxetine HCl 40 MG Cap DR Particles Take 1 Capsule by mouth 2 times a day.     • fluconazole (DIFLUCAN) 150 MG tablet Take 1 Tablet by mouth every day. 1 Tablet 0   • albuterol 108 (90 Base) MCG/ACT Aero Soln inhalation aerosol INHALE 2 PUFFS BY MOUTH EVERY 6 HOURS AS NEEDED FOR SHORTNESS OF BREATH 8.5 Each 2   • hydrOXYzine HCl (ATARAX) 10 MG Tab TAKE 1-2 TABLETS BY MOUTH 3 TIMES A DAY AS NEEDED FOR ANXIETY. 60 Tablet 1   • ondansetron (ZOFRAN) 4 MG Tab tablet Take 1 Tablet by mouth every four hours as needed for Nausea/Vomiting. 20 Tablet 1   • sumatriptan (IMITREX) 100 MG tablet Take 1 Tablet by mouth one time as needed for Migraine for up to 1 dose. 10 Tablet 3   • pregabalin (LYRICA) 150 MG Cap 2 times a day.     • HYDROcodone-acetaminophen (NORCO) 7.5-325 MG per tablet      • tizanidine  (ZANAFLEX) 4 MG Tab Take  by mouth 3 times a day as needed. Take 1 tablet by mouth three times a day as needed     • omeprazole (PRILOSEC) 40 MG delayed-release capsule Take 1 capsule by mouth every day. 90 capsule 3     No current [facility]-ordered facility-administered medications on file.     Family History:    Family History   Problem Relation Age of Onset   • Kidney Disease Mother         Stage 3-4   • Hypertension Mother    • Other Mother         cervical dysplasia treated by cryo   • Diabetes Father    • Heart Attack Father 61        Status post triple bypass   • Hypertension Father    • Cancer Cousin 21        Benign breast cancer   • Breast Cancer Other         Maternal grandmother's sister      Personal/Social History:    Social History     Tobacco Use   • Smoking status: Never Smoker   • Smokeless tobacco: Never Used   Vaping Use   • Vaping Use: Never used   Substance Use Topics   • Alcohol use: Yes     Comment: Rarely   • Drug use: No     Social History     Social History Narrative    Lives in a house in [place] with her , has 2 biological children, has custody of her niece, reports managing fine at home.    Has cats, dogs, and lizards as pets.    Works a warehouse job, is on her feet for most of the day.      Consumes a healthy diet consisting of fruits, vegetables, white meats and home cooked meals.     Walks at least 10,000 steps per day currently.       Review of Systems:   General: Negative for fever/chills. Positive for unexpected with gain and fatigue.  Eyes:  Negative for vision changes, eye pain.  Respiratory:  Negative for cough and dyspnea.    Cardiovascular:  Negative for chest pain and palpitations.  Musculoskeletal:  Negative for myalgias.   Skin:  Negative for rash.   Neurological:  Negative for numbness/tingling and headaches.     Objective  Physical Exam:   /72 (BP Location: Left arm, Patient Position: Sitting, BP Cuff Size: Large adult)   Pulse 80   Temp 36.5 °C (97.7 °F)  "(Temporal)   Resp 20   Ht 1.575 m (5' 2\")   Wt 114 kg (251 lb)   SpO2 99%  Body mass index is 45.91 kg/m².  General:  Alert and oriented.  Well appearing.  NAD  Neck: Supple without JVD. No lymphadenopathy.  Pulmonary:  Normal effort.  Clear to ausculation without rales, ronchi, or wheezing.  Cardiovascular:  Regular rate and rhythm without murmur, rubs or gallop.   Skin:  Warm and dry.  No obvious lesions.  Musculoskeletal:  No extremity cyanosis, clubbing, or edema.      Assessment/Plan  1. Generalized anxiety disorder  Chronic and ongoing.  Discussed increasing her Escitalopram to 20 mg daily, she would like to do that.  Continue to take Hydroxyzine as needed.  - escitalopram (LEXAPRO) 20 MG tablet; Take 1 Tablet by mouth every day.  Dispense: 30 Tablet; Refill: 1    2. Subclinical hypothyroidism  Chronic and ongoing.  She is requesting updated labs as she continues to struggle with her weight and is always fatigued.  Due for updated labs.  - TSH; Future  - FREE THYROXINE; Future  - TRIIDOTHYRONINE; Future    3. Vitamin D deficiency  Chronic and ongoing.  Educated to take Vitamin D and Calcium supplement, she verbalized understanding.  Due for updated labs.  - VITAMIN D,25 HYDROXY; Future    4. Class 3 severe obesity due to excess calories with body mass index (BMI) of 40.0 to 44.9 in adult, unspecified whether serious comorbidity present (HCC)  Chronic and ongoing.  Discussed with her Dr. Taveras's weight loss management program and working with a Nutrionist, she is interested and would like a referral sent over.  Due for updated labs.  - Lipid Profile; Future  - Comp Metabolic Panel; Future    5. Encounter for screening for diabetes mellitus  Due for updated labs.  - Comp Metabolic Panel; Future    6. Encounter for lipid screening for cardiovascular disease  Due for updated labs.  - Lipid Profile; Future    7. Screening for deficiency anemia  Due for updated labs.  - CBC WITHOUT DIFFERENTIAL; " Future      Health Maintenance: Completed    Return in 1 month (on 6/30/2022) for Medication F/U.    Please note that this dictation was created using voice recognition software. I have made every reasonable attempt to correct obvious errors, but I expect that there are errors of grammar and possibly content that I did not discover before finalizing the note.    DIMAS Duncan  [facility]

## 2022-05-31 NOTE — ASSESSMENT & PLAN NOTE
Chronic and ongoing. Currently taking Escitalopram 10 mg daily. She also has Hydroxyzine to use as needed. She feels as though she might want to increase the dosage of Escitalopram to see if it helps more with her anxiety. Denies any side effects from the medication.

## 2022-05-31 NOTE — ASSESSMENT & PLAN NOTE
Chronic, ongoing.  Reports fatigue.  Denies any muscle weakness.  No history of CKD.  Reports having a good diet, including dairy products.  No history of IBD's, celiac, CF, or surgeries causing malabsorption.  Patient is obese.  Is not taking vitamin d or calcium supplement.   Due for updated labs.     08/04/17 06:46 06/02/20 06:46 06/19/21 08:18   25-Hydroxy   Vitamin D 25 25 (L) [1] 13.7 (L) [2] 27 (L) [3]

## 2022-06-07 ENCOUNTER — TELEPHONE (OUTPATIENT)
Dept: MEDICAL GROUP | Facility: PHYSICIAN GROUP | Age: 42
End: 2022-06-07

## 2022-06-07 NOTE — LETTER
June 7, 2022    To Whom It May Concern:         This is confirmation that Farhana Freeman needs to be excused from work on June 6th to June 8th due to an acute illness. She may return to work on June 9th.         If you have any questions please do not hesitate to call me at the phone number listed below.    Sincerely,    Leydi Love, A.P.R.N.  [phone]

## 2022-06-14 ENCOUNTER — OFFICE VISIT (OUTPATIENT)
Dept: MEDICAL GROUP | Facility: PHYSICIAN GROUP | Age: 42
End: 2022-06-14
Payer: COMMERCIAL

## 2022-06-14 VITALS
RESPIRATION RATE: 20 BRPM | HEIGHT: 63 IN | HEART RATE: 68 BPM | SYSTOLIC BLOOD PRESSURE: 120 MMHG | OXYGEN SATURATION: 98 % | BODY MASS INDEX: 44.3 KG/M2 | DIASTOLIC BLOOD PRESSURE: 78 MMHG | WEIGHT: 250 LBS | TEMPERATURE: 97.9 F

## 2022-06-14 DIAGNOSIS — F41.1 GENERALIZED ANXIETY DISORDER: ICD-10-CM

## 2022-06-14 PROBLEM — T63.301A SPIDER BITE: Status: RESOLVED | Noted: 2021-11-11 | Resolved: 2022-06-14

## 2022-06-14 PROCEDURE — 99213 OFFICE O/P EST LOW 20 MIN: CPT | Performed by: NURSE PRACTITIONER

## 2022-06-14 ASSESSMENT — FIBROSIS 4 INDEX: FIB4 SCORE: 0.65

## 2022-06-14 NOTE — PROGRESS NOTES
Subjective  Chief Complaint  Medication Follow Up    History of Present Illness  Farhana Freeman is a 42 y.o. female. This established patient is here today to follow up on her anxiety medication.    Generalized anxiety disorder  Chronic and ongoing. Currently taking Escitalopram 20 mg daily. She states that when she first increased her dosage that she was a lot more emotional and would cry for no reason. She states that it has improved over the weekend and today. She states that she has noticed the medication helping with anxiety better.    Past Medical History    Allergies: Oxycodone hcl, Flu virus vaccine, Ibuprofen, Morphine, Nsaids, Oxycodone-acetaminophen, Percocet [perloxx], Sulfa drugs, and Toradol  Past Medical History:   Diagnosis Date   • Anemia     Pt states she has no history of anemia   • Anesthesia     severe nausea and vomiting with morphine   • Anxiety    • ASTHMA 05/2017    Managed with PRN inhaler, last use was in March 2017   • Bronchitis 7/6/2021   • Chest congestion 7/23/2021   • Cholelithiasis 2017    Status post lap adeline   • Cough 7/28/2021   • Dental disorder     dentures    • GERD (gastroesophageal reflux disease)    • Gross hematuria 6/2/2020   • Heart burn     Resolved with gastric surgeries.   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    • Intertriginous dermatitis associated with moisture 6/16/2020   • Kidney disease     kidney stones    • Migraine     much less since losing weight, 1-2 times per month   • Morbid obesity (HCC) 5/12/2017   • Muscle disorder    • Pain of left upper extremity 5/19/2021   • Postural hypotension     EMR   • Psychiatric problem     anxiety    • Radicular pain of left lower extremity 5/29/2019   • Renal stone 3/27/2020    IMO load March 2020   • Sinus congestion 5/18/2022   • Spider bite 11/11/2021   • Sprain of lateral ligament of ankle joint 9/8/2021   • Status post gastric surgery 5/29/2019    DATE OF  OPERATION: 5/12/2017 POSTOPERATIVE DIAGNOSIS: Morbid Obesity with medical sequelae; Hiatal hernia OPERATION PERFORMED: 1.  Laparoscopic Isiah-en-Y Gastric Bypass  3. Hiatal hernia repair SURGEON: Shelton Jackson MD   • Symptomatic abdominal panniculus 6/16/2020   • Urinary bladder disorder    • Urinary incontinence      Past Surgical History:   Procedure Laterality Date   • [place] ANTER COLPORRHAPHY,BLAD/VAGINA N/A 8/12/2020    Procedure: COLPORRHAPHY, ANTERIOR, TENSION FREE VAGINAL TAPE, PERINORRHAPHY;  Surgeon: Maria Elena Zhou M.D.;  Location: SURGERY SAME DAY [facility] ORS;  Service: Obstetrics   • LA POST COLPORRHAPHY,RECTUM/VAGINA N/A 8/12/2020    Procedure: COLPORRHAPHY, POSTERIOR;  Surgeon: Maria Elena Zhou M.D.;  Location: SURGERY SAME DAY [facility] ORS;  Service: Obstetrics   • VAGINAL HYSTERECTOMY TOTAL N/A 8/12/2020    Procedure: HYSTERECTOMY, TOTAL, VAGINAL;  Surgeon: Maria Elena Zhou M.D.;  Location: SURGERY SAME DAY [facility] ORS;  Service: Obstetrics   • SALPINGECTOMY Bilateral 8/12/2020    Procedure: SALPINGECTOMY;  Surgeon: Maria Elena Zhou M.D.;  Location: SURGERY SAME DAY [facility] ORS;  Service: Obstetrics   • CYSTOSCOPY N/A 8/12/2020    Procedure: CYSTOSCOPY;  Surgeon: Maria Elena Zhou M.D.;  Location: SURGERY SAME DAY [facility] ORS;  Service: Obstetrics   • LITHOTRIPSY  06/2020   • LUIS BY LAPAROSCOPY  8/9/2017    Procedure: LUIS BY LAPAROSCOPY;  Surgeon: Shelton Jackson M.D.;  Location: SURGERY [facility] ORS;  Service:    • GASTRIC BYPASS LAPAROSCOPIC  5/12/2017    Procedure: GASTRIC BYPASS LAPAROSCOPIC - ISIAH EN Y AND HIATAL HERNIA REPAIR;  Surgeon: Shelton Jackson M.D.;  Location: SURGERY [facility] ORS;  Service:    • OTHER ABDOMINAL SURGERY  10/12/15    vertical gastric sleeve     Current Outpatient Medications Ordered in Epic   Medication Sig Dispense Refill   • ofloxacin (OCUFLOX) 0.3 % Solution      • escitalopram (LEXAPRO) 20 MG tablet Take 1 Tablet by mouth every day.  30 Tablet 1   • dexamethasone (DECADRON) 4 MG Tab Take one tablet by mouth twice a day for three days, then take one tablet by mouth for four days. 10 Tablet 0   • DULoxetine HCl 40 MG Cap DR Particles Take 1 Capsule by mouth 2 times a day.     • fluconazole (DIFLUCAN) 150 MG tablet Take 1 Tablet by mouth every day. 1 Tablet 0   • albuterol 108 (90 Base) MCG/ACT Aero Soln inhalation aerosol INHALE 2 PUFFS BY MOUTH EVERY 6 HOURS AS NEEDED FOR SHORTNESS OF BREATH 8.5 Each 2   • hydrOXYzine HCl (ATARAX) 10 MG Tab TAKE 1-2 TABLETS BY MOUTH 3 TIMES A DAY AS NEEDED FOR ANXIETY. 60 Tablet 1   • ondansetron (ZOFRAN) 4 MG Tab tablet Take 1 Tablet by mouth every four hours as needed for Nausea/Vomiting. 20 Tablet 1   • sumatriptan (IMITREX) 100 MG tablet Take 1 Tablet by mouth one time as needed for Migraine for up to 1 dose. 10 Tablet 3   • pregabalin (LYRICA) 150 MG Cap 2 times a day.     • HYDROcodone-acetaminophen (NORCO) 7.5-325 MG per tablet      • tizanidine (ZANAFLEX) 4 MG Tab Take  by mouth 3 times a day as needed. Take 1 tablet by mouth three times a day as needed     • omeprazole (PRILOSEC) 40 MG delayed-release capsule Take 1 capsule by mouth every day. 90 capsule 3     No current [facility]-ordered facility-administered medications on file.     Family History:    Family History   Problem Relation Age of Onset   • Kidney Disease Mother         Stage 3-4   • Hypertension Mother    • Other Mother         cervical dysplasia treated by cryo   • Diabetes Father    • Heart Attack Father 61        Status post triple bypass   • Hypertension Father    • Cancer Cousin 21        Benign breast cancer   • Breast Cancer Other         Maternal grandmother's sister      Personal/Social History:    Social History     Tobacco Use   • Smoking status: Never Smoker   • Smokeless tobacco: Never Used   Vaping Use   • Vaping Use: Never used   Substance Use Topics   • Alcohol use: Yes     Comment: Rarely   • Drug use: No     Social History  "    Social History Narrative    Lives in a house in [place] with her , has 2 biological children, has custody of her niece, reports managing fine at home.    Has cats, dogs, and lizards as pets.    Works a warehouse job, is on her feet for most of the day.      Consumes a healthy diet consisting of fruits, vegetables, white meats and home cooked meals.     Walks at least 10,000 steps per day currently.       Review of Systems:   General: Negative for fever/chills and unexpected weight change.   Eyes:  Negative for vision changes, eye pain.  Respiratory:  Negative for cough and dyspnea.    Cardiovascular:  Negative for chest pain and palpitations.  Musculoskeletal:  Negative for myalgias.   Skin:  Negative for rash.     Objective  Physical Exam:   /78 (BP Location: Left arm, Patient Position: Sitting, BP Cuff Size: Large adult)   Pulse 68   Temp 36.6 °C (97.9 °F) (Temporal)   Resp 20   Ht 1.6 m (5' 3\")   Wt 113 kg (250 lb)   SpO2 98%  Body mass index is 44.29 kg/m².  General:  Alert and oriented.  Well appearing.  NAD  Neck: Supple without JVD. No lymphadenopathy.  Pulmonary:  Normal effort.  Clear to ausculation without rales, ronchi, or wheezing.  Cardiovascular:  Regular rate and rhythm without murmur, rubs or gallop.   Skin:  Warm and dry.  No obvious lesions.  Musculoskeletal:  No extremity cyanosis, clubbing, or edema.      Assessment/Plan  1. Generalized anxiety disorder  Chronic and ongoing.  She states that the weird side effects that she was having previously have resolved. She would like to continue on the current dosage.  Continue to take Escitalopram 20 mg daily.      Health Maintenance: Completed    Return in about 4 weeks (around 7/12/2022) for Medication F/U.    Please note that this dictation was created using voice recognition software. I have made every reasonable attempt to correct obvious errors, but I expect that there are errors of grammar and possibly content that I did not " discover before finalizing the note.    DIMAS Duncan  [facility]

## 2022-06-14 NOTE — ASSESSMENT & PLAN NOTE
Chronic and ongoing. Currently taking Escitalopram 20 mg daily. She states that when she first increased her dosage that she was a lot more emotional and would cry for no reason. She states that it has improved over the weekend and today. She states that she has noticed the medication helping with anxiety better.

## 2022-06-28 ENCOUNTER — TELEPHONE (OUTPATIENT)
Dept: NEUROLOGY | Facility: MEDICAL CENTER | Age: 42
End: 2022-06-28

## 2022-06-28 ENCOUNTER — OFFICE VISIT (OUTPATIENT)
Dept: NEUROLOGY | Facility: MEDICAL CENTER | Age: 42
End: 2022-06-28
Attending: PSYCHIATRY & NEUROLOGY
Payer: COMMERCIAL

## 2022-06-28 VITALS
OXYGEN SATURATION: 97 % | HEART RATE: 76 BPM | RESPIRATION RATE: 18 BRPM | DIASTOLIC BLOOD PRESSURE: 82 MMHG | SYSTOLIC BLOOD PRESSURE: 126 MMHG

## 2022-06-28 DIAGNOSIS — G43.109 MIGRAINE WITH AURA AND WITHOUT STATUS MIGRAINOSUS, NOT INTRACTABLE: Primary | ICD-10-CM

## 2022-06-28 PROCEDURE — 99212 OFFICE O/P EST SF 10 MIN: CPT

## 2022-06-28 PROCEDURE — 99204 OFFICE O/P NEW MOD 45 MIN: CPT | Performed by: PSYCHIATRY & NEUROLOGY

## 2022-06-28 RX ORDER — FREMANEZUMAB-VFRM 225 MG/1.5ML
225 INJECTION SUBCUTANEOUS
Qty: 1.5 ML | Refills: 5 | Status: SHIPPED | OUTPATIENT
Start: 2022-06-28 | End: 2022-07-28

## 2022-06-28 NOTE — TELEPHONE ENCOUNTER
Ajovy 225mg/1.5ml Soln PFS      PA submitted via CMM Key: [id number] awaiting response TAT 24-72 hrs.  - 06/28/2022 4:25pm

## 2022-06-28 NOTE — PROGRESS NOTES
"[facility] NEUROLOGY  GENERAL NEUROLOGY  NEW PATIENT VISIT    Referral source: DIMAS Pereyra    CC: \"migarine without status...\"    HISTORY OF ILLNESS:  Farhana Freeman is a 42 y.o. woman with a history most notable for migraine, asthma, obesity, nephrolithiasis, cervical radiculopathy, chronic fatigue, and TENZIN.  Today, she was accompanied by her  and daughter, and she provided the following history:    The following is a summary of headache symptoms, presented in my standard format:    Family History: maternal grandmother, daughter  Age at onset: 18-19  Location: variable: left orbital, top of the head  Radiation:   Frequency: 2/month  Duration: variable: hours to 6 weeks  Headache Days/Month: 15/30  Quality: \"pressure, throbbing\"  Intensity: 8/10  Aura: nausea  Photophobia/Phonophobia/Nausea/Vomiting: yes/no/yes/yes  Provoked by Physical Activity?:   Triggers: Lyrica  Associated Symptoms:   Autonomic Signs (such as ptosis, miosis, conjunctival injection, rhinorrhea, increased lacrimation):   Head Trauma:   Association with Menses: s/p hysterectomy 8/2020  ED Visits: yes  Hospitalizations: yes  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  Sleep: average: 6 hours/night  Caffeine Intake: 2 cups/day  Hydration: tries to keep well-hydrated  Nutrition: eats regular meals  Exercise:   Analgesic Overuse:     Current Medication Regimen:  -     Medications Tried: Response  Preventive:  - none    Abortive:  - hydrocodone/Norco: effective if taken with sleep  - sumatriptan: ineffective    Medications Not Tried:  - propranolol: contraindicated due to history of asthma  - topiramate: contraindicated due to history of nephrolithiasis  - tricyclic antidepressants: contraindicated due to med-med interactions with duloxetine and escitalopram  - venlafaxine: contraindicated due to med-med interactions with duloxetine and escitalopram    MEDICAL AND SURGICAL HISTORY:  Past Medical History:   Diagnosis Date   • " Anemia     Pt states she has no history of anemia   • Anesthesia     severe nausea and vomiting with morphine   • Anxiety    • ASTHMA 05/2017    Managed with PRN inhaler, last use was in March 2017   • Bronchitis 7/6/2021   • Chest congestion 7/23/2021   • Cholelithiasis 2017    Status post lap adeline   • Cough 7/28/2021   • Dental disorder     dentures    • GERD (gastroesophageal reflux disease)    • Gross hematuria 6/2/2020   • Heart burn     Resolved with gastric surgeries.   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    • Intertriginous dermatitis associated with moisture 6/16/2020   • Kidney disease     kidney stones    • Migraine     much less since losing weight, 1-2 times per month   • Morbid obesity (HCC) 5/12/2017   • Muscle disorder    • Pain of left upper extremity 5/19/2021   • Postural hypotension     EMR   • Psychiatric problem     anxiety    • Radicular pain of left lower extremity 5/29/2019   • Renal stone 3/27/2020    IMO load March 2020   • Sinus congestion 5/18/2022   • Spider bite 11/11/2021   • Sprain of lateral ligament of ankle joint 9/8/2021   • Status post gastric surgery 5/29/2019    DATE OF OPERATION: 5/12/2017 POSTOPERATIVE DIAGNOSIS: Morbid Obesity with medical sequelae; Hiatal hernia OPERATION PERFORMED: 1.  Laparoscopic Danie-en-Y Gastric Bypass  3. Hiatal hernia repair SURGEON: Shelton Jackson MD   • Symptomatic abdominal panniculus 6/16/2020   • Urinary bladder disorder    • Urinary incontinence      Past Surgical History:   Procedure Laterality Date   • CT ANTER COLPORRHAPHY,BLAD/VAGINA N/A 8/12/2020    Procedure: COLPORRHAPHY, ANTERIOR, TENSION FREE VAGINAL TAPE, PERINORRHAPHY;  Surgeon: Maria Elena Zhou M.D.;  Location: SURGERY SAME DAY [facility];  Service: Obstetrics   • CT POST COLPORRHAPHY,RECTUM/VAGINA N/A 8/12/2020    Procedure: COLPORRHAPHY, POSTERIOR;  Surgeon: Maria Elena Zhou M.D.;  Location: SURGERY SAME DAY  [facility] ORS;  Service: Obstetrics   • VAGINAL HYSTERECTOMY TOTAL N/A 8/12/2020    Procedure: HYSTERECTOMY, TOTAL, VAGINAL;  Surgeon: Maria Elena Zhou M.D.;  Location: SURGERY SAME DAY [facility];  Service: Obstetrics   • SALPINGECTOMY Bilateral 8/12/2020    Procedure: SALPINGECTOMY;  Surgeon: Maria Elena Zhou M.D.;  Location: SURGERY SAME DAY [facility] ORS;  Service: Obstetrics   • CYSTOSCOPY N/A 8/12/2020    Procedure: CYSTOSCOPY;  Surgeon: Maria Elena Zhou M.D.;  Location: SURGERY SAME DAY [facility] ORS;  Service: Obstetrics   • LITHOTRIPSY  06/2020   • LUIS BY LAPAROSCOPY  8/9/2017    Procedure: LUIS BY LAPAROSCOPY;  Surgeon: Shelton Jackson M.D.;  Location: SURGERY [facility] ORS;  Service:    • GASTRIC BYPASS LAPAROSCOPIC  5/12/2017    Procedure: GASTRIC BYPASS LAPAROSCOPIC - ISIAH EN Y AND HIATAL HERNIA REPAIR;  Surgeon: Shelton Jackson M.D.;  Location: SURGERY [facility] ORS;  Service:    • OTHER ABDOMINAL SURGERY  10/12/15    vertical gastric sleeve     MEDICATIONS:  Current Outpatient Medications   Medication Sig   • Fremanezumab-vfrm (AJOVY) 225 MG/1.5ML Solution Prefilled Syringe Inject 225 mg under the skin Q30 DAYS for 30 days.   • escitalopram (LEXAPRO) 20 MG tablet Take 1 Tablet by mouth every day.   • DULoxetine HCl 40 MG Cap DR Particles Take 1 Capsule by mouth 2 times a day.   • albuterol 108 (90 Base) MCG/ACT Aero Soln inhalation aerosol INHALE 2 PUFFS BY MOUTH EVERY 6 HOURS AS NEEDED FOR SHORTNESS OF BREATH   • HYDROcodone-acetaminophen (NORCO) 7.5-325 MG per tablet    • tizanidine (ZANAFLEX) 4 MG Tab Take  by mouth 3 times a day as needed. Take 1 tablet by mouth three times a day as needed   • omeprazole (PRILOSEC) 40 MG delayed-release capsule Take 1 capsule by mouth every day.     SOCIAL HISTORY:  Social History     Tobacco Use   • Smoking status: Never Smoker   • Smokeless tobacco: Never Used   Substance Use Topics   • Alcohol use: Yes     Comment: Rarely     Social History      Social History Narrative    Lives in a house in [place] with her , has 2 biological children, has custody of her niece, reports managing fine at home.    Has cats, dogs, and lizards as pets.    Works a warehouse job, is on her feet for most of the day.      Consumes a healthy diet consisting of fruits, vegetables, white meats and home cooked meals.     Walks at least 10,000 steps per day currently.      FAMILY HISTORY:  Family History   Problem Relation Age of Onset   • Kidney Disease Mother         Stage 3-4   • Hypertension Mother    • Other Mother         cervical dysplasia treated by cryo   • Diabetes Father    • Heart Attack Father 61        Status post triple bypass   • Hypertension Father    • Cancer Cousin 21        Benign breast cancer   • Breast Cancer Other         Maternal grandmother's sister     REVIEW OF SYSTEMS:  A ROS was completed.  Pertinent positives and negatives were included in the HPI, above.  All other systems were reviewed and are negative.    PHYSICAL EXAM:  General/Medical:  - NAD  - hair, skin, nails, and joints were normal    Neuro:  MENTAL STATUS: awake and alert; no deficits of speech or language; oriented to conversation; affect was appropriate to situation    CRANIAL NERVES:    II: acuity: J1+/J2, fields: intact to confrontation, pupils: 3/3 to 2/2 without a relative afferent pupillary defect, discs: sharp    III/IV/VI: versions: intact without nystagmus    V: facial sensation: symmetric to light touch    VII: facial expression: symmetric    VIII: hearing: intact to finger rub    IX/X: palate: elevates symmetrically    XI: shoulder shrug: symmetric    XII: tongue: midline    MOTOR:  - bulk: normal throughout  - tone: normal in the upper extremities  Upper Extremity Strength  (R/L)    5/5   Elbow flexion 5/5   Elbow extension 5/5   Shoulder abduction 5/5     Lower Extremity Strength  (R/L)   Hip flexion 5/5   Knee extension 5/5   Knee flexion 5/5   Ankle plantarflexion  5/5   Ankle dorsiflexion 5/5     - can walk on toes and heels  - pronator drift: absent  - abnormal movements: none    SENSATION:  - light touch: grossly intact over the upper and lower extremities  - vibration (R/L, seconds): NT/NT at the great toes  - pinprick: NT  - proprioception: NT  - Romberg: absent    COORDINATION:  - finger to nose: NT  - finger tapping: rapid and accurate, bilaterally    REFLEXES:  Reflex Right Left   BR 1+ 1+   Biceps 1+ 1+   Triceps 1+ 1+   Patellae 2+ 2+   Achilles NT NT   Toes NT NT     GAIT:  - narrow base and normal  - heel-raised/toe-raised gait: intact/intact  - tandem gait: intact    REVIEW OF IMAGING STUDIES:  No brain imaging available.    REVIEW OF LABORATORY STUDIES:  No recent data available.    ASSESSMENT:  Farhana Freeman is a 42 y.o. woman with chronic migraine with aura as well as a history of asthma, obesity, nephrolithiasis, cervical radiculopathy, chronic fatigue, and TENZIN.  A number of first line preventive agents are contraindicated due to her medical history and medication list, as explained above.  Plans/recommendations as follows:    PLAN:  Chronic Migraine w/ Aura:  Prevention:  - start Ajovy  - try supplementing:  - magnesium: 400-600 mg once or 200-300 mg twice daily  - riboflavin (vitamin B2): 400 mg once daily  - coenzyme Q10: 300 mg daily  - get 7-9 hours of sleep per night; can try supplementing melatonin 2-10 mg, 2-3 hours before bedtime  - drink plenty of fluids (urine should be nearly clear)  - avoid excessive caffeine intake (no more than 2 servings per day and nothing in the afternoon)  - eat regular meals (don't skip meals)  - get moderate exercise (even just a 20 minute walk daily)    Rescue:  - do not use analgesics (e.g., ibuprofen, acetaminophen) more than 2 days per week in order to avoid analgesic rebound headaches    - keep a headache log    Follow-Up:  - Return in about 4 months (around 10/28/2022).    Signed: Mike Dalton M.D.

## 2022-06-29 ENCOUNTER — TELEPHONE (OUTPATIENT)
Dept: NEUROLOGY | Facility: MEDICAL CENTER | Age: 42
End: 2022-06-29

## 2022-06-29 ENCOUNTER — TELEPHONE (OUTPATIENT)
Dept: NEUROLOGY | Facility: MEDICAL CENTER | Age: 42
End: 2022-06-29
Payer: MEDICAID

## 2022-06-29 NOTE — TELEPHONE ENCOUNTER
Ajovy 225mg/1.5ml Soln PFS    PA approved caseId:[id number];Status:Approved;Review Type:Prior Auth;Coverage Start Date:05/29/2022;Coverage End Date:06/28/2023; TC paid copay $0.00 #4.5ml/90 DS - FUTURE RETAIL FILL MAY HAVE HIGHER COST EXPIRATION [id number] MAX QTY SUPPLY   DAY PERIOD MAXIMUM DAY SUPPLY  ALLOWED - notifying liaison Vero Seo - 06/29/2022 8:19am

## 2022-06-29 NOTE — TELEPHONE ENCOUNTER
Contacted patient at [phone] to discuss [facility] and services/benefits offered. No answer, left voicemail.    Patsy Ashley  Rx Coordinator   [phone]

## 2022-07-13 ENCOUNTER — OFFICE VISIT (OUTPATIENT)
Dept: MEDICAL GROUP | Facility: PHYSICIAN GROUP | Age: 42
End: 2022-07-13
Payer: COMMERCIAL

## 2022-07-13 VITALS
BODY MASS INDEX: 45.91 KG/M2 | WEIGHT: 249.5 LBS | DIASTOLIC BLOOD PRESSURE: 82 MMHG | OXYGEN SATURATION: 100 % | RESPIRATION RATE: 20 BRPM | SYSTOLIC BLOOD PRESSURE: 124 MMHG | TEMPERATURE: 97.1 F | HEART RATE: 72 BPM | HEIGHT: 62 IN

## 2022-07-13 DIAGNOSIS — K21.9 GASTROESOPHAGEAL REFLUX DISEASE, UNSPECIFIED WHETHER ESOPHAGITIS PRESENT: ICD-10-CM

## 2022-07-13 DIAGNOSIS — F41.1 GENERALIZED ANXIETY DISORDER: ICD-10-CM

## 2022-07-13 PROBLEM — H92.01 RIGHT EAR PAIN: Status: RESOLVED | Noted: 2021-07-28 | Resolved: 2022-07-13

## 2022-07-13 PROBLEM — R11.2 NON-INTRACTABLE VOMITING WITH NAUSEA: Status: RESOLVED | Noted: 2021-07-13 | Resolved: 2022-07-13

## 2022-07-13 PROCEDURE — 99214 OFFICE O/P EST MOD 30 MIN: CPT | Performed by: NURSE PRACTITIONER

## 2022-07-13 RX ORDER — HYDROXYZINE HYDROCHLORIDE 25 MG/1
25 TABLET, FILM COATED ORAL 3 TIMES DAILY PRN
Qty: 90 TABLET | Refills: 0 | Status: SHIPPED | OUTPATIENT
Start: 2022-07-13 | End: 2022-08-09

## 2022-07-13 RX ORDER — ESCITALOPRAM OXALATE 20 MG/1
20 TABLET ORAL DAILY
Qty: 90 TABLET | Refills: 3 | Status: SHIPPED | OUTPATIENT
Start: 2022-07-13 | End: 2022-10-26 | Stop reason: SDUPTHER

## 2022-07-13 RX ORDER — OMEPRAZOLE 40 MG/1
40 CAPSULE, DELAYED RELEASE ORAL DAILY
Qty: 90 CAPSULE | Refills: 3 | Status: SHIPPED | OUTPATIENT
Start: 2022-07-13 | End: 2022-10-26 | Stop reason: SDUPTHER

## 2022-07-13 RX ORDER — LIDOCAINE 50 MG/G
PATCH TOPICAL
COMMUNITY
Start: 2022-06-27 | End: 2023-01-17

## 2022-07-13 ASSESSMENT — FIBROSIS 4 INDEX: FIB4 SCORE: 0.65

## 2022-07-13 NOTE — ASSESSMENT & PLAN NOTE
Chronic and ongoing. Currently taking Omeprazole 40 mg daily. She states that the medication does work well for her GERD. She is requesting a refill at this time.

## 2022-07-14 NOTE — PROGRESS NOTES
Subjective  Chief Complaint  Medication Refill    History of Present Illness  Farhana Freeman is a 42 y.o. female. This established patient is here today to refill her medications.    GERD (gastroesophageal reflux disease)  Chronic and ongoing. Currently taking Omeprazole 40 mg daily. She states that the medication does work well for her GERD. She is requesting a refill at this time.    Generalized anxiety disorder  Chronic and ongoing. Currently taking Escitalopram 20 mg daily. She also uses Hydroxyzine 25 mg as needed for anxiety especially when she is at work. She states that the medications are working well. She is requesting a refill at this time.    Past Medical History    Allergies: Oxycodone hcl, Flu virus vaccine, Ibuprofen, Morphine, Nsaids, Oxycodone-acetaminophen, Percocet [perloxx], Sulfa drugs, and Toradol  Past Medical History:   Diagnosis Date   • Anemia     Pt states she has no history of anemia   • Anesthesia     severe nausea and vomiting with morphine   • Anxiety    • ASTHMA 05/2017    Managed with PRN inhaler, last use was in March 2017   • Bronchitis 7/6/2021   • Chest congestion 7/23/2021   • Cholelithiasis 2017    Status post lap adeline   • Cough 7/28/2021   • Dental disorder     dentures    • GERD (gastroesophageal reflux disease)    • Gross hematuria 6/2/2020   • Heart burn     Resolved with gastric surgeries.   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    • Intertriginous dermatitis associated with moisture 6/16/2020   • Kidney disease     kidney stones    • Migraine     much less since losing weight, 1-2 times per month   • Morbid obesity (HCC) 5/12/2017   • Muscle disorder    • Non-intractable vomiting with nausea 7/13/2021   • Pain of left upper extremity 5/19/2021   • Postural hypotension     EMR   • Psychiatric problem     anxiety    • Radicular pain of left lower extremity 5/29/2019   • Renal stone 3/27/2020    IMO load March  2020   • Right ear pain 7/28/2021   • Sinus congestion 5/18/2022   • Spider bite 11/11/2021   • Sprain of lateral ligament of ankle joint 9/8/2021   • Status post gastric surgery 5/29/2019    DATE OF OPERATION: 5/12/2017 POSTOPERATIVE DIAGNOSIS: Morbid Obesity with medical sequelae; Hiatal hernia OPERATION PERFORMED: 1.  Laparoscopic Isiah-en-Y Gastric Bypass  3. Hiatal hernia repair SURGEON: Shelton Jackson MD   • Symptomatic abdominal panniculus 6/16/2020   • Urinary bladder disorder    • Urinary incontinence      Past Surgical History:   Procedure Laterality Date   • CA ANTER COLPORRHAPHY,BLAD/VAGINA N/A 8/12/2020    Procedure: COLPORRHAPHY, ANTERIOR, TENSION FREE VAGINAL TAPE, PERINORRHAPHY;  Surgeon: Maria Elena Zhou M.D.;  Location: SURGERY SAME DAY [facility] ORS;  Service: Obstetrics   • CA POST COLPORRHAPHY,RECTUM/VAGINA N/A 8/12/2020    Procedure: COLPORRHAPHY, POSTERIOR;  Surgeon: Maria Elena Zhou M.D.;  Location: SURGERY SAME DAY [facility] ORS;  Service: Obstetrics   • VAGINAL HYSTERECTOMY TOTAL N/A 8/12/2020    Procedure: HYSTERECTOMY, TOTAL, VAGINAL;  Surgeon: Maria Elena Zhou M.D.;  Location: SURGERY SAME DAY [facility] ORS;  Service: Obstetrics   • SALPINGECTOMY Bilateral 8/12/2020    Procedure: SALPINGECTOMY;  Surgeon: Maria Elena Zhou M.D.;  Location: SURGERY SAME DAY [facility] ORS;  Service: Obstetrics   • CYSTOSCOPY N/A 8/12/2020    Procedure: CYSTOSCOPY;  Surgeon: Maria Elena Zhou M.D.;  Location: SURGERY SAME DAY [facility] ORS;  Service: Obstetrics   • LITHOTRIPSY  06/2020   • LUIS BY LAPAROSCOPY  8/9/2017    Procedure: LUIS BY LAPAROSCOPY;  Surgeon: Shelton Jackson M.D.;  Location: [facility] ORS;  Service:    • GASTRIC BYPASS LAPAROSCOPIC  5/12/2017    Procedure: GASTRIC BYPASS LAPAROSCOPIC - ISIAH EN Y AND HIATAL HERNIA REPAIR;  Surgeon: Shelton Jackson M.D.;  Location: [facility] ORS;  Service:    • OTHER ABDOMINAL SURGERY  10/12/15    vertical gastric sleeve      Current Outpatient Medications Ordered in Epic   Medication Sig Dispense Refill   • lidocaine (LIDODERM) 5 % Patch APPLY 1 PATCH TO AFFECTED AREA(S), LEAVE IN PLACE FOR 12HRS ON/OFF     • omeprazole (PRILOSEC) 40 MG delayed-release capsule Take 1 Capsule by mouth every day. 90 Capsule 3   • escitalopram (LEXAPRO) 20 MG tablet Take 1 Tablet by mouth every day. 90 Tablet 3   • hydrOXYzine HCl (ATARAX) 25 MG Tab Take 1 Tablet by mouth 3 times a day as needed for Anxiety. 90 Tablet 0   • Fremanezumab-vfrm (AJOVY) 225 MG/1.5ML Solution Prefilled Syringe Inject 225 mg under the skin Q30 DAYS for 30 days. 1.5 mL 5   • DULoxetine HCl 40 MG Cap DR Particles Take 1 Capsule by mouth 2 times a day.     • albuterol 108 (90 Base) MCG/ACT Aero Soln inhalation aerosol INHALE 2 PUFFS BY MOUTH EVERY 6 HOURS AS NEEDED FOR SHORTNESS OF BREATH 8.5 Each 2   • HYDROcodone-acetaminophen (NORCO) 7.5-325 MG per tablet      • tizanidine (ZANAFLEX) 4 MG Tab Take  by mouth 3 times a day as needed. Take 1 tablet by mouth three times a day as needed       No current Epic-ordered facility-administered medications on file.     Family History:    Family History   Problem Relation Age of Onset   • Kidney Disease Mother         Stage 3-4   • Hypertension Mother    • Other Mother         cervical dysplasia treated by cryo   • Diabetes Father    • Heart Attack Father 61        Status post triple bypass   • Hypertension Father    • Cancer Cousin 21        Benign breast cancer   • Breast Cancer Other         Maternal grandmother's sister      Personal/Social History:    Social History     Tobacco Use   • Smoking status: Never Smoker   • Smokeless tobacco: Never Used   Vaping Use   • Vaping Use: Never used   Substance Use Topics   • Alcohol use: Yes     Comment: Rarely   • Drug use: No     Social History     Social History Narrative    Lives in a house in [place] with her , has 2 biological children, has custody of her niece, reports managing fine  "at home.    Has cats, dogs, and lizards as pets.    Works a warehouse job, is on her feet for most of the day.      Consumes a healthy diet consisting of fruits, vegetables, white meats and home cooked meals.     Walks at least 10,000 steps per day currently.       Review of Systems:   General: Negative for fever/chills and unexpected weight change.   Eyes:  Negative for vision changes, eye pain.  Respiratory:  Negative for cough and dyspnea.    Cardiovascular:  Negative for chest pain and palpitations.  Musculoskeletal:  Negative for myalgias.   Skin:  Negative for rash.   Neurological:  Negative for numbness/tingling and headaches.     Objective  Physical Exam:   /82 (BP Location: Left arm, Patient Position: Sitting, BP Cuff Size: Adult)   Pulse 72   Temp 36.2 °C (97.1 °F) (Temporal)   Resp 20   Ht 1.575 m (5' 2\")   Wt 113 kg (249 lb 8 oz)   SpO2 100%  Body mass index is 45.63 kg/m².  General:  Alert and oriented.  Well appearing.  NAD  Neck: Supple without JVD. No lymphadenopathy.  Pulmonary:  Normal effort.  Clear to ausculation without rales, ronchi, or wheezing.  Cardiovascular:  Regular rate and rhythm without murmur, rubs or gallop.   Skin:  Warm and dry.  No obvious lesions.  Musculoskeletal:  No extremity cyanosis, clubbing, or edema.      Assessment/Plan  1. Gastroesophageal reflux disease, unspecified whether esophagitis present  Chronic and ongoing.  Continue to take Omeprazole 40 mg daily.  - omeprazole (PRILOSEC) 40 MG delayed-release capsule; Take 1 Capsule by mouth every day.  Dispense: 90 Capsule; Refill: 3    2. Generalized anxiety disorder  Chronic and ongoing.  Continue to take Escitalopram 20 mg daily.  Continue to take Hydroxyzine 25 mg as needed up to three times a day.  - escitalopram (LEXAPRO) 20 MG tablet; Take 1 Tablet by mouth every day.  Dispense: 90 Tablet; Refill: 3  - hydrOXYzine HCl (ATARAX) 25 MG Tab; Take 1 Tablet by mouth 3 times a day as needed for Anxiety.  " Dispense: 90 Tablet; Refill: 0      Health Maintenance: Completed    Return if symptoms worsen or fail to improve.    Please note that this dictation was created using voice recognition software. I have made every reasonable attempt to correct obvious errors, but I expect that there are errors of grammar and possibly content that I did not discover before finalizing the note.    DIMAS Duncan  [facility]

## 2022-07-14 NOTE — ASSESSMENT & PLAN NOTE
Chronic and ongoing. Currently taking Escitalopram 20 mg daily. She also uses Hydroxyzine 25 mg as needed for anxiety especially when she is at work. She states that the medications are working well. She is requesting a refill at this time.

## 2022-07-22 DIAGNOSIS — R23.2 HOT FLASHES: ICD-10-CM

## 2022-07-22 NOTE — PROGRESS NOTES
1. Hot flashes  - ESTROGEN TOTAL; Future  - PROGESTERONE; Future  - TESTOSTERONE F&T FEMALES/CHILD; Future

## 2022-08-09 DIAGNOSIS — F41.1 GENERALIZED ANXIETY DISORDER: ICD-10-CM

## 2022-08-09 RX ORDER — HYDROXYZINE HYDROCHLORIDE 25 MG/1
TABLET, FILM COATED ORAL
Qty: 90 TABLET | Refills: 0 | Status: SHIPPED | OUTPATIENT
Start: 2022-08-09 | End: 2022-09-15

## 2022-08-19 DIAGNOSIS — G89.29 CHRONIC BILATERAL LOW BACK PAIN WITH LEFT-SIDED SCIATICA: ICD-10-CM

## 2022-08-19 DIAGNOSIS — M54.42 CHRONIC BILATERAL LOW BACK PAIN WITH LEFT-SIDED SCIATICA: ICD-10-CM

## 2022-08-19 DIAGNOSIS — M54.32 LEFT SIDED SCIATICA: ICD-10-CM

## 2022-08-19 DIAGNOSIS — M54.12 CERVICAL RADICULOPATHY AT C6: ICD-10-CM

## 2022-08-19 NOTE — PROGRESS NOTES
1. Cervical radiculopathy at C6  - Referral to Physiatry (PMR)    2. Left sided sciatica  - Referral to Physiatry (PMR)    3. Chronic bilateral low back pain with left-sided sciatica  - Referral to Physiatry (PMR)

## 2022-09-08 DIAGNOSIS — G25.81 RESTLESS LEGS: ICD-10-CM

## 2022-09-08 RX ORDER — ROPINIROLE 0.25 MG/1
0.25 TABLET, FILM COATED ORAL NIGHTLY
Qty: 30 TABLET | Refills: 0 | Status: SHIPPED | OUTPATIENT
Start: 2022-09-08 | End: 2022-10-26

## 2022-09-08 NOTE — PROGRESS NOTES
1. Restless legs  - ROPINIRole (REQUIP) 0.25 MG Tab; Take 1 Tablet by mouth every evening.  Dispense: 30 Tablet; Refill: 0

## 2022-09-15 DIAGNOSIS — F41.1 GENERALIZED ANXIETY DISORDER: ICD-10-CM

## 2022-09-15 RX ORDER — HYDROXYZINE HYDROCHLORIDE 25 MG/1
TABLET, FILM COATED ORAL
Qty: 90 TABLET | Refills: 0 | Status: SHIPPED | OUTPATIENT
Start: 2022-09-15 | End: 2022-10-18

## 2022-09-22 DIAGNOSIS — J40 BRONCHITIS: ICD-10-CM

## 2022-09-22 RX ORDER — ALBUTEROL SULFATE 90 UG/1
AEROSOL, METERED RESPIRATORY (INHALATION)
Qty: 8.5 EACH | Refills: 2 | Status: SHIPPED | OUTPATIENT
Start: 2022-09-22 | End: 2022-10-26 | Stop reason: SDUPTHER

## 2022-10-04 ENCOUNTER — HOSPITAL ENCOUNTER (OUTPATIENT)
Dept: RADIOLOGY | Facility: MEDICAL CENTER | Age: 42
End: 2022-10-04
Attending: NURSE PRACTITIONER
Payer: COMMERCIAL

## 2022-10-04 DIAGNOSIS — M25.552 LEFT HIP PAIN: ICD-10-CM

## 2022-10-04 PROCEDURE — 73502 X-RAY EXAM HIP UNI 2-3 VIEWS: CPT | Mod: LT

## 2022-10-10 ENCOUNTER — HOSPITAL ENCOUNTER (OUTPATIENT)
Dept: LAB | Facility: MEDICAL CENTER | Age: 42
End: 2022-10-10
Attending: FAMILY MEDICINE
Payer: COMMERCIAL

## 2022-10-10 ENCOUNTER — HOSPITAL ENCOUNTER (OUTPATIENT)
Dept: LAB | Facility: MEDICAL CENTER | Age: 42
End: 2022-10-10
Attending: NURSE PRACTITIONER
Payer: COMMERCIAL

## 2022-10-10 DIAGNOSIS — Z13.0 SCREENING FOR DEFICIENCY ANEMIA: ICD-10-CM

## 2022-10-10 DIAGNOSIS — E55.9 VITAMIN D DEFICIENCY: ICD-10-CM

## 2022-10-10 DIAGNOSIS — E66.01 CLASS 3 SEVERE OBESITY DUE TO EXCESS CALORIES WITH BODY MASS INDEX (BMI) OF 40.0 TO 44.9 IN ADULT, UNSPECIFIED WHETHER SERIOUS COMORBIDITY PRESENT (HCC): ICD-10-CM

## 2022-10-10 DIAGNOSIS — E03.8 SUBCLINICAL HYPOTHYROIDISM: ICD-10-CM

## 2022-10-10 DIAGNOSIS — Z13.220 ENCOUNTER FOR LIPID SCREENING FOR CARDIOVASCULAR DISEASE: ICD-10-CM

## 2022-10-10 DIAGNOSIS — R23.2 HOT FLASHES: ICD-10-CM

## 2022-10-10 DIAGNOSIS — Z13.1 ENCOUNTER FOR SCREENING FOR DIABETES MELLITUS: ICD-10-CM

## 2022-10-10 DIAGNOSIS — Z13.6 ENCOUNTER FOR LIPID SCREENING FOR CARDIOVASCULAR DISEASE: ICD-10-CM

## 2022-10-10 LAB
25(OH)D3 SERPL-MCNC: 17 NG/ML (ref 30–100)
ALBUMIN SERPL BCP-MCNC: 4.4 G/DL (ref 3.2–4.9)
ALBUMIN/GLOB SERPL: 1.6 G/DL
ALP SERPL-CCNC: 141 U/L (ref 30–99)
ALT SERPL-CCNC: 12 U/L (ref 2–50)
ANION GAP SERPL CALC-SCNC: 12 MMOL/L (ref 7–16)
AST SERPL-CCNC: 14 U/L (ref 12–45)
BILIRUB SERPL-MCNC: 0.5 MG/DL (ref 0.1–1.5)
BUN SERPL-MCNC: 9 MG/DL (ref 8–22)
CALCIUM SERPL-MCNC: 9 MG/DL (ref 8.5–10.5)
CHLORIDE SERPL-SCNC: 101 MMOL/L (ref 96–112)
CHOLEST SERPL-MCNC: 211 MG/DL (ref 100–199)
CO2 SERPL-SCNC: 24 MMOL/L (ref 20–33)
CREAT SERPL-MCNC: 0.56 MG/DL (ref 0.5–1.4)
ERYTHROCYTE [DISTWIDTH] IN BLOOD BY AUTOMATED COUNT: 50.4 FL (ref 35.9–50)
FASTING STATUS PATIENT QL REPORTED: NORMAL
FERRITIN SERPL-MCNC: 3.6 NG/ML (ref 10–291)
GFR SERPLBLD CREATININE-BSD FMLA CKD-EPI: 116 ML/MIN/1.73 M 2
GLOBULIN SER CALC-MCNC: 2.8 G/DL (ref 1.9–3.5)
GLUCOSE SERPL-MCNC: 90 MG/DL (ref 65–99)
HCT VFR BLD AUTO: 34.6 % (ref 37–47)
HDLC SERPL-MCNC: 84 MG/DL
HGB BLD-MCNC: 10.5 G/DL (ref 12–16)
IRON SATN MFR SERPL: 6 % (ref 15–55)
IRON SERPL-MCNC: 30 UG/DL (ref 40–170)
LDLC SERPL CALC-MCNC: 106 MG/DL
MCH RBC QN AUTO: 23.4 PG (ref 27–33)
MCHC RBC AUTO-ENTMCNC: 30.3 G/DL (ref 33.6–35)
MCV RBC AUTO: 77.2 FL (ref 81.4–97.8)
PLATELET # BLD AUTO: 523 K/UL (ref 164–446)
PMV BLD AUTO: 9.8 FL (ref 9–12.9)
POTASSIUM SERPL-SCNC: 3.9 MMOL/L (ref 3.6–5.5)
PROGEST SERPL-MCNC: 0.68 NG/ML
PROT SERPL-MCNC: 7.2 G/DL (ref 6–8.2)
RBC # BLD AUTO: 4.48 M/UL (ref 4.2–5.4)
SODIUM SERPL-SCNC: 137 MMOL/L (ref 135–145)
T3 SERPL-MCNC: 116 NG/DL (ref 60–181)
T4 FREE SERPL-MCNC: 0.75 NG/DL (ref 0.93–1.7)
TIBC SERPL-MCNC: 531 UG/DL (ref 250–450)
TRIGL SERPL-MCNC: 107 MG/DL (ref 0–149)
TSH SERPL DL<=0.005 MIU/L-ACNC: 1.18 UIU/ML (ref 0.38–5.33)
UIBC SERPL-MCNC: 501 UG/DL (ref 110–370)
WBC # BLD AUTO: 7.8 K/UL (ref 4.8–10.8)

## 2022-10-10 PROCEDURE — 80053 COMPREHEN METABOLIC PANEL: CPT

## 2022-10-10 PROCEDURE — 84443 ASSAY THYROID STIM HORMONE: CPT

## 2022-10-10 PROCEDURE — 82671 ASSAY OF ESTROGENS: CPT

## 2022-10-10 PROCEDURE — 83540 ASSAY OF IRON: CPT

## 2022-10-10 PROCEDURE — 82306 VITAMIN D 25 HYDROXY: CPT

## 2022-10-10 PROCEDURE — 84270 ASSAY OF SEX HORMONE GLOBUL: CPT

## 2022-10-10 PROCEDURE — 85027 COMPLETE CBC AUTOMATED: CPT

## 2022-10-10 PROCEDURE — 84439 ASSAY OF FREE THYROXINE: CPT

## 2022-10-10 PROCEDURE — 84144 ASSAY OF PROGESTERONE: CPT

## 2022-10-10 PROCEDURE — 36415 COLL VENOUS BLD VENIPUNCTURE: CPT

## 2022-10-10 PROCEDURE — 82728 ASSAY OF FERRITIN: CPT

## 2022-10-10 PROCEDURE — 80061 LIPID PANEL: CPT

## 2022-10-10 PROCEDURE — 84402 ASSAY OF FREE TESTOSTERONE: CPT

## 2022-10-10 PROCEDURE — 83550 IRON BINDING TEST: CPT

## 2022-10-10 PROCEDURE — 84480 ASSAY TRIIODOTHYRONINE (T3): CPT

## 2022-10-10 PROCEDURE — 84403 ASSAY OF TOTAL TESTOSTERONE: CPT

## 2022-10-12 LAB
ESTRADIOL SERPL HS-MCNC: 624.7 PG/ML
ESTROGEN SERPL CALC-MCNC: 967.2 PG/ML
ESTRONE SERPL-MCNC: 342.5 PG/ML

## 2022-10-18 DIAGNOSIS — F41.1 GENERALIZED ANXIETY DISORDER: ICD-10-CM

## 2022-10-18 RX ORDER — HYDROXYZINE HYDROCHLORIDE 25 MG/1
TABLET, FILM COATED ORAL
Qty: 90 TABLET | Refills: 0 | Status: SHIPPED | OUTPATIENT
Start: 2022-10-18 | End: 2022-10-26 | Stop reason: SDUPTHER

## 2022-10-19 LAB
SHBG SERPL-SCNC: 70 NMOL/L (ref 25–122)
TESTOST FREE SERPL-MCNC: 4 PG/ML (ref 1.1–5.8)
TESTOST SERPL-MCNC: 39 NG/DL (ref 9–55)

## 2022-10-26 ENCOUNTER — OFFICE VISIT (OUTPATIENT)
Dept: MEDICAL GROUP | Facility: PHYSICIAN GROUP | Age: 42
End: 2022-10-26
Payer: COMMERCIAL

## 2022-10-26 VITALS
OXYGEN SATURATION: 99 % | DIASTOLIC BLOOD PRESSURE: 80 MMHG | SYSTOLIC BLOOD PRESSURE: 120 MMHG | BODY MASS INDEX: 48.1 KG/M2 | TEMPERATURE: 97.5 F | HEART RATE: 95 BPM | RESPIRATION RATE: 20 BRPM | WEIGHT: 261.38 LBS | HEIGHT: 62 IN

## 2022-10-26 DIAGNOSIS — J40 BRONCHITIS: ICD-10-CM

## 2022-10-26 DIAGNOSIS — E55.9 VITAMIN D DEFICIENCY: ICD-10-CM

## 2022-10-26 DIAGNOSIS — E66.01 CLASS 3 SEVERE OBESITY DUE TO EXCESS CALORIES WITHOUT SERIOUS COMORBIDITY WITH BODY MASS INDEX (BMI) OF 45.0 TO 49.9 IN ADULT (HCC): ICD-10-CM

## 2022-10-26 DIAGNOSIS — F41.1 GENERALIZED ANXIETY DISORDER: ICD-10-CM

## 2022-10-26 DIAGNOSIS — D50.8 OTHER IRON DEFICIENCY ANEMIA: ICD-10-CM

## 2022-10-26 DIAGNOSIS — K21.9 GASTROESOPHAGEAL REFLUX DISEASE, UNSPECIFIED WHETHER ESOPHAGITIS PRESENT: ICD-10-CM

## 2022-10-26 PROCEDURE — 99214 OFFICE O/P EST MOD 30 MIN: CPT | Performed by: NURSE PRACTITIONER

## 2022-10-26 RX ORDER — TOPIRAMATE 25 MG/1
25 TABLET ORAL 2 TIMES DAILY
Qty: 60 TABLET | Refills: 3 | Status: SHIPPED | OUTPATIENT
Start: 2022-10-26 | End: 2023-01-17 | Stop reason: SDUPTHER

## 2022-10-26 RX ORDER — FERROUS SULFATE 325(65) MG
325 TABLET ORAL DAILY
Qty: 90 TABLET | Refills: 3 | Status: SHIPPED | OUTPATIENT
Start: 2022-10-26 | End: 2023-05-23

## 2022-10-26 RX ORDER — ESCITALOPRAM OXALATE 20 MG/1
20 TABLET ORAL DAILY
Qty: 90 TABLET | Refills: 3 | Status: SHIPPED | OUTPATIENT
Start: 2022-10-26 | End: 2023-05-22

## 2022-10-26 RX ORDER — ERGOCALCIFEROL 1.25 MG/1
50000 CAPSULE ORAL
Qty: 12 CAPSULE | Refills: 3 | Status: SHIPPED | OUTPATIENT
Start: 2022-10-26 | End: 2023-01-17 | Stop reason: SDUPTHER

## 2022-10-26 RX ORDER — PHENTERMINE HYDROCHLORIDE 15 MG/1
15 CAPSULE ORAL EVERY MORNING
Qty: 30 CAPSULE | Refills: 0 | Status: SHIPPED | OUTPATIENT
Start: 2022-10-26 | End: 2022-11-21

## 2022-10-26 RX ORDER — OMEPRAZOLE 40 MG/1
40 CAPSULE, DELAYED RELEASE ORAL DAILY
Qty: 90 CAPSULE | Refills: 3 | Status: SHIPPED | OUTPATIENT
Start: 2022-10-26 | End: 2023-01-02 | Stop reason: SDUPTHER

## 2022-10-26 RX ORDER — HYDROXYZINE HYDROCHLORIDE 25 MG/1
25 TABLET, FILM COATED ORAL 3 TIMES DAILY PRN
Qty: 90 TABLET | Refills: 0 | Status: SHIPPED | OUTPATIENT
Start: 2022-10-26 | End: 2022-12-20

## 2022-10-26 RX ORDER — ALBUTEROL SULFATE 90 UG/1
2 AEROSOL, METERED RESPIRATORY (INHALATION) EVERY 6 HOURS PRN
Qty: 8.5 EACH | Refills: 2 | Status: SHIPPED | OUTPATIENT
Start: 2022-10-26

## 2022-10-26 ASSESSMENT — FIBROSIS 4 INDEX: FIB4 SCORE: 0.32

## 2022-10-26 NOTE — ASSESSMENT & PLAN NOTE
Chronic and ongoing. She states that she has been trying to eat healthier but she has been struggling with always eating healthy especially at night time. She is currently not exercising much because of her foot pain from a previous surgery. She states that she does want to start walking more to see if this helps with the pain. She has been on Phentermine in the past and is wondering if she could start it again to help kick start her weight loss.

## 2022-10-26 NOTE — ASSESSMENT & PLAN NOTE
Chronic and ongoing. Currently not taking any Iron supplement. She did have gastric bypass surgery and this could be secondary to this.

## 2022-10-26 NOTE — ASSESSMENT & PLAN NOTE
Chronic, ongoing.  Reports fatigue.  Denies any muscle weakness.  No history of CKD.  Reports having a good diet, including dairy products.  No history of IBD's, celiac, or CF.   History of surgeries causing malabsorption.  Patient is obese.  Is not taking vitamin d or calcium supplement.      6/2/20 06:46 6/19/21 08:18 10/10/22 07:46   25-Hydroxy   Vitamin D 25 13.7 (L) 27 (L) 17 (L)

## 2022-10-26 NOTE — PROGRESS NOTES
Subjective  Chief Complaint  Medication Refill    History of Present Illness  Farhana Freeman is a 42 y.o. female. This established patient is here today to refill her medications and go over recent lab results.    Vitamin D deficiency  Chronic, ongoing.  Reports fatigue.  Denies any muscle weakness.  No history of CKD.  Reports having a good diet, including dairy products.  No history of IBD's, celiac, or CF.   History of surgeries causing malabsorption.  Patient is obese.  Is not taking vitamin d or calcium supplement.      6/2/20 06:46 6/19/21 08:18 10/10/22 07:46   25-Hydroxy   Vitamin D 25 13.7 (L) 27 (L) 17 (L)       Iron deficiency anemia  Chronic and ongoing. Currently not taking any Iron supplement. She did have gastric bypass surgery and this could be secondary to this.    GERD (gastroesophageal reflux disease)  Chronic and ongoing. Currently taking Omeprazole 40 mg daily. She states that the medication does work well for her GERD. She is requesting a refill at this time.    Class 3 severe obesity due to excess calories without serious comorbidity with body mass index (BMI) of 45.0 to 49.9 in adult (HCC)  Chronic and ongoing. She states that she has been trying to eat healthier but she has been struggling with always eating healthy especially at night time. She is currently not exercising much because of her foot pain from a previous surgery. She states that she does want to start walking more to see if this helps with the pain. She has been on Phentermine in the past and is wondering if she could start it again to help kick start her weight loss.    Past Medical History    Allergies: Oxycodone hcl, Flu virus vaccine, Ibuprofen, Morphine, Nickel, Nsaids, Oxycodone-acetaminophen, Percocet [perloxx], Sulfa drugs, and Toradol  Past Medical History:   Diagnosis Date    Anemia     Pt states she has no history of anemia    Anesthesia     severe nausea and vomiting with morphine    Anxiety     ASTHMA  05/2017    Managed with PRN inhaler, last use was in March 2017    Bronchitis 7/6/2021    Chest congestion 7/23/2021    Cholelithiasis 2017    Status post lap adeline    Cough 7/28/2021    Dental disorder     dentures     GERD (gastroesophageal reflux disease)     Gross hematuria 6/2/2020    Heart burn     Resolved with gastric surgeries.    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     Intertriginous dermatitis associated with moisture 6/16/2020    Kidney disease     kidney stones     Migraine     much less since losing weight, 1-2 times per month    Morbid obesity (HCC) 5/12/2017    Muscle disorder     Non-intractable vomiting with nausea 7/13/2021    Pain of left upper extremity 5/19/2021    Postural hypotension     EMR    Psychiatric problem     anxiety     Radicular pain of left lower extremity 5/29/2019    Renal stone 3/27/2020    IMO load March 2020    Right ear pain 7/28/2021    Sinus congestion 5/18/2022    Spider bite 11/11/2021    Sprain of lateral ligament of ankle joint 9/8/2021    Status post gastric surgery 5/29/2019    DATE OF OPERATION: 5/12/2017 POSTOPERATIVE DIAGNOSIS: Morbid Obesity with medical sequelae; Hiatal hernia OPERATION PERFORMED: 1.  Laparoscopic Danie-en-Y Gastric Bypass  3. Hiatal hernia repair SURGEON: Shelton Jackson MD    Symptomatic abdominal panniculus 6/16/2020    Urinary bladder disorder     Urinary incontinence      Past Surgical History:   Procedure Laterality Date    [place] ANTER COLPORRHAPHY,BLAD/VAGINA N/A 8/12/2020    Procedure: COLPORRHAPHY, ANTERIOR, TENSION FREE VAGINAL TAPE, PERINORRHAPHY;  Surgeon: Maria Elena Zhou M.D.;  Location: SURGERY SAME DAY [facility] ORS;  Service: Obstetrics    [place] POST COLPORRHAPHY,RECTUM/VAGINA N/A 8/12/2020    Procedure: COLPORRHAPHY, POSTERIOR;  Surgeon: Maria Elena Zhou M.D.;  Location: SURGERY SAME DAY [facility];  Service: Obstetrics    VAGINAL HYSTERECTOMY TOTAL N/A 8/12/2020     Procedure: HYSTERECTOMY, TOTAL, VAGINAL;  Surgeon: Maria Elena Zhou M.D.;  Location: SURGERY SAME DAY [facility]S;  Service: Obstetrics    SALPINGECTOMY Bilateral 8/12/2020    Procedure: SALPINGECTOMY;  Surgeon: Maria Elena Zhou M.D.;  Location: SURGERY SAME DAY [facility];  Service: Obstetrics    CYSTOSCOPY N/A 8/12/2020    Procedure: CYSTOSCOPY;  Surgeon: Maria Elena Zhou M.D.;  Location: SURGERY SAME DAY [facility];  Service: Obstetrics    LITHOTRIPSY  06/2020    LUIS BY LAPAROSCOPY  8/9/2017    Procedure: LUIS BY LAPAROSCOPY;  Surgeon: Shelton Jackson M.D.;  Location: SURGERY [facility] ORS;  Service:     GASTRIC BYPASS LAPAROSCOPIC  5/12/2017    Procedure: GASTRIC BYPASS LAPAROSCOPIC - ISIAH EN Y AND HIATAL HERNIA REPAIR;  Surgeon: Shelton Jackson M.D.;  Location: SURGERY [facility] ORS;  Service:     OTHER ABDOMINAL SURGERY  10/12/15    vertical gastric sleeve     Current Outpatient Medications Ordered in Epic   Medication Sig Dispense Refill    OFLOXACIN PO       DULoxetine (CYMBALTA) 60 MG Cap DR Particles delayed-release capsule Take 60 mg by mouth 2 times a day.      Fremanezumab-vfrm (AJOVY) 225 MG/1.5ML Solution Prefilled Syringe Ajovy Syringe 225 mg/1.5 mL subcutaneous   INJECT 1.5 ML (225 MG) ONCE A MONTH      ROPINIRole (REQUIP) 0.5 MG Tab TAKE 1 TABLET BY MOUTH EVERY DAY AT NIGHT      vitamin D2, Ergocalciferol, (DRISDOL) 1.25 MG (80965 UT) Cap capsule Take 1 Capsule by mouth every 7 days. 12 Capsule 3    ferrous sulfate 325 (65 Fe) MG tablet Take 1 Tablet by mouth every day. 90 Tablet 3    phentermine 15 MG capsule Take 1 Capsule by mouth every morning for 30 days. 30 Capsule 0    topiramate (TOPAMAX) 25 MG Tab Take 1 Tablet by mouth 2 times a day. 60 Tablet 3    omeprazole (PRILOSEC) 40 MG delayed-release capsule Take 1 Capsule by mouth every day. 90 Capsule 3    hydrOXYzine HCl (ATARAX) 25 MG Tab TAKE 1 TABLET BY MOUTH THREE TIMES A DAY AS NEEDED FOR ANXIETY 90 Tablet 0     albuterol 108 (90 Base) MCG/ACT Aero Soln inhalation aerosol INHALE 2 PUFFS BY MOUTH EVERY 6 HOURS AS NEEDED FOR SHORTNESS OF BREATH 8.5 Each 2    lidocaine (LIDODERM) 5 % Patch APPLY 1 PATCH TO AFFECTED AREA(S), LEAVE IN PLACE FOR 12HRS ON/OFF      escitalopram (LEXAPRO) 20 MG tablet Take 1 Tablet by mouth every day. 90 Tablet 3    HYDROcodone-acetaminophen (NORCO) 7.5-325 MG per tablet       tizanidine (ZANAFLEX) 4 MG Tab Take  by mouth 3 times a day as needed. Take 1 tablet by mouth three times a day as needed       No current Epic-ordered facility-administered medications on file.     Family History:    Family History   Problem Relation Age of Onset    Kidney Disease Mother         Stage 3-4    Hypertension Mother     Other Mother         cervical dysplasia treated by cryo    Diabetes Father     Heart Attack Father 61        Status post triple bypass    Hypertension Father     Cancer Cousin 21        Benign breast cancer    Breast Cancer Other         Maternal grandmother's sister      Personal/Social History:    Social History     Tobacco Use    Smoking status: Never    Smokeless tobacco: Never   Vaping Use    Vaping Use: Never used   Substance Use Topics    Alcohol use: Yes     Comment: Rarely    Drug use: No     Social History     Social History Narrative    Lives in a house in [place] with her , has 2 biological children, has custody of her niece, reports managing fine at home.    Has cats, dogs, and lizards as pets.    Works a warehouse job, is on her feet for most of the day.      Consumes a healthy diet consisting of fruits, vegetables, white meats and home cooked meals.     Walks at least 10,000 steps per day currently.       Review of Systems:   General: Negative for fever/chills and unexpected weight change.   Respiratory:  Negative for cough and dyspnea.    Cardiovascular:  Negative for chest pain and palpitations.  Musculoskeletal: Positive for chronic pain.  Skin:  Negative for rash.  "    Objective  Physical Exam:   /80 (BP Location: Left arm, Patient Position: Sitting, BP Cuff Size: Large adult)   Pulse 95   Temp 36.4 °C (97.5 °F) (Temporal)   Resp 20   Ht 1.575 m (5' 2\")   Wt 119 kg (261 lb 6 oz)   SpO2 99%  Body mass index is 47.81 kg/m².  General:  Alert and oriented.  Well appearing.  NAD  Neck: Supple without JVD. No lymphadenopathy.  Pulmonary:  Normal effort.  Clear to ausculation without rales, ronchi, or wheezing.  Cardiovascular:  Regular rate and rhythm without murmur, rubs or gallop.   Skin:  Warm and dry.  No obvious lesions.  Musculoskeletal:  No extremity cyanosis, clubbing, or edema.      Assessment/Plan  1. Other iron deficiency anemia  Chronic and ongoing.  Discussed the need to be taking an iron supplement, she verbalized understanding.  - ferrous sulfate 325 (65 Fe) MG tablet; Take 1 Tablet by mouth every day.  Dispense: 90 Tablet; Refill: 3    2. Vitamin D deficiency  Chronic and ongoing.  Discussed the need to be taking a Vitamin D and Calcium supplement, she verbalized understanding.  - vitamin D2, Ergocalciferol, (DRISDOL) 1.25 MG (46943 UT) Cap capsule; Take 1 Capsule by mouth every 7 days.  Dispense: 12 Capsule; Refill: 3    3. Class 3 severe obesity due to excess calories without serious comorbidity with body mass index (BMI) of 45.0 to 49.9 in adult (HCC)  Chronic and ongoing.  Discussed Phentermine risks, benefits and side effects, she verbalized understanding.  Discussed Topiramate risks, benefits and side effects, she verbalized understanding.  Discussed with her that she has to see me in person monthly to refill her medication and check in on her diet and exercise, she verbalized understanding.  Educated on a healthy diet and exercise.  - phentermine 15 MG capsule; Take 1 Capsule by mouth every morning for 30 days.  Dispense: 30 Capsule; Refill: 0  - topiramate (TOPAMAX) 25 MG Tab; Take 1 Tablet by mouth 2 times a day.  Dispense: 60 Tablet; Refill: " 3    Obtained and reviewed patient utilization report from [facility] pharmacy database on 10/26/22 at 1501 prior to writing prescription for controlled substance II, III or IV per [place] bill . Based on assessment of the report, the prescription is medically necessary.    4. Gastroesophageal reflux disease, unspecified whether esophagitis present  Chronic and ongoing.  Continue to take Omeprazole 40 mg daily.  - omeprazole (PRILOSEC) 40 MG delayed-release capsule; Take 1 Capsule by mouth every day.  Dispense: 90 Capsule; Refill: 3      Health Maintenance: Completed    Return in about 1 month (around 11/26/2022) for Medication F/U.    Please note that this dictation was created using voice recognition software. I have made every reasonable attempt to correct obvious errors, but I expect that there are errors of grammar and possibly content that I did not discover before finalizing the note.    DIMAS Duncan  [facility]

## 2022-11-21 ENCOUNTER — OFFICE VISIT (OUTPATIENT)
Dept: MEDICAL GROUP | Facility: PHYSICIAN GROUP | Age: 42
End: 2022-11-21
Payer: COMMERCIAL

## 2022-11-21 VITALS
HEIGHT: 62 IN | DIASTOLIC BLOOD PRESSURE: 76 MMHG | SYSTOLIC BLOOD PRESSURE: 122 MMHG | WEIGHT: 254.38 LBS | RESPIRATION RATE: 20 BRPM | TEMPERATURE: 97.5 F | OXYGEN SATURATION: 99 % | BODY MASS INDEX: 46.81 KG/M2 | HEART RATE: 74 BPM

## 2022-11-21 DIAGNOSIS — R09.81 SINUS CONGESTION: ICD-10-CM

## 2022-11-21 DIAGNOSIS — E66.01 CLASS 3 SEVERE OBESITY DUE TO EXCESS CALORIES WITHOUT SERIOUS COMORBIDITY WITH BODY MASS INDEX (BMI) OF 45.0 TO 49.9 IN ADULT (HCC): ICD-10-CM

## 2022-11-21 DIAGNOSIS — J02.9 SORE THROAT: ICD-10-CM

## 2022-11-21 PROCEDURE — 99214 OFFICE O/P EST MOD 30 MIN: CPT | Performed by: NURSE PRACTITIONER

## 2022-11-21 RX ORDER — PSEUDOEPHEDRINE HCL 30 MG
30 TABLET ORAL EVERY 4 HOURS PRN
Qty: 120 TABLET | Refills: 0 | Status: SHIPPED | OUTPATIENT
Start: 2022-11-21 | End: 2022-12-16

## 2022-11-21 RX ORDER — PHENTERMINE HYDROCHLORIDE 30 MG/1
30 CAPSULE ORAL EVERY MORNING
Qty: 30 CAPSULE | Refills: 0 | Status: SHIPPED | OUTPATIENT
Start: 2022-11-26 | End: 2022-12-26

## 2022-11-21 ASSESSMENT — FIBROSIS 4 INDEX: FIB4 SCORE: 0.32

## 2022-11-21 NOTE — PROGRESS NOTES
Subjective  Chief Complaint  Medication Refill    History of Present Illness  Farhana Freeman is a 42 y.o. female. This established patient is here today to refill her weight loss medication.    Class 3 severe obesity due to excess calories without serious comorbidity with body mass index (BMI) of 45.0 to 49.9 in adult (HCC)  Chronic and ongoing. Currently taking Phentermine 15 mg every morning and Topiramate 25 mg BID. She states that she has been working more on her diet. She states that she notices that around 3 pm her cravings start to come back. She has been able to lose 7 pounds since starting the medication. She is wondering if she needs to increase the Phentermine as she noticed the afternoon cravings. She states that she is looking into joining a gym.    Sore throat  Acute and ongoing. She states that over the weekend she noticed a sore throat. She also notes some sinus congestion. She does take Claritin daily to help with her sinuses. She has been taking OTC Dayquil and Nyquil. She also knows to do sinuses rinses at home.    Past Medical History    Allergies: Oxycodone hcl, Flu virus vaccine, Ibuprofen, Morphine, Nickel, Nsaids, Oxycodone-acetaminophen, Percocet [perloxx], Sulfa drugs, and Toradol  Past Medical History:   Diagnosis Date    Anemia     Pt states she has no history of anemia    Anesthesia     severe nausea and vomiting with morphine    Anxiety     ASTHMA 05/2017    Managed with PRN inhaler, last use was in March 2017    Bronchitis 7/6/2021    Chest congestion 7/23/2021    Cholelithiasis 2017    Status post lap adeline    Cough 7/28/2021    Dental disorder     dentures     GERD (gastroesophageal reflux disease)     Gross hematuria 6/2/2020    Heart burn     Resolved with gastric surgeries.    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     Intertriginous dermatitis associated with moisture 6/16/2020    Kidney disease     kidney stones      Migraine     much less since losing weight, 1-2 times per month    Morbid obesity (HCC) 5/12/2017    Muscle disorder     Non-intractable vomiting with nausea 7/13/2021    Pain of left upper extremity 5/19/2021    Postural hypotension     EMR    Psychiatric problem     anxiety     Radicular pain of left lower extremity 5/29/2019    Renal stone 3/27/2020    IMO load March 2020    Right ear pain 7/28/2021    Sinus congestion 5/18/2022    Spider bite 11/11/2021    Sprain of lateral ligament of ankle joint 9/8/2021    Status post gastric surgery 5/29/2019    DATE OF OPERATION: 5/12/2017 POSTOPERATIVE DIAGNOSIS: Morbid Obesity with medical sequelae; Hiatal hernia OPERATION PERFORMED: 1.  Laparoscopic Danie-en-Y Gastric Bypass  3. Hiatal hernia repair SURGEON: Shelton Jackson MD    Symptomatic abdominal panniculus 6/16/2020    Urinary bladder disorder     Urinary incontinence      Past Surgical History:   Procedure Laterality Date    ME ANTER COLPORRHAPHY,BLAD/VAGINA N/A 8/12/2020    Procedure: COLPORRHAPHY, ANTERIOR, TENSION FREE VAGINAL TAPE, PERINORRHAPHY;  Surgeon: Maria Elena Zhou M.D.;  Location: SURGERY SAME DAY [facility]S;  Service: Obstetrics    ME POST COLPORRHAPHY,RECTUM/VAGINA N/A 8/12/2020    Procedure: COLPORRHAPHY, POSTERIOR;  Surgeon: Maria Elena Zhou M.D.;  Location: SURGERY SAME DAY [facility];  Service: Obstetrics    VAGINAL HYSTERECTOMY TOTAL N/A 8/12/2020    Procedure: HYSTERECTOMY, TOTAL, VAGINAL;  Surgeon: Maria Elena Zhou M.D.;  Location: SURGERY SAME DAY [facility];  Service: Obstetrics    SALPINGECTOMY Bilateral 8/12/2020    Procedure: SALPINGECTOMY;  Surgeon: Maria Elena Zhou M.D.;  Location: SURGERY SAME DAY [facility];  Service: Obstetrics    CYSTOSCOPY N/A 8/12/2020    Procedure: CYSTOSCOPY;  Surgeon: Maria Elena Zhou M.D.;  Location: SURGERY SAME DAY [facility];  Service: Obstetrics    LITHOTRIPSY  06/2020    LUIS BY LAPAROSCOPY  8/9/2017    Procedure: LUIS  BY LAPAROSCOPY;  Surgeon: Shelton Jackson M.D.;  Location: SURGERY [facility]S;  Service:     GASTRIC BYPASS LAPAROSCOPIC  5/12/2017    Procedure: GASTRIC BYPASS LAPAROSCOPIC - ISIAH EN Y AND HIATAL HERNIA REPAIR;  Surgeon: Shelton Jackson M.D.;  Location: SURGERY [facility];  Service:     OTHER ABDOMINAL SURGERY  10/12/15    vertical gastric sleeve     Current Outpatient Medications Ordered in Epic   Medication Sig Dispense Refill    [START ON 11/26/2022] phentermine 30 MG capsule Take 1 Capsule by mouth every morning for 30 days. 30 Capsule 0    pseudoephedrine (SUDAFED) 30 MG Tab Take 1 Tablet by mouth every four hours as needed for Congestion. 120 Tablet 0    OFLOXACIN PO       DULoxetine (CYMBALTA) 60 MG Cap DR Particles delayed-release capsule Take 1 Capsule by mouth 2 times a day.      Fremanezumab-vfrm (AJOVY) 225 MG/1.5ML Solution Prefilled Syringe Ajovy Syringe 225 mg/1.5 mL subcutaneous   INJECT 1.5 ML (225 MG) ONCE A MONTH      ROPINIRole (REQUIP) 0.5 MG Tab TAKE 1 TABLET BY MOUTH EVERY DAY AT NIGHT      vitamin D2, Ergocalciferol, (DRISDOL) 1.25 MG (54319 UT) Cap capsule Take 1 Capsule by mouth every 7 days. 12 Capsule 3    ferrous sulfate 325 (65 Fe) MG tablet Take 1 Tablet by mouth every day. 90 Tablet 3    omeprazole (PRILOSEC) 40 MG delayed-release capsule Take 1 Capsule by mouth every day. 90 Capsule 3    escitalopram (LEXAPRO) 20 MG tablet Take 1 Tablet by mouth every day. 90 Tablet 3    albuterol 108 (90 Base) MCG/ACT Aero Soln inhalation aerosol Inhale 2 Puffs every 6 hours as needed for Shortness of Breath. 8.5 Each 2    hydrOXYzine HCl (ATARAX) 25 MG Tab Take 1 Tablet by mouth 3 times a day as needed for Anxiety. 90 Tablet 0    lidocaine (LIDODERM) 5 % Patch APPLY 1 PATCH TO AFFECTED AREA(S), LEAVE IN PLACE FOR 12HRS ON/OFF      HYDROcodone-acetaminophen (NORCO) 7.5-325 MG per tablet       tizanidine (ZANAFLEX) 4 MG Tab Take  by mouth 3 times a day as needed. Take 1 tablet by mouth three  "times a day as needed      topiramate (TOPAMAX) 25 MG Tab Take 1 Tablet by mouth 2 times a day. 60 Tablet 3     No current Epic-ordered facility-administered medications on file.     Family History:    Family History   Problem Relation Age of Onset    Kidney Disease Mother         Stage 3-4    Hypertension Mother     Other Mother         cervical dysplasia treated by cryo    Diabetes Father     Heart Attack Father 61        Status post triple bypass    Hypertension Father     Cancer Cousin 21        Benign breast cancer    Breast Cancer Other         Maternal grandmother's sister      Personal/Social History:    Social History     Tobacco Use    Smoking status: Never    Smokeless tobacco: Never   Vaping Use    Vaping Use: Never used   Substance Use Topics    Alcohol use: Yes     Comment: Rarely    Drug use: No     Social History     Social History Narrative    Lives in a house in [place] with her , has 2 biological children, has custody of her niece, reports managing fine at home.    Has cats, dogs, and lizards as pets.    Works a warehouse job, is on her feet for most of the day.      Consumes a healthy diet consisting of fruits, vegetables, white meats and home cooked meals.     Walks at least 10,000 steps per day currently.       Review of Systems:   General: Negative for fever/chills and unexpected weight change.   ENT:  Negative for hearing changes, ear pain, and neck pain. Positive for sore throat and sinus congestion.  Respiratory:  Negative for cough and dyspnea.    Cardiovascular:  Negative for chest pain and palpitations.  Musculoskeletal:  Negative for myalgias.   Skin:  Negative for rash.     Objective  Physical Exam:   /76 (BP Location: Left arm, Patient Position: Sitting, BP Cuff Size: Large adult)   Pulse 74   Temp 36.4 °C (97.5 °F) (Temporal)   Resp 20   Ht 1.575 m (5' 2\")   Wt 115 kg (254 lb 6 oz)   SpO2 99%  Body mass index is 46.53 kg/m².  General:  Alert and oriented.  Well " appearing.  NAD  Neck: Supple without JVD. No lymphadenopathy.  Pulmonary:  Normal effort.  Clear to ausculation without rales, ronchi, or wheezing.  Cardiovascular:  Regular rate and rhythm without murmur, rubs or gallop.   Skin:  Warm and dry.  No obvious lesions.  Musculoskeletal:  No extremity cyanosis, clubbing, or edema.      Assessment/Plan  1. Class 3 severe obesity due to excess calories without serious comorbidity with body mass index (BMI) of 45.0 to 49.9 in adult (HCC)  Chronic and ongoing.  Discussed that we can increase her dosage of Phentermine to 30 mg daily, she verbalized understanding.  Educated on a healthy diet and exercise.  - phentermine 30 MG capsule; Take 1 Capsule by mouth every morning for 30 days.  Dispense: 30 Capsule; Refill: 0    Obtained and reviewed patient utilization report from [facility] pharmacy database on 11/21/2022 at 0724 prior to writing prescription for controlled substance II, III or IV per [place] bill . Based on assessment of the report, the prescription is medically necessary.    2. Sore throat  3. Sinus congestion  Acute and ongoing.  Discussed Sudafed risks, benefits and side effects, she verbalized understanding.  Discussed drinking lots of aparicio and warm tea and honey, she verbalized understanding.  - pseudoephedrine (SUDAFED) 30 MG Tab; Take 1 Tablet by mouth every four hours as needed for Congestion.  Dispense: 120 Tablet; Refill: 0      Health Maintenance: Completed    Return in about 1 month (around 12/21/2022) for Medication F/U.    Please note that this dictation was created using voice recognition software. I have made every reasonable attempt to correct obvious errors, but I expect that there are errors of grammar and possibly content that I did not discover before finalizing the note.    DIMAS Duncan  [facility]

## 2022-11-21 NOTE — ASSESSMENT & PLAN NOTE
Chronic and ongoing. Currently taking Phentermine 15 mg every morning and Topiramate 25 mg BID. She states that she has been working more on her diet. She states that she notices that around 3 pm her cravings start to come back. She has been able to lose 7 pounds since starting the medication. She is wondering if she needs to increase the Phentermine as she noticed the afternoon cravings. She states that she is looking into joining a gym.

## 2022-11-21 NOTE — ASSESSMENT & PLAN NOTE
Acute and ongoing. She states that over the weekend she noticed a sore throat. She also notes some sinus congestion. She does take Claritin daily to help with her sinuses. She has been taking OTC Dayquil and Nyquil. She also knows to do sinuses rinses at home.

## 2022-11-22 DIAGNOSIS — R11.0 NAUSEA: ICD-10-CM

## 2022-11-22 RX ORDER — ONDANSETRON HYDROCHLORIDE 8 MG/1
8 TABLET, FILM COATED ORAL EVERY 8 HOURS PRN
Qty: 15 TABLET | Refills: 0 | Status: SHIPPED | OUTPATIENT
Start: 2022-11-22 | End: 2022-12-08

## 2022-11-22 NOTE — PROGRESS NOTES
1. Nausea  - ondansetron (ZOFRAN) 8 MG Tab; Take 1 Tablet by mouth every 8 hours as needed for Nausea/Vomiting.  Dispense: 15 Tablet; Refill: 0

## 2022-12-07 DIAGNOSIS — R11.0 NAUSEA: ICD-10-CM

## 2022-12-08 RX ORDER — ONDANSETRON HYDROCHLORIDE 8 MG/1
TABLET, FILM COATED ORAL
Qty: 15 TABLET | Refills: 0 | Status: SHIPPED | OUTPATIENT
Start: 2022-12-08 | End: 2023-03-12

## 2022-12-16 ENCOUNTER — OFFICE VISIT (OUTPATIENT)
Dept: MEDICAL GROUP | Facility: PHYSICIAN GROUP | Age: 42
End: 2022-12-16
Payer: COMMERCIAL

## 2022-12-16 VITALS
SYSTOLIC BLOOD PRESSURE: 124 MMHG | HEART RATE: 84 BPM | RESPIRATION RATE: 20 BRPM | WEIGHT: 249 LBS | DIASTOLIC BLOOD PRESSURE: 72 MMHG | OXYGEN SATURATION: 100 % | BODY MASS INDEX: 45.82 KG/M2 | HEIGHT: 62 IN | TEMPERATURE: 97.5 F

## 2022-12-16 DIAGNOSIS — E66.01 CLASS 3 SEVERE OBESITY DUE TO EXCESS CALORIES WITHOUT SERIOUS COMORBIDITY WITH BODY MASS INDEX (BMI) OF 45.0 TO 49.9 IN ADULT (HCC): ICD-10-CM

## 2022-12-16 DIAGNOSIS — H92.03 OTALGIA OF BOTH EARS: ICD-10-CM

## 2022-12-16 DIAGNOSIS — H65.03 BILATERAL ACUTE SEROUS OTITIS MEDIA, RECURRENCE NOT SPECIFIED: ICD-10-CM

## 2022-12-16 PROCEDURE — 99214 OFFICE O/P EST MOD 30 MIN: CPT | Performed by: NURSE PRACTITIONER

## 2022-12-16 RX ORDER — PREDNISONE 20 MG/1
TABLET ORAL
COMMUNITY
Start: 2022-12-02 | End: 2023-01-17

## 2022-12-16 RX ORDER — BENZONATATE 200 MG/1
CAPSULE ORAL
COMMUNITY
Start: 2022-12-02 | End: 2023-03-12

## 2022-12-16 RX ORDER — DEXTROMETHORPHAN HYDROBROMIDE AND PROMETHAZINE HYDROCHLORIDE 15; 6.25 MG/5ML; MG/5ML
SYRUP ORAL
COMMUNITY
Start: 2022-12-11 | End: 2023-03-12

## 2022-12-16 RX ORDER — AMOXICILLIN AND CLAVULANATE POTASSIUM 875; 125 MG/1; MG/1
1 TABLET, FILM COATED ORAL 2 TIMES DAILY
Qty: 10 TABLET | Refills: 0 | Status: SHIPPED | OUTPATIENT
Start: 2022-12-16 | End: 2022-12-21

## 2022-12-16 RX ORDER — AMOXICILLIN AND CLAVULANATE POTASSIUM 875; 125 MG/1; MG/1
1 TABLET, FILM COATED ORAL 2 TIMES DAILY
COMMUNITY
Start: 2022-12-11 | End: 2023-01-17

## 2022-12-16 RX ORDER — PREDNISONE 50 MG/1
50 TABLET ORAL
COMMUNITY
Start: 2022-12-11 | End: 2023-01-17

## 2022-12-16 RX ORDER — PHENTERMINE HYDROCHLORIDE 30 MG/1
30 CAPSULE ORAL EVERY MORNING
Qty: 30 CAPSULE | Refills: 0 | Status: SHIPPED | OUTPATIENT
Start: 2022-12-26 | End: 2023-01-25

## 2022-12-16 ASSESSMENT — FIBROSIS 4 INDEX: FIB4 SCORE: 0.32

## 2022-12-16 NOTE — ASSESSMENT & PLAN NOTE
Acute and ongoing. On Sunday she went to the urgent care and was told she has an ear infection. She was given Augmentin 875-125 mg for 7 days. She states that she is close to the end of her antibiotic course and still is having a lot of ear pain.

## 2022-12-16 NOTE — ASSESSMENT & PLAN NOTE
Chronic and ongoing. Currently taking Phentermine 30 mg every morning and Topiramate 25 mg BID. She has lost 5 pounds since her last visit and 12 pounds overall. She continues to work on her diet. She is also walking around at work as much as she can.

## 2022-12-16 NOTE — PROGRESS NOTES
Subjective  Chief Complaint  Medication Follow Up and Ear Pain    History of Present Illness  Farhana Freeman is a 42 y.o. female. This established patient is here today to follow up on weight loss medications and discuss her ear pain.    Class 3 severe obesity due to excess calories without serious comorbidity with body mass index (BMI) of 45.0 to 49.9 in adult (HCC)  Chronic and ongoing. Currently taking Phentermine 30 mg every morning and Topiramate 25 mg BID. She has lost 5 pounds since her last visit and 12 pounds overall. She continues to work on her diet. She is also walking around at work as much as she can.    Otalgia of both ears  Acute and ongoing. On Sunday she went to the urgent care and was told she has an ear infection. She was given Augmentin 875-125 mg for 7 days. She states that she is close to the end of her antibiotic course and still is having a lot of ear pain.    Past Medical History    Allergies: Oxycodone hcl, Flu virus vaccine, Ibuprofen, Morphine, Nickel, Nsaids, Oxycodone-acetaminophen, Percocet [perloxx], Sulfa drugs, and Toradol  Past Medical History:   Diagnosis Date    Anemia     Pt states she has no history of anemia    Anesthesia     severe nausea and vomiting with morphine    Anxiety     ASTHMA 05/2017    Managed with PRN inhaler, last use was in March 2017    Bronchitis 7/6/2021    Chest congestion 7/23/2021    Cholelithiasis 2017    Status post lap adeline    Cough 7/28/2021    Dental disorder     dentures     GERD (gastroesophageal reflux disease)     Gross hematuria 6/2/2020    Heart burn     Resolved with gastric surgeries.    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     Intertriginous dermatitis associated with moisture 6/16/2020    Kidney disease     kidney stones     Migraine     much less since losing weight, 1-2 times per month    Morbid obesity (HCC) 5/12/2017    Muscle disorder     Non-intractable vomiting with nausea  7/13/2021    Pain of left upper extremity 5/19/2021    Postural hypotension     EMR    Psychiatric problem     anxiety     Radicular pain of left lower extremity 5/29/2019    Renal stone 3/27/2020    IMO load March 2020    Right ear pain 7/28/2021    Sinus congestion 5/18/2022    Spider bite 11/11/2021    Sprain of lateral ligament of ankle joint 9/8/2021    Status post gastric surgery 5/29/2019    DATE OF OPERATION: 5/12/2017 POSTOPERATIVE DIAGNOSIS: Morbid Obesity with medical sequelae; Hiatal hernia OPERATION PERFORMED: 1.  Laparoscopic Danie-en-Y Gastric Bypass  3. Hiatal hernia repair SURGEON: Shelton Jackson MD    Symptomatic abdominal panniculus 6/16/2020    Urinary bladder disorder     Urinary incontinence      Past Surgical History:   Procedure Laterality Date    NH ANTER COLPORRHAPHY,BLAD/VAGINA N/A 8/12/2020    Procedure: COLPORRHAPHY, ANTERIOR, TENSION FREE VAGINAL TAPE, PERINORRHAPHY;  Surgeon: Maria Elena Zhou M.D.;  Location: SURGERY SAME DAY [facility] ORS;  Service: Obstetrics    NH POST COLPORRHAPHY,RECTUM/VAGINA N/A 8/12/2020    Procedure: COLPORRHAPHY, POSTERIOR;  Surgeon: Maria Elena Zhou M.D.;  Location: SURGERY SAME DAY [facility] ORS;  Service: Obstetrics    VAGINAL HYSTERECTOMY TOTAL N/A 8/12/2020    Procedure: HYSTERECTOMY, TOTAL, VAGINAL;  Surgeon: Maria Elena Zhou M.D.;  Location: SURGERY SAME DAY [facility] ORS;  Service: Obstetrics    SALPINGECTOMY Bilateral 8/12/2020    Procedure: SALPINGECTOMY;  Surgeon: Maria Elena Zhou M.D.;  Location: SURGERY SAME DAY [facility] ORS;  Service: Obstetrics    CYSTOSCOPY N/A 8/12/2020    Procedure: CYSTOSCOPY;  Surgeon: Maria Elena Zhou M.D.;  Location: SURGERY SAME DAY [facility] ORS;  Service: Obstetrics    LITHOTRIPSY  06/2020    LUIS BY LAPAROSCOPY  8/9/2017    Procedure: LUIS BY LAPAROSCOPY;  Surgeon: Shelton Jackson M.D.;  Location: SURGERY [facility] ORS;  Service:     GASTRIC BYPASS LAPAROSCOPIC  5/12/2017    Procedure: GASTRIC  BYPASS LAPAROSCOPIC - ISIAH EN Y AND HIATAL HERNIA REPAIR;  Surgeon: Shelton Jackson M.D.;  Location: SURGERY [facility];  Service:     OTHER ABDOMINAL SURGERY  10/12/15    vertical gastric sleeve     Current Outpatient Medications Ordered in Epic   Medication Sig Dispense Refill    amoxicillin-clavulanate (AUGMENTIN) 875-125 MG Tab Take 1 Tablet by mouth 2 times a day.      benzonatate (TESSALON) 200 MG capsule       predniSONE (DELTASONE) 20 MG Tab       predniSONE (DELTASONE) 50 MG Tab Take 50 mg by mouth every day. for 5 days      promethazine-dextromethorphan (PROMETHAZINE-DM) 6.25-15 MG/5ML syrup TAKE 5 ML BY MOUTH EVERY 4-6 HOURS FOR 5 DAYS AS NEEDED FOR COUGH      [START ON 12/26/2022] phentermine 30 MG capsule Take 1 Capsule by mouth every morning for 30 days. 30 Capsule 0    amoxicillin-clavulanate (AUGMENTIN) 875-125 MG Tab Take 1 Tablet by mouth 2 times a day for 5 days. 10 Tablet 0    ondansetron (ZOFRAN) 8 MG Tab TAKE 1 TABLET BY MOUTH EVERY EIGHT HOURS AS NEEDED FOR NAUSEA/VOMITING. 15 Tablet 0    phentermine 30 MG capsule Take 1 Capsule by mouth every morning for 30 days. 30 Capsule 0    OFLOXACIN PO       ROPINIRole (REQUIP) 0.5 MG Tab TAKE 1 TABLET BY MOUTH EVERY DAY AT NIGHT      vitamin D2, Ergocalciferol, (DRISDOL) 1.25 MG (49281 UT) Cap capsule Take 1 Capsule by mouth every 7 days. 12 Capsule 3    ferrous sulfate 325 (65 Fe) MG tablet Take 1 Tablet by mouth every day. 90 Tablet 3    topiramate (TOPAMAX) 25 MG Tab Take 1 Tablet by mouth 2 times a day. 60 Tablet 3    omeprazole (PRILOSEC) 40 MG delayed-release capsule Take 1 Capsule by mouth every day. 90 Capsule 3    escitalopram (LEXAPRO) 20 MG tablet Take 1 Tablet by mouth every day. 90 Tablet 3    albuterol 108 (90 Base) MCG/ACT Aero Soln inhalation aerosol Inhale 2 Puffs every 6 hours as needed for Shortness of Breath. 8.5 Each 2    hydrOXYzine HCl (ATARAX) 25 MG Tab Take 1 Tablet by mouth 3 times a day as needed for Anxiety. 90 Tablet 0     lidocaine (LIDODERM) 5 % Patch APPLY 1 PATCH TO AFFECTED AREA(S), LEAVE IN PLACE FOR 12HRS ON/OFF      HYDROcodone-acetaminophen (NORCO) 7.5-325 MG per tablet       tizanidine (ZANAFLEX) 4 MG Tab Take  by mouth 3 times a day as needed. Take 1 tablet by mouth three times a day as needed      pseudoephedrine (SUDAFED) 30 MG Tab Take 1 Tablet by mouth every four hours as needed for Congestion. 120 Tablet 0     No current Epic-ordered facility-administered medications on file.     Family History:    Family History   Problem Relation Age of Onset    Kidney Disease Mother         Stage 3-4    Hypertension Mother     Other Mother         cervical dysplasia treated by cryo    Diabetes Father     Heart Attack Father 61        Status post triple bypass    Hypertension Father     Cancer Cousin 21        Benign breast cancer    Breast Cancer Other         Maternal grandmother's sister      Personal/Social History:    Social History     Tobacco Use    Smoking status: Never    Smokeless tobacco: Never   Vaping Use    Vaping Use: Never used   Substance Use Topics    Alcohol use: Yes     Comment: Rarely    Drug use: No     Social History     Social History Narrative    Lives in a house in [place] with her , has 2 biological children, has custody of her niece, reports managing fine at home.    Has cats, dogs, and lizards as pets.    Works a warehouse job, is on her feet for most of the day.      Consumes a healthy diet consisting of fruits, vegetables, white meats and home cooked meals.     Walks at least 10,000 steps per day currently.       Review of Systems:   General: Negative for fever/chills and unexpected weight change.   ENT:  Negative for hearing changes, congestion, sore throat, and neck pain. Positive for ear pain.  Respiratory:  Negative for cough and dyspnea.    Cardiovascular:  Negative for chest pain and palpitations.  Musculoskeletal:  Negative for myalgias.   Skin:  Negative for rash.     Objective  Physical  "Exam:   /72 (BP Location: Left arm, Patient Position: Sitting, BP Cuff Size: Adult)   Pulse 84   Temp 36.4 °C (97.5 °F) (Temporal)   Resp 20   Ht 1.575 m (5' 2\")   Wt 113 kg (249 lb)   SpO2 100%  Body mass index is 45.54 kg/m².  General:  Alert and oriented.  Well appearing.  NAD  Neck: Supple without JVD. No lymphadenopathy.  ENT: Bilateral ear canals clear with mild erythema, tympanic membranes non-bulging with effusion.  Pulmonary:  Normal effort.  Clear to ausculation without rales, ronchi, or wheezing.  Cardiovascular:  Regular rate and rhythm without murmur, rubs or gallop.   Skin:  Warm and dry.  No obvious lesions.  Musculoskeletal:  No extremity cyanosis, clubbing, or edema.      Assessment/Plan  1. Class 3 severe obesity due to excess calories without serious comorbidity with body mass index (BMI) of 45.0 to 49.9 in adult (HCC)  Chronic and ongoing.  Continue to take Phentermine 30 mg every morning.  Educated on a healthy diet and exercise.  - phentermine 30 MG capsule; Take 1 Capsule by mouth every morning for 30 days.  Dispense: 30 Capsule; Refill: 0    Obtained and reviewed patient utilization report from [facility] pharmacy database on 12/16/2022 at 0711 prior to writing prescription for controlled substance II, III or IV per [place] bill . Based on assessment of the report, the prescription is medically necessary.    2. Otalgia of both ears  3. Bilateral acute serous otitis media, recurrence not specified  Acute and ongoing.  Discussed with her that I am going to extended her dosage of Augmentin for 5 more days, she is agreeable to this plan.  - amoxicillin-clavulanate (AUGMENTIN) 875-125 MG Tab; Take 1 Tablet by mouth 2 times a day for 5 days.  Dispense: 10 Tablet; Refill: 0        Health Maintenance: Completed    Return in about 1 month (around 1/16/2023) for Medication F/U.    Please note that this dictation was created using voice recognition software. I have made every " reasonable attempt to correct obvious errors, but I expect that there are errors of grammar and possibly content that I did not discover before finalizing the note.    Leydi Love, DIMAS  [facility]

## 2022-12-20 DIAGNOSIS — F41.1 GENERALIZED ANXIETY DISORDER: ICD-10-CM

## 2022-12-20 RX ORDER — HYDROXYZINE HYDROCHLORIDE 25 MG/1
TABLET, FILM COATED ORAL
Qty: 90 TABLET | Refills: 5 | Status: SHIPPED | OUTPATIENT
Start: 2022-12-20 | End: 2023-01-17

## 2023-01-02 DIAGNOSIS — K21.9 GASTROESOPHAGEAL REFLUX DISEASE, UNSPECIFIED WHETHER ESOPHAGITIS PRESENT: ICD-10-CM

## 2023-01-02 RX ORDER — OMEPRAZOLE 40 MG/1
40 CAPSULE, DELAYED RELEASE ORAL 2 TIMES DAILY
Qty: 180 CAPSULE | Refills: 2 | Status: SHIPPED | OUTPATIENT
Start: 2023-01-02 | End: 2023-02-23 | Stop reason: SDUPTHER

## 2023-01-02 NOTE — PROGRESS NOTES
1. Gastroesophageal reflux disease, unspecified whether esophagitis present  - omeprazole (PRILOSEC) 40 MG delayed-release capsule; Take 1 Capsule by mouth 2 times a day.  Dispense: 180 Capsule; Refill: 2

## 2023-01-17 ENCOUNTER — OFFICE VISIT (OUTPATIENT)
Dept: MEDICAL GROUP | Facility: PHYSICIAN GROUP | Age: 43
End: 2023-01-17
Payer: COMMERCIAL

## 2023-01-17 VITALS
SYSTOLIC BLOOD PRESSURE: 108 MMHG | HEIGHT: 62 IN | HEART RATE: 70 BPM | DIASTOLIC BLOOD PRESSURE: 70 MMHG | WEIGHT: 254 LBS | OXYGEN SATURATION: 99 % | TEMPERATURE: 97.7 F | BODY MASS INDEX: 46.74 KG/M2 | RESPIRATION RATE: 18 BRPM

## 2023-01-17 DIAGNOSIS — E66.01 CLASS 3 SEVERE OBESITY DUE TO EXCESS CALORIES WITHOUT SERIOUS COMORBIDITY WITH BODY MASS INDEX (BMI) OF 45.0 TO 49.9 IN ADULT (HCC): ICD-10-CM

## 2023-01-17 DIAGNOSIS — F41.1 GENERALIZED ANXIETY DISORDER: ICD-10-CM

## 2023-01-17 DIAGNOSIS — E55.9 VITAMIN D DEFICIENCY: ICD-10-CM

## 2023-01-17 DIAGNOSIS — D50.8 OTHER IRON DEFICIENCY ANEMIA: ICD-10-CM

## 2023-01-17 PROCEDURE — 99214 OFFICE O/P EST MOD 30 MIN: CPT | Performed by: NURSE PRACTITIONER

## 2023-01-17 RX ORDER — PROPRANOLOL HYDROCHLORIDE 10 MG/1
10 TABLET ORAL 3 TIMES DAILY PRN
Qty: 90 TABLET | Refills: 1 | Status: SHIPPED | OUTPATIENT
Start: 2023-01-17 | End: 2023-03-28

## 2023-01-17 RX ORDER — ERGOCALCIFEROL 1.25 MG/1
50000 CAPSULE ORAL
Qty: 12 CAPSULE | Refills: 3 | Status: SHIPPED | OUTPATIENT
Start: 2023-01-17 | End: 2023-05-23

## 2023-01-17 RX ORDER — TOPIRAMATE 25 MG/1
25 TABLET ORAL 2 TIMES DAILY
Qty: 60 TABLET | Refills: 1 | Status: SHIPPED | OUTPATIENT
Start: 2023-01-17 | End: 2023-02-23

## 2023-01-17 RX ORDER — PHENTERMINE HYDROCHLORIDE 30 MG/1
30 CAPSULE ORAL EVERY MORNING
Qty: 30 CAPSULE | Refills: 0 | Status: SHIPPED | OUTPATIENT
Start: 2023-01-29 | End: 2023-02-23

## 2023-01-17 ASSESSMENT — PATIENT HEALTH QUESTIONNAIRE - PHQ9: CLINICAL INTERPRETATION OF PHQ2 SCORE: 0

## 2023-01-17 ASSESSMENT — FIBROSIS 4 INDEX: FIB4 SCORE: 0.32

## 2023-01-17 NOTE — PROGRESS NOTES
Subjective  Chief Complaint  Medication Follow Up    History of Present Illness  Farhana Freeman is a 42 y.o. female. This established patient is here today to follow up on her medications.    Generalized anxiety disorder  Chronic and ongoing. She is currently taking Hydroxyzine 25 mg as needed. She states that she feels like the Hydroxyzine is not working as much and she is afraid to increase the dosage because she does not want to get drowsy. She is wanting to try a different medication.    Class 3 severe obesity due to excess calories without serious comorbidity with body mass index (BMI) of 45.0 to 49.9 in adult (HCC)  Chronic and ongoing. Currently taking Phentermine 30 mg every morning and Topiramate 25 mg BID. She states that the holidays was a little crazy and her weight did fluctuate. She did join the gym and is going to start walking more. She is still trying to work on her diet. She is requesting a refill at this time.    Past Medical History    Allergies: Oxycodone hcl, Flu virus vaccine, Ibuprofen, Morphine, Nickel, Nsaids, Oxycodone-acetaminophen, Percocet [perloxx], Sulfa drugs, and Toradol  Past Medical History:   Diagnosis Date    Anemia     Pt states she has no history of anemia    Anesthesia     severe nausea and vomiting with morphine    Anxiety     ASTHMA 05/2017    Managed with PRN inhaler, last use was in March 2017    Bronchitis 7/6/2021    Chest congestion 7/23/2021    Cholelithiasis 2017    Status post lap adeline    Cough 7/28/2021    Dental disorder     dentures     GERD (gastroesophageal reflux disease)     Gross hematuria 6/2/2020    Heart burn     Resolved with gastric surgeries.    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     Intertriginous dermatitis associated with moisture 6/16/2020    Kidney disease     kidney stones     Migraine     much less since losing weight, 1-2 times per month    Morbid obesity (HCC) 5/12/2017    Muscle  disorder     Non-intractable vomiting with nausea 7/13/2021    Pain of left upper extremity 5/19/2021    Postural hypotension     EMR    Psychiatric problem     anxiety     Radicular pain of left lower extremity 5/29/2019    Renal stone 3/27/2020    IMO load March 2020    Right ear pain 7/28/2021    Sinus congestion 5/18/2022    Spider bite 11/11/2021    Sprain of lateral ligament of ankle joint 9/8/2021    Status post gastric surgery 5/29/2019    DATE OF OPERATION: 5/12/2017 POSTOPERATIVE DIAGNOSIS: Morbid Obesity with medical sequelae; Hiatal hernia OPERATION PERFORMED: 1.  Laparoscopic Danie-en-Y Gastric Bypass  3. Hiatal hernia repair SURGEON: Shelton Jackson MD    Symptomatic abdominal panniculus 6/16/2020    Urinary bladder disorder     Urinary incontinence      Past Surgical History:   Procedure Laterality Date    RI ANTER COLPORRHAPHY,BLAD/VAGINA N/A 8/12/2020    Procedure: COLPORRHAPHY, ANTERIOR, TENSION FREE VAGINAL TAPE, PERINORRHAPHY;  Surgeon: Maria Elena Zhou M.D.;  Location: SURGERY SAME DAY [facility]S;  Service: Obstetrics    RI POST COLPORRHAPHY,RECTUM/VAGINA N/A 8/12/2020    Procedure: COLPORRHAPHY, POSTERIOR;  Surgeon: Maria Elena Zhou M.D.;  Location: SURGERY SAME DAY [facility]S;  Service: Obstetrics    VAGINAL HYSTERECTOMY TOTAL N/A 8/12/2020    Procedure: HYSTERECTOMY, TOTAL, VAGINAL;  Surgeon: Maria Elena Zhou M.D.;  Location: SURGERY SAME DAY [facility]S;  Service: Obstetrics    SALPINGECTOMY Bilateral 8/12/2020    Procedure: SALPINGECTOMY;  Surgeon: Maria Elena Zhou M.D.;  Location: SURGERY SAME DAY [facility]S;  Service: Obstetrics    CYSTOSCOPY N/A 8/12/2020    Procedure: CYSTOSCOPY;  Surgeon: Maria Elena Zhou M.D.;  Location: SURGERY SAME DAY [facility]S;  Service: Obstetrics    LITHOTRIPSY  06/2020    LUIS BY LAPAROSCOPY  8/9/2017    Procedure: LUIS BY LAPAROSCOPY;  Surgeon: Shelton Jackson M.D.;  Location: SURGERY [facility];  Service:     GASTRIC  BYPASS LAPAROSCOPIC  5/12/2017    Procedure: GASTRIC BYPASS LAPAROSCOPIC - ISIAH EN Y AND HIATAL HERNIA REPAIR;  Surgeon: Shelton Jackson M.D.;  Location: SURGERY [facility];  Service:     OTHER ABDOMINAL SURGERY  10/12/15    vertical gastric sleeve     Current Outpatient Medications Ordered in Epic   Medication Sig Dispense Refill    propranolol (INDERAL) 10 MG Tab Take 1 Tablet by mouth 3 times a day as needed (Anxiety). 90 Tablet 1    topiramate (TOPAMAX) 25 MG Tab Take 1 Tablet by mouth 2 times a day. 60 Tablet 1    [START ON 1/29/2023] phentermine 30 MG capsule Take 1 Capsule by mouth every morning for 30 days. 30 Capsule 0    vitamin D2, Ergocalciferol, (DRISDOL) 1.25 MG (92271 UT) Cap capsule Take 1 Capsule by mouth every 7 days. 12 Capsule 3    omeprazole (PRILOSEC) 40 MG delayed-release capsule Take 1 Capsule by mouth 2 times a day. 180 Capsule 2    benzonatate (TESSALON) 200 MG capsule       promethazine-dextromethorphan (PROMETHAZINE-DM) 6.25-15 MG/5ML syrup TAKE 5 ML BY MOUTH EVERY 4-6 HOURS FOR 5 DAYS AS NEEDED FOR COUGH      phentermine 30 MG capsule Take 1 Capsule by mouth every morning for 30 days. 30 Capsule 0    ondansetron (ZOFRAN) 8 MG Tab TAKE 1 TABLET BY MOUTH EVERY EIGHT HOURS AS NEEDED FOR NAUSEA/VOMITING. 15 Tablet 0    OFLOXACIN PO       ROPINIRole (REQUIP) 0.5 MG Tab TAKE 1 TABLET BY MOUTH EVERY DAY AT NIGHT      ferrous sulfate 325 (65 Fe) MG tablet Take 1 Tablet by mouth every day. 90 Tablet 3    escitalopram (LEXAPRO) 20 MG tablet Take 1 Tablet by mouth every day. 90 Tablet 3    albuterol 108 (90 Base) MCG/ACT Aero Soln inhalation aerosol Inhale 2 Puffs every 6 hours as needed for Shortness of Breath. 8.5 Each 2    HYDROcodone-acetaminophen (NORCO) 7.5-325 MG per tablet       tizanidine (ZANAFLEX) 4 MG Tab Take  by mouth 3 times a day as needed. Take 1 tablet by mouth three times a day as needed       No current Epic-ordered facility-administered medications on file.     Family  "History:    Family History   Problem Relation Age of Onset    Kidney Disease Mother         Stage 3-4    Hypertension Mother     Other Mother         cervical dysplasia treated by cryo    Diabetes Father     Heart Attack Father 61        Status post triple bypass    Hypertension Father     Cancer Cousin 21        Benign breast cancer    Breast Cancer Other         Maternal grandmother's sister      Personal/Social History:    Social History     Tobacco Use    Smoking status: Never    Smokeless tobacco: Never   Vaping Use    Vaping Use: Never used   Substance Use Topics    Alcohol use: Yes     Comment: Rarely    Drug use: No     Social History     Social History Narrative    Lives in a house in [place] with her , has 2 biological children, has custody of her niece, reports managing fine at home.    Has cats, dogs, and lizards as pets.    Works a warehouse job, is on her feet for most of the day.      Consumes a healthy diet consisting of fruits, vegetables, white meats and home cooked meals.     Walks at least 10,000 steps per day currently.       Review of Systems:   General: Negative for fever/chills and unexpected weight change.    Respiratory:  Negative for cough and dyspnea.    Cardiovascular:  Negative for chest pain and palpitations.  Musculoskeletal:  Negative for myalgias.   Skin:  Negative for rash.     Objective  Physical Exam:   /70   Pulse 70   Temp 36.5 °C (97.7 °F) (Temporal)   Resp 18   Ht 1.575 m (5' 2\")   Wt 115 kg (254 lb)   SpO2 99%  Body mass index is 46.46 kg/m².  General:  Alert and oriented.  Well appearing.  NAD  Neck: Supple without JVD. No lymphadenopathy.  Pulmonary:  Normal effort.  Clear to ausculation without rales, ronchi, or wheezing.  Cardiovascular:  Regular rate and rhythm without murmur, rubs or gallop.   Skin:  Warm and dry.  No obvious lesions.  Musculoskeletal:  No extremity cyanosis, clubbing, or edema.      Assessment/Plan  1. Generalized anxiety " disorder  Chronic and ongoing.  Discussed Propranolol risks, benefits and side effects, she verbalized understanding.  Continue to take Escitalopram 20 mg daily.  - propranolol (INDERAL) 10 MG Tab; Take 1 Tablet by mouth 3 times a day as needed (Anxiety).  Dispense: 90 Tablet; Refill: 1    2. Class 3 severe obesity due to excess calories without serious comorbidity with body mass index (BMI) of 45.0 to 49.9 in adult (HCC)  Chronic and ongoing.  Educated on a healthy diet and exercise.  Continue to take Phentermine 30 mg every morning.  Continue to take Topiramate 25 mg BID.  - topiramate (TOPAMAX) 25 MG Tab; Take 1 Tablet by mouth 2 times a day.  Dispense: 60 Tablet; Refill: 1  - phentermine 30 MG capsule; Take 1 Capsule by mouth every morning for 30 days.  Dispense: 30 Capsule; Refill: 0    Obtained and reviewed patient utilization report from [facility] pharmacy database on 1/17/2023 at 0705 prior to writing prescription for controlled substance II, III or IV per [place] bill . Based on assessment of the report, the prescription is medically necessary.    3. Other iron deficiency anemia  Chronic and ongoing.  Continue to take Ferrous Sulfate 325 mg daily.  Due for updated labs.  - CBC WITHOUT DIFFERENTIAL; Future  - IRON/TOTAL IRON BIND; Future  - FERRITIN; Future    4. Vitamin D deficiency  Chronic and ongoing.  Continue to take Vitamin D supplement.  - vitamin D2, Ergocalciferol, (DRISDOL) 1.25 MG (26190 UT) Cap capsule; Take 1 Capsule by mouth every 7 days.  Dispense: 12 Capsule; Refill: 3      Health Maintenance: Completed    Return in about 5 weeks (around 2/21/2023) for Medication F/U.    Please note that this dictation was created using voice recognition software. I have made every reasonable attempt to correct obvious errors, but I expect that there are errors of grammar and possibly content that I did not discover before finalizing the note.    DIMAS Duncan  [facility] [place]

## 2023-01-17 NOTE — ASSESSMENT & PLAN NOTE
Chronic and ongoing. Currently taking Phentermine 30 mg every morning and Topiramate 25 mg BID. She states that the holidays was a little crazy and her weight did fluctuate. She did join the gym and is going to start walking more. She is still trying to work on her diet. She is requesting a refill at this time.

## 2023-01-17 NOTE — ASSESSMENT & PLAN NOTE
Chronic and ongoing. She is currently taking Hydroxyzine 25 mg as needed. She states that she feels like the Hydroxyzine is not working as much and she is afraid to increase the dosage because she does not want to get drowsy. She is wanting to try a different medication.

## 2023-02-07 ENCOUNTER — OFFICE VISIT (OUTPATIENT)
Dept: MEDICAL GROUP | Facility: PHYSICIAN GROUP | Age: 43
End: 2023-02-07
Payer: COMMERCIAL

## 2023-02-07 VITALS
OXYGEN SATURATION: 100 % | RESPIRATION RATE: 20 BRPM | BODY MASS INDEX: 45.09 KG/M2 | WEIGHT: 254.5 LBS | DIASTOLIC BLOOD PRESSURE: 66 MMHG | TEMPERATURE: 98.6 F | SYSTOLIC BLOOD PRESSURE: 120 MMHG | HEART RATE: 56 BPM | HEIGHT: 63 IN

## 2023-02-07 DIAGNOSIS — Z98.84 HX OF GASTRIC BYPASS: ICD-10-CM

## 2023-02-07 DIAGNOSIS — G43.909 MIGRAINE WITHOUT STATUS MIGRAINOSUS, NOT INTRACTABLE, UNSPECIFIED MIGRAINE TYPE: ICD-10-CM

## 2023-02-07 PROCEDURE — 99214 OFFICE O/P EST MOD 30 MIN: CPT | Performed by: NURSE PRACTITIONER

## 2023-02-07 RX ORDER — HYDROXYZINE HYDROCHLORIDE 25 MG/1
TABLET, FILM COATED ORAL
COMMUNITY
Start: 2023-01-20 | End: 2023-03-12

## 2023-02-07 RX ORDER — KETOROLAC TROMETHAMINE 30 MG/ML
60 INJECTION, SOLUTION INTRAMUSCULAR; INTRAVENOUS ONCE
Status: COMPLETED | OUTPATIENT
Start: 2023-02-07 | End: 2023-02-07

## 2023-02-07 RX ADMIN — KETOROLAC TROMETHAMINE 60 MG: 30 INJECTION, SOLUTION INTRAMUSCULAR; INTRAVENOUS at 14:30

## 2023-02-07 ASSESSMENT — FIBROSIS 4 INDEX: FIB4 SCORE: 0.32

## 2023-02-07 NOTE — ASSESSMENT & PLAN NOTE
Chronic and worsening. She states that she has had a migraine for about four days now. She has bene on medication in the past for her migraines but has not followed up with a Neurologist recently. She is requesting a Toradol injection today.

## 2023-02-07 NOTE — LETTER
February 7, 2023    To Whom It May Concern:         This is confirmation that Farhana Freeman attended her scheduled appointment with JESSICA Sharma on 2/07/23. Please excuse her from work on 2/06/23 to 2/07/23 as she was dealing with an acute illness.         If you have any questions please do not hesitate to call me at the phone number listed below.    Sincerely,          DENY SharmaRRenataN.  [phone]

## 2023-02-07 NOTE — PROGRESS NOTES
Subjective  Chief Complaint  Migraine    History of Present Illness  Farhana Freeman is a 42 y.o. female. This established patient is here today to discuss her migraine.    Migraine  Chronic and worsening. She states that she has had a migraine for about four days now. She has bene on medication in the past for her migraines but has not followed up with a Neurologist recently. She is requesting a Toradol injection today.    Past Medical History    Allergies: Oxycodone hcl, Flu virus vaccine, Ibuprofen, Morphine, Nickel, Nsaids, Oxycodone-acetaminophen, Percocet [perloxx], Sulfa drugs, and Toradol  Past Medical History:   Diagnosis Date    Anemia     Pt states she has no history of anemia    Anesthesia     severe nausea and vomiting with morphine    Anxiety     ASTHMA 05/2017    Managed with PRN inhaler, last use was in March 2017    Bronchitis 7/6/2021    Chest congestion 7/23/2021    Cholelithiasis 2017    Status post lap adeline    Cough 7/28/2021    Dental disorder     dentures     GERD (gastroesophageal reflux disease)     Gross hematuria 6/2/2020    Heart burn     Resolved with gastric surgeries.    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     Intertriginous dermatitis associated with moisture 6/16/2020    Kidney disease     kidney stones     Migraine     much less since losing weight, 1-2 times per month    Morbid obesity (HCC) 5/12/2017    Muscle disorder     Non-intractable vomiting with nausea 7/13/2021    Pain of left upper extremity 5/19/2021    Postural hypotension     EMR    Psychiatric problem     anxiety     Radicular pain of left lower extremity 5/29/2019    Renal stone 3/27/2020    IMO load March 2020    Right ear pain 7/28/2021    Sinus congestion 5/18/2022    Spider bite 11/11/2021    Sprain of lateral ligament of ankle joint 9/8/2021    Status post gastric surgery 5/29/2019    DATE OF OPERATION: 5/12/2017 POSTOPERATIVE DIAGNOSIS: Morbid Obesity  with medical sequelae; Hiatal hernia OPERATION PERFORMED: 1.  Laparoscopic Isiah-en-Y Gastric Bypass  3. Hiatal hernia repair SURGEON: Shelton Jackson MD    Symptomatic abdominal panniculus 6/16/2020    Urinary bladder disorder     Urinary incontinence      Past Surgical History:   Procedure Laterality Date    FL ANTER COLPORRHAPHY,BLAD/VAGINA N/A 8/12/2020    Procedure: COLPORRHAPHY, ANTERIOR, TENSION FREE VAGINAL TAPE, PERINORRHAPHY;  Surgeon: Maria Elena Zhou M.D.;  Location: SURGERY SAME DAY [facility];  Service: Obstetrics    FL POST COLPORRHAPHY,RECTUM/VAGINA N/A 8/12/2020    Procedure: COLPORRHAPHY, POSTERIOR;  Surgeon: Maria Elena Zhou M.D.;  Location: SURGERY SAME DAY [facility];  Service: Obstetrics    VAGINAL HYSTERECTOMY TOTAL N/A 8/12/2020    Procedure: HYSTERECTOMY, TOTAL, VAGINAL;  Surgeon: Maria Elena Zhou M.D.;  Location: SURGERY SAME DAY [facility];  Service: Obstetrics    SALPINGECTOMY Bilateral 8/12/2020    Procedure: SALPINGECTOMY;  Surgeon: Maria Elena Zhou M.D.;  Location: SURGERY SAME DAY [facility];  Service: Obstetrics    CYSTOSCOPY N/A 8/12/2020    Procedure: CYSTOSCOPY;  Surgeon: Maria Elena Zhou M.D.;  Location: SURGERY SAME DAY [facility];  Service: Obstetrics    LITHOTRIPSY  06/2020    LUIS BY LAPAROSCOPY  8/9/2017    Procedure: LUIS BY LAPAROSCOPY;  Surgeon: Shelton Jackson M.D.;  Location: [facility]ERY [facility];  Service:     GASTRIC BYPASS LAPAROSCOPIC  5/12/2017    Procedure: GASTRIC BYPASS LAPAROSCOPIC - ISIAH EN Y AND HIATAL HERNIA REPAIR;  Surgeon: Shelton Jackson M.D.;  Location: SURGERY [facility];  Service:     OTHER ABDOMINAL SURGERY  10/12/15    vertical gastric sleeve     Current Outpatient Medications Ordered in Epic   Medication Sig Dispense Refill    propranolol (INDERAL) 10 MG Tab Take 1 Tablet by mouth 3 times a day as needed (Anxiety). 90 Tablet 1    topiramate (TOPAMAX) 25 MG Tab Take 1 Tablet by mouth 2 times a day. 60 Tablet 1     phentermine 30 MG capsule Take 1 Capsule by mouth every morning for 30 days. 30 Capsule 0    vitamin D2, Ergocalciferol, (DRISDOL) 1.25 MG (04188 UT) Cap capsule Take 1 Capsule by mouth every 7 days. 12 Capsule 3    omeprazole (PRILOSEC) 40 MG delayed-release capsule Take 1 Capsule by mouth 2 times a day. 180 Capsule 2    benzonatate (TESSALON) 200 MG capsule       promethazine-dextromethorphan (PROMETHAZINE-DM) 6.25-15 MG/5ML syrup TAKE 5 ML BY MOUTH EVERY 4-6 HOURS FOR 5 DAYS AS NEEDED FOR COUGH      ondansetron (ZOFRAN) 8 MG Tab TAKE 1 TABLET BY MOUTH EVERY EIGHT HOURS AS NEEDED FOR NAUSEA/VOMITING. 15 Tablet 0    OFLOXACIN PO       ROPINIRole (REQUIP) 0.5 MG Tab TAKE 1 TABLET BY MOUTH EVERY DAY AT NIGHT      ferrous sulfate 325 (65 Fe) MG tablet Take 1 Tablet by mouth every day. 90 Tablet 3    escitalopram (LEXAPRO) 20 MG tablet Take 1 Tablet by mouth every day. 90 Tablet 3    albuterol 108 (90 Base) MCG/ACT Aero Soln inhalation aerosol Inhale 2 Puffs every 6 hours as needed for Shortness of Breath. 8.5 Each 2    HYDROcodone-acetaminophen (NORCO) 7.5-325 MG per tablet       tizanidine (ZANAFLEX) 4 MG Tab Take  by mouth 3 times a day as needed. Take 1 tablet by mouth three times a day as needed      hydrOXYzine HCl (ATARAX) 25 MG Tab        No current Epic-ordered facility-administered medications on file.     Family History:    Family History   Problem Relation Age of Onset    Kidney Disease Mother         Stage 3-4    Hypertension Mother     Other Mother         cervical dysplasia treated by cryo    Diabetes Father     Heart Attack Father 61        Status post triple bypass    Hypertension Father     Cancer Cousin 21        Benign breast cancer    Breast Cancer Other         Maternal grandmother's sister      Personal/Social History:    Social History     Tobacco Use    Smoking status: Never    Smokeless tobacco: Never   Vaping Use    Vaping Use: Never used   Substance Use Topics    Alcohol use: Yes      "Comment: Rarely    Drug use: No     Social History     Social History Narrative    Lives in a house in [place] with her , has 2 biological children, has custody of her niece, reports managing fine at home.    Has cats, dogs, and lizards as pets.    Works a warehouse job, is on her feet for most of the day.      Consumes a healthy diet consisting of fruits, vegetables, white meats and home cooked meals.     Walks at least 10,000 steps per day currently.       Review of Systems:   General: Negative for fever/chills and unexpected weight change.   Respiratory:  Negative for cough and dyspnea.    Cardiovascular:  Negative for chest pain and palpitations.  Musculoskeletal:  Negative for myalgias.   Skin:  Negative for rash.   Neurological:  Negative for numbness/tingling. Positive for migraine.     Objective  Physical Exam:   /66 (BP Location: Left arm, Patient Position: Sitting, BP Cuff Size: Large adult)   Pulse (!) 56   Temp 37 °C (98.6 °F) (Temporal)   Resp 20   Ht 1.6 m (5' 3\")   Wt 115 kg (254 lb 8 oz)   SpO2 100%  Body mass index is 45.08 kg/m².  General:  Alert and oriented.  Well appearing.  NAD  Neck: Supple without JVD. No lymphadenopathy.  Pulmonary:  Normal effort.  Clear to ausculation without rales, ronchi, or wheezing.  Cardiovascular:  Regular rate and rhythm without murmur, rubs or gallop.   Skin:  Warm and dry.  No obvious lesions.  Musculoskeletal:  No extremity cyanosis, clubbing, or edema.      Assessment/Plan  1. Migraine without status migrainosus, not intractable, unspecified migraine type  2. Hx of gastric bypass  Chronic and worsening.  Discussed Toradol injection risks, benefits and side effects, she verbalized understanding.  Discussed that with her history of gastric bypass surgery that she has to be cautious with NSAID's she verbalized understanding.  Discussed referring her back to Neurology to the headache clinic, she is agreeable.  - Referral to Neurology  - ketorolac " (TORADOL) injection 60 mg        Health Maintenance: Completed    Return if symptoms worsen or fail to improve.    I have placed the above orders and discussed them with an approved delegating provider.  The MA is performing the above orders under the direction of Dr. Jef Mclain MD/DO.    Discussed that the patient carries some responsibility in management of their health care.    Please note that this dictation was created using voice recognition software. I have made every reasonable attempt to correct obvious errors, but I expect that there are errors of grammar and possibly content that I did not discover before finalizing the note.    DIMAS Duncan  [facility]

## 2023-02-09 ENCOUNTER — TELEPHONE (OUTPATIENT)
Dept: MEDICAL GROUP | Facility: PHYSICIAN GROUP | Age: 43
End: 2023-02-09
Payer: COMMERCIAL

## 2023-02-09 NOTE — LETTER
February 9, 2023    To Whom It May Concern:         This is confirmation that Farhana Freeman needs to be excused from work on Wednesday, February 8, 2023, as she is dealing with an acute illness.         If you have any questions please do not hesitate to call me at the phone number listed below.    Sincerely,    Leydi Love, A.P.R.N.  [phone]

## 2023-02-15 ENCOUNTER — TELEPHONE (OUTPATIENT)
Dept: HEALTH INFORMATION MANAGEMENT | Facility: OTHER | Age: 43
End: 2023-02-15
Payer: COMMERCIAL

## 2023-02-23 DIAGNOSIS — K21.9 GASTROESOPHAGEAL REFLUX DISEASE, UNSPECIFIED WHETHER ESOPHAGITIS PRESENT: ICD-10-CM

## 2023-02-23 RX ORDER — OMEPRAZOLE 40 MG/1
40 CAPSULE, DELAYED RELEASE ORAL 2 TIMES DAILY
Qty: 180 CAPSULE | Refills: 2 | Status: SHIPPED | OUTPATIENT
Start: 2023-02-23 | End: 2023-05-23

## 2023-03-12 ENCOUNTER — OFFICE VISIT (OUTPATIENT)
Dept: URGENT CARE | Facility: CLINIC | Age: 43
End: 2023-03-12
Payer: COMMERCIAL

## 2023-03-12 VITALS
DIASTOLIC BLOOD PRESSURE: 80 MMHG | BODY MASS INDEX: 46.74 KG/M2 | TEMPERATURE: 98.7 F | HEIGHT: 62 IN | SYSTOLIC BLOOD PRESSURE: 124 MMHG | RESPIRATION RATE: 16 BRPM | OXYGEN SATURATION: 98 % | HEART RATE: 86 BPM | WEIGHT: 254 LBS

## 2023-03-12 DIAGNOSIS — J32.9 CHRONIC SINUSITIS, UNSPECIFIED LOCATION: ICD-10-CM

## 2023-03-12 PROCEDURE — 99214 OFFICE O/P EST MOD 30 MIN: CPT | Performed by: NURSE PRACTITIONER

## 2023-03-12 RX ORDER — AMOXICILLIN AND CLAVULANATE POTASSIUM 875; 125 MG/1; MG/1
1 TABLET, FILM COATED ORAL 2 TIMES DAILY
Qty: 20 TABLET | Refills: 0 | Status: SHIPPED | OUTPATIENT
Start: 2023-03-12 | End: 2023-03-22

## 2023-03-12 RX ORDER — TRIAMCINOLONE ACETONIDE 40 MG/ML
40 INJECTION, SUSPENSION INTRA-ARTICULAR; INTRAMUSCULAR ONCE
Status: DISCONTINUED | OUTPATIENT
Start: 2023-03-12 | End: 2023-03-15

## 2023-03-12 ASSESSMENT — ENCOUNTER SYMPTOMS
SINUS PRESSURE: 1
HEADACHES: 1
SENSORY CHANGE: 0
WEAKNESS: 0
SINUS PAIN: 1

## 2023-03-12 ASSESSMENT — FIBROSIS 4 INDEX: FIB4 SCORE: 0.32

## 2023-03-12 ASSESSMENT — VISUAL ACUITY: OU: 1

## 2023-03-12 NOTE — LETTER
March 12, 2023         Patient: Farhana Freeman   YOB: 1980   Date of Visit: 3/12/2023           To Whom it May Concern:    Farhana Freeman was seen in my clinic on 3/12/2023 due to illness. Due to medical necessity, please excuse patient from work 3/13 and 3/14.    If you have any questions or concerns, please don't hesitate to call.        Sincerely,           DELL Méndez.  Electronically Signed

## 2023-03-12 NOTE — PATIENT INSTRUCTIONS
Suggest using any combination of the following over-the-counter medications per 's instructions:  - sinus rinse kits, neti pot, nasal saline sprays  - nasal steroid sprays (e.g. fluticasone/Flonase, Nasacort)  - oral daily antihistamine (e.g. loratadine/Claritin, Zyrtec, Allegra)  - oral decongestant (e.g. pseudoephedrine, Sudafed)  - oral pain reliever/fever reducer (e.g. acetaminophen, Tylenol)

## 2023-03-12 NOTE — PROGRESS NOTES
Subjective:     Farhana Freeman is a 42 y.o. female who presents for Sinusitis (Has been sick for almost a month)       Sinusitis  This is a new problem. The problem has been gradually worsening since onset. Associated symptoms include congestion, ear pain, headaches and sinus pressure.     Had reoccurring symptoms of sinusitis ever since Thanksgiving.  Has been treated with antibiotic.  Symptoms improved.  However, keep coming back.  Symptoms started 1 month ago.  Has tried multiple OTC medication.  Currently on Zyrtec.    Cannot take NSAIDs due to history of gastric sleeve and gastric bypass.    Review of Systems   Constitutional:  Positive for malaise/fatigue.   HENT:  Positive for congestion, ear pain, sinus pressure and sinus pain.    Neurological:  Positive for headaches. Negative for sensory change and weakness.   All other systems reviewed and are negative.    Refer to HPI for additional details.    During this visit, appropriate PPE was worn, hand hygiene was performed, and the patient and any visitors were masked.    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), Intertriginous dermatitis associated with moisture (6/16/2020), Kidney disease, Migraine, Morbid obesity (HCC) (5/12/2017), Muscle disorder, Non-intractable vomiting with nausea (7/13/2021), Pain of left upper extremity (5/19/2021), Postural hypotension, Psychiatric problem, Radicular pain of left lower extremity (5/29/2019), Renal stone (3/27/2020), Right ear pain (7/28/2021), Sinus congestion (5/18/2022), Spider bite (11/11/2021), Sprain of lateral ligament of ankle joint (9/8/2021), Status post gastric surgery (5/29/2019), Symptomatic abdominal panniculus  "(6/16/2020), Urinary bladder disorder, and Urinary incontinence.    MEDS:   Current Outpatient Medications:     amoxicillin-clavulanate (AUGMENTIN) 875-125 MG Tab, Take 1 Tablet by mouth 2 times a day for 10 days., Disp: 20 Tablet, Rfl: 0    omeprazole (PRILOSEC) 40 MG delayed-release capsule, Take 1 Capsule by mouth 2 times a day., Disp: 180 Capsule, Rfl: 2    propranolol (INDERAL) 10 MG Tab, Take 1 Tablet by mouth 3 times a day as needed (Anxiety)., Disp: 90 Tablet, Rfl: 1    vitamin D2, Ergocalciferol, (DRISDOL) 1.25 MG (61755 UT) Cap capsule, Take 1 Capsule by mouth every 7 days., Disp: 12 Capsule, Rfl: 3    OFLOXACIN PO, , Disp: , Rfl:     ROPINIRole (REQUIP) 0.5 MG Tab, TAKE 1 TABLET BY MOUTH EVERY DAY AT NIGHT, Disp: , Rfl:     ferrous sulfate 325 (65 Fe) MG tablet, Take 1 Tablet by mouth every day., Disp: 90 Tablet, Rfl: 3    escitalopram (LEXAPRO) 20 MG tablet, Take 1 Tablet by mouth every day., Disp: 90 Tablet, Rfl: 3    albuterol 108 (90 Base) MCG/ACT Aero Soln inhalation aerosol, Inhale 2 Puffs every 6 hours as needed for Shortness of Breath., Disp: 8.5 Each, Rfl: 2    HYDROcodone-acetaminophen (NORCO) 7.5-325 MG per tablet, , Disp: , Rfl:     tizanidine (ZANAFLEX) 4 MG Tab, Take  by mouth 3 times a day as needed. Take 1 tablet by mouth three times a day as needed, Disp: , Rfl:     Current Facility-Administered Medications:     triamcinolone acetonide (KENALOG-40) injection 40 mg, 40 mg, Intramuscular, Once, Mauri Nieves, A.P.R.NRenata    ALLERGIES:   Allergies   Allergen Reactions    Oxycodone Hcl Vomiting    Flu Virus Vaccine Vomiting     Reports severe illness with nausea, vomiting and diarrhea.      Ibuprofen      1999-11-18;GI UPSET    Morphine Nausea     \"severe nausea vomiting\"    Nickel     Nsaids      Added per MD request    Oxycodone-Acetaminophen     Percocet [Perloxx]      Vomiting     Sulfa Drugs      \"Causes blindness\"    Toradol      States that she can not have oral Toradol but can " "tolerate injection     SURGHX:   Past Surgical History:   Procedure Laterality Date    DC ANTER COLPORRHAPHY,BLAD/VAGINA N/A 8/12/2020    Procedure: COLPORRHAPHY, ANTERIOR, TENSION FREE VAGINAL TAPE, PERINORRHAPHY;  Surgeon: Maria Elena Zhou M.D.;  Location: SURGERY SAME DAY [facility];  Service: Obstetrics    DC POST COLPORRHAPHY,RECTUM/VAGINA N/A 8/12/2020    Procedure: COLPORRHAPHY, POSTERIOR;  Surgeon: Maria Elena Zhou M.D.;  Location: SURGERY SAME DAY [facility];  Service: Obstetrics    VAGINAL HYSTERECTOMY TOTAL N/A 8/12/2020    Procedure: HYSTERECTOMY, TOTAL, VAGINAL;  Surgeon: Maria Elena Zhou M.D.;  Location: SURGERY SAME DAY [facility];  Service: Obstetrics    SALPINGECTOMY Bilateral 8/12/2020    Procedure: SALPINGECTOMY;  Surgeon: Maria Elena Zhou M.D.;  Location: SURGERY SAME DAY [facility];  Service: Obstetrics    CYSTOSCOPY N/A 8/12/2020    Procedure: CYSTOSCOPY;  Surgeon: Maria Elena Zhou M.D.;  Location: SURGERY SAME DAY [facility];  Service: Obstetrics    LITHOTRIPSY  06/2020    LUIS BY LAPAROSCOPY  8/9/2017    Procedure: LUIS BY LAPAROSCOPY;  Surgeon: Shelton Jackson M.D.;  Location: SURGERY [facility];  Service:     GASTRIC BYPASS LAPAROSCOPIC  5/12/2017    Procedure: GASTRIC BYPASS LAPAROSCOPIC - ISIAH EN Y AND HIATAL HERNIA REPAIR;  Surgeon: Shelton Jackson M.D.;  Location: SURGERY [facility];  Service:     OTHER ABDOMINAL SURGERY  10/12/15    vertical gastric sleeve     SOCHX:  reports that she has never smoked. She has never used smokeless tobacco. She reports current alcohol use. She reports that she does not use drugs.    FH: Per HPI as applicable/pertinent.      Objective:     /80 (BP Location: Right arm, Patient Position: Sitting, BP Cuff Size: Adult long)   Pulse 86   Temp 37.1 °C (98.7 °F) (Temporal)   Resp 16   Ht 1.575 m (5' 2\")   Wt 115 kg (254 lb)   LMP 07/25/2020 (Exact Date)   SpO2 98%   BMI 46.46 kg/m²     Physical " Exam  Nursing note reviewed.   Constitutional:       General: She is not in acute distress.     Appearance: She is well-developed. She is not ill-appearing or toxic-appearing.   HENT:      Right Ear: Tympanic membrane normal.      Left Ear: Tympanic membrane normal.      Nose: Congestion present.      Right Sinus: Maxillary sinus tenderness present.      Left Sinus: Maxillary sinus tenderness present.      Mouth/Throat:      Mouth: Mucous membranes are moist.      Pharynx: Oropharynx is clear.   Eyes:      General: Vision grossly intact.   Cardiovascular:      Rate and Rhythm: Normal rate.   Pulmonary:      Effort: Pulmonary effort is normal. No respiratory distress.   Musculoskeletal:         General: No deformity. Normal range of motion.   Skin:     General: Skin is warm and dry.      Coloration: Skin is not pale.   Neurological:      Mental Status: She is alert and oriented to person, place, and time.      Motor: No weakness.   Psychiatric:         Behavior: Behavior normal. Behavior is cooperative.       Assessment/Plan:     1. Chronic sinusitis, unspecified location  - amoxicillin-clavulanate (AUGMENTIN) 875-125 MG Tab; Take 1 Tablet by mouth 2 times a day for 10 days.  Dispense: 20 Tablet; Refill: 0  - triamcinolone acetonide (KENALOG-40) injection 40 mg  - Referral to ENT    Rx as above sent electronically. Discussed saline rinses and Flonase. Continue antihistamine.    Follow up with ENT; referral placed.    Differential diagnosis, natural history, supportive care, over-the-counter symptom management per 's instructions, close monitoring, and indications for immediate follow-up discussed.     Suggest using any combination of the following over-the-counter medications per 's instructions:  - sinus rinse kits, neti pot, nasal saline sprays  - nasal steroid sprays (e.g. fluticasone/Flonase, Nasacort)  - oral daily antihistamine (e.g. loratadine/Claritin, Zyrtec, Allegra)  - oral  decongestant (e.g. pseudoephedrine, Sudafed)  - oral pain reliever/fever reducer (e.g. acetaminophen, Tylenol)    All questions answered. Patient agrees with the plan of care.    Discharge summary provided through print and via [employer].    Work note provided.     Billing note: chronic illness with exacerbation/progression; prescription drug management. Established patient. [id number]. Please refer to LOS tool for details.

## 2023-03-15 ENCOUNTER — OFFICE VISIT (OUTPATIENT)
Dept: MEDICAL GROUP | Facility: PHYSICIAN GROUP | Age: 43
End: 2023-03-15
Payer: COMMERCIAL

## 2023-03-15 VITALS
SYSTOLIC BLOOD PRESSURE: 114 MMHG | DIASTOLIC BLOOD PRESSURE: 78 MMHG | HEIGHT: 62 IN | BODY MASS INDEX: 47.29 KG/M2 | WEIGHT: 257 LBS | HEART RATE: 61 BPM | OXYGEN SATURATION: 97 % | TEMPERATURE: 96.5 F | RESPIRATION RATE: 18 BRPM

## 2023-03-15 DIAGNOSIS — J30.2 SEASONAL ALLERGIES: ICD-10-CM

## 2023-03-15 DIAGNOSIS — H66.002 NON-RECURRENT ACUTE SUPPURATIVE OTITIS MEDIA OF LEFT EAR WITHOUT SPONTANEOUS RUPTURE OF TYMPANIC MEMBRANE: ICD-10-CM

## 2023-03-15 DIAGNOSIS — J01.01 ACUTE RECURRENT MAXILLARY SINUSITIS: ICD-10-CM

## 2023-03-15 DIAGNOSIS — R42 VERTIGO: ICD-10-CM

## 2023-03-15 PROCEDURE — 99214 OFFICE O/P EST MOD 30 MIN: CPT | Performed by: PHYSICIAN ASSISTANT

## 2023-03-15 RX ORDER — MECLIZINE HYDROCHLORIDE 25 MG/1
25 TABLET ORAL PRN
Qty: 20 TABLET | Refills: 0 | Status: SHIPPED | OUTPATIENT
Start: 2023-03-15 | End: 2023-10-02

## 2023-03-15 ASSESSMENT — FIBROSIS 4 INDEX: FIB4 SCORE: 0.32

## 2023-03-15 NOTE — LETTER
March 15, 2023    To Whom It May Concern:         This is confirmation that Farhana Freeman attended her scheduled appointment with Quin Tao P.A.-C. on 3/15/23. Please excuse her from 3/15/2023-3/17/2023 as she is out with an illness.          If you have any questions please do not hesitate to call me at the phone number listed below.    Sincerely,    Quin Tao P.A.-C.  [phone]

## 2023-03-15 NOTE — PROGRESS NOTES
Subjective:     CC: sinus pressure    HPI:   Farhana presents today with concerns for sinus pressure and congestion.  Patient recently went to urgent care.  Review of their note shows she was given a prescription for Augmentin and was given a Kenalog injection.  Referral to ENT was placed at that time, however patient has not made an appointment yet.  States that she is having a difficult time with sleep.  Some associated dizziness and nausea. Dizziness is mostly when she stands up or turns her head. Some ear fullness. Has really bad seasonal allergies.      Past Medical History:   Diagnosis Date    Anemia     Pt states she has no history of anemia    Anesthesia     severe nausea and vomiting with morphine    Anxiety     ASTHMA 05/2017    Managed with PRN inhaler, last use was in March 2017    Bronchitis 7/6/2021    Chest congestion 7/23/2021    Cholelithiasis 2017    Status post lap adeline    Cough 7/28/2021    Dental disorder     dentures     GERD (gastroesophageal reflux disease)     Gross hematuria 6/2/2020    Heart burn     Resolved with gastric surgeries.    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     Intertriginous dermatitis associated with moisture 6/16/2020    Kidney disease     kidney stones     Migraine     much less since losing weight, 1-2 times per month    Morbid obesity (HCC) 5/12/2017    Muscle disorder     Non-intractable vomiting with nausea 7/13/2021    Pain of left upper extremity 5/19/2021    Postural hypotension     EMR    Psychiatric problem     anxiety     Radicular pain of left lower extremity 5/29/2019    Renal stone 3/27/2020    IMO load March 2020    Right ear pain 7/28/2021    Sinus congestion 5/18/2022    Spider bite 11/11/2021    Sprain of lateral ligament of ankle joint 9/8/2021    Status post gastric surgery 5/29/2019    DATE OF OPERATION: 5/12/2017 POSTOPERATIVE DIAGNOSIS: Morbid Obesity with medical sequelae; Hiatal hernia OPERATION  PERFORMED: 1.  Laparoscopic Danie-en-Y Gastric Bypass  3. Hiatal hernia repair SURGEON: Shelton Jackson MD    Symptomatic abdominal panniculus 6/16/2020    Urinary bladder disorder     Urinary incontinence        Social History     Tobacco Use    Smoking status: Never    Smokeless tobacco: Never   Vaping Use    Vaping Use: Never used   Substance Use Topics    Alcohol use: Yes     Comment: Rarely    Drug use: No       Current Outpatient Medications Ordered in Epic   Medication Sig Dispense Refill    meclizine (ANTIVERT) 25 MG Tab Take 1 Tablet by mouth as needed for Dizziness. 20 Tablet 0    amoxicillin-clavulanate (AUGMENTIN) 875-125 MG Tab Take 1 Tablet by mouth 2 times a day for 10 days. 20 Tablet 0    omeprazole (PRILOSEC) 40 MG delayed-release capsule Take 1 Capsule by mouth 2 times a day. 180 Capsule 2    propranolol (INDERAL) 10 MG Tab Take 1 Tablet by mouth 3 times a day as needed (Anxiety). 90 Tablet 1    vitamin D2, Ergocalciferol, (DRISDOL) 1.25 MG (65549 UT) Cap capsule Take 1 Capsule by mouth every 7 days. 12 Capsule 3    ROPINIRole (REQUIP) 0.5 MG Tab TAKE 1 TABLET BY MOUTH EVERY DAY AT NIGHT      ferrous sulfate 325 (65 Fe) MG tablet Take 1 Tablet by mouth every day. 90 Tablet 3    escitalopram (LEXAPRO) 20 MG tablet Take 1 Tablet by mouth every day. 90 Tablet 3    albuterol 108 (90 Base) MCG/ACT Aero Soln inhalation aerosol Inhale 2 Puffs every 6 hours as needed for Shortness of Breath. 8.5 Each 2    HYDROcodone-acetaminophen (NORCO) 7.5-325 MG per tablet       tizanidine (ZANAFLEX) 4 MG Tab Take  by mouth 3 times a day as needed. Take 1 tablet by mouth three times a day as needed       No current Epic-ordered facility-administered medications on file.       Allergies:  Oxycodone hcl, Flu virus vaccine, Ibuprofen, Morphine, Nickel, Nsaids, Oxycodone-acetaminophen, Percocet [perloxx], Sulfa drugs, and Toradol    Health Maintenance: Completed    ROS:  Gen: no fevers/chills  Eyes: no changes in vision  ENT:  "no sore throat, positive for congestion and sinus pressure  Pulm: no sob, no cough  CV: no chest pain  GI: no nausea/vomiting  : no dysuria  MSk: no myalgias  Skin: no rash  Neuro: no headaches  Psych: no depression, no anxiety    Objective:       Exam:  /78   Pulse 61   Temp 35.8 °C (96.5 °F) (Temporal)   Resp 18   Ht 1.575 m (5' 2\")   Wt 117 kg (257 lb)   LMP 07/25/2020 (Exact Date)   SpO2 97%   BMI 47.01 kg/m²  Body mass index is 47.01 kg/m².    Gen: Alert and oriented, No apparent distress.  Skin: Warm, dry, good turgor, no rashes in visible areas.  HEENT: Normocephalic. Eyes conjunctiva clear lids without ptosis, pupils equal and reactive to light accommodation, ears normal shape and contour, left tympanic membrane is erythematous and bulging without rupture, frontal and maxillary sinus tenderness, Nasal turbinate hypertrophy bilaterally with clear nasal discharge, mildly erythematous oropharynx with clear postnasal drip and cobblestoning without exudate  Neck: Trachea midline, no masses, no thyromegaly  Lungs: Normal effort, CTA bilaterally, no wheezes, rhonchi, or rales  CV: Regular rate and rhythm. No murmurs, rubs, or gallops.  MSK: Normal gait, moves all extremities.  Neuro: Grossly non-focal.  Ext: No clubbing, cyanosis, edema.  Psych: Alert and oriented x3, normal affect and mood.        Assessment & Plan:     42 y.o. female with the following -     1. Seasonal allergies  Reported history and exam findings are most consistent with seasonal allergies and PND. Recommended daily sinus rinses as well as Flonase once or twice daily. Patient is instructed on how to correctly use Flonase to avoid epistaxis. Patient can also take Zyrtec as she has been doing.  I also placed a referral to allergy for further evaluation and management as patient does report significant seasonal allergies that have worsened over time.  - Referral to Allergy    2. Vertigo  Acute.  Suspect it is due to AOM, but did " discuss possibility of BPPV as well.  Can pursue further evaluation if necessary after finishing antibiotics.  Prescription for meclizine sent in to help with symptoms in the meantime.  - meclizine (ANTIVERT) 25 MG Tab; Take 1 Tablet by mouth as needed for Dizziness.  Dispense: 20 Tablet; Refill: 0    3. Acute recurrent maxillary sinusitis  Acute.  Patient is already on Augmentin that was started at urgent care.  She states she is feeling significantly improved after Kenalog injection.     4. Non-recurrent acute suppurative otitis media of left ear without spontaneous rupture of tympanic membrane  Acute.  Patient is already on Augmentin for sinus infection.  Suspect this is likely contributing to her vertigo symptoms.  Did discuss if not improving after finishing antibiotic course to be reevaluated or sooner if needed for new or worsening concerns.    I spent a total of 31 minutes with record review (including external notes and labs), exam, communication with the patient, communication with other providers, and documentation of this encounter.     Return if symptoms worsen or fail to improve.    Please note that this dictation was created using voice recognition software. I have made every reasonable attempt to correct obvious errors, but I expect that there are errors of grammar and possibly content that I did not discover before finalizing the note.    Electronically signed by Quin Tao PA-C on March 15, 2023

## 2023-04-11 ENCOUNTER — OFFICE VISIT (OUTPATIENT)
Dept: URGENT CARE | Facility: CLINIC | Age: 43
End: 2023-04-11
Payer: COMMERCIAL

## 2023-04-11 VITALS
HEART RATE: 63 BPM | TEMPERATURE: 97.4 F | DIASTOLIC BLOOD PRESSURE: 76 MMHG | HEIGHT: 62 IN | BODY MASS INDEX: 48.21 KG/M2 | SYSTOLIC BLOOD PRESSURE: 114 MMHG | RESPIRATION RATE: 16 BRPM | OXYGEN SATURATION: 97 % | WEIGHT: 262 LBS

## 2023-04-11 DIAGNOSIS — J04.0 LARYNGITIS: ICD-10-CM

## 2023-04-11 DIAGNOSIS — J06.9 VIRAL URI WITH COUGH: ICD-10-CM

## 2023-04-11 LAB
FLUAV RNA SPEC QL NAA+PROBE: NEGATIVE
FLUBV RNA SPEC QL NAA+PROBE: NEGATIVE
RSV RNA SPEC QL NAA+PROBE: NEGATIVE
S PYO DNA SPEC NAA+PROBE: NOT DETECTED
SARS-COV-2 RNA RESP QL NAA+PROBE: NEGATIVE

## 2023-04-11 PROCEDURE — 87651 STREP A DNA AMP PROBE: CPT | Performed by: PHYSICIAN ASSISTANT

## 2023-04-11 PROCEDURE — 99213 OFFICE O/P EST LOW 20 MIN: CPT | Performed by: PHYSICIAN ASSISTANT

## 2023-04-11 PROCEDURE — 0241U POCT CEPHEID COV-2, FLU A/B, RSV - PCR: CPT | Performed by: PHYSICIAN ASSISTANT

## 2023-04-11 RX ORDER — BENZONATATE 100 MG/1
100 CAPSULE ORAL 3 TIMES DAILY PRN
Qty: 21 CAPSULE | Refills: 0 | Status: SHIPPED | OUTPATIENT
Start: 2023-04-11 | End: 2023-10-02

## 2023-04-11 ASSESSMENT — FIBROSIS 4 INDEX: FIB4 SCORE: 0.32

## 2023-04-11 NOTE — PROGRESS NOTES
Subjective:   Farhana Freeman is a 42 y.o. female who presents for Laryngitis (X 3 days with sore throat, cough and bilateral ear pain.  Denies fever or chills. Hx of Asthma. )      HPI  The patient presents to the Urgent Care with complaints of laryngitis and a cough onset 2 days ago.  Raspy voice and sometimes loss of voice.  Feeling a little rundown otherwise she states she feels fine.  Some ear discomfort bilaterally worse on the left.  Felt feverish yesterday.  Nasal congestion, runny nose.  Sore throat in the morning.  Runny stools which is common after taking Iron supplements.   Denies any trouble breathing, chest pain, vomiting.       Medications:    albuterol Aers  escitalopram  ferrous sulfate  HYDROcodone-acetaminophen  meclizine Tabs  omeprazole  propranolol Tabs  ROPINIRole Tabs  tizanidine Tabs  topiramate Tabs  vitamin D2 (Ergocalciferol) Caps    Allergies: Oxycodone hcl, Flu virus vaccine, Ibuprofen, Morphine, Nickel, Nsaids, Oxycodone-acetaminophen, Percocet [perloxx], Sulfa drugs, and Toradol    Problem List: Farhana Freeman does not have any pertinent problems on file.    Surgical History:  Past Surgical History:   Procedure Laterality Date    LA ANTER COLPORRHAPHY,BLAD/VAGINA N/A 8/12/2020    Procedure: COLPORRHAPHY, ANTERIOR, TENSION FREE VAGINAL TAPE, PERINORRHAPHY;  Surgeon: Maria Elena Zhou M.D.;  Location: SURGERY SAME DAY [facility];  Service: Obstetrics    LA POST COLPORRHAPHY,RECTUM/VAGINA N/A 8/12/2020    Procedure: COLPORRHAPHY, POSTERIOR;  Surgeon: Maria Elena Zhou M.D.;  Location: SURGERY SAME DAY [facility];  Service: Obstetrics    VAGINAL HYSTERECTOMY TOTAL N/A 8/12/2020    Procedure: HYSTERECTOMY, TOTAL, VAGINAL;  Surgeon: Maria Elena Zhou M.D.;  Location: SURGERY SAME DAY [facility];  Service: Obstetrics    SALPINGECTOMY Bilateral 8/12/2020    Procedure: SALPINGECTOMY;  Surgeon: Maria Elena Zhou M.D.;  Location: SURGERY SAME DAY [facility];   "Service: Obstetrics    CYSTOSCOPY N/A 8/12/2020    Procedure: CYSTOSCOPY;  Surgeon: Maria Elena Zhou M.D.;  Location: SURGERY SAME DAY [facility];  Service: Obstetrics    LITHOTRIPSY  06/2020    LUIS BY LAPAROSCOPY  8/9/2017    Procedure: LUIS BY LAPAROSCOPY;  Surgeon: Shelton Jackson M.D.;  Location: SURGERY [facility] ORS;  Service:     GASTRIC BYPASS LAPAROSCOPIC  5/12/2017    Procedure: GASTRIC BYPASS LAPAROSCOPIC - ISIAH EN Y AND HIATAL HERNIA REPAIR;  Surgeon: Shelton Jackson M.D.;  Location: SURGERY [facility] ORS;  Service:     OTHER ABDOMINAL SURGERY  10/12/15    vertical gastric sleeve       Past Social Hx: Farhana Freeman  reports that she has never smoked. She has never used smokeless tobacco. She reports current alcohol use. She reports that she does not use drugs.     Past Family Hx:  Farhana Freeman family history includes Breast Cancer in an other family member; Cancer (age of onset: 21) in her cousin; Diabetes in her father; Heart Attack (age of onset: 61) in her father; Hypertension in her father and mother; Kidney Disease in her mother; Other in her mother.     Problem list, medications, and allergies reviewed by myself today in Epic.     Objective:     /76   Pulse 63   Temp 36.3 °C (97.4 °F) (Temporal)   Resp 16   Ht 1.575 m (5' 2\")   Wt 119 kg (262 lb)   LMP 07/25/2020 (Exact Date)   SpO2 97%   BMI 47.92 kg/m²     Physical Exam  Vitals reviewed.   Constitutional:       General: She is not in acute distress.     Appearance: Normal appearance. She is not ill-appearing or toxic-appearing.   HENT:      Head: Normocephalic.      Right Ear: Tympanic membrane and ear canal normal.      Left Ear: Tympanic membrane and ear canal normal.      Mouth/Throat:      Mouth: Mucous membranes are moist.      Pharynx: Uvula midline. Posterior oropharyngeal erythema present. No pharyngeal swelling, oropharyngeal exudate or uvula swelling.      Tonsils: No tonsillar exudate or " tonsillar abscesses.   Eyes:      Conjunctiva/sclera: Conjunctivae normal.      Pupils: Pupils are equal, round, and reactive to light.   Cardiovascular:      Rate and Rhythm: Normal rate and regular rhythm.      Heart sounds: Normal heart sounds.   Pulmonary:      Effort: Pulmonary effort is normal. No respiratory distress.      Breath sounds: Normal breath sounds. No wheezing, rhonchi or rales.   Musculoskeletal:      Cervical back: Neck supple. No rigidity.   Lymphadenopathy:      Cervical: No cervical adenopathy.   Skin:     General: Skin is warm and dry.   Neurological:      General: No focal deficit present.      Mental Status: She is alert and oriented to person, place, and time.   Psychiatric:         Mood and Affect: Mood normal.         Behavior: Behavior normal.     Results for orders placed or performed in visit on 04/11/23   POCT CoV-2, Flu A/B, RSV by PCR   Result Value Ref Range    SARS-CoV-2 by PCR Negative Negative, Invalid    Influenza virus A RNA Negative Negative, Invalid    Influenza virus B, PCR Negative Negative, Invalid    RSV, PCR Negative Negative, Invalid     Strep: Negative     Diagnosis and associated orders:     1. Viral URI with cough  - benzonatate (TESSALON) 100 MG Cap; Take 1 Capsule by mouth 3 times a day as needed for Cough.  Dispense: 21 Capsule; Refill: 0  - POCT CoV-2, Flu A/B, RSV by PCR    2. Laryngitis  - POCT GROUP A STREP, PCR       Comments/MDM:     The patient's presenting symptoms and exam findings are consistent with a upper respiratory infection most likely viral etiology. They have a normal pulse oximetry on room air, afebrile, and a normal pulmonary exam. Overall, the patient is very well appearing. I do not feel that this patient would benefit from antibiotics at this time.   Recommended symptomatic and supportive care at this time that includes plenty of fluids, rest, Tylenol/Ibuprofen for pain/fever, warm salt water gargles for sore throat, OTC cough and  decongestant medication, Flonase, nasal saline washes. If no improvement in 5-7 days or any worsening symptoms, recommend returning to the urgent care for re-evaluation.        I personally reviewed prior external notes and test results pertinent to today's visit. Pathogenesis of diagnosis discussed including typical length and natural progression. Supportive care, natural history, differential diagnoses, and indications for immediate follow-up discussed. Patient expresses understanding and agrees to plan. Patient denies any other questions or concerns.     Follow-up with the primary care physician for recheck, reevaluation, and consideration of further management.    Please note that this dictation was created using voice recognition software. I have made a reasonable attempt to correct obvious errors, but I expect that there are errors of grammar and possibly content that I did not discover before finalizing the note.    This note was electronically signed by Vitaly Ling PA-C

## 2023-04-13 DIAGNOSIS — F41.1 GENERALIZED ANXIETY DISORDER: ICD-10-CM

## 2023-04-13 DIAGNOSIS — J06.9 UPPER RESPIRATORY TRACT INFECTION, UNSPECIFIED TYPE: ICD-10-CM

## 2023-04-13 RX ORDER — PROPRANOLOL HYDROCHLORIDE 10 MG/1
TABLET ORAL
Qty: 90 TABLET | Refills: 0 | Status: SHIPPED | OUTPATIENT
Start: 2023-04-13 | End: 2023-06-01

## 2023-04-13 RX ORDER — METHYLPREDNISOLONE 4 MG/1
TABLET ORAL
Qty: 21 TABLET | Refills: 0 | Status: SHIPPED | OUTPATIENT
Start: 2023-04-13 | End: 2023-10-02

## 2023-04-13 RX ORDER — METHYLPREDNISOLONE 4 MG/1
TABLET ORAL
Qty: 21 TABLET | Refills: 0 | Status: SHIPPED | OUTPATIENT
Start: 2023-04-13 | End: 2023-04-13 | Stop reason: SDUPTHER

## 2023-04-13 RX ORDER — PROPRANOLOL HYDROCHLORIDE 10 MG/1
TABLET ORAL
Qty: 90 TABLET | Refills: 0 | Status: SHIPPED | OUTPATIENT
Start: 2023-04-13 | End: 2023-04-13

## 2023-04-13 NOTE — PROGRESS NOTES
1. Upper respiratory tract infection, unspecified type  - methylPREDNISolone (MEDROL DOSEPAK) 4 MG Tablet Therapy Pack; As directed on the packaging label.  Dispense: 21 Tablet; Refill: 0

## 2023-04-13 NOTE — PROGRESS NOTES
1. Generalized anxiety disorder  - propranolol (INDERAL) 10 MG Tab; TAKE 1 TABLET BY MOUTH THREE TIMES A DAY AS NEEDED (ANXIETY).  Dispense: 90 Tablet; Refill: 0

## 2023-05-08 NOTE — LETTER
April 29, 2022    To Whom It May Concern:         This is confirmation that Farhana Montano Freeman attended her scheduled appointment with JESSICA Sharma on 4/29/22.         If you have any questions please do not hesitate to call me at the phone number listed below.    Sincerely,    DENY SharmaRRenataN.  [phone]                  
3 = A little assistance

## 2023-05-22 DIAGNOSIS — F41.1 GENERALIZED ANXIETY DISORDER: ICD-10-CM

## 2023-05-22 DIAGNOSIS — E55.9 VITAMIN D DEFICIENCY: ICD-10-CM

## 2023-05-22 DIAGNOSIS — D50.8 OTHER IRON DEFICIENCY ANEMIA: ICD-10-CM

## 2023-05-22 DIAGNOSIS — K21.9 GASTROESOPHAGEAL REFLUX DISEASE, UNSPECIFIED WHETHER ESOPHAGITIS PRESENT: ICD-10-CM

## 2023-05-22 RX ORDER — ESCITALOPRAM OXALATE 20 MG/1
20 TABLET ORAL DAILY
Qty: 90 TABLET | Refills: 3 | Status: SHIPPED | OUTPATIENT
Start: 2023-05-22

## 2023-05-23 RX ORDER — ERGOCALCIFEROL 1.25 MG/1
CAPSULE ORAL
Qty: 12 CAPSULE | Refills: 2 | Status: SHIPPED | OUTPATIENT
Start: 2023-05-23

## 2023-05-23 RX ORDER — OMEPRAZOLE 40 MG/1
CAPSULE, DELAYED RELEASE ORAL
Qty: 180 CAPSULE | Refills: 2 | Status: SHIPPED | OUTPATIENT
Start: 2023-05-23

## 2023-05-23 RX ORDER — FERROUS SULFATE 325(65) MG
TABLET ORAL
Qty: 90 TABLET | Refills: 5 | Status: SHIPPED | OUTPATIENT
Start: 2023-05-23 | End: 2024-03-04

## 2023-05-26 DIAGNOSIS — F41.1 GENERALIZED ANXIETY DISORDER: ICD-10-CM

## 2023-06-01 RX ORDER — PROPRANOLOL HYDROCHLORIDE 10 MG/1
TABLET ORAL
Qty: 90 TABLET | Refills: 0 | Status: SHIPPED | OUTPATIENT
Start: 2023-06-01 | End: 2024-03-04

## 2023-09-06 ENCOUNTER — OFFICE VISIT (OUTPATIENT)
Dept: URGENT CARE | Facility: CLINIC | Age: 43
End: 2023-09-06
Payer: COMMERCIAL

## 2023-09-06 VITALS
DIASTOLIC BLOOD PRESSURE: 60 MMHG | RESPIRATION RATE: 16 BRPM | OXYGEN SATURATION: 97 % | BODY MASS INDEX: 46.93 KG/M2 | SYSTOLIC BLOOD PRESSURE: 122 MMHG | WEIGHT: 255 LBS | HEIGHT: 62 IN | HEART RATE: 80 BPM | TEMPERATURE: 96.9 F

## 2023-09-06 DIAGNOSIS — B96.89 BACTERIAL SINUSITIS: ICD-10-CM

## 2023-09-06 DIAGNOSIS — Z03.818 ENCOUNTER FOR PATIENT CONCERN ABOUT EXPOSURE TO INFECTIOUS ORGANISM: ICD-10-CM

## 2023-09-06 DIAGNOSIS — J32.9 BACTERIAL SINUSITIS: ICD-10-CM

## 2023-09-06 DIAGNOSIS — U07.1 COVID-19: ICD-10-CM

## 2023-09-06 LAB
FLUAV RNA SPEC QL NAA+PROBE: NEGATIVE
FLUBV RNA SPEC QL NAA+PROBE: NEGATIVE
RSV RNA SPEC QL NAA+PROBE: NEGATIVE
SARS-COV-2 RNA RESP QL NAA+PROBE: POSITIVE

## 2023-09-06 PROCEDURE — 99213 OFFICE O/P EST LOW 20 MIN: CPT | Performed by: FAMILY MEDICINE

## 2023-09-06 PROCEDURE — 3078F DIAST BP <80 MM HG: CPT | Performed by: FAMILY MEDICINE

## 2023-09-06 PROCEDURE — 0241U POCT CEPHEID COV-2, FLU A/B, RSV - PCR: CPT | Performed by: FAMILY MEDICINE

## 2023-09-06 PROCEDURE — 3074F SYST BP LT 130 MM HG: CPT | Performed by: FAMILY MEDICINE

## 2023-09-06 RX ORDER — AMOXICILLIN AND CLAVULANATE POTASSIUM 875; 125 MG/1; MG/1
1 TABLET, FILM COATED ORAL 2 TIMES DAILY
Qty: 10 TABLET | Refills: 0 | Status: SHIPPED | OUTPATIENT
Start: 2023-09-06 | End: 2023-09-11

## 2023-09-06 ASSESSMENT — FIBROSIS 4 INDEX: FIB4 SCORE: 0.33

## 2023-09-06 NOTE — PROGRESS NOTES
"  Subjective:      43 y.o. female presents to urgent care for cold symptoms that started about one week ago. She is experiencing headache, increased sinus pressure, and ear pressure. No fevers or sore throat. No tobacco product use.  She does have asthma for which she uses albuterol as needed.  Typically she uses her albuterol 2 times per month.  She is fully vaccinated against COVID.  She did have a recent COVID contact.    She denies any other questions or concerns at this time.    Current problem list, medication, and past medical/surgical history were reviewed in Epic.    ROS  See HPI     Objective:      /60   Pulse 80   Temp 36.1 °C (96.9 °F)   Resp 16   Ht 1.575 m (5' 2\")   Wt 116 kg (255 lb)   LMP 07/25/2020 (Exact Date)   SpO2 97%   BMI 46.64 kg/m²     Physical Exam  Constitutional:       General: She is not in acute distress.     Appearance: She is not diaphoretic.   HENT:      Right Ear: Tympanic membrane, ear canal and external ear normal.      Left Ear: Tympanic membrane, ear canal and external ear normal.      Nose:      Right Sinus: Frontal sinus tenderness present. No maxillary sinus tenderness.      Left Sinus: Frontal sinus tenderness present. No maxillary sinus tenderness.   Cardiovascular:      Rate and Rhythm: Normal rate and regular rhythm.      Heart sounds: Normal heart sounds.   Pulmonary:      Effort: Pulmonary effort is normal. No respiratory distress.      Breath sounds: Normal breath sounds.   Neurological:      Mental Status: She is alert.   Psychiatric:         Mood and Affect: Affect normal.         Judgment: Judgment normal.       Assessment/Plan:     1. Bacterial sinusitis  2. COVID-19  3. Encounter for patient concern about exposure to infectious organism  COVID test did come back positive, she will wear an N95 mask for the next 2 days per CDC guidelines.  That being said I still believe she has a secondary bacterial sinus infection.  Prescription for Augmentin has " been sent.  - amoxicillin-clavulanate (AUGMENTIN) 875-125 MG Tab; Take 1 Tablet by mouth 2 times a day for 5 days.  Dispense: 10 Tablet; Refill: 0  - POCT CoV-2, Flu A/B, RSV by PCR      Instructed to return to Urgent Care or nearest Emergency Department if symptoms fail to improve, for any change in condition, further concerns, or new concerning symptoms. Patient states understanding of the plan of care and discharge instructions.    Farhana London M.D.

## 2023-09-06 NOTE — LETTER
September 6, 2023    To Whom It May Concern:         This is confirmation that Farhana Pereza Lydia attended her scheduled appointment with Farhana London M.D. on 9/06/23. She may return to work on Friday without any restrictions.          If you have any questions please do not hesitate to call me at the phone number listed below.    Sincerely,          Farhana London M.D.  [phone]

## 2023-10-02 ENCOUNTER — OFFICE VISIT (OUTPATIENT)
Dept: URGENT CARE | Facility: CLINIC | Age: 43
End: 2023-10-02
Payer: COMMERCIAL

## 2023-10-02 VITALS
DIASTOLIC BLOOD PRESSURE: 82 MMHG | TEMPERATURE: 98.2 F | OXYGEN SATURATION: 98 % | WEIGHT: 255 LBS | SYSTOLIC BLOOD PRESSURE: 128 MMHG | RESPIRATION RATE: 16 BRPM | BODY MASS INDEX: 46.93 KG/M2 | HEIGHT: 62 IN | HEART RATE: 86 BPM

## 2023-10-02 DIAGNOSIS — G43.809 OTHER MIGRAINE WITHOUT STATUS MIGRAINOSUS, NOT INTRACTABLE: ICD-10-CM

## 2023-10-02 PROCEDURE — 3074F SYST BP LT 130 MM HG: CPT | Performed by: NURSE PRACTITIONER

## 2023-10-02 PROCEDURE — 3079F DIAST BP 80-89 MM HG: CPT | Performed by: NURSE PRACTITIONER

## 2023-10-02 PROCEDURE — 99213 OFFICE O/P EST LOW 20 MIN: CPT | Performed by: NURSE PRACTITIONER

## 2023-10-02 RX ORDER — KETOROLAC TROMETHAMINE 30 MG/ML
30 INJECTION, SOLUTION INTRAMUSCULAR; INTRAVENOUS ONCE
Status: COMPLETED | OUTPATIENT
Start: 2023-10-02 | End: 2023-10-02

## 2023-10-02 RX ORDER — MECLIZINE HYDROCHLORIDE 25 MG/1
25 TABLET ORAL 3 TIMES DAILY PRN
Qty: 30 TABLET | Refills: 0 | Status: SHIPPED | OUTPATIENT
Start: 2023-10-02

## 2023-10-02 RX ADMIN — KETOROLAC TROMETHAMINE 30 MG: 30 INJECTION, SOLUTION INTRAMUSCULAR; INTRAVENOUS at 13:53

## 2023-10-02 ASSESSMENT — ENCOUNTER SYMPTOMS
SPEECH CHANGE: 0
SENSORY CHANGE: 0
GASTROINTESTINAL NEGATIVE: 1
WEAKNESS: 0
TINGLING: 0
LOSS OF CONSCIOUSNESS: 0
RESPIRATORY NEGATIVE: 1
CARDIOVASCULAR NEGATIVE: 1
TREMORS: 0
PHOTOPHOBIA: 1
CONSTITUTIONAL NEGATIVE: 1
HEADACHES: 1
SEIZURES: 0
DIZZINESS: 1
FOCAL WEAKNESS: 0

## 2023-10-02 ASSESSMENT — FIBROSIS 4 INDEX: FIB4 SCORE: 0.33

## 2023-10-02 NOTE — PROGRESS NOTES
"Subjective:   Farhana Freeman is a 43 y.o. female who presents for Migraine      Patient presents for evaluation of a migraine headache x 4 days.  She states that her migraine began late Friday night, she slept all day Saturday and Sunday, and could not get up to work today.  She does get very dizzy with her headaches and has photophobia as well.  She states she is slightly better today, but is requesting ketorolac and meclizine refill for the dizziness, as well as a 3 day work note, as she anticipates this will be how long it will take to fully resolve after the ketorolac injection.  She does have a history of migraines and has had to utilize ketorolac in the past to help break the headaches.  She reports this usually works well.  She states she used to be on a monthly injectable but her insurance changed and she had to discontinue this. She states that since that time the frequency of the migraines has increased.          Review of Systems   Constitutional: Negative.    HENT: Negative.     Eyes:  Positive for photophobia.   Respiratory: Negative.     Cardiovascular: Negative.    Gastrointestinal: Negative.    Skin: Negative.    Neurological:  Positive for dizziness and headaches. Negative for tingling, tremors, sensory change, speech change, focal weakness, seizures, loss of consciousness and weakness.       Medications, Allergies, and current problem list reviewed today in Epic.     Objective:     /82 (BP Location: Right arm, Patient Position: Sitting, BP Cuff Size: Adult long)   Pulse 86   Temp 36.8 °C (98.2 °F) (Temporal)   Resp 16   Ht 1.575 m (5' 2\")   Wt 116 kg (255 lb)   SpO2 98%     Physical Exam  Vitals reviewed.   Constitutional:       Appearance: Normal appearance.   HENT:      Head: Normocephalic and atraumatic.      Right Ear: Tympanic membrane, ear canal and external ear normal.      Left Ear: Tympanic membrane, ear canal and external ear normal.      Nose: Nose normal.      " Mouth/Throat:      Mouth: Mucous membranes are moist.      Pharynx: Oropharynx is clear.   Eyes:      Extraocular Movements: Extraocular movements intact.      Conjunctiva/sclera: Conjunctivae normal.      Pupils: Pupils are equal, round, and reactive to light.   Cardiovascular:      Rate and Rhythm: Normal rate and regular rhythm.   Pulmonary:      Effort: Pulmonary effort is normal.      Breath sounds: Normal breath sounds.   Abdominal:      General: Abdomen is flat.      Palpations: Abdomen is soft.   Musculoskeletal:         General: Normal range of motion.      Cervical back: Normal range of motion and neck supple.   Skin:     General: Skin is warm and dry.      Capillary Refill: Capillary refill takes less than 2 seconds.   Neurological:      General: No focal deficit present.      Mental Status: She is alert and oriented to person, place, and time.      GCS: GCS eye subscore is 4. GCS verbal subscore is 5. GCS motor subscore is 6.      Cranial Nerves: Cranial nerves 2-12 are intact.      Sensory: Sensation is intact.      Coordination: Coordination is intact.      Gait: Gait is intact.      Deep Tendon Reflexes: Reflexes are normal and symmetric.   Psychiatric:         Mood and Affect: Mood normal.         Behavior: Behavior normal.         Assessment/Plan:     Diagnosis and associated orders:     1. Other migraine without status migrainosus, not intractable  ketorolac (Toradol) injection 30 mg    meclizine (ANTIVERT) 25 MG Tab         Comments/MDM:     Pt given IM Ketorolac in office, and refill of meclizine was provided. Pt directed to continue supportive care at home. Trial of dark room without stimuli or phones suggested. Pt given migraine educational handout. We reviewed red flags and precautions on when to seek emergent medical care. Follow up with pcp in 7-10 days. If sx persist pt directed to return to clinic for re-evaluation. Patient appears understanding of information.           Differential  diagnosis, natural history, supportive care, and indications for immediate follow-up discussed.    Advised the patient to follow-up with the primary care physician for recheck, reevaluation, and consideration of further management.    Please note that this dictation was created using voice recognition software. I have made a reasonable attempt to correct obvious errors, but I expect that there are errors of grammar and possibly content that I did not discover before finalizing the note.    This note was electronically signed by DIMAS Varma

## 2023-10-02 NOTE — LETTER
October 2, 2023       Patient: Farhana Freeman   YOB: 1980   Date of Visit: 10/2/2023         To Whom It May Concern:    In my medical opinion, I recommend that Farhana Freeman be excused from work 10/2/2023 through 10/4/2023 due to illness.    If you have any questions or concerns, please don't hesitate to call [phone]          Sincerely,          LENNY [email or web address].  Electronically Signed

## 2023-10-20 ENCOUNTER — APPOINTMENT (OUTPATIENT)
Dept: URGENT CARE | Facility: CLINIC | Age: 43
End: 2023-10-20
Payer: MEDICAID

## 2023-10-20 ENCOUNTER — OFFICE VISIT (OUTPATIENT)
Dept: URGENT CARE | Facility: CLINIC | Age: 43
End: 2023-10-20
Payer: MEDICAID

## 2023-10-20 VITALS
DIASTOLIC BLOOD PRESSURE: 88 MMHG | TEMPERATURE: 96.3 F | SYSTOLIC BLOOD PRESSURE: 110 MMHG | HEIGHT: 62 IN | HEART RATE: 86 BPM | WEIGHT: 257.7 LBS | BODY MASS INDEX: 47.42 KG/M2 | RESPIRATION RATE: 16 BRPM | OXYGEN SATURATION: 96 %

## 2023-10-20 DIAGNOSIS — N63.23 MASS OF LOWER OUTER QUADRANT OF LEFT BREAST: ICD-10-CM

## 2023-10-20 PROCEDURE — 99213 OFFICE O/P EST LOW 20 MIN: CPT | Performed by: PHYSICIAN ASSISTANT

## 2023-10-20 PROCEDURE — 3074F SYST BP LT 130 MM HG: CPT | Performed by: PHYSICIAN ASSISTANT

## 2023-10-20 PROCEDURE — 3079F DIAST BP 80-89 MM HG: CPT | Performed by: PHYSICIAN ASSISTANT

## 2023-10-20 RX ORDER — GABAPENTIN 300 MG/1
300 CAPSULE ORAL 3 TIMES DAILY
COMMUNITY

## 2023-10-20 ASSESSMENT — ENCOUNTER SYMPTOMS
HEADACHES: 0
DIARRHEA: 0
VOMITING: 0
SHORTNESS OF BREATH: 0
EYE PAIN: 0
CONSTIPATION: 0
ABDOMINAL PAIN: 0
CHILLS: 0
FEVER: 0
COUGH: 0
SORE THROAT: 0
MYALGIAS: 0
NAUSEA: 0

## 2023-10-20 ASSESSMENT — FIBROSIS 4 INDEX: FIB4 SCORE: 0.33

## 2023-10-20 NOTE — PROGRESS NOTES
"Subjective:   Farhana Freeman is a 43 y.o. female who presents for Breast Mass ((L) SIDE OF BREAST, INCREASE IN PAIN, GROWING IN SIZE X 2 MONTHS )    Is a 43-year-old female who notes a 2+ month history of nodule or small mass on the outer aspect of the left breast around the under wire of the brassiere.  It was slightly more irritated yesterday when she took off a supportive bra.  Has not had any mammography before.  Has no significant family history of first-degree relatives with breast cancer.  Has not noted any breast pain or swelling, is premenopausal.    Review of Systems   Constitutional:  Negative for chills and fever.   HENT:  Negative for congestion, ear pain and sore throat.    Eyes:  Negative for pain.   Respiratory:  Negative for cough and shortness of breath.    Cardiovascular:  Negative for chest pain.   Gastrointestinal:  Negative for abdominal pain, constipation, diarrhea, nausea and vomiting.   Genitourinary:  Negative for dysuria.   Musculoskeletal:  Negative for myalgias.   Skin:  Negative for rash.   Neurological:  Negative for headaches.       Medications, Allergies, and current problem list reviewed today in Epic.     Objective:     /88 (BP Location: Right arm, Patient Position: Sitting, BP Cuff Size: Large adult long)   Pulse 86   Temp (!) 35.7 °C (96.3 °F) (Temporal)   Resp 16   Ht 1.575 m (5' 2\")   Wt 117 kg (257 lb 11.2 oz)   SpO2 96%     Physical Exam  Vitals reviewed.   Constitutional:       Appearance: Normal appearance.   HENT:      Head: Normocephalic and atraumatic.      Right Ear: External ear normal.      Left Ear: External ear normal.      Nose: Nose normal.      Mouth/Throat:      Mouth: Mucous membranes are moist.   Eyes:      Conjunctiva/sclera: Conjunctivae normal.   Cardiovascular:      Rate and Rhythm: Normal rate.   Pulmonary:      Effort: Pulmonary effort is normal.   Chest:          Comments: Small mobile very superficial nodule lateral aspect lower " quadrant left breast.  Smaller than almond size, potential lymph node versus soft tissue mass  Skin:     General: Skin is warm and dry.      Capillary Refill: Capillary refill takes less than 2 seconds.   Neurological:      Mental Status: She is alert and oriented to person, place, and time.         Assessment/Plan:     Diagnosis and associated orders:     1. Mass of lower outer quadrant of left breast  Referral to [facility]    Referral to OB/Gyn         Comments/MDM:     We will put in referral to women's health professional, this appears to be potentially a lymph node or potential epidermal inclusion cyst or other soft tissue mass as it is far lateral to the majority of the breast tissue but she would benefit from expert advice and potential tomography         Differential diagnosis, natural history, supportive care, and indications for immediate follow-up discussed.    Advised the patient to follow-up with the primary care physician for recheck, reevaluation, and consideration of further management.    Please note that this dictation was created using voice recognition software. I have made a reasonable attempt to correct obvious errors, but I expect that there are errors of grammar and possibly content that I did not discover before finalizing the note.    This note was electronically signed by Edgardo Hopkins PA-C

## 2023-11-14 ENCOUNTER — TELEPHONE (OUTPATIENT)
Dept: RADIOLOGY | Facility: MEDICAL CENTER | Age: 43
End: 2023-11-14
Payer: MEDICAID

## 2023-12-05 ENCOUNTER — APPOINTMENT (OUTPATIENT)
Dept: URGENT CARE | Facility: CLINIC | Age: 43
End: 2023-12-05
Payer: MEDICAID

## 2023-12-06 ENCOUNTER — APPOINTMENT (OUTPATIENT)
Dept: URGENT CARE | Facility: CLINIC | Age: 43
End: 2023-12-06
Payer: COMMERCIAL

## 2023-12-06 ENCOUNTER — OFFICE VISIT (OUTPATIENT)
Dept: URGENT CARE | Facility: CLINIC | Age: 43
End: 2023-12-06
Payer: COMMERCIAL

## 2023-12-06 VITALS
HEIGHT: 62 IN | RESPIRATION RATE: 16 BRPM | HEART RATE: 74 BPM | WEIGHT: 259 LBS | DIASTOLIC BLOOD PRESSURE: 68 MMHG | OXYGEN SATURATION: 98 % | SYSTOLIC BLOOD PRESSURE: 118 MMHG | TEMPERATURE: 97.4 F | BODY MASS INDEX: 47.66 KG/M2

## 2023-12-06 DIAGNOSIS — L30.9 ECZEMA, UNSPECIFIED TYPE: ICD-10-CM

## 2023-12-06 DIAGNOSIS — G43.909 MIGRAINE WITHOUT STATUS MIGRAINOSUS, NOT INTRACTABLE, UNSPECIFIED MIGRAINE TYPE: ICD-10-CM

## 2023-12-06 PROCEDURE — 3074F SYST BP LT 130 MM HG: CPT | Performed by: FAMILY MEDICINE

## 2023-12-06 PROCEDURE — 99213 OFFICE O/P EST LOW 20 MIN: CPT | Performed by: FAMILY MEDICINE

## 2023-12-06 PROCEDURE — 3078F DIAST BP <80 MM HG: CPT | Performed by: FAMILY MEDICINE

## 2023-12-06 RX ORDER — DIAPER,BRIEF,INFANT-TODD,DISP
1 EACH MISCELLANEOUS 2 TIMES DAILY
Qty: 25 G | Refills: 0 | Status: SHIPPED | OUTPATIENT
Start: 2023-12-06 | End: 2024-03-04

## 2023-12-06 RX ORDER — KETOROLAC TROMETHAMINE 30 MG/ML
30 INJECTION, SOLUTION INTRAMUSCULAR; INTRAVENOUS ONCE
Status: COMPLETED | OUTPATIENT
Start: 2023-12-06 | End: 2023-12-06

## 2023-12-06 RX ADMIN — KETOROLAC TROMETHAMINE 30 MG: 30 INJECTION, SOLUTION INTRAMUSCULAR; INTRAVENOUS at 10:29

## 2023-12-06 ASSESSMENT — FIBROSIS 4 INDEX: FIB4 SCORE: 0.33

## 2023-12-06 NOTE — PROGRESS NOTES
"  Subjective:      43 y.o. female presents to urgent care for rash to her left inner thigh that has been present for approximately 4 days.  It is extremely itchy.  She has tried triple antibiotic ointment and baking powder without significant relief in symptoms.    She also presents for headache that has been present since Friday.  Pain is located behind her eyes bilaterally, it is constant, but does improve with medication, is described as feeling achy, currently rated 6/10.  She has both noise and light sensitivity.  No associated vomiting.  No aura.  She has been using Tylenol and oxycodone without significant improvement in symptoms.    She denies any other questions or concerns at this time.    Current problem list, medication, and past medical/surgical history were reviewed in Epic.    ROS  See HPI     Objective:      /68 (BP Location: Left arm, Patient Position: Sitting, BP Cuff Size: Large adult)   Pulse 74   Temp 36.3 °C (97.4 °F)   Resp 16   Ht 1.575 m (5' 2\")   Wt 117 kg (259 lb)   LMP 07/25/2020 (Exact Date)   SpO2 98%   BMI 47.37 kg/m²     Physical Exam  Constitutional:       General: She is not in acute distress.     Appearance: She is not diaphoretic.   Eyes:      General:         Right eye: No discharge.         Left eye: No discharge.      Extraocular Movements: Extraocular movements intact.      Conjunctiva/sclera: Conjunctivae normal.      Pupils: Pupils are equal, round, and reactive to light.   Cardiovascular:      Rate and Rhythm: Normal rate and regular rhythm.      Heart sounds: Normal heart sounds.   Pulmonary:      Effort: Pulmonary effort is normal. No respiratory distress.      Breath sounds: Normal breath sounds.   Skin:     Findings: Rash (dry flaky skin to left inner thigh.  No underlying areas of induration.  No erythema.) present.   Neurological:      General: No focal deficit present.      Mental Status: She is alert and oriented to person, place, and time. Mental " status is at baseline.      Comments: Cranial nerves II through XII grossly intact.  Equal strength and sensation to extremities x4.  Normal rapid alternating hand movements.  Normal gait.   Psychiatric:         Mood and Affect: Affect normal.         Judgment: Judgment normal.       Assessment/Plan:     1. Eczema, unspecified type  She is encouraged to moisturize daily.  Use cortisone 10 as needed.  - hydrocortisone (CORTIZONE-10) 1 % Ointment; Apply 1 Application topically 2 times a day.  Dispense: 25 g; Refill: 0    2. Migraine without status migrainosus, not intractable, unspecified migraine type  Toradol injection given with good relief in symptoms.  Follow with PCP.  - ketorolac (Toradol) injection 30 mg      Instructed to return to Urgent Care or nearest Emergency Department if symptoms fail to improve, for any change in condition, further concerns, or new concerning symptoms. Patient states understanding of the plan of care and discharge instructions.    Farhana London M.D.

## 2024-02-17 ENCOUNTER — OFFICE VISIT (OUTPATIENT)
Dept: URGENT CARE | Facility: CLINIC | Age: 44
End: 2024-02-17
Payer: MEDICAID

## 2024-02-17 DIAGNOSIS — L03.012 ACUTE PARONYCHIA OF THUMB, LEFT: ICD-10-CM

## 2024-02-17 PROCEDURE — 3079F DIAST BP 80-89 MM HG: CPT | Performed by: NURSE PRACTITIONER

## 2024-02-17 PROCEDURE — 3075F SYST BP GE 130 - 139MM HG: CPT | Performed by: NURSE PRACTITIONER

## 2024-02-17 PROCEDURE — 99213 OFFICE O/P EST LOW 20 MIN: CPT | Performed by: NURSE PRACTITIONER

## 2024-02-17 RX ORDER — CEPHALEXIN 500 MG/1
500 CAPSULE ORAL 4 TIMES DAILY
Qty: 28 CAPSULE | Refills: 0 | Status: SHIPPED | OUTPATIENT
Start: 2024-02-17 | End: 2024-02-24

## 2024-02-17 ASSESSMENT — FIBROSIS 4 INDEX: FIB4 SCORE: 0.33

## 2024-02-20 VITALS
OXYGEN SATURATION: 98 % | HEART RATE: 66 BPM | TEMPERATURE: 97 F | WEIGHT: 262.5 LBS | HEIGHT: 62 IN | BODY MASS INDEX: 48.31 KG/M2 | DIASTOLIC BLOOD PRESSURE: 84 MMHG | SYSTOLIC BLOOD PRESSURE: 139 MMHG | RESPIRATION RATE: 18 BRPM

## 2024-02-20 ASSESSMENT — ENCOUNTER SYMPTOMS
RESPIRATORY NEGATIVE: 1
CONSTITUTIONAL NEGATIVE: 1
NEUROLOGICAL NEGATIVE: 1
MUSCULOSKELETAL NEGATIVE: 1
CARDIOVASCULAR NEGATIVE: 1
GASTROINTESTINAL NEGATIVE: 1
EYES NEGATIVE: 1
PSYCHIATRIC NEGATIVE: 1

## 2024-02-21 NOTE — PROGRESS NOTES
"Subjective:   Farhana Freeman is a 43 y.o. female who presents for Finger Pain (X4days left thumb fingernail/red/swollen/painful/possible got infected )      Patient presents for evaluation of left lateral thumb pain x 4 days.  Patient has noted redness, swelling and some drainage to the lateral side of her left thumb s/p hangnail to the area.  She has noted the swelling and pain becoming worse and she is soaking the thumb in warm water and trying to express the pus-like material out.  She denies any fever or chills.  No red streaking to the area.          Review of Systems   Constitutional: Negative.    HENT: Negative.     Eyes: Negative.    Respiratory: Negative.     Cardiovascular: Negative.    Gastrointestinal: Negative.    Genitourinary: Negative.    Musculoskeletal: Negative.    Skin: Negative.         Redness, swelling and pain to the left lateral thumb   Neurological: Negative.    Endo/Heme/Allergies: Negative.    Psychiatric/Behavioral: Negative.     All other systems reviewed and are negative.      Medications, Allergies, and current problem list reviewed today in Epic.     Objective:     /84 (BP Location: Left arm, Patient Position: Sitting)   Pulse 66   Temp 36.1 °C (97 °F) (Temporal)   Resp 18   Ht 1.575 m (5' 2\")   Wt 119 kg (262 lb 8 oz)   SpO2 98%     Physical Exam  Vitals and nursing note reviewed.   Constitutional:       General: She is not in acute distress.     Appearance: Normal appearance. She is not ill-appearing.   HENT:      Head: Normocephalic and atraumatic.      Nose: Nose normal.      Mouth/Throat:      Mouth: Mucous membranes are moist.      Pharynx: Oropharynx is clear.   Eyes:      Extraocular Movements: Extraocular movements intact.      Conjunctiva/sclera: Conjunctivae normal.      Pupils: Pupils are equal, round, and reactive to light.   Cardiovascular:      Rate and Rhythm: Normal rate and regular rhythm.   Pulmonary:      Effort: Pulmonary effort is normal.      " Breath sounds: Normal breath sounds.   Abdominal:      General: Abdomen is flat.      Palpations: Abdomen is soft.   Musculoskeletal:         General: Normal range of motion.      Left hand: Swelling and tenderness present. No deformity, lacerations or bony tenderness. Normal range of motion. Normal strength. Normal sensation. There is no disruption of two-point discrimination. Normal capillary refill. Normal pulse.        Arms:       Cervical back: Normal range of motion and neck supple.      Comments: There is erythema, edema and tenderness to the lateral aspect of the left thumbnail.  There is a scabbed area with a slightly fluctuant area below it.  There is no drainage noted.  No induration.  The area is warm to touch.    Skin:     General: Skin is warm and dry.      Capillary Refill: Capillary refill takes less than 2 seconds.   Neurological:      General: No focal deficit present.      Mental Status: She is alert.   Psychiatric:         Mood and Affect: Mood normal.         Behavior: Behavior normal.         Assessment/Plan:     Diagnosis and associated orders:     1. Acute paronychia of thumb, left  cephALEXin (KEFLEX) 500 MG Cap         Comments/MDM:     Patient will take antibiotics and complete them. The area was not drained today as there was not much fluctuance palpable as patient has been expressing the area after soaks at home. The patient was given specific instructions on how to care for the wound, signs of infection including redness, warmth, swelling, and pain.  Patient was told to elevate the limb, and do dressing changes regularly.  Patient demonstrated verbal understanding these instructions.             Differential diagnosis, natural history, supportive care, and indications for immediate follow-up discussed.    Advised the patient to follow-up with the primary care physician for recheck, reevaluation, and consideration of further management.    Please note that this dictation was created  using voice recognition software. I have made a reasonable attempt to correct obvious errors, but I expect that there are errors of grammar and possibly content that I did not discover before finalizing the note.    This note was electronically signed by DIMAS Varma

## 2024-03-04 ENCOUNTER — OFFICE VISIT (OUTPATIENT)
Dept: URGENT CARE | Facility: CLINIC | Age: 44
End: 2024-03-04
Payer: MEDICAID

## 2024-03-04 ENCOUNTER — HOSPITAL ENCOUNTER (OUTPATIENT)
Facility: MEDICAL CENTER | Age: 44
End: 2024-03-04
Payer: MEDICAID

## 2024-03-04 ENCOUNTER — APPOINTMENT (OUTPATIENT)
Dept: URGENT CARE | Facility: CLINIC | Age: 44
End: 2024-03-04
Payer: MEDICAID

## 2024-03-04 VITALS
HEART RATE: 70 BPM | SYSTOLIC BLOOD PRESSURE: 120 MMHG | HEIGHT: 62 IN | TEMPERATURE: 97.9 F | DIASTOLIC BLOOD PRESSURE: 68 MMHG | OXYGEN SATURATION: 96 % | WEIGHT: 262.3 LBS | RESPIRATION RATE: 13 BRPM | BODY MASS INDEX: 48.27 KG/M2

## 2024-03-04 DIAGNOSIS — R59.1 LYMPHADENOPATHY: ICD-10-CM

## 2024-03-04 DIAGNOSIS — J02.9 VIRAL PHARYNGITIS: ICD-10-CM

## 2024-03-04 LAB — S PYO DNA SPEC NAA+PROBE: NOT DETECTED

## 2024-03-04 PROCEDURE — 99213 OFFICE O/P EST LOW 20 MIN: CPT

## 2024-03-04 PROCEDURE — 87070 CULTURE OTHR SPECIMN AEROBIC: CPT

## 2024-03-04 PROCEDURE — 87651 STREP A DNA AMP PROBE: CPT

## 2024-03-04 PROCEDURE — 3074F SYST BP LT 130 MM HG: CPT

## 2024-03-04 PROCEDURE — 3078F DIAST BP <80 MM HG: CPT

## 2024-03-04 RX ORDER — DEXAMETHASONE SODIUM PHOSPHATE 10 MG/ML
10 INJECTION INTRAMUSCULAR; INTRAVENOUS ONCE
Status: COMPLETED | OUTPATIENT
Start: 2024-03-04 | End: 2024-03-04

## 2024-03-04 RX ORDER — LIDOCAINE HYDROCHLORIDE 20 MG/ML
15 SOLUTION OROPHARYNGEAL EVERY 6 HOURS
Qty: 300 ML | Refills: 0 | Status: SHIPPED | OUTPATIENT
Start: 2024-03-04 | End: 2024-03-09

## 2024-03-04 RX ADMIN — DEXAMETHASONE SODIUM PHOSPHATE 10 MG: 10 INJECTION INTRAMUSCULAR; INTRAVENOUS at 11:41

## 2024-03-04 ASSESSMENT — FIBROSIS 4 INDEX: FIB4 SCORE: 0.33

## 2024-03-04 NOTE — PROGRESS NOTES
"Chief Complaint   Patient presents with    Oral Swelling     X 10 days burns, blisters    Otalgia     Rt ear x 4days         Subjective:   HISTORY OF PRESENT ILLNESS: Farhana Freeman is a 43 y.o. female who presents for tongue blisters and pain and a sore throat and right ear pain x 4 days.  She notes  resolving URI symptoms that started  2 weeks ago.   Patient denies inability to swallow, voice changes, neck pain or difficulty opening their mouth.      Medications, Allergies, current problem list, Social and Family history reviewed today in Epic.     Objective:     /68 (BP Location: Right arm, Patient Position: Sitting, BP Cuff Size: Large adult)   Pulse 70   Temp 36.6 °C (97.9 °F)   Resp 13   Ht 1.575 m (5' 2\")   Wt 119 kg (262 lb 4.8 oz)   SpO2 96%     Physical Exam  Vitals reviewed.   Constitutional:       Appearance: Normal appearance.   HENT:      Mouth/Throat:      Mouth: Mucous membranes are moist.      Pharynx: Oropharyngeal exudate and posterior oropharyngeal erythema present. No pharyngeal swelling or uvula swelling.   Neck:      Comments: No submental or mandibular swelling or pain, no elevation of the tongue.   Cardiovascular:      Rate and Rhythm: Normal rate.   Pulmonary:      Effort: Pulmonary effort is normal.   Musculoskeletal:      Cervical back: Full passive range of motion without pain.   Lymphadenopathy:      Cervical: Cervical adenopathy present.      Right cervical: Superficial cervical adenopathy and posterior cervical adenopathy present.   Skin:     General: Skin is warm and dry.   Neurological:      Mental Status: She is alert and oriented to person, place, and time.   Psychiatric:         Mood and Affect: Mood normal.          Assessment/Plan:     Diagnosis and associated orders    I personally reviewed prior external notes and test results pertinent to today's visit.     1. Viral pharyngitis  dexamethasone (Decadron) injection (check route below) 10 mg    POCT CEPHEID " GROUP A STREP - PCR    lidocaine (XYLOCAINE) 2 % Solution    CULTURE THROAT      2. Lymphadenopathy  dexamethasone (Decadron) injection (check route below) 10 mg    POCT CEPHEID GROUP A STREP - PCR    lidocaine (XYLOCAINE) 2 % Solution    CULTURE THROAT        Results for orders placed or performed in visit on 03/04/24   POCT CEPHEID GROUP A STREP - PCR   Result Value Ref Range    POC Group A Strep, PCR Not Detected Not Detected, Invalid        IMPRESSION:  Pt has stable vital signs and no red flag symptoms or exam findings identified.  Strep negative, no signs of deep space abscess.  Will trial alana lidocaine for her discomfort, she is given a dose of decadron that will hopefully help her sore throat. Instructed her to Fu with her dentist or primary care for the oral lesions if they do not resolve with her illness.    Differential diagnosis discussed. Pt was Educated on red flag symptoms. Pt has been Instructed to return to Urgent Care or nearest Emergency Department if symptoms fail to improve, for any change in condition, further concerns, or new concerning symptoms. Patient states understanding of the plan of care and discharge instructions.  They are discharged in stable condition.         Please note that this dictation was created using voice recognition software. I have made a reasonable attempt to correct obvious errors, but I expect that there are errors of grammar and possibly content that I did not discover before finalizing the note.    This note was electronically signed by JESSICA vEans

## 2024-03-04 NOTE — LETTER
March 4, 2024    To Whom It May Concern:         This is confirmation that Farhana Freeman attended her scheduled appointment with JESSICA Evans on 3/04/24.  Remain off for the rest of today, can return tomorrow 3/5         If you have any questions please do not hesitate to call me at the phone number listed below.    Sincerely,          Stephanie Saenz A.P.R.N.  [phone]

## 2024-03-07 LAB
BACTERIA SPEC RESP CULT: NORMAL
SIGNIFICANT IND 70042: NORMAL
SITE SITE: NORMAL
SOURCE SOURCE: NORMAL

## 2024-03-25 ENCOUNTER — OFFICE VISIT (OUTPATIENT)
Dept: URGENT CARE | Facility: CLINIC | Age: 44
End: 2024-03-25
Payer: MEDICAID

## 2024-03-25 ENCOUNTER — APPOINTMENT (OUTPATIENT)
Dept: RADIOLOGY | Facility: IMAGING CENTER | Age: 44
End: 2024-03-25
Attending: PHYSICIAN ASSISTANT
Payer: MEDICAID

## 2024-03-25 VITALS
TEMPERATURE: 97.8 F | HEART RATE: 73 BPM | HEIGHT: 63 IN | RESPIRATION RATE: 18 BRPM | BODY MASS INDEX: 46.09 KG/M2 | DIASTOLIC BLOOD PRESSURE: 74 MMHG | OXYGEN SATURATION: 96 % | WEIGHT: 260.1 LBS | SYSTOLIC BLOOD PRESSURE: 132 MMHG

## 2024-03-25 DIAGNOSIS — M19.012 ARTHRITIS OF LEFT ACROMIOCLAVICULAR JOINT: ICD-10-CM

## 2024-03-25 DIAGNOSIS — M25.512 ACUTE PAIN OF LEFT SHOULDER: ICD-10-CM

## 2024-03-25 DIAGNOSIS — G43.809 OTHER MIGRAINE WITHOUT STATUS MIGRAINOSUS, NOT INTRACTABLE: ICD-10-CM

## 2024-03-25 PROCEDURE — 73030 X-RAY EXAM OF SHOULDER: CPT | Mod: TC,LT | Performed by: PHYSICIAN ASSISTANT

## 2024-03-25 RX ORDER — KETOROLAC TROMETHAMINE 30 MG/ML
15 INJECTION, SOLUTION INTRAMUSCULAR; INTRAVENOUS ONCE
Status: COMPLETED | OUTPATIENT
Start: 2024-03-25 | End: 2024-03-25

## 2024-03-25 RX ORDER — DEXAMETHASONE SODIUM PHOSPHATE 10 MG/ML
10 INJECTION INTRAMUSCULAR; INTRAVENOUS ONCE
Status: COMPLETED | OUTPATIENT
Start: 2024-03-25 | End: 2024-03-25

## 2024-03-25 RX ADMIN — DEXAMETHASONE SODIUM PHOSPHATE 10 MG: 10 INJECTION INTRAMUSCULAR; INTRAVENOUS at 12:21

## 2024-03-25 RX ADMIN — KETOROLAC TROMETHAMINE 15 MG: 30 INJECTION, SOLUTION INTRAMUSCULAR; INTRAVENOUS at 12:21

## 2024-03-25 ASSESSMENT — ENCOUNTER SYMPTOMS
LOSS OF CONSCIOUSNESS: 0
NAUSEA: 0
DOUBLE VISION: 0
MYALGIAS: 1
DIZZINESS: 1
FOCAL WEAKNESS: 0
TINGLING: 0
VOMITING: 0
HEADACHES: 1
FEVER: 0
BLURRED VISION: 0
SEIZURES: 0
CHILLS: 0
SPEECH CHANGE: 0

## 2024-03-25 ASSESSMENT — FIBROSIS 4 INDEX: FIB4 SCORE: 0.33

## 2024-03-25 NOTE — PROGRESS NOTES
Subjective:   Farhana Freeman is a 43 y.o. female who presents for Migraine (X4 days ago left with lingering headache) and Dizziness        1. Patient presents with concerns of headache that has been waxing and waning for the last 4 days.  States that location and nature of pain is consistent with prior migraines.  She has a longstanding history of migraines.  She also endorses occasional dizziness, which is also consistent with prior migraines.  She has been taking Tylenol at home with mild symptomatic relief.  Patient used to take Ajovy for her migraines and this worked well.  Plans to follow-up with primary care to discuss restarting Hx of gastric bypass.  Recently started metformin for prediabetes.    2.  Patient also states that her left shoulder has been bothering her for the last week.  Pain is worse with movement and she endorses restricted range of motion, particularly with overhead movements.  Denies remote and recent trauma.  No numbness or tingling in the left upper extremity.      Review of Systems   Constitutional:  Negative for chills and fever.   Eyes:  Negative for blurred vision and double vision.   Gastrointestinal:  Negative for nausea and vomiting.   Musculoskeletal:  Positive for joint pain and myalgias.   Neurological:  Positive for dizziness and headaches. Negative for tingling, speech change, focal weakness, seizures and loss of consciousness.       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), Intertriginous dermatitis associated with moisture (6/16/2020), Kidney disease, Migraine, Morbid obesity (HCC) (5/12/2017), Muscle disorder, Non-intractable vomiting with nausea (7/13/2021), Pain of left upper  "extremity (5/19/2021), Postural hypotension, Psychiatric problem, Radicular pain of left lower extremity (5/29/2019), Renal stone (3/27/2020), Right ear pain (7/28/2021), Sinus congestion (5/18/2022), Spider bite (11/11/2021), Sprain of lateral ligament of ankle joint (9/8/2021), Status post gastric surgery (5/29/2019), Symptomatic abdominal panniculus (6/16/2020), Urinary bladder disorder, and Urinary incontinence.  MEDS:   Current Outpatient Medications:     gabapentin (NEURONTIN) 300 MG Cap, Take 300 mg by mouth 3 times a day., Disp: , Rfl:     meclizine (ANTIVERT) 25 MG Tab, Take 1 Tablet by mouth 3 times a day as needed for Dizziness., Disp: 30 Tablet, Rfl: 0    omeprazole (PRILOSEC) 40 MG delayed-release capsule, TAKE 1 CAPSULE BY MOUTH TWO TIMES A DAY., Disp: 180 Capsule, Rfl: 2    escitalopram (LEXAPRO) 20 MG tablet, TAKE 1 TABLET BY MOUTH EVERY DAY., Disp: 90 Tablet, Rfl: 3    albuterol 108 (90 Base) MCG/ACT Aero Soln inhalation aerosol, Inhale 2 Puffs every 6 hours as needed for Shortness of Breath., Disp: 8.5 Each, Rfl: 2    tizanidine (ZANAFLEX) 4 MG Tab, Take  by mouth 3 times a day as needed. Take 1 tablet by mouth three times a day as needed, Disp: , Rfl:     vitamin D2, Ergocalciferol, (DRISDOL) 1.25 MG (76488 UT) Cap capsule, TAKE 1 CAPSULE BY MOUTH EVERY SEVEN DAYS. (Patient not taking: Reported on 3/25/2024), Disp: 12 Capsule, Rfl: 2  ALLERGIES:   Allergies   Allergen Reactions    Flu Virus Vaccine Vomiting     Reports severe illness with nausea, vomiting and diarrhea.      Ibuprofen      1999-11-18;GI UPSET    Morphine Nausea     \"severe nausea vomiting\"    Nickel     Nsaids      Added per MD request    Sulfa Drugs      \"Causes blindness\"    Toradol      States that she can not have oral Toradol but can tolerate injection     SURGHX:   Past Surgical History:   Procedure Laterality Date    SD ANTER COLPORRHAPHY,BLAD/VAGINA N/A 8/12/2020    Procedure: COLPORRHAPHY, ANTERIOR, TENSION FREE VAGINAL " "TAPE, PERINORRHAPHY;  Surgeon: Maria Elena Zhou M.D.;  Location: SURGERY SAME DAY [facility];  Service: Obstetrics    [place] POST COLPORRHAPHY,RECTUM/VAGINA N/A 8/12/2020    Procedure: COLPORRHAPHY, POSTERIOR;  Surgeon: Maria Elena Zhou M.D.;  Location: SURGERY SAME DAY [facility];  Service: Obstetrics    VAGINAL HYSTERECTOMY TOTAL N/A 8/12/2020    Procedure: HYSTERECTOMY, TOTAL, VAGINAL;  Surgeon: Maria Elena Zhou M.D.;  Location: SURGERY SAME DAY [facility];  Service: Obstetrics    SALPINGECTOMY Bilateral 8/12/2020    Procedure: SALPINGECTOMY;  Surgeon: Maria Elena Zhou M.D.;  Location: SURGERY SAME DAY [facility];  Service: Obstetrics    CYSTOSCOPY N/A 8/12/2020    Procedure: CYSTOSCOPY;  Surgeon: Maria Elena Zhou M.D.;  Location: SURGERY SAME DAY [facility];  Service: Obstetrics    LITHOTRIPSY  06/2020    LUIS BY LAPAROSCOPY  8/9/2017    Procedure: LUIS BY LAPAROSCOPY;  Surgeon: Shelton Jackson M.D.;  Location: SURGERY [facility];  Service:     GASTRIC BYPASS LAPAROSCOPIC  5/12/2017    Procedure: GASTRIC BYPASS LAPAROSCOPIC - ISIAH EN Y AND HIATAL HERNIA REPAIR;  Surgeon: Shelton Jackson M.D.;  Location: SURGERY [facility];  Service:     OTHER ABDOMINAL SURGERY  10/12/15    vertical gastric sleeve     SOCHX:  reports that she has never smoked. She has never used smokeless tobacco. She reports current alcohol use. She reports that she does not use drugs.  FH: Family history was reviewed, no pertinent findings to report   Objective:   /74 (BP Location: Left arm, Patient Position: Sitting, BP Cuff Size: Large adult)   Pulse 73   Temp 36.6 °C (97.8 °F) (Temporal)   Resp 18   Ht 1.6 m (5' 2.99\")   Wt 118 kg (260 lb 1.6 oz)   LMP 07/25/2020 (Exact Date)   SpO2 96%   BMI 46.09 kg/m²   Physical Exam  Vitals reviewed.   Constitutional:       General: She is not in acute distress.     Appearance: Normal appearance. She is well-developed. She is not toxic-appearing. "   HENT:      Head: Normocephalic and atraumatic.      Right Ear: External ear normal.      Left Ear: External ear normal.      Nose: Nose normal.   Cardiovascular:      Rate and Rhythm: Normal rate and regular rhythm.      Heart sounds: Normal heart sounds, S1 normal and S2 normal.   Pulmonary:      Effort: Pulmonary effort is normal. No respiratory distress.      Breath sounds: Normal breath sounds. No stridor. No decreased breath sounds, wheezing, rhonchi or rales.   Musculoskeletal:      Comments: Tender to palpation over anterior GHJ and AC joint.  Flexion and abduction moderately restricted.  Upper extremity sensation intact even bilaterally.  Radial pulse 2+.   Skin:     General: Skin is dry.   Neurological:      General: No focal deficit present.      Mental Status: She is alert and oriented to person, place, and time.      GCS: GCS eye subscore is 4. GCS verbal subscore is 5. GCS motor subscore is 6.      Cranial Nerves: Cranial nerves 2-12 are intact.      Sensory: Sensation is intact.      Motor: Motor function is intact.      Coordination: Coordination is intact.      Gait: Gait is intact.   Psychiatric:         Speech: Speech normal.         Behavior: Behavior normal.        Imaging:  FINDINGS:  Bones: Normal bone mineralization. No fracture. No focal bone lesion.     Joints: There are moderate osteoarthritic changes involving the left acromioclavicular joint. Glenohumeral joint is intact. No subluxation or joint effusion.     Soft tissues are unremarkable.     IMPRESSION:     Moderate osteoarthritic changes involving the left acromioclavicular joint.  Assessment/Plan:   1. Other migraine without status migrainosus, not intractable  - ketorolac (Toradol) injection 15 mg  - dexamethasone (Decadron) injection (check route below) 10 mg    2. Arthritis of left acromioclavicular joint  - Referral to Sports Medicine    3. Acute pain of left shoulder  - DX-SHOULDER 2+ LEFT; Future    Patient presents with  headache consistent with prior migraines.  Patient otherwise well-appearing and vital signs are stable.  Neurological examination within normal limits.  Patient treated with Toradol and Decadron today.  Recommend following up with primary care soonest for reevaluation and further management.  If Patient develops any red flag signs or symptoms-to ED for reevaluation.  2. Patient has moderate arthritic changes at the left AC joint on imaging.  Recommend that she follow-up with primary care to discuss starting physical therapy.  I will also refer her to sports med to discuss possibility of corticosteroid injection.  Recommend immediate reevaluation with any new or worsening symptoms.

## 2024-03-25 NOTE — RESULT ENCOUNTER NOTE
Joselo Delcid,    You do have some arthritis in the left shoulder.  I recommend that we have you follow-up with primary care to discuss physical therapy and possibly a referral to sports medicine for a steroid injection.    Kind regards,  David

## 2024-05-05 ENCOUNTER — APPOINTMENT (OUTPATIENT)
Dept: RADIOLOGY | Facility: IMAGING CENTER | Age: 44
End: 2024-05-05
Payer: MEDICAID

## 2024-05-05 ENCOUNTER — OFFICE VISIT (OUTPATIENT)
Dept: URGENT CARE | Facility: CLINIC | Age: 44
End: 2024-05-05
Payer: MEDICAID

## 2024-05-05 VITALS
TEMPERATURE: 97.4 F | SYSTOLIC BLOOD PRESSURE: 128 MMHG | DIASTOLIC BLOOD PRESSURE: 92 MMHG | BODY MASS INDEX: 47.68 KG/M2 | WEIGHT: 259.1 LBS | HEIGHT: 62 IN | HEART RATE: 72 BPM | RESPIRATION RATE: 15 BRPM | OXYGEN SATURATION: 98 %

## 2024-05-05 DIAGNOSIS — M25.562 ACUTE PAIN OF LEFT KNEE: ICD-10-CM

## 2024-05-05 DIAGNOSIS — S80.02XA CONTUSION OF LEFT KNEE, INITIAL ENCOUNTER: ICD-10-CM

## 2024-05-05 DIAGNOSIS — M25.572 ACUTE LEFT ANKLE PAIN: ICD-10-CM
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
## 2024-05-05 DIAGNOSIS — S93.409A SPRAIN OF LATERAL LIGAMENT OF ANKLE JOINT: ICD-10-CM

## 2024-05-05 PROCEDURE — 73610 X-RAY EXAM OF ANKLE: CPT | Mod: TC,LT

## 2024-05-05 PROCEDURE — 3080F DIAST BP >= 90 MM HG: CPT

## 2024-05-05 PROCEDURE — 99213 OFFICE O/P EST LOW 20 MIN: CPT

## 2024-05-05 PROCEDURE — 3074F SYST BP LT 130 MM HG: CPT

## 2024-05-05 PROCEDURE — 73564 X-RAY EXAM KNEE 4 OR MORE: CPT | Mod: TC,LT

## 2024-05-05 ASSESSMENT — FIBROSIS 4 INDEX: FIB4 SCORE: 0.34

## 2024-05-05 NOTE — PROGRESS NOTES
Subjective:   Farhana Freeman is a 44 y.o. female who presents for Knee Pain (Left knee & foot fall x 1 month)      HPI:    Patient presents to urgent care with concerns of left knee pain. States she tripped over a baby gate a month ago. Landed on bilateral knees. States the left knee and ankle are still painful. She endorses antalgic gait. Believes her left foot hyper plantar flexed during the fall. Reports pain along the lateral malleolus. The left knee is tender around the knee cap. States her knee has slowly improved over the past month. Valerio previous pmh of traumatic knee, ankle, foot injuries. Endorses buckling sensation. Denies numbness/tingling sensation. Reports difficulty fully extending her knee. Rates pain as 6-8/10. Aggravated with prolonged weight bearing, walking. Mild alleviation with rest, elevation, Norco.    ROS As above in HPI    Medications:    Current Outpatient Medications on File Prior to Visit   Medication Sig Dispense Refill    gabapentin (NEURONTIN) 300 MG Cap Take 300 mg by mouth 3 times a day.      meclizine (ANTIVERT) 25 MG Tab Take 1 Tablet by mouth 3 times a day as needed for Dizziness. 30 Tablet 0    omeprazole (PRILOSEC) 40 MG delayed-release capsule TAKE 1 CAPSULE BY MOUTH TWO TIMES A DAY. 180 Capsule 2    escitalopram (LEXAPRO) 20 MG tablet TAKE 1 TABLET BY MOUTH EVERY DAY. 90 Tablet 3    albuterol 108 (90 Base) MCG/ACT Aero Soln inhalation aerosol Inhale 2 Puffs every 6 hours as needed for Shortness of Breath. 8.5 Each 2    vitamin D2, Ergocalciferol, (DRISDOL) 1.25 MG (77213 UT) Cap capsule TAKE 1 CAPSULE BY MOUTH EVERY SEVEN DAYS. (Patient not taking: Reported on 3/25/2024) 12 Capsule 2    tizanidine (ZANAFLEX) 4 MG Tab Take  by mouth 3 times a day as needed. Take 1 tablet by mouth three times a day as needed (Patient not taking: Reported on 5/5/2024)       No current facility-administered medications on file prior to visit.        Allergies:   Flu virus vaccine,  Ibuprofen, Morphine, Nickel, Nsaids, Sulfa drugs, and Toradol    Problem List:   Patient Active Problem List   Diagnosis    Generalized anxiety disorder    Vitamin D deficiency    Asthma    Cervical radiculopathy at C6    Chronic neck pain    Chronic back pain    Left sided sciatica    Iron deficiency anemia    GERD (gastroesophageal reflux disease)    Stress incontinence    Class 3 severe obesity due to excess calories without serious comorbidity with body mass index (BMI) of 45.0 to 49.9 in adult (HCC)    Meralgia paresthetica, left    Otalgia of both ears    Under care of pain management specialist    Chronic fatigue    Subclinical hypothyroidism    Migraine    Hot flashes    Restless legs    Sore throat    Hx of gastric bypass        Surgical History:  Past Surgical History:   Procedure Laterality Date    MT ANTER COLPORRHAPHY,BLAD/VAGINA N/A 8/12/2020    Procedure: COLPORRHAPHY, ANTERIOR, TENSION FREE VAGINAL TAPE, PERINORRHAPHY;  Surgeon: Maria Elena Zhou M.D.;  Location: SURGERY SAME DAY [facility]S;  Service: Obstetrics    MT POST COLPORRHAPHY,RECTUM/VAGINA N/A 8/12/2020    Procedure: COLPORRHAPHY, POSTERIOR;  Surgeon: Maria Elena Zhou M.D.;  Location: SURGERY SAME DAY [facility];  Service: Obstetrics    VAGINAL HYSTERECTOMY TOTAL N/A 8/12/2020    Procedure: HYSTERECTOMY, TOTAL, VAGINAL;  Surgeon: Maria Elena Zhou M.D.;  Location: SURGERY SAME DAY [facility];  Service: Obstetrics    SALPINGECTOMY Bilateral 8/12/2020    Procedure: SALPINGECTOMY;  Surgeon: Maria Elena Zhou M.D.;  Location: SURGERY SAME DAY [facility];  Service: Obstetrics    CYSTOSCOPY N/A 8/12/2020    Procedure: CYSTOSCOPY;  Surgeon: Maria Elena Zhou M.D.;  Location: SURGERY SAME DAY [facility];  Service: Obstetrics    LITHOTRIPSY  06/2020    LUIS BY LAPAROSCOPY  8/9/2017    Procedure: LUIS BY LAPAROSCOPY;  Surgeon: Shelton Jackson M.D.;  Location: [facility];  Service:     GASTRIC BYPASS  "LAPAROSCOPIC  5/12/2017    Procedure: GASTRIC BYPASS LAPAROSCOPIC - ISAIH EN Y AND HIATAL HERNIA REPAIR;  Surgeon: Shelton Jackson M.D.;  Location[facility];  Service:     OTHER ABDOMINAL SURGERY  10/12/15    vertical gastric sleeve       Past Social Hx:   Social History     Tobacco Use    Smoking status: Never    Smokeless tobacco: Never   Vaping Use    Vaping Use: Never used   Substance Use Topics    Alcohol use: Yes     Comment: Rarely    Drug use: No          Problem list, medications, and allergies reviewed by myself today in Epic.     Objective:     BP (!) 128/92   Pulse 72   Temp 36.3 °C (97.4 °F) (Temporal)   Resp 15   Ht 1.575 m (5' 2\")   Wt 118 kg (259 lb 1.6 oz)   LMP 07/25/2020 (Exact Date)   SpO2 98%   BMI 47.39 kg/m²     Physical Exam  Vitals and nursing note reviewed.   Constitutional:       General: She is not in acute distress.     Appearance: Normal appearance. She is not ill-appearing or diaphoretic.   HENT:      Head: Normocephalic.   Cardiovascular:      Rate and Rhythm: Normal rate and regular rhythm.      Heart sounds: Normal heart sounds.   Pulmonary:      Effort: Pulmonary effort is normal.      Breath sounds: Normal breath sounds.   Musculoskeletal:         General: Tenderness and signs of injury present. No swelling or deformity.      Comments: There is left lateral joint line tenderness to palpation. No deformity, dislocation. No crepitus, bony step offs. The left lateral malleolus is also slightly tender to palpation. No deformity, dislocation. Strength is normal and symmetric. There is slightly reduced ROM with extension of left knee. Ankle has full and active ROM. Sensation is intact to light and sharp touch, cap refill is less than 2 seconds, 2+ pedal pulses. No edema, erythema, warmth, fluctuance, ecchymosis. No rash. Negative anterior and posterior drawer. Negative valgus and varus. Positive medial Eunice's.   Skin:     General: Skin is warm and dry.      " Capillary Refill: Capillary refill takes less than 2 seconds.      Findings: No bruising or erythema.   Neurological:      Mental Status: She is alert and oriented to person, place, and time.         Assessment/Plan:       DX-ANKLE 3+ VIEWS LEFT 05/05/2024    Narrative  5/5/2024 11:53 AM    HISTORY/REASON FOR EXAM:  Pain/Deformity Following Trauma; Fell one month ago, lateral malleolus tenderness.      TECHNIQUE/EXAM DESCRIPTION AND NUMBER OF VIEWS:  3 views of the LEFT ankle.    COMPARISON: Left ankle radiographs 4/16/2015    FINDINGS:  No fracture, dislocation detected. Alignment appears normal.      No osseous lesion or degenerative change. Plantar calcaneal enthesophyte.    Impression  No fracture detected.      DX-KNEE COMPLETE 4+ LEFT 05/05/2024    Narrative  5/5/2024 11:53 AM    HISTORY/REASON FOR EXAM:  Pain/Deformity Following Trauma; Fell onto the left knee one month ago, endorses buckling, tenderness lateral joint line.      TECHNIQUE/EXAM DESCRIPTION AND NUMBER OF VIEWS:  4 views of the LEFT knee.    COMPARISON: None    FINDINGS:  Subtle linear lucency visible in the patella on the sunrise view. This could be incidental. The possibility of subtle nondisplaced patellar fracture cannot be entirely excluded. Correlate for patella tenderness.    No additional fracture evidence detected. No dislocation or arthritic change. No joint effusion.    Impression  Subtle linear lucency visible in the patella on the sunrise view. This could be incidental. The possibility of subtle nondisplaced patellar fracture cannot be entirely excluded. Correlate for patella tenderness.      Diagnosis and associated orders:   1. Fall, initial encounter    2. Sprain of lateral ligament of ankle joint    3. Contusion of left knee, initial encounter  - Knee Brace  - Referral to Orthopedics    4. Acute pain of left knee  - DX-KNEE COMPLETE 4+ LEFT; Future  - Referral to Orthopedics    5. Acute left ankle pain  - DX-ANKLE 3+ VIEWS LEFT;  Future        Comments/MDM:     Per radiology impression, left ankle is negative for fracture and malalignment. The left knee xray series, per impression, shows subtle linear lucency visible in the patella on the sunrise view. This could be incidental. The possibility of subtle nondisplaced patellar fracture cannot be entirely excluded. Correlate for patella tenderness.  Patient fitted in knee brace, weight bearing as tolerated. Injury occurred 1 month ago.   Referral to ortho to review images for possible patella fracture and instability symptoms.  Recommend: rest, elevation, ice, compression. Wear knee brace due to instability symptoms until cleared by ortho. Declined crutches. NSAIDS/Tylenol per package instructions.   Follow up with PCP advised. Work note provided.     Return to clinic or go to ED if symptoms worsen or persist. Indications for ED discussed at length. Patient/Parent/Guardian voices understanding. Follow-up with your primary care provider in 3-5 days. Red flag symptoms discussed. All side effects of medication discussed including allergic response, GI upset, tendon injury, rash, sedation etc.    Please note that this dictation was created using voice recognition software. I have made a reasonable attempt to correct obvious errors, but I expect that there are errors of grammar and possibly content that I did not discover before finalizing the note.    This note was electronically signed by DIMAS Herrera

## 2024-05-05 NOTE — LETTER
May 5, 2024    To Whom It May Concern:         This is confirmation that Farhana Freeman attended her scheduled appointment with JESSICA Reyes on 5/05/24.    Please excuse her from work tomorrow 05/06/24.         If you have any questions please do not hesitate to call me at the phone number listed below.    Sincerely,          DELL Reyes.  [phone]

## 2024-05-05 NOTE — LETTER
May 5, 2024      To Whom It May Concern:         This is confirmation that Farhana Freeman attended her scheduled appointment with JESSICA Reyes on 5/05/24.    Please allow patient to sit as needed or take breaks to elevate her left leg, apply ice packs, and to rest as needed until cleared by ortho.          If you have any questions please do not hesitate to call me at the phone number listed below.    Sincerely,      DELL Reyes.  [phone]

## 2024-05-09 ENCOUNTER — OFFICE VISIT (OUTPATIENT)
Dept: MEDICAL GROUP | Facility: CLINIC | Age: 44
End: 2024-05-09
Payer: MEDICAID

## 2024-05-09 VITALS
OXYGEN SATURATION: 95 % | HEART RATE: 56 BPM | SYSTOLIC BLOOD PRESSURE: 124 MMHG | DIASTOLIC BLOOD PRESSURE: 80 MMHG | BODY MASS INDEX: 48.03 KG/M2 | HEIGHT: 62 IN | TEMPERATURE: 97.6 F | WEIGHT: 261 LBS

## 2024-05-09 DIAGNOSIS — M19.012 ARTHRITIS OF LEFT ACROMIOCLAVICULAR JOINT: ICD-10-CM

## 2024-05-09 PROCEDURE — 20606 DRAIN/INJ JOINT/BURSA W/US: CPT | Mod: GC | Performed by: STUDENT IN AN ORGANIZED HEALTH CARE EDUCATION/TRAINING PROGRAM

## 2024-05-09 RX ORDER — METHYLPREDNISOLONE SODIUM SUCCINATE 40 MG/ML
40 INJECTION, POWDER, LYOPHILIZED, FOR SOLUTION INTRAMUSCULAR; INTRAVENOUS ONCE
Status: CANCELLED | OUTPATIENT
Start: 2024-05-09 | End: 2024-05-09

## 2024-05-09 RX ORDER — METHYLPREDNISOLONE ACETATE 80 MG/ML
80 INJECTION, SUSPENSION INTRA-ARTICULAR; INTRALESIONAL; INTRAMUSCULAR; SOFT TISSUE ONCE
Status: COMPLETED | OUTPATIENT
Start: 2024-05-09 | End: 2024-05-09

## 2024-05-09 RX ADMIN — METHYLPREDNISOLONE ACETATE 80 MG: 80 INJECTION, SUSPENSION INTRA-ARTICULAR; INTRALESIONAL; INTRAMUSCULAR; SOFT TISSUE at 16:41

## 2024-05-09 ASSESSMENT — FIBROSIS 4 INDEX: FIB4 SCORE: 0.34

## 2024-05-09 NOTE — PROGRESS NOTES
Subjective:   Farhana Freeman is a 44 y.o. female here for the evaluation and management of Shoulder Pain (Left shoulder pain/moderate arthritis)    HPI  Pleasant 44-year-old female here for evaluation of the left shoulder pain.  Pain began insidiously about 2 months ago.  Denies any trauma or history of shoulder pain previously.  Pain is located over superior aspect of left shoulder.  It is worse when she reaches overhead or across her body.  Does feel some pain that radiates towards the left side of her neck as well.  Has history of gastric bypass, and cannot take NSAIDs.  Has been trying topical Voltaren, but this has not been too helpful.  She does take oxycodone and gabapentin chronically. Denies any neck pain, numbness, weakness, tingling radiating down arm.     ROS  As per [facility]   Current Outpatient Medications   Medication Sig Dispense Refill    gabapentin (NEURONTIN) 300 MG Cap Take 300 mg by mouth 3 times a day.      meclizine (ANTIVERT) 25 MG Tab Take 1 Tablet by mouth 3 times a day as needed for Dizziness. 30 Tablet 0    omeprazole (PRILOSEC) 40 MG delayed-release capsule TAKE 1 CAPSULE BY MOUTH TWO TIMES A DAY. 180 Capsule 2    escitalopram (LEXAPRO) 20 MG tablet TAKE 1 TABLET BY MOUTH EVERY DAY. 90 Tablet 3    albuterol 108 (90 Base) MCG/ACT Aero Soln inhalation aerosol Inhale 2 Puffs every 6 hours as needed for Shortness of Breath. 8.5 Each 2    vitamin D2, Ergocalciferol, (DRISDOL) 1.25 MG (76390 UT) Cap capsule TAKE 1 CAPSULE BY MOUTH EVERY SEVEN DAYS. (Patient not taking: Reported on 3/25/2024) 12 Capsule 2    tizanidine (ZANAFLEX) 4 MG Tab Take  by mouth 3 times a day as needed. Take 1 tablet by mouth three times a day as needed (Patient not taking: Reported on 5/5/2024)       Current Facility-Administered Medications   Medication Dose Route Frequency Provider Last Rate Last Admin    methylPREDNISolone acetate (Depo-Medrol) injection 80 mg  80 mg Other Once Gus Sánchez D.O.          Allergies  Flu virus vaccine, Ibuprofen, Morphine, Nickel, Nsaids, Sulfa drugs, and Toradol    Past Medical History:   Diagnosis Date    Anemia     Pt states she has no history of anemia    Anesthesia     severe nausea and vomiting with morphine    Anxiety     ASTHMA 05/2017    Managed with PRN inhaler, last use was in March 2017    Bronchitis 7/6/2021    Chest congestion 7/23/2021    Cholelithiasis 2017    Status post lap adeline    Cough 7/28/2021    Dental disorder     dentures     GERD (gastroesophageal reflux disease)     Gross hematuria 6/2/2020    Heart burn     Resolved with gastric surgeries.    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     Intertriginous dermatitis associated with moisture 6/16/2020    Kidney disease     kidney stones     Migraine     much less since losing weight, 1-2 times per month    Morbid obesity (HCC) 5/12/2017    Muscle disorder     Non-intractable vomiting with nausea 7/13/2021    Pain of left upper extremity 5/19/2021    Postural hypotension     EMR    Psychiatric problem     anxiety     Radicular pain of left lower extremity 5/29/2019    Renal stone 3/27/2020    IMO load March 2020    Right ear pain 7/28/2021    Sinus congestion 5/18/2022    Spider bite 11/11/2021    Sprain of lateral ligament of ankle joint 9/8/2021    Status post gastric surgery 5/29/2019    DATE OF OPERATION: 5/12/2017 POSTOPERATIVE DIAGNOSIS: Morbid Obesity with medical sequelae; Hiatal hernia OPERATION PERFORMED: 1.  Laparoscopic Danie-en-Y Gastric Bypass  3. Hiatal hernia repair SURGEON: Shelton Jackson MD    Symptomatic abdominal panniculus 6/16/2020    Urinary bladder disorder     Urinary incontinence      Patient Active Problem List    Diagnosis Date Noted    Hx of gastric bypass 02/07/2023    Sore throat 11/21/2022    Restless legs 09/08/2022    Hot flashes 07/22/2022    Migraine 04/29/2022    Subclinical hypothyroidism 01/04/2022    Chronic fatigue  12/15/2021    Under care of pain management specialist 09/08/2021    Otalgia of both ears 07/28/2021    Class 3 severe obesity due to excess calories without serious comorbidity with body mass index (BMI) of 45.0 to 49.9 in adult (HCC) 06/16/2020    Stress incontinence 06/02/2020    Cervical radiculopathy at C6 03/27/2020    Chronic neck pain 03/27/2020    Chronic back pain 03/27/2020    Left sided sciatica 03/27/2020    Iron deficiency anemia 03/27/2020    GERD (gastroesophageal reflux disease) 03/27/2020    Meralgia paresthetica, left 11/08/2019    Generalized anxiety disorder 05/29/2019    Vitamin D deficiency 05/29/2019    Asthma 11/01/2005       Past Surgical History  Past Surgical History:   Procedure Laterality Date    MA ANTER COLPORRHAPHY,BLAD/VAGINA N/A 8/12/2020    Procedure: COLPORRHAPHY, ANTERIOR, TENSION FREE VAGINAL TAPE, PERINORRHAPHY;  Surgeon: Maria Elena Zhou M.D.;  Location: SURGERY SAME DAY [facility]S;  Service: Obstetrics    MA POST COLPORRHAPHY,RECTUM/VAGINA N/A 8/12/2020    Procedure: COLPORRHAPHY, POSTERIOR;  Surgeon: Maria Elena Zhou M.D.;  Location: SURGERY SAME DAY [facility];  Service: Obstetrics    VAGINAL HYSTERECTOMY TOTAL N/A 8/12/2020    Procedure: HYSTERECTOMY, TOTAL, VAGINAL;  Surgeon: Maria Elena Zhou M.D.;  Location: SURGERY SAME DAY [facility];  Service: Obstetrics    SALPINGECTOMY Bilateral 8/12/2020    Procedure: SALPINGECTOMY;  Surgeon: Maria Elena Zhou M.D.;  Location: SURGERY SAME DAY [facility];  Service: Obstetrics    CYSTOSCOPY N/A 8/12/2020    Procedure: CYSTOSCOPY;  Surgeon: Maria Elena Zhou M.D.;  Location: SURGERY SAME DAY [facility];  Service: Obstetrics    LITHOTRIPSY  06/2020    LUIS BY LAPAROSCOPY  8/9/2017    Procedure: LUIS BY LAPAROSCOPY;  Surgeon: Shelton Jackson M.D.;  Location: SURGERY [facility];  Service:     GASTRIC BYPASS LAPAROSCOPIC  5/12/2017    Procedure: GASTRIC BYPASS LAPAROSCOPIC - ISIAH EN Y AND HIATAL  HERNIA REPAIR;  Surgeon: Shelton Jackson M.D.;  Location: SURGERY [facility];  Service:     OTHER ABDOMINAL SURGERY  10/12/15    vertical gastric sleeve       Social History     Socioeconomic History    Marital status:      Spouse name: Not on file    Number of children: Not on file    Years of education: Not on file    Highest education level: Not on file   Occupational History     Employer: [employer]   Tobacco Use    Smoking status: Never    Smokeless tobacco: Never   Vaping Use    Vaping Use: Never used   Substance and Sexual Activity    Alcohol use: Yes     Comment: Rarely    Drug use: No    Sexual activity: Yes     Partners: Male     Birth control/protection: Female Sterilization   Other Topics Concern     Service Not Asked    Blood Transfusions Not Asked    Caffeine Concern Not Asked    Occupational Exposure Not Asked    Hobby Hazards Not Asked    Sleep Concern Not Asked    Stress Concern Yes    Weight Concern Not Asked    Special Diet Yes    Back Care Not Asked    Exercise Not Asked    Bike Helmet Not Asked    Seat Belt Not Asked    Self-Exams Not Asked   Social History Narrative    Lives in a house in [place] with her , has 2 biological children, has custody of her niece, reports managing fine at home.    Has cats, dogs, and lizards as pets.    Works a warehouse job, is on her feet for most of the day.      Consumes a healthy diet consisting of fruits, vegetables, white meats and home cooked meals.     Walks at least 10,000 steps per day currently.      Social Determinants of Health     Financial Resource Strain: Not on file   Food Insecurity: Not on file   Transportation Needs: Not on file   Physical Activity: Not on file   Stress: Not on file   Social Connections: Not on file   Intimate Partner Violence: Not on file   Housing Stability: Not on file        Objective:     Vitals:    05/09/24 0817   BP: 124/80   BP Location: Left arm   Patient Position: Sitting   Pulse: (!)  "56   Temp: 36.4 °C (97.6 °F)   TempSrc: Temporal   SpO2: 95%   Weight: 118 kg (261 lb)   Height: 1.575 m (5' 2\")     Body mass index is 47.74 kg/m².     Physical Exam  General: Well appearing, no acute distress  MSK: No midline c-spine tenderness. Full neck ROM. TTP along L AC joint. Full shoulder ROM. Strength 5/5 bilaterally, sensation intact. Negative empty can, stephen, davalos's, hornblowers, speeds, and yergason's. Positive scarf test.     Assessment and Plan:   Farhana Freeman is a 44 y.o. female with a Shoulder Pain (Left shoulder pain/moderate arthritis)     The following was discussed with the patient today.    1. Arthritis of left acromioclavicular joint  - methylPREDNISolone acetate (Depo-Medrol) injection 80 mg    Problem List Items Addressed This Visit       Arthritis of left acromioclavicular joint     X-rays reviewed with patient which do show mild to moderate AC joint arthritis.  Exam today is consistent with AC joint pathology.  Patient is unable to take NSAIDs due to gastric bypass.  Did discuss steroid injection today as well as PT referral.  Patient did provide consent for procedure, please see procedure note below for detail.  She tolerated the procedure well and there were no complications.  Aftercare was discussed.  Follow-up as needed.          Procedure note    Diagnosis: AC joint arthritis  Procedure: Ultrasound-guided left AC joint steroid injection  Performing physician: Everton Sánchez DO  Supervising physician: Shanika Mike MD  Injectate: 0.5 cc of 80 mg/mL methylprednisolone mixed with 1 cc of 2% lidocaine without epinephrine    Details:  After discussion of risks and benefits patient did provide verbal consent.  Utilizing ultrasound, appropriate injection site was identified.  This area was cleansed with ChloraPrep x 3 in usual sterile fashion.  1.5 inch 25-gauge needle was then easily inserted into tissue near the joint space.  Given for visualization of needle on initial insertion, " needle was removed.  Superficial skin was recleaned with ChloraPrep, and needle was then reinserted under ultrasound guidance.  Once needle was seen within joint space of AC joint, all contents of syringe were easily delivered.  Needle was then removed and bandage was applied.  There were no complications.  Patient tolerated the procedure well.  Aftercare was discussed.  Plan as above.      Everton Sánchez DO  [employer] [facility] Fellow

## 2024-05-09 NOTE — ASSESSMENT & PLAN NOTE
X-rays reviewed with patient which do show mild to moderate AC joint arthritis.  Exam today is consistent with AC joint pathology.  Patient is unable to take NSAIDs due to gastric bypass.  Did discuss steroid injection today as well as PT referral.  Patient did provide consent for procedure, please see procedure note below for detail.  She tolerated the procedure well and there were no complications.  Aftercare was discussed.  Follow-up as needed.

## 2024-05-22 ENCOUNTER — OFFICE VISIT (OUTPATIENT)
Dept: URGENT CARE | Facility: CLINIC | Age: 44
End: 2024-05-22
Payer: MEDICAID

## 2024-05-22 VITALS
RESPIRATION RATE: 14 BRPM | HEART RATE: 74 BPM | TEMPERATURE: 98.4 F | BODY MASS INDEX: 48.21 KG/M2 | WEIGHT: 262 LBS | HEIGHT: 62 IN | SYSTOLIC BLOOD PRESSURE: 112 MMHG | OXYGEN SATURATION: 99 % | DIASTOLIC BLOOD PRESSURE: 74 MMHG

## 2024-05-22 DIAGNOSIS — Z91.09 ENVIRONMENTAL ALLERGIES: ICD-10-CM

## 2024-05-22 PROCEDURE — 3074F SYST BP LT 130 MM HG: CPT | Performed by: FAMILY MEDICINE

## 2024-05-22 PROCEDURE — 3078F DIAST BP <80 MM HG: CPT | Performed by: FAMILY MEDICINE

## 2024-05-22 PROCEDURE — 99213 OFFICE O/P EST LOW 20 MIN: CPT | Performed by: FAMILY MEDICINE

## 2024-05-22 RX ORDER — LORATADINE 10 MG/1
1 CAPSULE, LIQUID FILLED ORAL
Qty: 60 CAPSULE | Refills: 0 | Status: SHIPPED | OUTPATIENT
Start: 2024-05-22

## 2024-05-22 ASSESSMENT — FIBROSIS 4 INDEX: FIB4 SCORE: 0.34

## 2024-05-22 NOTE — PROGRESS NOTES
"  Subjective:      44 y.o. female presents to urgent care for sensation that right ear is blocked.  She first noticed this about 5 days ago.  There is no associated pain.  She does also have a runny nose, but otherwise no fever, headache, body ache, or cough.  No tobacco product use.  She does have a history of asthma for which she uses albuterol as needed.  She has not been needing it anymore during the last 5 days.    She denies any other questions or concerns at this time.    Current problem list, medication, and past medical/surgical history were reviewed in Epic.    ROS  See HPI     Objective:      /74   Pulse 74   Temp 36.9 °C (98.4 °F)   Resp 14   Ht 1.575 m (5' 2\")   Wt 119 kg (262 lb)   LMP 07/25/2020 (Exact Date)   SpO2 99%   BMI 47.92 kg/m²     Physical Exam  Constitutional:       General: She is not in acute distress.     Appearance: She is not diaphoretic.   HENT:      Right Ear: Ear canal and external ear normal. Tympanic membrane is bulging. Tympanic membrane is not erythematous.      Left Ear: Ear canal and external ear normal. Tympanic membrane is bulging. Tympanic membrane is not erythematous.      Mouth/Throat:      Tongue: Tongue does not deviate from midline.      Palate: No lesions.      Pharynx: Uvula midline. No oropharyngeal exudate or posterior oropharyngeal erythema.      Tonsils: No tonsillar exudate. 1+ on the right. 1+ on the left.   Cardiovascular:      Rate and Rhythm: Normal rate and regular rhythm.      Heart sounds: Normal heart sounds.   Pulmonary:      Effort: Pulmonary effort is normal. No respiratory distress.      Breath sounds: Normal breath sounds.   Neurological:      Mental Status: She is alert.   Psychiatric:         Mood and Affect: Affect normal.         Judgment: Judgment normal.       Assessment/Plan:     1. Environmental allergies  No sign of bacterial infection.  She does have known environmental allergies and takes an antihistamine intermittently.  " She was encouraged to use this daily as well as Flonase.  - Loratadine (CLARITIN) 10 MG Cap; Take 1 Tablet by mouth 1 time a day as needed (allergies).  Dispense: 60 Capsule; Refill: 0      Instructed to return to Urgent Care or nearest Emergency Department if symptoms fail to improve, for any change in condition, further concerns, or new concerning symptoms. Patient states understanding of the plan of care and discharge instructions.    Farhana London M.D.

## 2024-06-01 ENCOUNTER — APPOINTMENT (OUTPATIENT)
Dept: URGENT CARE | Facility: CLINIC | Age: 44
End: 2024-06-01
Payer: MEDICAID

## 2024-06-01 ENCOUNTER — OFFICE VISIT (OUTPATIENT)
Dept: URGENT CARE | Facility: CLINIC | Age: 44
End: 2024-06-01
Payer: MEDICAID

## 2024-06-01 VITALS
WEIGHT: 268 LBS | DIASTOLIC BLOOD PRESSURE: 64 MMHG | HEART RATE: 88 BPM | RESPIRATION RATE: 12 BRPM | HEIGHT: 67 IN | SYSTOLIC BLOOD PRESSURE: 122 MMHG | TEMPERATURE: 97.6 F | BODY MASS INDEX: 42.06 KG/M2 | OXYGEN SATURATION: 98 %

## 2024-06-01 DIAGNOSIS — L23.9 ALLERGIC DERMATITIS: ICD-10-CM

## 2024-06-01 PROCEDURE — 3078F DIAST BP <80 MM HG: CPT | Performed by: NURSE PRACTITIONER

## 2024-06-01 PROCEDURE — 3074F SYST BP LT 130 MM HG: CPT | Performed by: NURSE PRACTITIONER

## 2024-06-01 PROCEDURE — 99214 OFFICE O/P EST MOD 30 MIN: CPT | Performed by: NURSE PRACTITIONER

## 2024-06-01 RX ORDER — DEXAMETHASONE SODIUM PHOSPHATE 10 MG/ML
10 INJECTION INTRAMUSCULAR; INTRAVENOUS ONCE
Status: COMPLETED | OUTPATIENT
Start: 2024-06-01 | End: 2024-06-01

## 2024-06-01 RX ADMIN — DEXAMETHASONE SODIUM PHOSPHATE 10 MG: 10 INJECTION INTRAMUSCULAR; INTRAVENOUS at 18:09

## 2024-06-01 ASSESSMENT — FIBROSIS 4 INDEX: FIB4 SCORE: 0.34

## 2024-06-02 ASSESSMENT — ENCOUNTER SYMPTOMS: SHORTNESS OF BREATH: 0

## 2024-06-02 NOTE — PROGRESS NOTES
Subjective:   Farhana Freeman is a 44 y.o. female who presents for Other (Went to baseball game on Tuesday and wore sunscreen and broke out in a rash )      Rash  This is a new problem. The current episode started in the past 7 days. The problem has been gradually worsening since onset. The rash is diffuse. The rash is characterized by redness and itchiness. Associated with: sunscreen. Pertinent negatives include no facial edema or shortness of breath. Past treatments include anti-itch cream and antihistamine. The treatment provided no relief. Her past medical history is significant for allergies (sunscreen).       Review of Systems   Respiratory:  Negative for shortness of breath.    Skin:  Positive for itching and rash.       Medications:    albuterol Aers  escitalopram  gabapentin Caps  Loratadine Caps  meclizine Tabs  omeprazole    Allergies: Flu virus vaccine, Ibuprofen, Morphine, Nickel, Nsaids, Sulfa drugs, and Toradol    Problem List: Farhana Freeman does not have any pertinent problems on file.    Surgical History:  Past Surgical History:   Procedure Laterality Date    AL ANTER COLPORRHAPHY,BLAD/VAGINA N/A 8/12/2020    Procedure: COLPORRHAPHY, ANTERIOR, TENSION FREE VAGINAL TAPE, PERINORRHAPHY;  Surgeon: Maria Elena Zhou M.D.;  Location: SURGERY SAME DAY [facility];  Service: Obstetrics    AL POST COLPORRHAPHY,RECTUM/VAGINA N/A 8/12/2020    Procedure: COLPORRHAPHY, POSTERIOR;  Surgeon: Maria Elena Zhou M.D.;  Location: SURGERY SAME [facility];  Service: Obstetrics    VAGINAL HYSTERECTOMY TOTAL N/A 8/12/2020    Procedure: HYSTERECTOMY, TOTAL, VAGINAL;  Surgeon: Maria Elena Zhou M.D.;  Location: SURGERY SAME DAY [facility];  Service: Obstetrics    SALPINGECTOMY Bilateral 8/12/2020    Procedure: SALPINGECTOMY;  Surgeon: Maria Elena Zhou M.D.;  Location: SURGERY SAME DAY [facility];  Service: Obstetrics    CYSTOSCOPY N/A 8/12/2020    Procedure: CYSTOSCOPY;  Surgeon:  "Maria Elena Zhou M.D.;  Location: SURGERY SAME DAY [facility];  Service: Obstetrics    LITHOTRIPSY  06/2020    LUIS BY LAPAROSCOPY  8/9/2017    Procedure: LUIS BY LAPAROSCOPY;  Surgeon: Shelton Jackson M.D.;  Location: SURGERY [facility]S;  Service:     GASTRIC BYPASS LAPAROSCOPIC  5/12/2017    Procedure: GASTRIC BYPASS LAPAROSCOPIC - ISIAH EN Y AND HIATAL HERNIA REPAIR;  Surgeon: Shelton Jackson M.D.;  Location: SURGERY [facility]S;  Service:     OTHER ABDOMINAL SURGERY  10/12/15    vertical gastric sleeve       Past Social Hx: Farhana Freeman  reports that she has never smoked. She has never used smokeless tobacco. She reports current alcohol use. She reports that she does not use drugs.     Past Family Hx:  Farhana Freeman family history includes Breast Cancer in an other family member; Cancer (age of onset: 21) in her cousin; Diabetes in her father; Heart Attack (age of onset: 61) in her father; Hypertension in her father and mother; Kidney Disease in her mother; Other in her mother.     Problem list, medications, and allergies reviewed by myself today in Epic.     Objective:     /64 (BP Location: Left arm, Patient Position: Sitting)   Pulse 88   Temp 36.4 °C (97.6 °F) (Temporal)   Resp 12   Ht 1.702 m (5' 7\")   Wt 122 kg (268 lb)   LMP 07/25/2020 (Exact Date)   SpO2 98%   BMI 41.97 kg/m²     Physical Exam  Constitutional:       Appearance: Normal appearance. She is not ill-appearing or toxic-appearing.   HENT:      Head: Normocephalic.      Right Ear: External ear normal.      Left Ear: External ear normal.      Nose: Nose normal.      Mouth/Throat:      Lips: Pink.   Eyes:      General: Lids are normal.   Pulmonary:      Effort: Pulmonary effort is normal. No accessory muscle usage.   Musculoskeletal:      Cervical back: Full passive range of motion without pain.   Skin:     Findings: Rash present. Rash is macular and papular. Rash is not urticarial.        "   Neurological:      Mental Status: She is alert and oriented to person, place, and time.   Psychiatric:         Mood and Affect: Mood normal.         Thought Content: Thought content normal.         Assessment/Plan:     Diagnosis and associated orders:     1. Allergic dermatitis  dexamethasone (Decadron) injection (check route below) 10 mg         Comments/MDM:     I personally reviewed prior external notes and prior test results pertinent to today's visit.  Patient has a history of allergic reaction to sunscreen which she did apply.  Patient in no acute anaphylaxis.  Has tried antihistamines with no relief.  Treat with Decadron in clinic  Discussed management options, risks and benefits, and alternatives to treatment plan agreed upon.   Red flags discussed and indications to immediately call 911 or present to the Emergency Department.   Supportive care, differential diagnoses, and indications for immediate follow-up discussed with patient.    Patient expresses understanding and agrees to plan. Patient denies any other questions or concerns.                Please note that this dictation was created using voice recognition software. I have made a reasonable attempt to correct obvious errors, but I expect that there are errors of grammar and possibly content that I did not discover before finalizing the note.    This note was electronically signed by Tevin COCHRAN.

## 2024-06-06 ENCOUNTER — OFFICE VISIT (OUTPATIENT)
Dept: URGENT CARE | Facility: CLINIC | Age: 44
End: 2024-06-06
Payer: MEDICAID

## 2024-06-06 ENCOUNTER — APPOINTMENT (OUTPATIENT)
Dept: RADIOLOGY | Facility: IMAGING CENTER | Age: 44
End: 2024-06-06
Attending: REGISTERED NURSE
Payer: MEDICAID

## 2024-06-06 ENCOUNTER — HOSPITAL ENCOUNTER (OUTPATIENT)
Dept: LAB | Facility: MEDICAL CENTER | Age: 44
End: 2024-06-06
Attending: REGISTERED NURSE
Payer: MEDICAID

## 2024-06-06 VITALS
BODY MASS INDEX: 49.32 KG/M2 | TEMPERATURE: 97.2 F | HEIGHT: 62 IN | WEIGHT: 268 LBS | HEART RATE: 92 BPM | SYSTOLIC BLOOD PRESSURE: 128 MMHG | RESPIRATION RATE: 14 BRPM | OXYGEN SATURATION: 100 % | DIASTOLIC BLOOD PRESSURE: 80 MMHG

## 2024-06-06 DIAGNOSIS — M79.89 LEG SWELLING: ICD-10-CM

## 2024-06-06 LAB
ALBUMIN SERPL BCP-MCNC: 4 G/DL (ref 3.2–4.9)
ALBUMIN/GLOB SERPL: 1.4 G/DL
ALP SERPL-CCNC: 103 U/L (ref 30–99)
ALT SERPL-CCNC: 15 U/L (ref 2–50)
ANION GAP SERPL CALC-SCNC: 12 MMOL/L (ref 7–16)
APPEARANCE UR: NORMAL
AST SERPL-CCNC: 15 U/L (ref 12–45)
BILIRUB SERPL-MCNC: 0.3 MG/DL (ref 0.1–1.5)
BILIRUB UR STRIP-MCNC: NEGATIVE MG/DL
BUN SERPL-MCNC: 17 MG/DL (ref 8–22)
CALCIUM ALBUM COR SERPL-MCNC: 9.4 MG/DL (ref 8.5–10.5)
CALCIUM SERPL-MCNC: 9.4 MG/DL (ref 8.5–10.5)
CHLORIDE SERPL-SCNC: 105 MMOL/L (ref 96–112)
CO2 SERPL-SCNC: 24 MMOL/L (ref 20–33)
COLOR UR AUTO: YELLOW
CREAT SERPL-MCNC: 0.64 MG/DL (ref 0.5–1.4)
GFR SERPLBLD CREATININE-BSD FMLA CKD-EPI: 112 ML/MIN/1.73 M 2
GLOBULIN SER CALC-MCNC: 2.8 G/DL (ref 1.9–3.5)
GLUCOSE SERPL-MCNC: 82 MG/DL (ref 65–99)
GLUCOSE UR STRIP.AUTO-MCNC: NEGATIVE MG/DL
KETONES UR STRIP.AUTO-MCNC: NEGATIVE MG/DL
LEUKOCYTE ESTERASE UR QL STRIP.AUTO: NEGATIVE
NITRITE UR QL STRIP.AUTO: NEGATIVE
NT-PROBNP SERPL IA-MCNC: 67 PG/ML (ref 0–125)
PH UR STRIP.AUTO: 5.5 [PH] (ref 5–8)
POTASSIUM SERPL-SCNC: 4.1 MMOL/L (ref 3.6–5.5)
PROT SERPL-MCNC: 6.8 G/DL (ref 6–8.2)
PROT UR QL STRIP: NEGATIVE MG/DL
RBC UR QL AUTO: NEGATIVE
SODIUM SERPL-SCNC: 141 MMOL/L (ref 135–145)
SP GR UR STRIP.AUTO: 1.02
UROBILINOGEN UR STRIP-MCNC: NORMAL MG/DL

## 2024-06-06 PROCEDURE — 71046 X-RAY EXAM CHEST 2 VIEWS: CPT | Mod: TC | Performed by: RADIOLOGY

## 2024-06-06 PROCEDURE — 36415 COLL VENOUS BLD VENIPUNCTURE: CPT

## 2024-06-06 PROCEDURE — 81002 URINALYSIS NONAUTO W/O SCOPE: CPT | Performed by: REGISTERED NURSE

## 2024-06-06 PROCEDURE — 3079F DIAST BP 80-89 MM HG: CPT | Performed by: REGISTERED NURSE

## 2024-06-06 PROCEDURE — 80053 COMPREHEN METABOLIC PANEL: CPT

## 2024-06-06 PROCEDURE — 83880 ASSAY OF NATRIURETIC PEPTIDE: CPT

## 2024-06-06 PROCEDURE — 3074F SYST BP LT 130 MM HG: CPT | Performed by: REGISTERED NURSE

## 2024-06-06 PROCEDURE — 99214 OFFICE O/P EST MOD 30 MIN: CPT | Performed by: REGISTERED NURSE

## 2024-06-06 ASSESSMENT — ENCOUNTER SYMPTOMS
COUGH: 0
HEMOPTYSIS: 0
ORTHOPNEA: 0
CHILLS: 0
FEVER: 0
PALPITATIONS: 0
ABDOMINAL PAIN: 0
SHORTNESS OF BREATH: 0
DIZZINESS: 0

## 2024-06-06 ASSESSMENT — FIBROSIS 4 INDEX: FIB4 SCORE: 0.34

## 2024-06-06 NOTE — LETTER
June 6, 2024         Patient: Farhana Freeman   YOB: 1980   Date of Visit: 6/6/2024           To Whom it May Concern:    Farhana Freeman was seen in my clinic on 6/6/2024. 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    If you have any questions or concerns, please don't hesitate to call.        Sincerely,           DELL Mary.  Electronically Signed

## 2024-06-06 NOTE — PROGRESS NOTES
"Subjective:   Farhana Freeman is a 44 y.o. female who presents for Foot Swelling (X 1 day / both Feet swelling/ pt states can't bend toes/ pressure pain)      HPI  This morning patient woke up and noticed bilateral equal swelling of the feet and ankles.  Does work on her feet a lot.  Is not having chest pain shortness of breath or orthopnea.  No history of heart failure.  No recent illness.  No treatments tried.  No history of DVT, no recent long travel, does not smoke.  Does note that she started a new medication called amitriptyline she took the first dose 2 nights ago did not take it last night.    Review of Systems   Constitutional:  Negative for chills and fever.   Respiratory:  Negative for cough, hemoptysis and shortness of breath.    Cardiovascular:  Positive for leg swelling. Negative for chest pain, palpitations and orthopnea.   Gastrointestinal:  Negative for abdominal pain.   Skin:  Negative for rash.   Neurological:  Negative for dizziness.       Allergies   Allergen Reactions    Flu Virus Vaccine Vomiting     Reports severe illness with nausea, vomiting and diarrhea.      Ibuprofen      1999-11-18;GI UPSET    Morphine Nausea     \"severe nausea vomiting\"    Nickel     Nsaids      Added per MD request    Sulfa Drugs      \"Causes blindness\"    Toradol      States that she can not have oral Toradol but can tolerate injection       Patient Active Problem List    Diagnosis Date Noted    Arthritis of left acromioclavicular joint 05/09/2024    Hx of gastric bypass 02/07/2023    Sore throat 11/21/2022    Restless legs 09/08/2022    Hot flashes 07/22/2022    Migraine 04/29/2022    Subclinical hypothyroidism 01/04/2022    Chronic fatigue 12/15/2021    Under care of pain management specialist 09/08/2021    Otalgia of both ears 07/28/2021    Class 3 severe obesity due to excess calories without serious comorbidity with body mass index (BMI) of 45.0 to 49.9 in adult (HCC) 06/16/2020    Stress incontinence " 06/02/2020    Cervical radiculopathy at C6 03/27/2020    Chronic neck pain 03/27/2020    Chronic back pain 03/27/2020    Left sided sciatica 03/27/2020    Iron deficiency anemia 03/27/2020    GERD (gastroesophageal reflux disease) 03/27/2020    Meralgia paresthetica, left 11/08/2019    Generalized anxiety disorder 05/29/2019    Vitamin D deficiency 05/29/2019    Asthma 11/01/2005       Current Outpatient Medications Ordered in Epic   Medication Sig Dispense Refill    Loratadine (CLARITIN) 10 MG Cap Take 1 Tablet by mouth 1 time a day as needed (allergies). 60 Capsule 0    gabapentin (NEURONTIN) 300 MG Cap Take 300 mg by mouth 3 times a day.      meclizine (ANTIVERT) 25 MG Tab Take 1 Tablet by mouth 3 times a day as needed for Dizziness. 30 Tablet 0    omeprazole (PRILOSEC) 40 MG delayed-release capsule TAKE 1 CAPSULE BY MOUTH TWO TIMES A DAY. 180 Capsule 2    escitalopram (LEXAPRO) 20 MG tablet TAKE 1 TABLET BY MOUTH EVERY DAY. 90 Tablet 3    albuterol 108 (90 Base) MCG/ACT Aero Soln inhalation aerosol Inhale 2 Puffs every 6 hours as needed for Shortness of Breath. 8.5 Each 2     No current Epic-ordered facility-administered medications on file.       Past Surgical History:   Procedure Laterality Date    DC ANTER COLPORRHAPHY,BLAD/VAGINA N/A 8/12/2020    Procedure: COLPORRHAPHY, ANTERIOR, TENSION FREE VAGINAL TAPE, PERINORRHAPHY;  Surgeon: Maria Elena Zhou M.D.;  Location: SURGERY SAME DAY [facility]S;  Service: Obstetrics    DC POST COLPORRHAPHY,RECTUM/VAGINA N/A 8/12/2020    Procedure: COLPORRHAPHY, POSTERIOR;  Surgeon: Maria Elena Zhou M.D.;  Location: SURGERY SAME DAY [facility] ORS;  Service: Obstetrics    VAGINAL HYSTERECTOMY TOTAL N/A 8/12/2020    Procedure: HYSTERECTOMY, TOTAL, VAGINAL;  Surgeon: Maria Elena Zhou M.D.;  Location: SURGERY SAME DAY [facility];  Service: Obstetrics    SALPINGECTOMY Bilateral 8/12/2020    Procedure: SALPINGECTOMY;  Surgeon: Maria Elena Zhou M.D.;  Location:  "SURGERY SAME DAY [facility]S;  Service: Obstetrics    CYSTOSCOPY N/A 8/12/2020    Procedure: CYSTOSCOPY;  Surgeon: Maria Elena Zhou M.D.;  Location: SURGERY SAME DAY [facility];  Service: Obstetrics    LITHOTRIPSY  06/2020    LUIS BY LAPAROSCOPY  8/9/2017    Procedure: LUIS BY LAPAROSCOPY;  Surgeon: Shelton Jackson M.D.;  Location: SURGERY [facility]S;  Service:     GASTRIC BYPASS LAPAROSCOPIC  5/12/2017    Procedure: GASTRIC BYPASS LAPAROSCOPIC - ISIAH EN Y AND HIATAL HERNIA REPAIR;  Surgeon: Shelton Jackson M.D.;  Location: SURGERY [facility]S;  Service:     OTHER ABDOMINAL SURGERY  10/12/15    vertical gastric sleeve       Social History     Tobacco Use    Smoking status: Never    Smokeless tobacco: Never   Vaping Use    Vaping status: Never Used   Substance Use Topics    Alcohol use: Yes     Comment: Rarely    Drug use: No       family history includes Breast Cancer in an other family member; Cancer (age of onset: 21) in her cousin; Diabetes in her father; Heart Attack (age of onset: 61) in her father; Hypertension in her father and mother; Kidney Disease in her mother; Other in her mother.     Problem list, medications, and allergies reviewed by myself today in Epic.     Objective:   /80   Pulse 92   Temp 36.2 °C (97.2 °F)   Resp 14   Ht 1.575 m (5' 2\")   Wt 122 kg (268 lb)   LMP 07/25/2020 (Exact Date)   SpO2 100%   BMI 49.02 kg/m²     Physical Exam  Vitals and nursing note reviewed.   Constitutional:       Appearance: Normal appearance. She is not ill-appearing or toxic-appearing.   HENT:      Mouth/Throat:      Mouth: Mucous membranes are moist.   Eyes:      Pupils: Pupils are equal, round, and reactive to light.   Cardiovascular:      Rate and Rhythm: Normal rate and regular rhythm.   Pulmonary:      Effort: Pulmonary effort is normal. No respiratory distress.      Breath sounds: Normal breath sounds. No wheezing, rhonchi or rales.      Comments: Diminished " bibasilarly  Musculoskeletal:         General: Normal range of motion.      Cervical back: Normal range of motion.      Right lower leg: Edema (1+ pitting) present.      Left lower leg: Edema (1+ pitting) present.   Skin:     General: Skin is warm and dry.      Capillary Refill: Capillary refill takes less than 2 seconds.      Findings: No rash.   Neurological:      General: No focal deficit present.      Mental Status: She is alert and oriented to person, place, and time.      Cranial Nerves: No cranial nerve deficit.   Psychiatric:         Mood and Affect: Mood normal.       Well score of 0      Lab Results/POC Test Results   Results for orders placed or performed in visit on 06/06/24   POCT Urinalysis   Result Value Ref Range    POC Color YELLOW Negative    POC Appearance SLIGHTLY CLOUDY Negative    POC Glucose NEGATIVE Negative mg/dL    POC Bilirubin NEGATIVE Negative mg/dL    POC Ketones NEGATIVE Negative mg/dL    POC Specific Gravity 1.025 <1.005 - >1.030    POC Blood NEGATIVE Negative    POC Urine PH 5.5 5.0 - 8.0    POC Protein NEGATIVE Negative mg/dL    POC Urobiligen 0.2 E.U./DL Negative (0.2) mg/dL    POC Nitrites NEGATIVE Negative    POC Leukocyte Esterase NEGATIVE Negative           RADIOLOGY RESULTS   DX-CHEST-2 VIEWS    Result Date: 6/6/2024 6/6/2024 9:26 AM HISTORY/REASON FOR EXAM:  new onset bilateral lower extremity swelling, diminished sounds in bases. TECHNIQUE/EXAM DESCRIPTION AND NUMBER OF VIEWS: Two views of the chest. COMPARISON:  None. FINDINGS: The heart is mildly enlarged. No pulmonary infiltrates or consolidations are noted. No pleural effusions are appreciated.     1.  Mild cardiomegaly. 2.  No consolidations identified.           Assessment/Plan:     I personally reviewed prior external notes and test results pertinent to today's visit as well as additional imaging and testing completed in clinic today. Shared decision-making was utilized with patient for treatment plan.     1. Leg  swelling  DX-CHEST-2 VIEWS    POCT Urinalysis        Very pleasant 44-year-old female with no history of heart failure presenting with new onset bilateral lower extremity edema 1+ and pitting from the ankle to foot.  No risk factors for blood clot and had Wells score of 0.  Did start amitriptyline 2 days ago but did not take a dose last night.  Does work on her feet.  No history of kidney dysfunction.  In clinic vitals are all reassuring good oxygenation no tachycardia normotensive.  Completely unremarkable exam findings with the exception of 1+ pitting edema to the bilateral lower extremities and some diminished lung sounds bibasilarly.  She is not in distress.  No erythema warmth or tenderness of the lower extremities.  Did complete a UA which shows no signs of kidney dysfunction.  Chest x-ray which shows no pleural effusions or acute disease.  BNP and CMP ordered, there were no significant abnormalities identified on these.  Discussed keeping feet elevated is much as possible.  Compression stockings.  Monitor salt intake.  Hold amitriptyline until talks with prescribing provider.    Medication discussed included indication for use and the potential benefits and side effects. Education was provided regarding the aforementioned assessments. All of the patient's questions were answered to their satisfaction at the time of discharge. Patient was encouraged to monitor symptoms closely and we reviewed the signs and symptoms which would warrant concern and mandate seeking a higher level of service through the emergency department. Patient stated agreement and understanding of this plan of care.     Please note that this dictation was created using voice recognition software. I have made every reasonable attempt to correct obvious errors, but I expect that there are errors of grammar and possibly content that I did not discover before finalizing the note.    This note was electronically signed by JESSICA Mary

## 2024-06-17 ENCOUNTER — OFFICE VISIT (OUTPATIENT)
Dept: MEDICAL GROUP | Facility: CLINIC | Age: 44
End: 2024-06-17
Payer: MEDICAID

## 2024-06-17 VITALS
DIASTOLIC BLOOD PRESSURE: 70 MMHG | SYSTOLIC BLOOD PRESSURE: 125 MMHG | RESPIRATION RATE: 16 BRPM | BODY MASS INDEX: 49.87 KG/M2 | WEIGHT: 271 LBS | HEIGHT: 62 IN | OXYGEN SATURATION: 97 % | HEART RATE: 64 BPM | TEMPERATURE: 97.2 F

## 2024-06-17 DIAGNOSIS — Z13.79 GENETIC SCREENING: ICD-10-CM

## 2024-06-17 DIAGNOSIS — M19.012 ARTHRITIS OF LEFT ACROMIOCLAVICULAR JOINT: Primary | ICD-10-CM

## 2024-06-17 PROCEDURE — 99213 OFFICE O/P EST LOW 20 MIN: CPT | Performed by: STUDENT IN AN ORGANIZED HEALTH CARE EDUCATION/TRAINING PROGRAM

## 2024-06-17 PROCEDURE — 3074F SYST BP LT 130 MM HG: CPT | Performed by: STUDENT IN AN ORGANIZED HEALTH CARE EDUCATION/TRAINING PROGRAM

## 2024-06-17 PROCEDURE — 3078F DIAST BP <80 MM HG: CPT | Performed by: STUDENT IN AN ORGANIZED HEALTH CARE EDUCATION/TRAINING PROGRAM

## 2024-06-17 ASSESSMENT — FIBROSIS 4 INDEX: FIB4 SCORE: 0.33

## 2024-06-17 NOTE — PROGRESS NOTES
"Family Medicine Clinic Note    Date: 6/17/2024    PPE used by provider during encounter: surgical mask    ASSESSMENT & PLAN    1. Arthritis of left acromioclavicular joint  Reviewed xray showing some ac joint arthritis. Improvement with injection which is encouraging. Discussed that physical therapy is going to be best option to strengthen the shoulder and help with pain. Clicking likely related to shoulder instability. Provided basic shoulder stretches and exercises to get started while waiting for physical therapy. Follow up if no improvement with physical therapy.    2. Genetic screening  Discussed, interested in healthy [place] and [place]  - Referral to Genetic Research Studies              SUBJECTIVE    Chief Complaint   Patient presents with    Shoulder Pain     Left Shoulder pain        Farhana is a 44 y.o. person presenting today with the following concerns:    L shoulder pain  Physical therapy - said can only do one appointment as a time (seeing for knees)  Has appointment later this week to get started going to switch to shoulder  Does get benefits through [employer] starting July 1 hoping to be able to get different physical therapy  Icing, taking oxycodone (chronic pain) - through pain managmeent  Prefers heat but too hot out right now  Got some relief from injection - mostly clicking noise that bothers her  Arm feels tired   Not doing too much lifting, pulling etc. Works as front end      OBJECTIVE  /70 (BP Location: Right arm, Patient Position: Sitting, BP Cuff Size: Large adult)   Pulse 64   Temp 36.2 °C (97.2 °F) (Temporal)   Resp 16   Ht 1.575 m (5' 2\")   Wt 123 kg (271 lb)   LMP 07/25/2020 (Exact Date)   SpO2 97%   BMI 49.57 kg/m²      General: Well appearing, NAD  Lungs: breathing comfortably  Left shoulder: mild tenderness at AC joint, no posterior tenderness, good internal and external rotation, clicking felt anteriorly when doing rowing motion    Total time I spent in care of " this patient today (excluding time spent on other billable services) was 20 minutes.    Germain Barber MD   Family and Community Medicine  [facility]  [facility]

## 2024-09-12 ENCOUNTER — HOSPITAL ENCOUNTER (OUTPATIENT)
Facility: MEDICAL CENTER | Age: 44
End: 2024-09-12
Attending: STUDENT IN AN ORGANIZED HEALTH CARE EDUCATION/TRAINING PROGRAM
Payer: COMMERCIAL

## 2024-09-12 LAB
C DIFF DNA SPEC QL NAA+PROBE: NEGATIVE
C DIFF TOX GENS STL QL NAA+PROBE: NEGATIVE
C PARVUM AG STL QL IA.RAPID: NEGATIVE
G LAMBLIA AG STL QL IA.RAPID: NEGATIVE
H PYLORI AG STL QL IA: DETECTED
LACTOFERRIN STL QL IA: POSITIVE

## 2024-09-12 PROCEDURE — 87046 STOOL CULTR AEROBIC BACT EA: CPT

## 2024-09-12 PROCEDURE — 87045 FECES CULTURE AEROBIC BACT: CPT

## 2024-09-12 PROCEDURE — 87328 CRYPTOSPORIDIUM AG IA: CPT

## 2024-09-12 PROCEDURE — 87493 C DIFF AMPLIFIED PROBE: CPT

## 2024-09-12 PROCEDURE — 83630 LACTOFERRIN FECAL (QUAL): CPT

## 2024-09-12 PROCEDURE — 87329 GIARDIA AG IA: CPT

## 2024-09-12 PROCEDURE — 87899 AGENT NOS ASSAY W/OPTIC: CPT

## 2024-09-12 PROCEDURE — 87338 HPYLORI STOOL AG IA: CPT

## 2024-09-12 PROCEDURE — 87077 CULTURE AEROBIC IDENTIFY: CPT

## 2024-09-13 LAB
E COLI SXT1+2 STL IA: NORMAL
SIGNIFICANT IND 70042: NORMAL
SITE SITE: NORMAL
SOURCE SOURCE: NORMAL

## 2024-09-15 LAB
BACTERIA STL CULT: NORMAL
E COLI SXT1+2 STL IA: NORMAL
SIGNIFICANT IND 70042: NORMAL
SITE SITE: NORMAL
SOURCE SOURCE: NORMAL

## 2024-09-19 ENCOUNTER — APPOINTMENT (OUTPATIENT)
Dept: URGENT CARE | Facility: CLINIC | Age: 44
End: 2024-09-19
Payer: COMMERCIAL

## 2025-04-05 ENCOUNTER — OFFICE VISIT (OUTPATIENT)
Dept: URGENT CARE | Facility: CLINIC | Age: 45
End: 2025-04-05
Payer: COMMERCIAL

## 2025-04-05 VITALS
SYSTOLIC BLOOD PRESSURE: 128 MMHG | HEIGHT: 65 IN | TEMPERATURE: 97.4 F | WEIGHT: 271 LBS | OXYGEN SATURATION: 96 % | DIASTOLIC BLOOD PRESSURE: 82 MMHG | BODY MASS INDEX: 45.15 KG/M2 | HEART RATE: 60 BPM | RESPIRATION RATE: 16 BRPM

## 2025-04-05 DIAGNOSIS — T36.95XA ANTIBIOTIC-INDUCED YEAST INFECTION: ICD-10-CM

## 2025-04-05 DIAGNOSIS — B37.9 ANTIBIOTIC-INDUCED YEAST INFECTION: ICD-10-CM

## 2025-04-05 DIAGNOSIS — L03.031 PARONYCHIA OF GREAT TOE OF RIGHT FOOT: ICD-10-CM

## 2025-04-05 PROCEDURE — 3074F SYST BP LT 130 MM HG: CPT | Performed by: STUDENT IN AN ORGANIZED HEALTH CARE EDUCATION/TRAINING PROGRAM

## 2025-04-05 PROCEDURE — 99213 OFFICE O/P EST LOW 20 MIN: CPT | Performed by: STUDENT IN AN ORGANIZED HEALTH CARE EDUCATION/TRAINING PROGRAM

## 2025-04-05 PROCEDURE — 3079F DIAST BP 80-89 MM HG: CPT | Performed by: STUDENT IN AN ORGANIZED HEALTH CARE EDUCATION/TRAINING PROGRAM

## 2025-04-05 RX ORDER — FLUCONAZOLE 150 MG/1
TABLET ORAL
Qty: 2 TABLET | Refills: 0 | Status: SHIPPED | OUTPATIENT
Start: 2025-04-05

## 2025-04-05 RX ORDER — CEPHALEXIN 500 MG/1
1000 CAPSULE ORAL 2 TIMES DAILY
Qty: 20 CAPSULE | Refills: 0 | Status: SHIPPED | OUTPATIENT
Start: 2025-04-05 | End: 2025-04-10

## 2025-04-05 RX ORDER — METFORMIN HYDROCHLORIDE 500 MG/1
TABLET, EXTENDED RELEASE ORAL
COMMUNITY

## 2025-04-05 NOTE — PROGRESS NOTES
reSubjective:   CHIEF COMPLAINT  Chief Complaint   Patient presents with    Nail Problem     X 3 days/ L 1st toe nail/ possible ingrown nail        HPI    History of Present Illness  Farhana Freeman is a 44 y.o. female who presents for evaluation of a toe infection.    She reports yesterday a sensation of a foreign body in her right great toe, which she attempted to extract using tweezers, resulting in the discharge of pus.  She removed part of the nail plate.  Overnight she developed worsening pain and redness.  Reports pain is aggravated with light touch.   She does not have a podiatrist but acknowledges the need for one. She has been experiencing sleep disturbances due to the discomfort. She is seeking a topical analgesic for pain management as she is unable to take ibuprofen due to a history of gastric surgery. She has hydrocodone at home, but it did not alleviate the pain. She has been managing the condition with Epsom salt baths and peroxide application. She has a history of similar episodes.    ALLERGIES  The patient has no known allergies.    MEDICATIONS  Current: hydrocodone        REVIEW OF SYSTEMS  General: no fever or chills  GI: no nausea or vomiting  See HPI for further details.    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), Intertriginous dermatitis associated with moisture (6/16/2020), Kidney disease, Migraine, Morbid obesity (HCC) (5/12/2017), Muscle disorder, Non-intractable vomiting with nausea (7/13/2021), Pain of left upper extremity (5/19/2021), Postural hypotension, Psychiatric problem, Radicular pain of left lower extremity (5/29/2019), Renal stone (3/27/2020), Right ear pain (7/28/2021),  "Sinus congestion (5/18/2022), Spider bite (11/11/2021), Sprain of lateral ligament of ankle joint (9/8/2021), Status post gastric surgery (5/29/2019), Symptomatic abdominal panniculus (6/16/2020), Urinary bladder disorder, and Urinary incontinence.    SURGICAL HISTORY   has a past surgical history that includes other abdominal surgery (10/12/15); gastric bypass laparoscopic (5/12/2017); adeline by laparoscopy (8/9/2017); lithotripsy (06/2020); anter colporrhaphy,blad/vagina (N/A, 8/12/2020); post colporrhaphy,rectum/vagina (N/A, 8/12/2020); vaginal hysterectomy total (N/A, 8/12/2020); salpingectomy (Bilateral, 8/12/2020); and cystoscopy (N/A, 8/12/2020).    ALLERGIES  Allergies   Allergen Reactions    Flu Virus Vaccine Vomiting     Reports severe illness with nausea, vomiting and diarrhea.      Ibuprofen      1999-11-18;GI UPSET    Morphine Nausea     \"severe nausea vomiting\"    Nickel     Nsaids      Added per MD request    Sulfa Drugs      \"Causes blindness\"    Toradol      States that she can not have oral Toradol but can tolerate injection       CURRENT MEDICATIONS  Home Medications       Reviewed by Silver[facility] Med [employer]'t (Medical Assistant) on 04/05/25 at 0818  Med List Status: <None>     Medication Last Dose Status   albuterol 108 (90 Base) MCG/ACT Aero Soln inhalation aerosol Taking Active   escitalopram (LEXAPRO) 20 MG tablet Taking Active   gabapentin (NEURONTIN) 300 MG Cap Taking Active   Loratadine (CLARITIN) 10 MG Cap Taking Active   meclizine (ANTIVERT) 25 MG Tab Taking Active   metFORMIN ER (GLUCOPHAGE XR) 500 MG TABLET SR 24 HR Taking Active   omeprazole (PRILOSEC) 40 MG delayed-release capsule Taking Active                    SOCIAL HISTORY  Social History     Tobacco Use    Smoking status: Never    Smokeless tobacco: Never   Vaping Use    Vaping status: Never Used   Substance and Sexual Activity    Alcohol use: Yes     Comment: Rarely    Drug use: No    Sexual activity: Yes     " "Partners: Male     Birth control/protection: Female Sterilization       FAMILY HISTORY  Family History   Problem Relation Age of Onset    Kidney Disease Mother         Stage 3-4    Hypertension Mother     Other Mother         cervical dysplasia treated by cryo    Diabetes Father     Heart Attack Father 61        Status post triple bypass    Hypertension Father     Cancer Cousin 21        Benign breast cancer    Breast Cancer Other         Maternal grandmother's sister          Objective:   PHYSICAL EXAM  VITAL SIGNS: /82   Pulse 60   Temp 36.3 °C (97.4 °F) (Temporal)   Resp 16   Ht 1.651 m (5' 5\")   Wt 123 kg (271 lb)   LMP 07/25/2020 (Exact Date)   SpO2 96%   BMI 45.10 kg/m²     Gen: no acute distress, normal voice  Skin: dry, intact, moist mucosal membranes  Eyes: No conjunctival injection b/l  Neck: Normal range of motion. No meningeal signs.   MSK: Right great toe: 25% of medial nail plate removed with erythema and edema of the nailbed and hyponychium.  No indurated or fluctuant lesions.    Assessment/Plan:     1. Paronychia of great toe of right foot  cephALEXin (KEFLEX) 500 MG Cap    Referral to Podiatry    lidocaine (XYLOCAINE) 2% Gel      2. Antibiotic-induced yeast infection  fluconazole (DIFLUCAN) 150 MG tablet      Not amenable to I&D  -Ordered Keflex  -ordered Diflucan if develop yeast infection  -Ordered topical lidocaine for symptomatic relief  -Ordered referral to establish with podiatry  -Return to urgent care any new/worsening symptoms or further questions or concerns.  Patient understood everything discussed.  All questions were answered.            Please note that this dictation was created using voice recognition software. I have made a reasonable attempt to correct obvious errors, but I expect that there are errors of grammar and possibly content that I did not discover before finalizing the note.         "

## 2025-04-09 NOTE — Clinical Note
REFERRAL APPROVAL NOTICE         Sent on April 9, 2025                   Farhana Freeman  [address] [facility] [address]                   Dear Ms. Freeman,    After a careful review of the medical information and benefit coverage, [facility] has processed your referral. See below for additional details.    If applicable, you must be actively enrolled with your insurance for coverage of the authorized service. If you have any questions regarding your coverage, please contact your insurance directly.    REFERRAL INFORMATION   Referral #:  [id number]  Referred-To Provider    Referred-By Provider:  Podiatry    Randy Chun D.O.   [facility]      [address]  [address]  [facility] [address]  [phone] [address]  [facility] [address]  [phone]    Referral Start Date:  04/05/2025  Referral End Date:   04/05/2026             SCHEDULING  If you do not already have an appointment, please call [phone] to make an appointment.     MORE INFORMATION  If you do not already have a [employer] account, sign up at: [email or web address]  You can access your medical information, make appointments, see lab results, billing information, and more.  If you have questions regarding this referral, please contact  the [facility] Referrals department at:             [phone]. Monday - Friday 8:00AM - 5:00PM.     Sincerely,    [facility]

## 2025-04-10 ENCOUNTER — OFFICE VISIT (OUTPATIENT)
Dept: URGENT CARE | Facility: CLINIC | Age: 45
End: 2025-04-10
Payer: COMMERCIAL

## 2025-04-10 VITALS
SYSTOLIC BLOOD PRESSURE: 128 MMHG | OXYGEN SATURATION: 99 % | DIASTOLIC BLOOD PRESSURE: 76 MMHG | HEART RATE: 65 BPM | RESPIRATION RATE: 18 BRPM | WEIGHT: 271 LBS | TEMPERATURE: 97 F | HEIGHT: 65 IN | BODY MASS INDEX: 45.15 KG/M2

## 2025-04-10 DIAGNOSIS — L03.031 PARONYCHIA OF GREAT TOE OF RIGHT FOOT: ICD-10-CM

## 2025-04-10 PROCEDURE — 3074F SYST BP LT 130 MM HG: CPT | Performed by: PHYSICIAN ASSISTANT

## 2025-04-10 PROCEDURE — 99213 OFFICE O/P EST LOW 20 MIN: CPT | Performed by: PHYSICIAN ASSISTANT

## 2025-04-10 PROCEDURE — 3078F DIAST BP <80 MM HG: CPT | Performed by: PHYSICIAN ASSISTANT

## 2025-04-10 RX ORDER — DOXYCYCLINE HYCLATE 100 MG
100 TABLET ORAL 2 TIMES DAILY
Qty: 14 TABLET | Refills: 0 | Status: SHIPPED | OUTPATIENT
Start: 2025-04-10 | End: 2025-04-17

## 2025-04-10 ASSESSMENT — ENCOUNTER SYMPTOMS
CHILLS: 0
FEVER: 0

## 2025-04-10 NOTE — PATIENT INSTRUCTIONS
Referral information sent to the following:     Podiatry     [facility]  [address]  [place] [address]  Phone: [phone]     Patient Care Coordination notes: referral faxed

## 2025-04-10 NOTE — PROGRESS NOTES
"Subjective:   Farhana Freeman  is a 44 y.o. female who presents for Ingrown Toenail (Not improving since last visit. )      Other  This is a new problem. The current episode started in the past 7 days. Pertinent negatives include no chills or fever.   Patient returns urgent care 5 days status post last evaluation.  At that time she was treated with Keflex and  a referral was placed for follow-up with podiatry.  She reports completing the course of Keflex.  She has been doing Epsom salt soaks.  She complains of continued pain to paronychia of great toe.  She denies fevers or chills.  Notes continued purulent discharge.  Notes past medical history of similar.  Notes remote history of MRSA.    Review of Systems   Constitutional:  Negative for chills and fever.   Musculoskeletal:         Ingrown great toenail       Allergies   Allergen Reactions    Flu Virus Vaccine Vomiting     Reports severe illness with nausea, vomiting and diarrhea.      Ibuprofen      1999-11-18;GI UPSET    Morphine Nausea     \"severe nausea vomiting\"    Nickel     Nsaids      Added per MD request    Sulfa Drugs      \"Causes blindness\"    Toradol      States that she can not have oral Toradol but can tolerate injection        Objective:   /76   Pulse 65   Temp 36.1 °C (97 °F)   Resp 18   Ht 1.651 m (5' 5\")   Wt 123 kg (271 lb)   LMP 07/25/2020 (Exact Date)   SpO2 99%   BMI 45.10 kg/m²     Physical Exam  Vitals and nursing note reviewed.   Constitutional:       General: She is not in acute distress.     Appearance: She is well-developed. She is not diaphoretic.   HENT:      Head: Normocephalic and atraumatic.      Right Ear: External ear normal.      Left Ear: External ear normal.      Nose: Nose normal.   Eyes:      General: Lids are normal. No scleral icterus.        Right eye: No discharge.         Left eye: No discharge.      Conjunctiva/sclera: Conjunctivae normal.   Pulmonary:      Effort: Pulmonary effort is normal. No " respiratory distress.   Musculoskeletal:         General: Normal range of motion.      Cervical back: Neck supple.      Comments: Right great toe with erythema surrounding distal toe and extending just proximal to proximal nail fold, no fluctuance, nonpointing, trace purulent discharge, approximately a third of the nails been removed   Skin:     General: Skin is warm and dry.      Coloration: Skin is not pale.      Findings: No erythema.   Neurological:      Mental Status: She is alert and oriented to person, place, and time. She is not disoriented.   Psychiatric:         Speech: Speech normal.         Behavior: Behavior normal.         Assessment/Plan:   1. Paronychia of great toe of right foot  - doxycycline (VIBRAMYCIN) 100 MG Tab; Take 1 Tablet by mouth 2 times a day for 7 days.  Dispense: 14 Tablet; Refill: 0  Supportive care is reviewed with patient/caregiver - recommend to push PO fluids and electrolytes, continue salt water soaks, mechanical barrier use reviewed, given podiatry referral information  Return to clinic with lack of resolution or progression of symptoms.      I have worn an N95 mask, gloves and eye protection for the entire encounter with this patient.     Differential diagnosis, natural history, supportive care, and indications for immediate follow-up discussed.

## 2025-05-19 NOTE — Clinical Note
REFERRAL APPROVAL NOTICE         Sent on May 19, 2025                   Farhana Freeman  [address] [facility] [address]                   Dear Ms. Freeman,    After a careful review of the medical information and benefit coverage, [facility] has processed your referral. See below for additional details.    If applicable, you must be actively enrolled with your insurance for coverage of the authorized service. If you have any questions regarding your coverage, please contact your insurance directly.    REFERRAL INFORMATION   Referral #:  [id number]  Referred-To Department    Referred-By Provider:  Gynecology    ANIRUDH JOSEPH P.A.-C.   [facility]      [address]  [facility] [address]  [phone] [address] [facility], [address]  [facility] [id number] [phone]    Referral Start Date:  05/19/2025  Referral End Date:   05/19/2026             SCHEDULING  If you do not already have an appointment, please call [phone] to make an appointment.     MORE INFORMATION  If you do not already have a [employer] account, sign up at: [email or web address]  You can access your medical information, make appointments, see lab results, billing information, and more.  If you have questions regarding this referral, please contact  the [facility] Referrals department at:             [phone]. Monday - Friday 8:00AM - 5:00PM.     Sincerely,    [facility]

## 2025-06-04 ENCOUNTER — APPOINTMENT (OUTPATIENT)
Dept: URGENT CARE | Facility: CLINIC | Age: 45
End: 2025-06-04
Payer: COMMERCIAL

## 2025-06-21 ENCOUNTER — OFFICE VISIT (OUTPATIENT)
Dept: URGENT CARE | Facility: CLINIC | Age: 45
End: 2025-06-21
Payer: COMMERCIAL

## 2025-06-21 VITALS
DIASTOLIC BLOOD PRESSURE: 68 MMHG | HEART RATE: 74 BPM | WEIGHT: 258.8 LBS | OXYGEN SATURATION: 95 % | RESPIRATION RATE: 16 BRPM | HEIGHT: 62 IN | BODY MASS INDEX: 47.62 KG/M2 | SYSTOLIC BLOOD PRESSURE: 124 MMHG | TEMPERATURE: 97.6 F

## 2025-06-21 DIAGNOSIS — M79.2 NEUROPATHIC PAIN: Primary | ICD-10-CM

## 2025-06-21 PROCEDURE — 99214 OFFICE O/P EST MOD 30 MIN: CPT | Performed by: STUDENT IN AN ORGANIZED HEALTH CARE EDUCATION/TRAINING PROGRAM

## 2025-06-21 RX ORDER — KETOROLAC TROMETHAMINE 15 MG/ML
15 INJECTION, SOLUTION INTRAMUSCULAR; INTRAVENOUS ONCE
Status: COMPLETED | OUTPATIENT
Start: 2025-06-21 | End: 2025-06-21

## 2025-06-21 RX ORDER — KETOROLAC TROMETHAMINE 15 MG/ML
15 INJECTION, SOLUTION INTRAMUSCULAR; INTRAVENOUS ONCE
Status: DISCONTINUED | OUTPATIENT
Start: 2025-06-21 | End: 2025-06-21

## 2025-06-21 RX ADMIN — KETOROLAC TROMETHAMINE 15 MG: 15 INJECTION, SOLUTION INTRAMUSCULAR; INTRAVENOUS at 10:40

## 2025-06-21 NOTE — PROGRESS NOTES
"Urgent Care Visit Note  [facility]    06/21/25    Farhana Freeman     [address] [facility] [place] [place] [address]     PCP: Pcp Pt States None     Subjective:     Chief Complaint   Patient presents with    Leg Pain     Burning & pins & needles in my legs       HPI:  Farhana Freeman is a 45 y.o. female who presents for a burning pins-and-needles sensation in her anterior legs bilaterally.  The patient reports that she has had this condition for many years.  She has seen a pain management specialist long-term and has been prescribed Lyrica in addition to Norco, which have kept her symptoms under control.  She reports that she has been told that the sensation could be from multiple conditions including neuropathy, sciatica, and meralgia paresthetica.  She reports that her medications keep the symptoms very limited.  However, the patient is in the process of switching physicians and therefore, has ran out of [place].  She has an appointment with a new physician in roughly 10 days who has agreed to take over her pain medication.    Denies any bowel or bladder changes.  Denies any numbness or weakness.  Denies any saddle anesthesia.  Denies any new onset low back pain.  Otherwise has been doing well and has been at her baseline.    ROS:  ROS     CURRENT MEDICATIONS:  Encounter Medications with Dispense Information[1]    ALLERGIES:   Allergies[2]    PROBLEM LIST:    does not have any pertinent problems on file.    Allergies, Medications, & Tobacco/Substance Use were reconciled by the Medical Assistant and reviewed by myself.     Objective:     /68 (BP Location: Left arm, Patient Position: Sitting)   Pulse 74   Temp 36.4 °C (97.6 °F) (Temporal)   Resp 16   Ht 1.575 m (5' 2\")   Wt 117 kg (258 lb 12.8 oz)   LMP 07/25/2020 (Exact Date)   SpO2 95%   BMI 47.34 kg/m²     Physical Exam  HENT:      Head: Normocephalic and atraumatic.      Right Ear: External ear normal.      Left Ear: External ear normal.      Nose: " Nose normal.      Mouth/Throat:      Mouth: Mucous membranes are moist.   Eyes:      General:         Right eye: No discharge.         Left eye: No discharge.      Extraocular Movements: Extraocular movements intact.      Pupils: Pupils are equal, round, and reactive to light.   Cardiovascular:      Rate and Rhythm: Normal rate.      Pulses: Normal pulses.   Pulmonary:      Effort: Pulmonary effort is normal.   Abdominal:      General: Abdomen is flat.   Skin:     General: Skin is warm.      Capillary Refill: Capillary refill takes less than 2 seconds.   Neurological:      Mental Status: She is alert and oriented to person, place, and time.      Cranial Nerves: No cranial nerve deficit.      Sensory: No sensory deficit.      Motor: No weakness.      Coordination: Coordination normal.      Gait: Gait normal.      Deep Tendon Reflexes:      Reflex Scores:       Patellar reflexes are 2+ on the right side and 2+ on the left side.       Achilles reflexes are 2+ on the right side and 2+ on the left side.  Psychiatric:         Mood and Affect: Mood normal.         Behavior: Behavior normal.           Lab Results/POC Test Results       Assessment/Plan:     Patient's history and physical exam consistent with:    Assessment & Plan  Neuropathic pain    Orders:    ketorolac (Toradol) 15 MG/ML injection 15 mg      Discussed the patient's current condition and history in depth today.  Patient's pain appears to be neuropathic in nature.  Difficult to say exact etiology of the patient's pain.  Last lumbar MRI that I can see results for did not show any signs of significant spinal stenosis in the lumbar spine that could explain the patient's bilateral symptoms.  I favor a possible neuropathy of unknown origin.  Does have a mildly elevated hemoglobin A1c but not diabetic.  Recommend the patient talks with her primary care doctor about other testing for possible neuropathy such as vitamin deficiencies and hormone imbalances as  indicated.  Low suspicion for lumbar spinal pathology as the cause of symptoms today.  No signs or symptoms of cauda equina.  No signs or symptoms of any emergent condition today.  Requesting Toradol injection, which has helped with these types of pain in the past.  This is reasonable considering the circumstances.  She cannot use Toradol orally due to history of gastric bypass but rare use of intramuscular Toradol is low risk for causing significant problems.  Educated on follow-up as needed.  Return precautions and ER precautions given.    Discussed differential diagnosis, management options, risks/benefits, and alternatives to planned treatment. Pt expressed understanding and the treatment plan was agreed upon. Questions were encouraged and answered. Pt encouraged to return to urgent care as needed if new or worsening symptoms or if there is no improvement in condition. Pt educated in red flags and indications to immediately call 911 or present to the Emergency Department. Advised the patient to follow-up with the primary care physician for recheck, reevaluation, and further management.    I personally reviewed prior external notes and test results pertinent to today's visit. I have independently reviewed and interpreted all diagnostics ordered during this visit.    Please note that this dictation was created using voice recognition software. I have made a reasonable attempt to correct obvious errors, but I expect that there are errors of grammar and possibly content that I did not discover before finalizing the note.    This note was electronically signed by Brad Alegre MD               [1]   Current Outpatient Medications   Medication Sig Refill Last Dispense    albuterol 108 (90 Base) MCG/ACT Aero Soln inhalation aerosol Inhale 2 Puffs every 6 hours as needed for Shortness of Breath. 2 Unknown (outside pharmacy)    escitalopram (LEXAPRO) 20 MG tablet TAKE 1 TABLET BY MOUTH EVERY DAY. 3 Unknown (outside  "pharmacy)    fluconazole (DIFLUCAN) 150 MG tablet If develop yeast infection, take 1 tablet.  If still symptomatic in 3 days, take second tablet. (Patient not taking: Reported on 4/10/2025) 0 Unknown (outside pharmacy)    gabapentin (NEURONTIN) 300 MG Cap Take 300 mg by mouth 3 times a day.  Unknown (patient-reported)    lidocaine (XYLOCAINE) 2% Gel Apply 1 Application topically 4 times a day as needed (pain). 0 Unknown (outside pharmacy)    Loratadine (CLARITIN) 10 MG Cap Take 1 Tablet by mouth 1 time a day as needed (allergies). 0 Unknown (outside pharmacy)    meclizine (ANTIVERT) 25 MG Tab Take 1 Tablet by mouth 3 times a day as needed for Dizziness. 0 Unknown (outside pharmacy)    metFORMIN ER (GLUCOPHAGE XR) 500 MG TABLET SR 24 HR   Unknown (patient-reported)    omeprazole (PRILOSEC) 40 MG delayed-release capsule TAKE 1 CAPSULE BY MOUTH TWO TIMES A DAY. 2 Unknown (outside pharmacy)   [2]   Allergies  Allergen Reactions    Flu Virus Vaccine Vomiting     Reports severe illness with nausea, vomiting and diarrhea.      Ibuprofen      1999-11-18;GI UPSET    Morphine Nausea     \"severe nausea vomiting\"    Nickel     Nsaids      Added per MD request    Sulfa Drugs      \"Causes blindness\"    Toradol      States that she can not have oral Toradol but can tolerate injection     "

## 2025-06-21 NOTE — PATIENT INSTRUCTIONS
Thank you for trusting [facility] for your medical care today. You were seen by Brad Alegre MD. We hope that we were able to answer all of your questions, alleviate concerns, and create a plan going forward. If you need anything from us, don't hesitate to reach out.     If you develop any new or worsening symptoms that you believe need urgent or emergent evaluation, be sure to be reevaluated in urgent care or the emergency room.     Brad Alegre MD  Urgent Care

## 2025-06-21 NOTE — LETTER
June 21, 2025    To Whom It May Concern:         This is confirmation that Farhana Freeman attended her scheduled appointment with Brad Alegre M.D. on 6/21/25.  Please excuse from work from 6/21/2025 to 6/23/2025 due to an injury.         If you have any questions please do not hesitate to call me at the phone number listed below.    Sincerely,          Brad Alegre M.D.  [phone]

## 2025-06-21 NOTE — LETTER
June 21, 2025    To Whom It May Concern:         This is confirmation that Farhana Freeman attended her scheduled appointment with Brad Alegre M.D. on 6/21/25.  Please excuse from work on 6/21/2025 due to a medical condition.         If you have any questions please do not hesitate to call me at the phone number listed below.    Sincerely,          Brad Alegre M.D.  [phone]

## (undated) DEVICE — SUTURE GENERAL

## (undated) DEVICE — LACTATED RINGERS INJ 1000 ML - (14EA/CA 60CA/PF)

## (undated) DEVICE — GOWN SURGEONS X-LARGE - DISP. (30/CA)

## (undated) DEVICE — SET EXTENSION WITH 2 PORTS (48EA/CA) ***PART #2C8610 IS A SUBSTITUTE*****

## (undated) DEVICE — WATER IRRIG. STER. 1500 ML - (9/CA)

## (undated) DEVICE — GLOVE BIOGEL SZ 6.5 SURGICAL PF LTX (50PR/BX 4BX/CA)

## (undated) DEVICE — CLOSURE SKIN STRIP 1/2 X 4 IN - (STERI STRIP) (50/BX 4BX/CA)

## (undated) DEVICE — ELECTRODE 5MM LHK LAPSCP STERILE DISP- MEGADYNE  (5/CA)

## (undated) DEVICE — HEAD HOLDER JUNIOR/ADULT

## (undated) DEVICE — SET LEADWIRE 5 LEAD BEDSIDE DISPOSABLE ECG (1SET OF 5/EA)

## (undated) DEVICE — KIT ANESTHESIA W/CIRCUIT & 3/LT BAG W/FILTER (20EA/CA)

## (undated) DEVICE — MASK ANESTHESIA ADULT  - (100/CA)

## (undated) DEVICE — STAPLE 60MM WHTE 2.6MM - (12/BX) WAS PART #ECR60W

## (undated) DEVICE — SET IRRIGATION CYSTOSCOPY TUBE L80 IN (20EA/CA)

## (undated) DEVICE — BANDAID SHEER STRIP 3/4 IN (100EA/BX 12BX/CA)

## (undated) DEVICE — ENDOSTITCH10MM SUTURING DEVIC - (3/CA)

## (undated) DEVICE — KIT ROOM DECONTAMINATION

## (undated) DEVICE — TUBING CLEARLINK DUO-VENT - C-FLO (48EA/CA)

## (undated) DEVICE — SUTURE 2-0 PDS II CT-2 - (36/BX)

## (undated) DEVICE — TRAY SRGPRP PVP IOD WT PRP - (20/CA)

## (undated) DEVICE — GOWN WARMING STANDARD FLEX - (30/CA)

## (undated) DEVICE — TUBE E-T HI-LO CUFF 7.0MM (10EA/PK)

## (undated) DEVICE — STAPLE 60MM GRAY (12/BX)

## (undated) DEVICE — HUMID-VENT HEAT AND MOISTURE EXCHANGE- (50/BX)

## (undated) DEVICE — GLOVE BIOGEL SZ 6 PF LATEX - (50EA/BX 4BX/CA)

## (undated) DEVICE — TUBING INSUFFLATION - (10/BX)

## (undated) DEVICE — SODIUM CHL IRRIGATION 0.9% 1000ML (12EA/CA)

## (undated) DEVICE — SUTURE 4-0 VICRYL PLUSFS-1 - 27 INCH (36/BX)

## (undated) DEVICE — TUBE CONNECTING SUCTION - CLEAR PLASTIC STERILE 72 IN (50EA/CA)

## (undated) DEVICE — ELECTRODE 850 FOAM ADHESIVE - HYDROGEL RADIOTRNSPRNT (50/PK)

## (undated) DEVICE — PROTECTOR ULNA NERVE - (36PR/CA)

## (undated) DEVICE — ARMREST CRADLE FOAM - (2PR/PK 12PR/CA)

## (undated) DEVICE — SENSOR SPO2 NEO LNCS ADHESIVE (20/BX) SEE USER NOTES

## (undated) DEVICE — SYRINGE 10 ML CONTROL LL (25EA/BX 4BX/CA)

## (undated) DEVICE — STAPLE BLUE 60MM X 3.5MM LEG (12EA/BX)***REPLACED USE #11149 PART #GST60B****

## (undated) DEVICE — LIGASURE TISSUE FUSION  - SINGLE USE (6/CA)

## (undated) DEVICE — TRAY IRRI.W/PISTON SYRINGE & - GRADUATE  (20EA/CA)

## (undated) DEVICE — SUTURE 0 VICRYL PLUS CT-1 - 36 INCH (36/BX)

## (undated) DEVICE — SUTURE 2-0 VICRYL PLUS CT-1 36 (36PK/BX)"

## (undated) DEVICE — ELECTRODE DUAL RETURN W/ CORD - (50/PK)

## (undated) DEVICE — SYRINGE 50ML CATHETER TIP (40EA/BX 4BX/CA)

## (undated) DEVICE — BAG, SPONGE COUNT 50600

## (undated) DEVICE — Device

## (undated) DEVICE — STAPLER POWERED 60MM (3EA/BX)

## (undated) DEVICE — TRAY FOLEY CATHETER STATLOCK 16FR SURESTEP  (10EA/CA)

## (undated) DEVICE — RELOAD WITH GRIPPING SURFACE TECHNOLOGY GOLD 60MM (12EA/BX)

## (undated) DEVICE — GLOVE, LITE (PAIR)

## (undated) DEVICE — PAD SANITARY 11IN MAXI IND WRAPPED  (12EA/PK 24PK/CA)

## (undated) DEVICE — DETERGENT RENUZYME PLUS 10 OZ PACKET (50/BX)

## (undated) DEVICE — TROCAR 5X100 NON BLADED Z-TH - READ KII (6/BX)

## (undated) DEVICE — CANISTER SUCTION 3000ML MECHANICAL FILTER AUTO SHUTOFF MEDI-VAC NONSTERILE LF DISP  (40EA/CA)

## (undated) DEVICE — KIT  I.V. START (100EA/CA)

## (undated) DEVICE — CLIP APPLIER 10MM ENDO LARGE (3EA/BX)

## (undated) DEVICE — CANISTER SUCTION RIGID RED 1500CC (40EA/CA)

## (undated) DEVICE — CANNULA W/SEAL 5X100 Z-THRE - ADED KII (12/BX)

## (undated) DEVICE — NEPTUNE 4 PORT MANIFOLD - (20/PK)

## (undated) DEVICE — SEALER ARTICULATING TISSUE NSEAL (6/BX)

## (undated) DEVICE — SPONGE XRAY 8X4 STERL. 12PL - (10EA/TY 80TY/CA)

## (undated) DEVICE — SPONGE GAUZESTER. 2X2 4-PL - (2/PK 50PK/BX 30BX/CS)

## (undated) DEVICE — DRAPE VAGINAL BIB W/ POUCH (10EA/CA)

## (undated) DEVICE — SUTURE 0 COATED VICRYL 6-18IN - (12PK/BX)

## (undated) DEVICE — TUBE CONNECT SUCTION CLEAR 120 X 1/4" (50EA/CA)"

## (undated) DEVICE — GLOVE, BIOGEL ECLIPSE, SZ 7.0, PF LTX (50/BX)

## (undated) DEVICE — SCISSORS 5MM CVD (6EA/BX)

## (undated) DEVICE — SUTURE 0 VICRYL PLUS CT-2 - 8 X 18 INCH (12/BX)

## (undated) DEVICE — GLOVE BIOGEL INDICATOR SZ 7.5 SURGICAL PF LTX - (50PR/BX 4BX/CA)

## (undated) DEVICE — PERISTRIP 60 STAPLE LINE REINFORCEMENT (6EA/CA)

## (undated) DEVICE — SLEEVE, VASO, THIGH, MED

## (undated) DEVICE — WATER IRRIG. STER 3000 ML - (4/CA)

## (undated) DEVICE — SUCTION INSTRUMENT YANKAUER BULBOUS TIP W/O VENT (50EA/CA)

## (undated) DEVICE — GOWN SURGEONS LARGE - (32/CA)

## (undated) DEVICE — BLADE SURGICAL CLIPPER - (50EA/CA)

## (undated) DEVICE — CHLORAPREP 26 ML APPLICATOR - ORANGE TINT(25/CA)

## (undated) DEVICE — WATER IRRIGATION STERILE 1000ML (12EA/CA)

## (undated) DEVICE — GLOVE BIOGEL INDICATOR SZ 6 SURGICAL PF LTX -(50/BX)

## (undated) DEVICE — BENZOIN TINCTURE AMPULE

## (undated) DEVICE — ENDOSTITCH  POLY 0 48 VIO DLU - (12EA/CA)

## (undated) DEVICE — ENDOSTITCH LOAD UNIT 2-0 POLY (12EA/CA)

## (undated) DEVICE — SET SUCTION/IRRIGATION WITH DISPOSABLE TIP (6/CA )PART #0250-070-520 IS A SUB

## (undated) DEVICE — CATHETER IV 20 GA X 1-1/4 ---SURG.& SDS ONLY--- (50EA/BX)

## (undated) DEVICE — DRESSING TRANSPARENT FILM TEGADERM 2.375 X 2.75"  (100EA/BX)"

## (undated) DEVICE — CANNULA W/SEAL12X100ZTHREAD - (12/BX)

## (undated) DEVICE — BAG RETRIEVAL 10ML (10EA/BX)

## (undated) DEVICE — DEVICE SUTURE CAPTURING

## (undated) DEVICE — GLOVE BIOGEL SZ 7 SURGICAL PF LTX - (50PR/BX 4BX/CA)

## (undated) DEVICE — TROCAR Z THREAD12MM OPTICAL - NON BLADED (6/BX)

## (undated) DEVICE — PACK LAP CHOLE OR - (2EA/CA)

## (undated) DEVICE — STERI STRIP COMPOUND BENZOIN - TINCTURE 0.6ML WITH APPLICATOR (40EA/BX)

## (undated) DEVICE — PACKING VAG 2 X-RAY (24EA/CS)